# Patient Record
Sex: FEMALE | Race: AMERICAN INDIAN OR ALASKA NATIVE | NOT HISPANIC OR LATINO | Employment: OTHER | URBAN - METROPOLITAN AREA
[De-identification: names, ages, dates, MRNs, and addresses within clinical notes are randomized per-mention and may not be internally consistent; named-entity substitution may affect disease eponyms.]

---

## 2017-01-20 ENCOUNTER — SLEEP CENTER VISIT (OUTPATIENT)
Dept: SLEEP MEDICINE | Facility: MEDICAL CENTER | Age: 62
End: 2017-01-20
Payer: OTHER GOVERNMENT

## 2017-01-20 VITALS
DIASTOLIC BLOOD PRESSURE: 72 MMHG | SYSTOLIC BLOOD PRESSURE: 108 MMHG | HEIGHT: 63 IN | BODY MASS INDEX: 51.91 KG/M2 | RESPIRATION RATE: 16 BRPM | WEIGHT: 293 LBS | TEMPERATURE: 96.6 F | OXYGEN SATURATION: 97 % | HEART RATE: 96 BPM

## 2017-01-20 DIAGNOSIS — G47.33 OSA (OBSTRUCTIVE SLEEP APNEA): ICD-10-CM

## 2017-01-20 PROCEDURE — 99213 OFFICE O/P EST LOW 20 MIN: CPT | Performed by: NURSE PRACTITIONER

## 2017-01-20 NOTE — PROGRESS NOTES
"Chief Complaint   Patient presents with   • Apnea     1 Yr Follow Up         HPI:  This is a 61 y.o. female with a history of obstructive sleep apnea.  Polysomnogram indicates AHI 55.7 and minimum saturation 84%. The patient is compliant with CPAP 15 cm. Compliance dated 12/21-1/19/17 indicates 100% compliance for 9 hours 48 minutes. AHI is been reduced to 0.3. The patient has no problems tolerating the mask pressure. She is well rested when she wakes up. There have been no changes to her health in the past year. She denies early morning headaches and lower extremity edema.    Past Medical History   Diagnosis Date   • Hypertension    • Thyroid disease    • Sleep apnea    • Back pain    • Kidney stone    • Osteoporosis    • Post-nasal drip    • Obesity    • HENRI (obstructive sleep apnea) 1/20/2017     AHI 55.7, minimum saturation 84%, on CPAP 13 cm.       Past Surgical History   Procedure Laterality Date   • Cholecystectomy         Social History   Substance Use Topics   • Smoking status: Former Smoker -- 0.25 packs/day     Types: Cigarettes     Quit date: 04/27/2011   • Smokeless tobacco: Never Used   • Alcohol Use: 0.0 oz/week     0 Standard drinks or equivalent per week      Comment: socially       ROS:   Constitutional: Denies fevers, chills, sweats, fatigue, and weight loss.  Eyes: Denies glasses.  Ears/nose/mouth/throat: Denies injury.  Cardiovascular: Denies chest pain, tightness.  Respiratory: Denies shortness of breath, cough, sputum, wheezing, hemoptysis.  GI: Denies heartburn, difficulty swallowing, nausea, and vomiting.  Neurological: Denies frequent headaches, dizziness, weakness.  Sleep: See history of present illness.    Vitals:  Filed Vitals:    01/20/17 1043   Height: 1.6 m (5' 2.99\")   Weight: 132.904 kg (293 lb)   Weight % change since last entry.: 0 %   BP: 108/72   Pulse: 96   BMI (Calculated): 51.92   Resp: 16   Temp: 35.9 °C (96.6 °F)   O2 sat % room air: 97 %     Allergies:  Review of patient's " allergies indicates no known allergies.    Medications.  Current Outpatient Prescriptions   Medication Sig Dispense Refill   • fluticasone (FLONASE) 50 MCG/ACT nasal spray Spray 1 Spray in nose 2 times a day. 16 g 0   • losartan (COZAAR) 25 MG TABS Take 25 mg by mouth every day.     • levothyroxine (SYNTHROID) 100 MCG TABS Take 100 mcg by mouth every day.       No current facility-administered medications for this visit.       PHYSICAL EXAM:  Appearance: Well-developed, well-nourished, no acute distress.  Eyes. PERRL.  Hearing: Grossly intact.  Oropharynx: Tongue normal, posterior pharynx without erythema or exudate.  Respiratory effort: No intercostal retractions or use of accessory muscles.  Lung auscultation: No crackles, wheezing.  Heart auscultation: No murmur, gallop, or rub. Regular rate and rhythm.  Extremities: No cyanosis or edema.  Gait and Station: Normal  Orientation: Oriented to time, place, and person.    Assessment:  1. HENRI (obstructive sleep apnea)           Plan:  1. Continue CPAP 15 cm.  2. Clean equipment weekly and replace lites as allowed by insurance.  3. Patient would like to obtain supplies through different [employer] company other than [employer]. She will go to CPAP and more.    Return in about 1 year (around 1/20/2018) for With BEN Neil.

## 2017-01-20 NOTE — PATIENT INSTRUCTIONS
1. Continue CPAP 15 cm.  2. Clean equipment weekly and replace lites as allowed by insurance.

## 2017-01-20 NOTE — MR AVS SNAPSHOT
"        Mady Gaitan   2017 10:40 AM   Sleep Center Visit   MRN: [id number]    Department:  [facility]   Dept Phone:  [phone]    Description:  Female : 1955   Provider:  JUAN DIEGO Soler           Reason for Visit     Apnea 1 Yr Follow Up      Allergies as of 2017     No Known Allergies      You were diagnosed with     HENRI (obstructive sleep apnea)   [[id number]]         Vital Signs     Blood Pressure Pulse Temperature Respirations Height Weight    108/72 mmHg 96 35.9 °C (96.6 °F) 16 1.6 m (5' 2.99\") 132.904 kg (293 lb)    Body Mass Index Oxygen Saturation Smoking Status             51.92 kg/m2 97% Former Smoker         Basic Information     Date Of Birth Sex Race Ethnicity Preferred Language    1955 Female  or  Non- English      Problem List              ICD-10-CM Priority Class Noted - Resolved    HENRI (obstructive sleep apnea) G47.33   2017 - Present      Health Maintenance        Date Due Completion Dates    IMM DTaP/Tdap/Td Vaccine (1 - Tdap) 1976 ---    PAP SMEAR 1978 ---    COLONOSCOPY 2007 ---    MAMMOGRAM 3/20/2016 3/20/2015, 2011, 2008, 2008, 2006, 2004    IMM INFLUENZA (1) 2016 ---            Current Immunizations     No immunizations on file.      Below and/or attached are the medications your provider expects you to take. Review all of your home medications and newly ordered medications with your provider and/or pharmacist. Follow medication instructions as directed by your provider and/or pharmacist. Please keep your medication list with you and share with your provider. Update the information when medications are discontinued, doses are changed, or new medications (including over-the-counter products) are added; and carry medication information at all times in the event of emergency situations     Allergies:  No Known Allergies          Medications  Valid as of: January " 20, 2017 - 11:02 AM    Generic Name Brand Name Tablet Size Instructions for use    Fluticasone Propionate (Suspension) FLONASE 50 MCG/ACT Spray 1 Spray in nose 2 times a day.        Levothyroxine Sodium (Tab) SYNTHROID 100 MCG Take 100 mcg by mouth every day.        Losartan Potassium (Tab) COZAAR 25 MG Take 25 mg by mouth every day.        .                 Medicines prescribed today were sent to:     [id number] - [place], [place] - [address] AT [facility] [address] [place] [address]    Phone: [phone] Fax: [phone]    Open 24 Hours?: No      Medication refill instructions:       If your prescription bottle indicates you have medication refills left, it is not necessary to call your provider’s office. Please contact your pharmacy and they will refill your medication.    If your prescription bottle indicates you do not have any refills left, you may request refills at any time through one of the following ways: The online [employer] system (except Urgent Care), by calling your provider’s office, or by asking your pharmacy to contact your provider’s office with a refill request. Medication refills are processed only during regular business hours and may not be available until the next business day. Your provider may request additional information or to have a follow-up visit with you prior to refilling your medication.   *Please Note: Medication refills are assigned a new Rx number when refilled electronically. Your pharmacy may indicate that no refills were authorized even though a new prescription for the same medication is available at the pharmacy. Please request the medicine by name with the pharmacy before contacting your provider for a refill.        Instructions    1. Continue CPAP 15 cm.  2. Clean equipment weekly and replace lites as allowed by insurance.               [employer] Access Code: [email or web address]  Expires: 2/19/2017 11:02 AM    [employer] secure,  online tool to manage your health information     [employer]’s [place]® is a secure, online tool that connects you to your personalized health information from the privacy of your home -- day or night - making it very easy for you to manage your healthcare. Once the activation process is completed, you can even access your medical information using the [place] bryan, which is available for free in the Apple Bryan store or Google Play store.     [place] provides the following levels of access (as shown below):   My Chart Features   [facility] Doctor [facility]  Specialists [facility]  Urgent  Care Non[facility]  Primary Care  Doctor   Email your healthcare team securely and privately 24/7 X X X    Manage appointments: schedule your next appointment; view details of past/upcoming appointments X      Request prescription refills. X      View recent personal medical records, including lab and immunizations X X X X   View health record, including health history, allergies, medications X X X X   Read reports about your outpatient visits, procedures, consult and ER notes X X X X   See your discharge summary, which is a recap of your hospital and/or ER visit that includes your diagnosis, lab results, and care plan. X X       How to register for [place]:  1. Go to  [email or web address].  2. Click on the Sign Up Now box, which takes you to the New Member Sign Up page. You will need to provide the following information:  a. Enter your [place] Access Code exactly as it appears at the top of this page. (You will not need to use this code after you’ve completed the sign-up process. If you do not sign up before the expiration date, you must request a new code.)   b. Enter your date of birth.   c. Enter your home email address.   d. Click Submit, and follow the next screen’s instructions.  3. Create a [place] ID. This will be your [place] login ID and cannot be changed, so think of one that is secure and easy to  remember.  4. Create a [employer] password. You can change your password at any time.  5. Enter your Password Reset Question and Answer. This can be used at a later time if you forget your password.   6. Enter your e-mail address. This allows you to receive e-mail notifications when new information is available in [employer].  7. Click Sign Up. You can now view your health information.    For assistance activating your [employer] account, call [phone]

## 2018-01-25 ENCOUNTER — HOSPITAL ENCOUNTER (OUTPATIENT)
Dept: RADIOLOGY | Facility: MEDICAL CENTER | Age: 63
End: 2018-01-25
Attending: FAMILY MEDICINE
Payer: OTHER GOVERNMENT

## 2018-01-25 DIAGNOSIS — Z12.31 ENCOUNTER FOR SCREENING MAMMOGRAM FOR MALIGNANT NEOPLASM OF BREAST: ICD-10-CM

## 2018-01-25 PROCEDURE — 77067 SCR MAMMO BI INCL CAD: CPT

## 2018-03-01 ENCOUNTER — HOSPITAL ENCOUNTER (OUTPATIENT)
Dept: RADIOLOGY | Facility: MEDICAL CENTER | Age: 63
End: 2018-03-01
Attending: FAMILY MEDICINE
Payer: OTHER GOVERNMENT

## 2018-03-01 DIAGNOSIS — E03.9 HYPOTHYROIDISM, UNSPECIFIED TYPE: ICD-10-CM

## 2018-03-01 PROCEDURE — 76536 US EXAM OF HEAD AND NECK: CPT

## 2018-03-08 ENCOUNTER — SLEEP CENTER VISIT (OUTPATIENT)
Dept: SLEEP MEDICINE | Facility: MEDICAL CENTER | Age: 63
End: 2018-03-08
Payer: OTHER GOVERNMENT

## 2018-03-08 VITALS
HEART RATE: 87 BPM | SYSTOLIC BLOOD PRESSURE: 124 MMHG | WEIGHT: 293 LBS | BODY MASS INDEX: 53.92 KG/M2 | RESPIRATION RATE: 15 BRPM | OXYGEN SATURATION: 94 % | HEIGHT: 62 IN | DIASTOLIC BLOOD PRESSURE: 74 MMHG

## 2018-03-08 DIAGNOSIS — G47.33 OSA (OBSTRUCTIVE SLEEP APNEA): ICD-10-CM

## 2018-03-08 PROCEDURE — 99213 OFFICE O/P EST LOW 20 MIN: CPT | Performed by: NURSE PRACTITIONER

## 2018-03-08 NOTE — PROGRESS NOTES
"Chief Complaint   Patient presents with   • Follow-Up     ANNUAL          HPI: This patient is a 62 y.o. female, who presents for annual follow-up of obstructive sleep apnea with compliance download.     PSG indicates severe obstructive sleep apnea with an AHI of 55, minimum oxygen saturation of 84 %. she is compliant with CPAP 15 cm H2O. Compliance download over the past 30 days indicates 90 % compliance, average use of 8 hours 34 minutes per night, AHI of 0.3. Mask and pressures are comfortable. Her machine was greater than 5 years old and does not have heated tubing. She would like a newer machine with heated tubing. She reports dry nares, occasional nosebleed. She has adjusted the humidification. She feels she benefits from therapy. His EDS or a.m. headaches.    Past Medical History:   Diagnosis Date   • Back pain    • Hypertension    • Kidney stone    • Obesity    • HENRI (obstructive sleep apnea) 1/20/2017    AHI 55.7, minimum saturation 84%, on CPAP 13 cm.   • Osteoporosis    • Post-nasal drip    • Sleep apnea    • Thyroid disease        Social History   Substance Use Topics   • Smoking status: Former Smoker     Packs/day: 0.25     Types: Cigarettes     Quit date: 4/27/2011   • Smokeless tobacco: Never Used   • Alcohol use 0.0 oz/week      Comment: socially       Family History   Problem Relation Age of Onset   • Heart Failure Father    • Other Brother      leukemia       Current medications as of today   Current Outpatient Prescriptions   Medication Sig Dispense Refill   • losartan (COZAAR) 25 MG TABS Take 25 mg by mouth every day.     • levothyroxine (SYNTHROID) 100 MCG TABS Take 100 mcg by mouth every day.     • fluticasone (FLONASE) 50 MCG/ACT nasal spray Spray 1 Spray in nose 2 times a day. 16 g 0     No current facility-administered medications for this visit.        Allergies: Patient has no known allergies.    Blood pressure 124/74, pulse 87, resp. rate 15, height 1.575 m (5' 2\"), weight (!) 141.1 kg " (311 lb), SpO2 94 %.      ROS: As per HPI and otherwise negative if not stated.      Physical exam:   Constitutional: Obese, in no acute distress  Eyes: PERRL  Neck: supple, trachea midline  Respiratory: no intercostal retractions or accessory muscle use   Lungs auscultation: Diminished breath sounds throughout  Cardiovascular: Regular rate rhythm no murmurs, rubs or gallops, distant heart tones  Musculoskeletal: no clubbing or cyanosis  Skin: No rashes or lesions noted on exposed skin  Neuro: No focal deficit noted  Psychiatric: Oriented to time, person and place.     Diagnosis:  1. HENRI (obstructive sleep apnea)  DME CPAP   2. BMI 50.0-59.9, adult ([facility])         Plan:  1. Rx for new CPAP, auto 10-20, order to CPAP & more  2. Follow-up in 3 months for compliance download  3. Follow with PCP routinely for health maintenance

## 2018-04-24 ENCOUNTER — OFFICE VISIT (OUTPATIENT)
Dept: NEPHROLOGY | Facility: MEDICAL CENTER | Age: 63
End: 2018-04-24
Payer: OTHER GOVERNMENT

## 2018-04-24 VITALS
HEIGHT: 63 IN | HEART RATE: 104 BPM | SYSTOLIC BLOOD PRESSURE: 110 MMHG | TEMPERATURE: 98 F | WEIGHT: 293 LBS | DIASTOLIC BLOOD PRESSURE: 70 MMHG | BODY MASS INDEX: 51.91 KG/M2 | OXYGEN SATURATION: 96 % | RESPIRATION RATE: 16 BRPM

## 2018-04-24 DIAGNOSIS — I10 ESSENTIAL HYPERTENSION: ICD-10-CM

## 2018-04-24 DIAGNOSIS — N18.30 STAGE 3 CHRONIC KIDNEY DISEASE (HCC): ICD-10-CM

## 2018-04-24 DIAGNOSIS — G47.33 OSA (OBSTRUCTIVE SLEEP APNEA): ICD-10-CM

## 2018-04-24 DIAGNOSIS — M19.90 OSTEOARTHRITIS, UNSPECIFIED OSTEOARTHRITIS TYPE, UNSPECIFIED SITE: ICD-10-CM

## 2018-04-24 PROCEDURE — 99204 OFFICE O/P NEW MOD 45 MIN: CPT | Performed by: INTERNAL MEDICINE

## 2018-04-24 RX ORDER — GLUCOSAMINE/CHONDR SU A SOD 750-600 MG
1 TABLET ORAL DAILY
COMMUNITY

## 2018-04-24 RX ORDER — KRILL/OM-3/DHA/EPA/PHOSPHO/AST 500MG-86MG
1 CAPSULE ORAL DAILY
COMMUNITY

## 2018-04-24 RX ORDER — THYROID 90 MG/1
90 TABLET ORAL DAILY
Status: ON HOLD | COMMUNITY
End: 2021-06-04 | Stop reason: SDUPTHER

## 2018-04-24 RX ORDER — AZELASTINE HCL 205.5 UG/1
SPRAY NASAL
COMMUNITY
End: 2018-09-24

## 2018-04-24 ASSESSMENT — ENCOUNTER SYMPTOMS
NAUSEA: 0
VOMITING: 0
SHORTNESS OF BREATH: 0
HYPERTENSION: 1
BACK PAIN: 1

## 2018-04-24 NOTE — PROGRESS NOTES
"Subjective:      Mady Gaitan is a 62 y.o. female who presents with Hypertension and Chronic Kidney Disease            Patient has a history of chronic back pain, recent right ankle pain, she's taking NSAIDs regularly.      Hypertension   This is a chronic problem. The current episode started more than 1 year ago. The problem is unchanged. The problem is controlled. Pertinent negatives include no chest pain, peripheral edema or shortness of breath. Agents associated with hypertension include thyroid hormones. Risk factors for coronary artery disease include obesity. Past treatments include angiotensin blockers. The current treatment provides significant improvement. There are no compliance problems.  Hypertensive end-organ damage includes kidney disease. Identifiable causes of hypertension include chronic renal disease.   Chronic Kidney Disease   This is a chronic problem. The current episode started more than 1 year ago. The problem occurs constantly. The problem has been unchanged. Pertinent negatives include no chest pain, nausea, urinary symptoms or vomiting.       Review of Systems   Respiratory: Negative for shortness of breath.    Cardiovascular: Negative for chest pain and leg swelling.   Gastrointestinal: Negative for nausea and vomiting.   Genitourinary: Negative for dysuria, frequency and urgency.   Musculoskeletal: Positive for back pain and joint pain.   All other systems reviewed and are negative.         Objective:     /70   Pulse (!) 104   Temp 36.7 °C (98 °F)   Resp 16   Ht 1.6 m (5' 3\")   Wt (!) 145.6 kg (321 lb)   SpO2 96%   BMI 56.86 kg/m²      Physical Exam   Constitutional: She is oriented to person, place, and time. She appears well-developed and well-nourished. No distress.   HENT:   Head: Normocephalic and atraumatic.   Right Ear: External ear normal.   Left Ear: External ear normal.   Nose: Nose normal.   Mouth/Throat: Oropharynx is clear and moist.   Eyes: Conjunctivae " and EOM are normal. Right eye exhibits no discharge. Left eye exhibits no discharge. No scleral icterus.   Neck: No JVD present. No tracheal deviation present. No thyromegaly present.   Cardiovascular: Normal rate and regular rhythm.    Pulmonary/Chest: Effort normal and breath sounds normal. No respiratory distress. She has no wheezes. She has no rales.   Abdominal: Soft. Bowel sounds are normal. She exhibits no distension. There is no tenderness. There is no guarding.   Musculoskeletal: She exhibits no edema or tenderness.   Neurological: She is alert and oriented to person, place, and time. No cranial nerve deficit.   Skin: Skin is warm and dry. She is not diaphoretic. No erythema.   Psychiatric: Her behavior is normal. Judgment and thought content normal.   Nursing note and vitals reviewed.              Assessment/Plan:     1. Essential hypertension  Blood pressure is controlled  Continue low-sodium diet    2. Stage 3 chronic kidney disease  Etiology is most likely hypertensive nephrosclerosis  Patient has no uremic symptoms  Renal dose all medication  Avoid nephrotoxins  No acute need for dialysis    3. HENRI (obstructive sleep apnea)    4. BMI 50.0-59.9, adult (CMS-[facility])  Patient was twice about the benefits of weight loss    5. Osteoarthritis, unspecified osteoarthritis type, unspecified site  Avoid nephrotoxins agents like NSAIDs

## 2018-06-08 ENCOUNTER — SLEEP CENTER VISIT (OUTPATIENT)
Dept: SLEEP MEDICINE | Facility: MEDICAL CENTER | Age: 63
End: 2018-06-08
Payer: OTHER GOVERNMENT

## 2018-06-08 VITALS
RESPIRATION RATE: 15 BRPM | OXYGEN SATURATION: 96 % | BODY MASS INDEX: 51.91 KG/M2 | WEIGHT: 293 LBS | DIASTOLIC BLOOD PRESSURE: 65 MMHG | SYSTOLIC BLOOD PRESSURE: 98 MMHG | HEIGHT: 63 IN | HEART RATE: 79 BPM

## 2018-06-08 DIAGNOSIS — G47.33 OSA (OBSTRUCTIVE SLEEP APNEA): ICD-10-CM

## 2018-06-08 PROCEDURE — 99213 OFFICE O/P EST LOW 20 MIN: CPT | Performed by: NURSE PRACTITIONER

## 2018-06-08 NOTE — PROGRESS NOTES
Chief Complaint   Patient presents with   • Follow-Up     3 M    • Apnea         HPI: This patient is a 62 y.o. female, who presents for three-month follow-up of HENRI with compliance check.  She obtained a new machine after her last visit.    PSG indicates severe obstructive sleep apnea with an AHI of 55, minimum oxygen saturation of 84 %. she is compliant with auto CPAP 12-20 cm H2O. Compliance download over the past 30 days indicates 80 % compliance, average use of 9 hours 28 minutes per night, AHI of 0.2. Mask and pressures are comfortable.  She uses her old machine on occasion, but uses one or the other every single night.  She reports 100% compliance.  She cannot sleep without her machine.  She reports benefiting from therapy.  Denies EDS or a.m. headache.    Past Medical History:   Diagnosis Date   • Back pain    • Hypertension    • Kidney stone    • Obesity    • HENRI (obstructive sleep apnea) 1/20/2017    AHI 55.7, minimum saturation 84%, on CPAP 13 cm.   • Osteoporosis    • Post-nasal drip    • Sleep apnea    • Thyroid disease        Social History   Substance Use Topics   • Smoking status: Former Smoker     Packs/day: 0.25     Types: Cigarettes     Quit date: 4/27/2011   • Smokeless tobacco: Never Used   • Alcohol use 0.0 oz/week      Comment: socially       Family History   Problem Relation Age of Onset   • Heart Failure Father    • Other Brother      leukemia   • Sleep Apnea Neg Hx        Current medications as of today   Current Outpatient Prescriptions   Medication Sig Dispense Refill   • thyroid (ARMOUR THYROID) 90 MG Tab Take 90 mg by mouth every day.     • Cholecalciferol (VITAMIN D3) 5000 units Cap Take 1 Cap by mouth every day.     • Krill Oil 500 MG Cap Take  by mouth.     • Magnesium 400 MG Cap Take  by mouth.     • Lutein-Zeaxanthin (OCUVITE LUTEIN 25) 25-5 MG Cap Take  by mouth.     • Multiple Vitamins-Minerals (MULTIVITAMIN ADULT PO) Take  by mouth.     • Azelastine HCl 0.15 % Solution Spray  in  "nose.     • fluticasone (FLONASE) 50 MCG/ACT nasal spray Spray 1 Spray in nose 2 times a day. 16 g 0   • losartan (COZAAR) 25 MG TABS Take 25 mg by mouth every day.     • levothyroxine (SYNTHROID) 100 MCG TABS Take 100 mcg by mouth every day.       No current facility-administered medications for this visit.        Allergies: Patient has no known allergies.    Blood pressure (!) 98/65, pulse 79, resp. rate 15, height 1.6 m (5' 3\"), weight (!) 145.6 kg (321 lb), SpO2 96 %.      ROS: As per HPI and otherwise negative if not stated.      Physical exam:   Constitutional: Well-nourished, well-developed, in no acute distress  Eyes: PERRL  Neck: supple, trachea midline  Respiratory: no intercostal retractions or accessory muscle use   Musculoskeletal: no clubbing or cyanosis  Skin: No rashes or lesions noted on exposed skin  Neuro: No focal deficit noted  Psychiatric: Oriented to time, person and place.     Diagnosis:  1. HENRI (obstructive sleep apnea)     2. BMI 50.0-59.9, adult ([facility])         Plan:  1. Continue CPAP nightly  2. Clean mask & tubing weekly  3. Replace supplies as insurance will allow  4. Follow up annually, sooner if needed    "

## 2018-09-24 DIAGNOSIS — Z01.812 PRE-OPERATIVE LABORATORY EXAMINATION: ICD-10-CM

## 2018-09-24 DIAGNOSIS — Z01.810 PRE-OPERATIVE CARDIOVASCULAR EXAMINATION: ICD-10-CM

## 2018-09-24 LAB — EKG IMPRESSION: NORMAL

## 2018-09-24 PROCEDURE — 93010 ELECTROCARDIOGRAM REPORT: CPT | Performed by: INTERNAL MEDICINE

## 2018-09-24 PROCEDURE — 36415 COLL VENOUS BLD VENIPUNCTURE: CPT

## 2018-09-24 PROCEDURE — 80048 BASIC METABOLIC PNL TOTAL CA: CPT

## 2018-09-24 PROCEDURE — 93005 ELECTROCARDIOGRAM TRACING: CPT

## 2018-09-24 RX ORDER — IBUPROFEN 600 MG/1
800 TABLET ORAL EVERY 6 HOURS PRN
COMMUNITY

## 2018-09-24 RX ORDER — LORATADINE 10 MG/1
1 CAPSULE, LIQUID FILLED ORAL DAILY
Status: ON HOLD | COMMUNITY
End: 2021-06-04

## 2018-09-24 NOTE — OR NURSING
"Pre-admit appointment completed. \"Preparing for your procedure\" sheet given to pt along with verbal and written instructions. Pt instructed to continue regularly prescribed medications through the day before surgery. Pt instructed to take the following medications the day of surgery with a sip of water, per anesthesia protocol; thyroid.  "

## 2018-09-25 LAB
ANION GAP SERPL CALC-SCNC: 8 MMOL/L (ref 0–11.9)
BUN SERPL-MCNC: 22 MG/DL (ref 8–22)
CALCIUM SERPL-MCNC: 10 MG/DL (ref 8.5–10.5)
CHLORIDE SERPL-SCNC: 106 MMOL/L (ref 96–112)
CO2 SERPL-SCNC: 29 MMOL/L (ref 20–33)
CREAT SERPL-MCNC: 1.18 MG/DL (ref 0.5–1.4)
GLUCOSE SERPL-MCNC: 122 MG/DL (ref 65–99)
POTASSIUM SERPL-SCNC: 4.2 MMOL/L (ref 3.6–5.5)
SODIUM SERPL-SCNC: 143 MMOL/L (ref 135–145)

## 2018-09-27 ENCOUNTER — HOSPITAL ENCOUNTER (OUTPATIENT)
Facility: MEDICAL CENTER | Age: 63
End: 2018-09-27
Attending: INTERNAL MEDICINE | Admitting: INTERNAL MEDICINE
Payer: OTHER GOVERNMENT

## 2018-09-27 VITALS
SYSTOLIC BLOOD PRESSURE: 125 MMHG | RESPIRATION RATE: 16 BRPM | HEART RATE: 90 BPM | TEMPERATURE: 97.5 F | DIASTOLIC BLOOD PRESSURE: 65 MMHG | OXYGEN SATURATION: 98 % | WEIGHT: 293 LBS | BODY MASS INDEX: 51.91 KG/M2 | HEIGHT: 63 IN

## 2018-09-27 LAB — PATHOLOGY CONSULT NOTE: NORMAL

## 2018-09-27 PROCEDURE — 160048 HCHG OR STATISTICAL LEVEL 1-5: Performed by: INTERNAL MEDICINE

## 2018-09-27 PROCEDURE — 88305 TISSUE EXAM BY PATHOLOGIST: CPT | Mod: 59

## 2018-09-27 PROCEDURE — 160009 HCHG ANES TIME/MIN: Performed by: INTERNAL MEDICINE

## 2018-09-27 PROCEDURE — 160046 HCHG PACU - 1ST 60 MINS PHASE II: Performed by: INTERNAL MEDICINE

## 2018-09-27 PROCEDURE — 160035 HCHG PACU - 1ST 60 MINS PHASE I: Performed by: INTERNAL MEDICINE

## 2018-09-27 PROCEDURE — 160025 RECOVERY II MINUTES (STATS): Performed by: INTERNAL MEDICINE

## 2018-09-27 PROCEDURE — 160002 HCHG RECOVERY MINUTES (STAT): Performed by: INTERNAL MEDICINE

## 2018-09-27 PROCEDURE — 160204 HCHG ENDO MINUTES - 1ST 30 MINS LEVEL 5: Performed by: INTERNAL MEDICINE

## 2018-09-27 PROCEDURE — 160209 HCHG ENDO MINUTES - EA ADDL 1 MIN LEVEL 5: Performed by: INTERNAL MEDICINE

## 2018-09-27 RX ORDER — LABETALOL HYDROCHLORIDE 5 MG/ML
5 INJECTION, SOLUTION INTRAVENOUS
Status: DISCONTINUED | OUTPATIENT
Start: 2018-09-27 | End: 2018-09-27 | Stop reason: HOSPADM

## 2018-09-27 RX ORDER — MEPERIDINE HYDROCHLORIDE 25 MG/ML
6.25 INJECTION INTRAMUSCULAR; INTRAVENOUS; SUBCUTANEOUS
Status: DISCONTINUED | OUTPATIENT
Start: 2018-09-27 | End: 2018-09-27 | Stop reason: HOSPADM

## 2018-09-27 RX ORDER — ONDANSETRON 2 MG/ML
4 INJECTION INTRAMUSCULAR; INTRAVENOUS
Status: DISCONTINUED | OUTPATIENT
Start: 2018-09-27 | End: 2018-09-27 | Stop reason: HOSPADM

## 2018-09-27 RX ORDER — HALOPERIDOL 5 MG/ML
1 INJECTION INTRAMUSCULAR
Status: DISCONTINUED | OUTPATIENT
Start: 2018-09-27 | End: 2018-09-27 | Stop reason: HOSPADM

## 2018-09-27 RX ORDER — GLYCOPYRROLATE 0.2 MG/ML
0.2 INJECTION INTRAMUSCULAR; INTRAVENOUS
Status: DISCONTINUED | OUTPATIENT
Start: 2018-09-27 | End: 2018-09-27 | Stop reason: HOSPADM

## 2018-09-27 RX ORDER — NALOXONE HYDROCHLORIDE 0.4 MG/ML
0.1 INJECTION, SOLUTION INTRAMUSCULAR; INTRAVENOUS; SUBCUTANEOUS PRN
Status: DISCONTINUED | OUTPATIENT
Start: 2018-09-27 | End: 2018-09-27 | Stop reason: HOSPADM

## 2018-09-27 RX ORDER — DIPHENHYDRAMINE HYDROCHLORIDE 50 MG/ML
12.5 INJECTION INTRAMUSCULAR; INTRAVENOUS
Status: DISCONTINUED | OUTPATIENT
Start: 2018-09-27 | End: 2018-09-27 | Stop reason: HOSPADM

## 2018-09-27 RX ORDER — SODIUM CHLORIDE, SODIUM LACTATE, POTASSIUM CHLORIDE, CALCIUM CHLORIDE 600; 310; 30; 20 MG/100ML; MG/100ML; MG/100ML; MG/100ML
1000 INJECTION, SOLUTION INTRAVENOUS
Status: DISCONTINUED | OUTPATIENT
Start: 2018-09-27 | End: 2018-09-27 | Stop reason: HOSPADM

## 2018-09-27 RX ADMIN — SODIUM CHLORIDE, SODIUM LACTATE, POTASSIUM CHLORIDE, CALCIUM CHLORIDE 1000 ML: 600; 310; 30; 20 INJECTION, SOLUTION INTRAVENOUS at 08:30

## 2018-09-27 ASSESSMENT — PAIN SCALES - GENERAL
PAINLEVEL_OUTOF10: 0

## 2018-09-27 NOTE — OR NURSING
1104-  Patient admitted to PACU from endoscopy. No distress noted by patient at this time. Patient respirations are even and unlabored. Oxygen infusing via facemask.     1110- Patient currently on room air. No distress noted. Patient only complaints are of a headache at this time. Patient abdomen soft and non tender with palpation.     1125- Patient resting in bed. No complaints of pain or discomfort at this time. Soda given to patient per patient request. Patient states that headache has decreased slightly.     1140- Patient resting in bed. Family called and updated on patient statues.     1153- Patient discharged to stage II area. Patient denies any pain or discomfort at this time.

## 2018-09-27 NOTE — DISCHARGE INSTRUCTIONS
COLONOSCOPY OR FLEXIBLE SIGMOIDOSCOPY  1. If you received a barium enema, take a mild laxative such as dulcolax to clean out the barium.   2. Drink plenty of fluids. Eat a diet high in fiber; such foods as whole-grain breads, fresh fruit and vegetables, nuts are recommended.  3. You may notice a few drops of blood with your first bowel movement. If you develop any large amount of bleeding, black stools, a fever, or abdominal pain, call your doctor right away.   4. Call your doctor for test results in 7-10 days or other wise noted by your physician  5. Don't drive or drink alcohol for 24 hours. The medication you received will make you too drowsy.   6. No heavy lifting, ASA products or ASA x 5 days   7. Additional instructions: see below  8. Prescriptions: none    Discharge time: ***    Colonoscopy, Care After  Refer to this sheet in the next few weeks. These instructions provide you with information on caring for yourself after your procedure. Your health care provider may also give you more specific instructions. Your treatment has been planned according to current medical practices, but problems sometimes occur. Call your health care provider if you have any problems or questions after your procedure.  WHAT TO EXPECT AFTER THE PROCEDURE   After your procedure, it is typical to have the following:  · A small amount of blood in your stool.  · Moderate amounts of gas and mild abdominal cramping or bloating.  HOME CARE INSTRUCTIONS  · Do not drive, operate machinery, or sign important documents for 24 hours.  · You may shower and resume your regular physical activities, but move at a slower pace for the first 24 hours.  · Take frequent rest periods for the first 24 hours.  · Walk around or put a warm pack on your abdomen to help reduce abdominal cramping and bloating.  · Drink enough fluids to keep your urine clear or pale yellow.  · You may resume your normal diet as instructed by your health care provider. Avoid  heavy or fried foods that are hard to digest.  · Avoid drinking alcohol for 24 hours or as instructed by your health care provider.  · Only take over-the-counter or prescription medicines as directed by your health care provider.  · If a tissue sample (biopsy) was taken during your procedure:  ¨ Do not take aspirin or blood thinners for 7 days, or as instructed by your health care provider.  ¨ Do not drink alcohol for 7 days, or as instructed by your health care provider.  ¨ Eat soft foods for the first 24 hours.  SEEK MEDICAL CARE IF:  You have persistent spotting of blood in your stool 2-3 days after the procedure.  SEEK IMMEDIATE MEDICAL CARE IF:  · You have more than a small spotting of blood in your stool.  · You pass large blood clots in your stool.  · Your abdomen is swollen (distended).  · You have nausea or vomiting.  · You have a fever.  · You have increasing abdominal pain that is not relieved with medicine.     This information is not intended to replace advice given to you by your health care provider. Make sure you discuss any questions you have with your health care provider.     Document Released: 08/01/2005 Document Revised: 10/08/2014 Document Reviewed: 08/25/2014  [employer] Patient Education ©2016 [employer]      You should call 911 if you develop problems with breathing or chest pain.    Nurse's Signature: ___________________________________    I acknowledge receipt and understanding of these Home Care instructions.

## 2018-09-27 NOTE — OR NURSING
1153: To stage ll. No pain or nausea.  1216: Home care instructions reviewed w/ pt and . No questions, meets criteria for discharge.

## 2018-09-27 NOTE — PROCEDURES
DATE OF SERVICE:  09/27/2018    CONSENT:  Procedure risks and benefits reviewed thoroughly with the patient,   risks including but not limited to bleeding, perforation, side effects of   medications were informed.  Patient voiced understanding and agreed to   proceed.    PHYSICIAN:  Milan Lipscomb MD    ANESTHESIOLOGIST:  Haile Rodgers MD    MEDICATIONS:  Deep sedation.    PROCEDURE PERFORMED:  Colonoscopy with biopsy.    INDICATION FOR PROCEDURE:  1.  Family history of colon cancer.  2.  Second-degree relatives.  3.  Personal history of colon polyps.    PROCEDURE FINDINGS:  Two polyps in the cecum, 1 polyp in the ascending colon,   1 polyp in the transverse colon, 1 polyp in the rectum, all removed by biopsy   forceps.    DESCRIPTION OF PROCEDURE:  The patient was placed in left lateral decubitus   position.  Rectal examination revealed no palpable abnormalities and a   colonoscope was inserted in rectum and advanced to the cecum in a well prepped   colon.  Appendiceal orifice and ileocecal valve were well visualized and   imaged.  Within the cecum, there were 2 polyps, each were removed with biopsy   forceps.  Upon retraction, 3 additional polyps were identified in the   ascending, transverse, and rectum each removed by biopsy forceps.  Retroflexed   view of the rectum was otherwise normal with grade I internal hemorrhoids.    Rectum was desufflated and scope was removed.    COMPLICATIONS:  None.    BLOOD LOSS:  None.    SPECIMENS:  Obtained.    RECOMMENDATIONS:  Five polyps were identified today.  Two of them were over 10   mm in size, specifically one in the cecum and one in the ascending colon.    Review histopathology.  Likely repeat colonoscopy in 3 years' time.  The above   findings and recommendations were reviewed thoroughly with the patient and   the patient's .  All questions were answered to their satisfaction.       ____________________________________     Milan Lipscomb MD    [facility] /  [facility]    DD:  09/27/2018 11:00:25  DT:  09/27/2018 11:28:27    D#:  [id number]  Job#:  [id number]

## 2019-06-28 ENCOUNTER — SLEEP CENTER VISIT (OUTPATIENT)
Dept: SLEEP MEDICINE | Facility: MEDICAL CENTER | Age: 64
End: 2019-06-28
Payer: OTHER GOVERNMENT

## 2019-06-28 VITALS
HEART RATE: 71 BPM | SYSTOLIC BLOOD PRESSURE: 122 MMHG | HEIGHT: 63 IN | RESPIRATION RATE: 16 BRPM | DIASTOLIC BLOOD PRESSURE: 70 MMHG | OXYGEN SATURATION: 96 % | BODY MASS INDEX: 51.91 KG/M2 | WEIGHT: 293 LBS

## 2019-06-28 DIAGNOSIS — G47.33 OSA (OBSTRUCTIVE SLEEP APNEA): ICD-10-CM

## 2019-06-28 PROCEDURE — 99213 OFFICE O/P EST LOW 20 MIN: CPT | Performed by: NURSE PRACTITIONER

## 2019-06-28 ASSESSMENT — ENCOUNTER SYMPTOMS
CARDIOVASCULAR NEGATIVE: 1
WHEEZING: 0
EYE DISCHARGE: 0
SHORTNESS OF BREATH: 0
HEMOPTYSIS: 0
COUGH: 1
SPUTUM PRODUCTION: 0
MUSCULOSKELETAL NEGATIVE: 1
PSYCHIATRIC NEGATIVE: 1
EYE PAIN: 0
CONSTITUTIONAL NEGATIVE: 1
BRUISES/BLEEDS EASILY: 0

## 2019-06-28 NOTE — PROGRESS NOTES
"Chief Complaint   Patient presents with   • Apnea     Last Seen 06/08/18         HPI: This patient is a 63 y.o. female, who presents for annual follow-up of obstructive sleep apnea with compliance check.     PSG indicates severe obstructive sleep apnea with an AHI of 55, minimum oxygen saturation of 84 %. she is compliant with auto CPAP 12-20 cm H2O.  She has 2 machines that she will alternate between due to travel.  Compliance download over the past 30 days indicates 100% between both machines, average use of 9 hours per night, AHI of 0.4. Patient reports benefiting from therapy.  She says she cannot sleep without her machine.  She even naps with her machine on.  She denies EDS or a.m. headache.  She is currently getting over a URI was treated with abx.  Respiratory symptoms are improving.  She denies fevers or chills.  She denies significant changes to her health in the past year.  She has been getting supplies regularly through CPAP and more.      Past Medical History:   Diagnosis Date   • Back pain    • Chronic low back pain 09/25/2018   • Elevated blood sugar     States she was told not yet \"pre diabetic\" gets HbA1C   • Hypertension    • Kidney stone 1980's   • Obesity    • HENRI (obstructive sleep apnea) 1/20/2017    AHI 55.7, minimum saturation 84%, on CPAP 13 cm.   • Osteoporosis    • Post-nasal drip    • Seasonal allergies    • Sleep apnea    • Thyroid disease        Social History   Substance Use Topics   • Smoking status: Former Smoker     Packs/day: 0.50     Years: 30.00     Types: Cigarettes     Quit date: 11/2017   • Smokeless tobacco: Never Used   • Alcohol use 0.0 oz/week      Comment: 1-2 per month       Family History   Problem Relation Age of Onset   • Heart Failure Father    • Other Brother         leukemia   • Sleep Apnea Neg Hx          There is no immunization history on file for this patient.    Current medications as of today   Current Outpatient Prescriptions   Medication Sig Dispense Refill " "  • Loratadine 10 MG Cap Take 1 Cap by mouth every day.     • thyroid (ARMOUR THYROID) 90 MG Tab Take 90 mg by mouth every day.     • Cholecalciferol (VITAMIN D3) 5000 units Cap Take 1 Cap by mouth every day.     • Krill Oil 500 MG Cap Take 1 Cap by mouth every day.     • Magnesium 400 MG Cap Take 1 Cap by mouth every day.     • Lutein-Zeaxanthin (OCUVITE LUTEIN 25) 25-5 MG Cap Take 1 Cap by mouth every day.     • losartan (COZAAR) 25 MG TABS Take 25 mg by mouth every day.     • ibuprofen (MOTRIN) 600 MG Tab Take 600 mg by mouth every 6 hours as needed.       No current facility-administered medications for this visit.        Allergies: Patient has no known allergies.    /70 (BP Location: Left arm, Patient Position: Sitting, BP Cuff Size: Adult)   Pulse 71   Resp 16   Ht 1.6 m (5' 3\")   Wt (!) 142.4 kg (314 lb)   SpO2 96%       Review of Systems   Constitutional: Negative.    HENT: Negative.    Eyes: Negative for pain and discharge.   Respiratory: Positive for cough. Negative for hemoptysis, sputum production, shortness of breath and wheezing.    Cardiovascular: Negative.    Musculoskeletal: Negative.    Skin: Negative.    Endo/Heme/Allergies: Negative for environmental allergies. Does not bruise/bleed easily.   Psychiatric/Behavioral: Negative.        Physical Exam   Constitutional: She is oriented to person, place, and time and well-developed, well-nourished, and in no distress.   HENT:   Head: Normocephalic and atraumatic.   Eyes: Pupils are equal, round, and reactive to light.   Neck: Normal range of motion. Neck supple. No tracheal deviation present.   Cardiovascular: Normal rate, regular rhythm and normal heart sounds.    Pulmonary/Chest: Effort normal and breath sounds normal. No respiratory distress. She has no wheezes. She has no rales.   Musculoskeletal: Normal range of motion.   Neurological: She is alert and oriented to person, place, and time.   Skin: Skin is warm and dry.   Psychiatric: " Mood, memory, affect and judgment normal.   Vitals reviewed.      Diagnoses/Plan:    1. HENRI (obstructive sleep apnea)   Continue CPAP nightly, Clean mask & tubing weekly, Replace supplies as insurance will allow, RX for new supplies to DME  - DME Mask and Supplies    Follow-up annually, sooner if needed    This dictation was created using voice recognition software. The accuracy of the dictation is limited to the abilities of the software. I expect there may be some errors of grammar and possibly content.

## 2019-07-15 ENCOUNTER — TELEPHONE (OUTPATIENT)
Dept: NEPHROLOGY | Facility: MEDICAL CENTER | Age: 64
End: 2019-07-15

## 2019-07-15 NOTE — TELEPHONE ENCOUNTER
Patient has an appointment with you on 7/23/19. Would you like her to do any labs prior to her appointment? If so, please place the orders.    Thank you

## 2019-07-22 ENCOUNTER — TELEPHONE (OUTPATIENT)
Dept: NEPHROLOGY | Facility: MEDICAL CENTER | Age: 64
End: 2019-07-22

## 2019-07-23 ENCOUNTER — OFFICE VISIT (OUTPATIENT)
Dept: NEPHROLOGY | Facility: MEDICAL CENTER | Age: 64
End: 2019-07-23
Payer: OTHER GOVERNMENT

## 2019-07-23 VITALS
HEIGHT: 63 IN | BODY MASS INDEX: 51.91 KG/M2 | RESPIRATION RATE: 16 BRPM | SYSTOLIC BLOOD PRESSURE: 126 MMHG | DIASTOLIC BLOOD PRESSURE: 74 MMHG | TEMPERATURE: 97.6 F | WEIGHT: 293 LBS | OXYGEN SATURATION: 97 % | HEART RATE: 77 BPM

## 2019-07-23 DIAGNOSIS — N18.30 STAGE 3 CHRONIC KIDNEY DISEASE (HCC): ICD-10-CM

## 2019-07-23 DIAGNOSIS — I10 ESSENTIAL HYPERTENSION: ICD-10-CM

## 2019-07-23 PROCEDURE — 99214 OFFICE O/P EST MOD 30 MIN: CPT | Performed by: INTERNAL MEDICINE

## 2019-07-23 ASSESSMENT — ENCOUNTER SYMPTOMS
CHILLS: 0
FEVER: 0
VOMITING: 0
SHORTNESS OF BREATH: 0
HYPERTENSION: 1
COUGH: 0
NAUSEA: 0

## 2019-07-23 NOTE — PROGRESS NOTES
"Subjective:      Mady Gaitan is a 63 y.o. female who presents with Chronic Kidney Disease and Hypertension            Chronic Kidney Disease   This is a chronic problem. The current episode started more than 1 year ago. The problem occurs constantly. The problem has been unchanged. Pertinent negatives include no chest pain, chills, coughing, fever, nausea, urinary symptoms or vomiting. Nothing aggravates the symptoms.   Hypertension   This is a chronic problem. The current episode started more than 1 year ago. The problem is unchanged. The problem is controlled. Pertinent negatives include no chest pain, malaise/fatigue, peripheral edema or shortness of breath. Risk factors for coronary artery disease include diabetes mellitus, obesity and post-menopausal state. Past treatments include angiotensin blockers. The current treatment provides significant improvement. There are no compliance problems.  Hypertensive end-organ damage includes kidney disease. Identifiable causes of hypertension include chronic renal disease.       Review of Systems   Constitutional: Negative for chills, fever and malaise/fatigue.   Respiratory: Negative for cough and shortness of breath.    Cardiovascular: Negative for chest pain and leg swelling.   Gastrointestinal: Negative for nausea and vomiting.   Genitourinary: Negative for dysuria, frequency and urgency.          Objective:     /74 (BP Location: Right arm, Patient Position: Sitting)   Pulse 77   Temp 36.4 °C (97.6 °F)   Resp 16   Ht 1.6 m (5' 3\")   Wt (!) 138.8 kg (306 lb)   SpO2 97%   BMI 54.21 kg/m²      Physical Exam   Constitutional: She is oriented to person, place, and time. She appears well-developed and well-nourished. No distress.   HENT:   Head: Normocephalic and atraumatic.   Right Ear: External ear normal.   Left Ear: External ear normal.   Nose: Nose normal.   Eyes: Conjunctivae are normal. Right eye exhibits no discharge. Left eye exhibits no " "discharge. No scleral icterus.   Cardiovascular: Normal rate and regular rhythm.    Pulmonary/Chest: Effort normal and breath sounds normal. No respiratory distress.   Musculoskeletal: She exhibits no edema.   Neurological: She is alert and oriented to person, place, and time.   Skin: Skin is warm. She is not diaphoretic.   Psychiatric: She has a normal mood and affect. Her behavior is normal.   Nursing note and vitals reviewed.    Past Medical History:   Diagnosis Date   • Back pain    • Chronic low back pain 09/25/2018   • Elevated blood sugar     States she was told not yet \"pre diabetic\" gets HbA1C   • Hypertension    • Kidney stone 1980's   • Obesity    • HENRI (obstructive sleep apnea) 1/20/2017    AHI 55.7, minimum saturation 84%, on CPAP 13 cm.   • Osteoporosis    • Post-nasal drip    • Seasonal allergies    • Sleep apnea    • Thyroid disease      Social History     Social History   • Marital status:      Spouse name: N/A   • Number of children: N/A   • Years of education: N/A     Occupational History   • Not on file.     Social History Main Topics   • Smoking status: Former Smoker     Packs/day: 0.50     Years: 30.00     Types: Cigarettes     Quit date: 11/2017   • Smokeless tobacco: Never Used   • Alcohol use 0.0 oz/week      Comment: 1-2 per month   • Drug use: No   • Sexual activity: Not on file     Other Topics Concern   • Not on file     Social History Narrative   • No narrative on file     Family History   Problem Relation Age of Onset   • Heart Failure Father    • Other Brother         leukemia   • Sleep Apnea Neg Hx      Recent Labs      09/24/18   1045   SODIUM  143   POTASSIUM  4.2   CHLORIDE  106   CO2  29   BUN  22   CREATININE  1.18               Assessment/Plan:     1. Essential hypertension  Controlled  Continue same medication regimen  Continue low-sodium diet      2. Stage 3 chronic kidney disease (HCC)  Stable  No uremic symptoms  Renal dose of medication  Avoid nephrotoxins  Continue " same medication regimen  Check blood test annually

## 2020-05-13 ENCOUNTER — HOSPITAL ENCOUNTER (OUTPATIENT)
Dept: RADIOLOGY | Facility: MEDICAL CENTER | Age: 65
End: 2020-05-13
Attending: PODIATRIST
Payer: OTHER GOVERNMENT

## 2020-05-13 DIAGNOSIS — M25.571 RIGHT ANKLE PAIN, UNSPECIFIED CHRONICITY: ICD-10-CM

## 2020-05-13 DIAGNOSIS — M79.671 BILATERAL FOOT PAIN: ICD-10-CM

## 2020-05-13 DIAGNOSIS — M25.572 LEFT ANKLE PAIN, UNSPECIFIED CHRONICITY: ICD-10-CM

## 2020-05-13 DIAGNOSIS — M79.672 BILATERAL FOOT PAIN: ICD-10-CM

## 2020-05-13 PROCEDURE — 77077 JOINT SURVEY SINGLE VIEW: CPT

## 2020-05-13 PROCEDURE — 73600 X-RAY EXAM OF ANKLE: CPT | Mod: RT

## 2020-05-13 PROCEDURE — 73600 X-RAY EXAM OF ANKLE: CPT | Mod: LT

## 2020-09-02 ENCOUNTER — OFFICE VISIT (OUTPATIENT)
Dept: NEPHROLOGY | Facility: MEDICAL CENTER | Age: 65
End: 2020-09-02
Payer: OTHER GOVERNMENT

## 2020-09-02 VITALS
TEMPERATURE: 96.5 F | HEIGHT: 64 IN | DIASTOLIC BLOOD PRESSURE: 74 MMHG | SYSTOLIC BLOOD PRESSURE: 128 MMHG | BODY MASS INDEX: 50.02 KG/M2 | RESPIRATION RATE: 16 BRPM | WEIGHT: 293 LBS | OXYGEN SATURATION: 96 % | HEART RATE: 93 BPM

## 2020-09-02 DIAGNOSIS — N18.30 STAGE 3 CHRONIC KIDNEY DISEASE: ICD-10-CM

## 2020-09-02 DIAGNOSIS — I10 ESSENTIAL HYPERTENSION: ICD-10-CM

## 2020-09-02 PROCEDURE — 99214 OFFICE O/P EST MOD 30 MIN: CPT | Performed by: INTERNAL MEDICINE

## 2020-09-02 ASSESSMENT — ENCOUNTER SYMPTOMS
FEVER: 0
COUGH: 0
HYPERTENSION: 1
NAUSEA: 0
VOMITING: 0
CHILLS: 0
SHORTNESS OF BREATH: 0

## 2020-09-02 NOTE — PROGRESS NOTES
"Subjective:      Mady Gaitan is a 64 y.o. female who presents with Chronic Kidney Disease and Hypertension            Chronic Kidney Disease  This is a chronic problem. The current episode started more than 1 year ago. The problem occurs constantly. The problem has been unchanged. Pertinent negatives include no chest pain, chills, coughing, fever, nausea, urinary symptoms or vomiting.   Hypertension  This is a chronic problem. The current episode started more than 1 year ago. The problem is unchanged. The problem is controlled. Pertinent negatives include no chest pain, malaise/fatigue, peripheral edema or shortness of breath. Risk factors for coronary artery disease include obesity and post-menopausal state. Past treatments include angiotensin blockers. The current treatment provides significant improvement. There are no compliance problems.  Hypertensive end-organ damage includes kidney disease. Identifiable causes of hypertension include chronic renal disease.       Review of Systems   Constitutional: Negative for chills, fever and malaise/fatigue.   Respiratory: Negative for cough and shortness of breath.    Cardiovascular: Negative for chest pain and leg swelling.   Gastrointestinal: Negative for nausea and vomiting.   Genitourinary: Negative for dysuria, frequency and urgency.          Objective:     /74 (BP Location: Right arm, Patient Position: Sitting, BP Cuff Size: Large adult)   Pulse 93   Temp 35.8 °C (96.5 °F) (Temporal)   Resp 16   Ht 1.619 m (5' 3.75\")   Wt (!) 147.4 kg (325 lb)   SpO2 96%   BMI 56.22 kg/m²      Physical Exam  Vitals signs and nursing note reviewed.   Constitutional:       General: She is not in acute distress.     Appearance: Normal appearance. She is well-developed. She is not diaphoretic.   HENT:      Head: Normocephalic and atraumatic.      Right Ear: External ear normal.      Left Ear: External ear normal.      Nose: Nose normal.   Eyes:      General: No " "scleral icterus.        Right eye: No discharge.         Left eye: No discharge.      Conjunctiva/sclera: Conjunctivae normal.   Cardiovascular:      Rate and Rhythm: Normal rate and regular rhythm.      Heart sounds: No murmur.   Pulmonary:      Effort: Pulmonary effort is normal. No respiratory distress.      Breath sounds: Normal breath sounds.   Musculoskeletal:         General: No tenderness.      Right lower leg: No edema.      Left lower leg: No edema.   Skin:     General: Skin is warm and dry.      Findings: No erythema.   Neurological:      General: No focal deficit present.      Mental Status: She is alert and oriented to person, place, and time.      Cranial Nerves: No cranial nerve deficit.   Psychiatric:         Mood and Affect: Mood normal.         Behavior: Behavior normal.       Past Medical History:   Diagnosis Date   • Back pain    • Chronic low back pain 2018   • Elevated blood sugar     States she was told not yet \"pre diabetic\" gets HbA1C   • Hypertension    • Kidney stone    • Obesity    • HENRI (obstructive sleep apnea) 2017    AHI 55.7, minimum saturation 84%, on CPAP 13 cm.   • Osteoporosis    • Post-nasal drip    • Seasonal allergies    • Sleep apnea    • Thyroid disease      Social History     Socioeconomic History   • Marital status:      Spouse name: Not on file   • Number of children: Not on file   • Years of education: Not on file   • Highest education level: Not on file   Occupational History   • Not on file   Social Needs   • Financial resource strain: Not on file   • Food insecurity     Worry: Not on file     Inability: Not on file   • Transportation needs     Medical: Not on file     Non-medical: Not on file   Tobacco Use   • Smoking status: Former Smoker     Packs/day: 0.50     Years: 30.00     Pack years: 15.00     Types: Cigarettes     Quit date: 2017     Years since quittin.8   • Smokeless tobacco: Never Used   Substance and Sexual Activity   • " Alcohol use: Yes     Alcohol/week: 0.0 oz     Comment: 1-2 per month   • Drug use: No   • Sexual activity: Not on file   Lifestyle   • Physical activity     Days per week: Not on file     Minutes per session: Not on file   • Stress: Not on file   Relationships   • Social connections     Talks on phone: Not on file     Gets together: Not on file     Attends Baptism service: Not on file     Active member of club or organization: Not on file     Attends meetings of clubs or organizations: Not on file     Relationship status: Not on file   • Intimate partner violence     Fear of current or ex partner: Not on file     Emotionally abused: Not on file     Physically abused: Not on file     Forced sexual activity: Not on file   Other Topics Concern   • Not on file   Social History Narrative   • Not on file     Family History   Problem Relation Age of Onset   • Heart Failure Father    • Other Brother         leukemia   • Sleep Apnea Neg Hx      No results for input(s): HGB, ALBUMIN, HDL, TRIGLYCERIDE, SODIUM, POTASSIUM, CHLORIDE, CO2, BUN, CREATININE, PHOSPHORUS in the last 8544 hours.    Invalid input(s): CHOLESTEROL, LDL CLACULATED, URIC ACID, INTACT PTH, PRO CREAT RATIO  Recent labs from August 2020 showed creatinine 1.3 mg/dL            Assessment/Plan:        1. Essential hypertension  Controlled  Continue same medication regimen  Continue low-sodium diet      2. Stage 3 chronic kidney disease (HCC)  Stable  No uremic symptoms  Renal dose of medication  Avoid nephrotoxins  Continue same medication regimen  Check labs annually

## 2021-04-21 ENCOUNTER — HOSPITAL ENCOUNTER (OUTPATIENT)
Facility: MEDICAL CENTER | Age: 66
End: 2021-04-21
Attending: PHYSICIAN ASSISTANT
Payer: OTHER GOVERNMENT

## 2021-04-21 ENCOUNTER — HOSPITAL ENCOUNTER (INPATIENT)
Facility: MEDICAL CENTER | Age: 66
LOS: 23 days | DRG: 339 | End: 2021-05-14
Attending: EMERGENCY MEDICINE | Admitting: INTERNAL MEDICINE
Payer: OTHER GOVERNMENT

## 2021-04-21 ENCOUNTER — HOSPITAL ENCOUNTER (OUTPATIENT)
Dept: LAB | Facility: MEDICAL CENTER | Age: 66
End: 2021-04-21
Attending: PHYSICIAN ASSISTANT
Payer: OTHER GOVERNMENT

## 2021-04-21 ENCOUNTER — HOSPITAL ENCOUNTER (EMERGENCY)
Facility: MEDICAL CENTER | Age: 66
End: 2021-04-21
Payer: OTHER GOVERNMENT

## 2021-04-21 ENCOUNTER — HOSPITAL ENCOUNTER (OUTPATIENT)
Dept: RADIOLOGY | Facility: MEDICAL CENTER | Age: 66
End: 2021-04-21
Attending: PHYSICIAN ASSISTANT
Payer: OTHER GOVERNMENT

## 2021-04-21 ENCOUNTER — ANESTHESIA (OUTPATIENT)
Dept: SURGERY | Facility: MEDICAL CENTER | Age: 66
DRG: 339 | End: 2021-04-21
Payer: OTHER GOVERNMENT

## 2021-04-21 ENCOUNTER — APPOINTMENT (OUTPATIENT)
Dept: URGENT CARE | Facility: CLINIC | Age: 66
End: 2021-04-21
Payer: OTHER GOVERNMENT

## 2021-04-21 ENCOUNTER — OFFICE VISIT (OUTPATIENT)
Dept: URGENT CARE | Facility: CLINIC | Age: 66
End: 2021-04-21
Payer: OTHER GOVERNMENT

## 2021-04-21 VITALS
HEIGHT: 64 IN | BODY MASS INDEX: 50.02 KG/M2 | RESPIRATION RATE: 16 BRPM | SYSTOLIC BLOOD PRESSURE: 112 MMHG | DIASTOLIC BLOOD PRESSURE: 64 MMHG | OXYGEN SATURATION: 93 % | WEIGHT: 293 LBS | HEART RATE: 86 BPM | TEMPERATURE: 97.1 F

## 2021-04-21 DIAGNOSIS — N28.9 FUNCTION KIDNEY DECREASED: ICD-10-CM

## 2021-04-21 DIAGNOSIS — K35.30 ACUTE APPENDICITIS WITH LOCALIZED PERITONITIS, WITHOUT PERFORATION, ABSCESS, OR GANGRENE: ICD-10-CM

## 2021-04-21 DIAGNOSIS — R10.31 RIGHT LOWER QUADRANT ABDOMINAL PAIN: ICD-10-CM

## 2021-04-21 DIAGNOSIS — K35.33 ACUTE APPENDICITIS WITH PERFORATION, LOCALIZED PERITONITIS, ABSCESS, AND GANGRENE: ICD-10-CM

## 2021-04-21 DIAGNOSIS — G47.33 OSA (OBSTRUCTIVE SLEEP APNEA): ICD-10-CM

## 2021-04-21 DIAGNOSIS — M19.90 OSTEOARTHRITIS, UNSPECIFIED OSTEOARTHRITIS TYPE, UNSPECIFIED SITE: ICD-10-CM

## 2021-04-21 DIAGNOSIS — K35.80 ACUTE APPENDICITIS, UNSPECIFIED ACUTE APPENDICITIS TYPE: Primary | ICD-10-CM

## 2021-04-21 DIAGNOSIS — E13.21 DIABETIC NEPHROPATHY ASSOCIATED WITH OTHER SPECIFIED DIABETES MELLITUS (HCC): ICD-10-CM

## 2021-04-21 DIAGNOSIS — K35.33 ACUTE APPENDICITIS WITH PERFORATION, LOCALIZED PERITONITIS, AND ABSCESS, UNSPECIFIED WHETHER GANGRENE PRESENT: ICD-10-CM

## 2021-04-21 DIAGNOSIS — N18.32 STAGE 3B CHRONIC KIDNEY DISEASE: ICD-10-CM

## 2021-04-21 DIAGNOSIS — D72.829 LEUKOCYTOSIS, UNSPECIFIED TYPE: ICD-10-CM

## 2021-04-21 LAB
ALBUMIN SERPL BCP-MCNC: 3.5 G/DL (ref 3.2–4.9)
ALBUMIN/GLOB SERPL: 0.8 G/DL
ALP SERPL-CCNC: 111 U/L (ref 30–99)
ALT SERPL-CCNC: 22 U/L (ref 2–50)
ANION GAP SERPL CALC-SCNC: 14 MMOL/L (ref 7–16)
APPEARANCE UR: NORMAL
AST SERPL-CCNC: 19 U/L (ref 12–45)
BASOPHILS # BLD AUTO: 0.4 % (ref 0–1.8)
BASOPHILS # BLD: 0.06 K/UL (ref 0–0.12)
BILIRUB SERPL-MCNC: 0.5 MG/DL (ref 0.1–1.5)
BILIRUB UR STRIP-MCNC: NORMAL MG/DL
BUN SERPL-MCNC: 35 MG/DL (ref 8–22)
CALCIUM SERPL-MCNC: 9.3 MG/DL (ref 8.4–10.2)
CHLORIDE SERPL-SCNC: 103 MMOL/L (ref 96–112)
CO2 SERPL-SCNC: 21 MMOL/L (ref 20–33)
COLOR UR AUTO: YELLOW
CREAT SERPL-MCNC: 1.61 MG/DL (ref 0.5–1.4)
EKG IMPRESSION: NORMAL
EOSINOPHIL # BLD AUTO: 0.32 K/UL (ref 0–0.51)
EOSINOPHIL NFR BLD: 2.4 % (ref 0–6.9)
ERYTHROCYTE [DISTWIDTH] IN BLOOD BY AUTOMATED COUNT: 48 FL (ref 35.9–50)
GLOBULIN SER CALC-MCNC: 4.2 G/DL (ref 1.9–3.5)
GLUCOSE SERPL-MCNC: 116 MG/DL (ref 65–99)
GLUCOSE UR STRIP.AUTO-MCNC: NEGATIVE MG/DL
HCT VFR BLD AUTO: 39.7 % (ref 37–47)
HGB BLD-MCNC: 12.7 G/DL (ref 12–16)
IMM GRANULOCYTES # BLD AUTO: 0.13 K/UL (ref 0–0.11)
IMM GRANULOCYTES NFR BLD AUTO: 1 % (ref 0–0.9)
KETONES UR STRIP.AUTO-MCNC: NEGATIVE MG/DL
LEUKOCYTE ESTERASE UR QL STRIP.AUTO: NORMAL
LIPASE SERPL-CCNC: 44 U/L (ref 7–58)
LYMPHOCYTES # BLD AUTO: 2.3 K/UL (ref 1–4.8)
LYMPHOCYTES NFR BLD: 17.2 % (ref 22–41)
MCH RBC QN AUTO: 28.8 PG (ref 27–33)
MCHC RBC AUTO-ENTMCNC: 32 G/DL (ref 33.6–35)
MCV RBC AUTO: 90 FL (ref 81.4–97.8)
MONOCYTES # BLD AUTO: 0.87 K/UL (ref 0–0.85)
MONOCYTES NFR BLD AUTO: 6.5 % (ref 0–13.4)
NEUTROPHILS # BLD AUTO: 9.68 K/UL (ref 2–7.15)
NEUTROPHILS NFR BLD: 72.5 % (ref 44–72)
NITRITE UR QL STRIP.AUTO: NEGATIVE
NRBC # BLD AUTO: 0 K/UL
NRBC BLD-RTO: 0 /100 WBC
PATHOLOGY CONSULT NOTE: NORMAL
PH UR STRIP.AUTO: 5 [PH] (ref 5–8)
PLATELET # BLD AUTO: 333 K/UL (ref 164–446)
PMV BLD AUTO: 9.5 FL (ref 9–12.9)
POTASSIUM SERPL-SCNC: 3.9 MMOL/L (ref 3.6–5.5)
PROT SERPL-MCNC: 7.7 G/DL (ref 6–8.2)
PROT UR QL STRIP: 30 MG/DL
RBC # BLD AUTO: 4.41 M/UL (ref 4.2–5.4)
RBC UR QL AUTO: NORMAL
SARS-COV+SARS-COV-2 AG RESP QL IA.RAPID: NOTDETECTED
SARS-COV-2 RNA RESP QL NAA+PROBE: NOTDETECTED
SODIUM SERPL-SCNC: 138 MMOL/L (ref 135–145)
SP GR UR STRIP.AUTO: >=1.03
SPECIMEN SOURCE: NORMAL
SPECIMEN SOURCE: NORMAL
UROBILINOGEN UR STRIP-MCNC: 0.2 MG/DL
WBC # BLD AUTO: 13.4 K/UL (ref 4.8–10.8)

## 2021-04-21 PROCEDURE — 87426 SARSCOV CORONAVIRUS AG IA: CPT

## 2021-04-21 PROCEDURE — 700111 HCHG RX REV CODE 636 W/ 250 OVERRIDE (IP): Performed by: ANESTHESIOLOGY

## 2021-04-21 PROCEDURE — 36415 COLL VENOUS BLD VENIPUNCTURE: CPT

## 2021-04-21 PROCEDURE — 501568 HCHG TROCAR, BLUNTPORT 12MM: Performed by: SURGERY

## 2021-04-21 PROCEDURE — 160048 HCHG OR STATISTICAL LEVEL 1-5: Performed by: SURGERY

## 2021-04-21 PROCEDURE — 700111 HCHG RX REV CODE 636 W/ 250 OVERRIDE (IP): Performed by: INTERNAL MEDICINE

## 2021-04-21 PROCEDURE — 99215 OFFICE O/P EST HI 40 MIN: CPT | Performed by: PHYSICIAN ASSISTANT

## 2021-04-21 PROCEDURE — 160041 HCHG SURGERY MINUTES - EA ADDL 1 MIN LEVEL 4: Performed by: SURGERY

## 2021-04-21 PROCEDURE — 700101 HCHG RX REV CODE 250: Performed by: SURGERY

## 2021-04-21 PROCEDURE — 502571 HCHG PACK, LAP CHOLE: Performed by: SURGERY

## 2021-04-21 PROCEDURE — 501583 HCHG TROCAR, THRD CAN&SEAL 5X100: Performed by: SURGERY

## 2021-04-21 PROCEDURE — 0DJD4ZZ INSPECTION OF LOWER INTESTINAL TRACT, PERCUTANEOUS ENDOSCOPIC APPROACH: ICD-10-PCS | Performed by: SURGERY

## 2021-04-21 PROCEDURE — 503366 HCHG TROCAR, 12X150 KII FIOS Z THR: Performed by: SURGERY

## 2021-04-21 PROCEDURE — 94660 CPAP INITIATION&MGMT: CPT

## 2021-04-21 PROCEDURE — 0DTJ0ZZ RESECTION OF APPENDIX, OPEN APPROACH: ICD-10-PCS | Performed by: SURGERY

## 2021-04-21 PROCEDURE — 700101 HCHG RX REV CODE 250: Performed by: ANESTHESIOLOGY

## 2021-04-21 PROCEDURE — 501443 HCHG STAPLER, ROTIC. FLEX: Performed by: SURGERY

## 2021-04-21 PROCEDURE — 81002 URINALYSIS NONAUTO W/O SCOPE: CPT | Performed by: PHYSICIAN ASSISTANT

## 2021-04-21 PROCEDURE — 700111 HCHG RX REV CODE 636 W/ 250 OVERRIDE (IP): Performed by: EMERGENCY MEDICINE

## 2021-04-21 PROCEDURE — 700105 HCHG RX REV CODE 258: Performed by: SURGERY

## 2021-04-21 PROCEDURE — 64488 TAP BLOCK BI INJECTION: CPT | Performed by: SURGERY

## 2021-04-21 PROCEDURE — 501838 HCHG SUTURE GENERAL: Performed by: SURGERY

## 2021-04-21 PROCEDURE — 501463 HCHG STAPLES, UNIV. ROTIC: Performed by: SURGERY

## 2021-04-21 PROCEDURE — 87086 URINE CULTURE/COLONY COUNT: CPT

## 2021-04-21 PROCEDURE — 88304 TISSUE EXAM BY PATHOLOGIST: CPT

## 2021-04-21 PROCEDURE — 99223 1ST HOSP IP/OBS HIGH 75: CPT | Performed by: INTERNAL MEDICINE

## 2021-04-21 PROCEDURE — 160009 HCHG ANES TIME/MIN: Performed by: SURGERY

## 2021-04-21 PROCEDURE — 160029 HCHG SURGERY MINUTES - 1ST 30 MINS LEVEL 4: Performed by: SURGERY

## 2021-04-21 PROCEDURE — 160002 HCHG RECOVERY MINUTES (STAT): Performed by: SURGERY

## 2021-04-21 PROCEDURE — U0003 INFECTIOUS AGENT DETECTION BY NUCLEIC ACID (DNA OR RNA); SEVERE ACUTE RESPIRATORY SYNDROME CORONAVIRUS 2 (SARS-COV-2) (CORONAVIRUS DISEASE [COVID-19]), AMPLIFIED PROBE TECHNIQUE, MAKING USE OF HIGH THROUGHPUT TECHNOLOGIES AS DESCRIBED BY CMS-2020-01-R: HCPCS

## 2021-04-21 PROCEDURE — 700105 HCHG RX REV CODE 258: Performed by: EMERGENCY MEDICINE

## 2021-04-21 PROCEDURE — 501572 HCHG TROCAR, SHIELD OBTU 5X100: Performed by: SURGERY

## 2021-04-21 PROCEDURE — U0005 INFEC AGEN DETEC AMPLI PROBE: HCPCS

## 2021-04-21 PROCEDURE — 93005 ELECTROCARDIOGRAM TRACING: CPT | Performed by: EMERGENCY MEDICINE

## 2021-04-21 PROCEDURE — 770006 HCHG ROOM/CARE - MED/SURG/GYN SEMI*

## 2021-04-21 PROCEDURE — 700102 HCHG RX REV CODE 250 W/ 637 OVERRIDE(OP): Performed by: ANESTHESIOLOGY

## 2021-04-21 PROCEDURE — 85025 COMPLETE CBC W/AUTO DIFF WBC: CPT

## 2021-04-21 PROCEDURE — 160036 HCHG PACU - EA ADDL 30 MINS PHASE I: Performed by: SURGERY

## 2021-04-21 PROCEDURE — 501399 HCHG SPECIMAN BAG, ENDO CATC: Performed by: SURGERY

## 2021-04-21 PROCEDURE — A9270 NON-COVERED ITEM OR SERVICE: HCPCS | Performed by: ANESTHESIOLOGY

## 2021-04-21 PROCEDURE — 99291 CRITICAL CARE FIRST HOUR: CPT

## 2021-04-21 PROCEDURE — A9270 NON-COVERED ITEM OR SERVICE: HCPCS | Performed by: SURGERY

## 2021-04-21 PROCEDURE — 700117 HCHG RX CONTRAST REV CODE 255: Performed by: PHYSICIAN ASSISTANT

## 2021-04-21 PROCEDURE — 94760 N-INVAS EAR/PLS OXIMETRY 1: CPT

## 2021-04-21 PROCEDURE — 500378 HCHG DRAIN, J-VAC ROUND 19FR: Performed by: SURGERY

## 2021-04-21 PROCEDURE — 501450 HCHG STAPLES, ENDO MULTIFIRE: Performed by: SURGERY

## 2021-04-21 PROCEDURE — 500512 HCHG ENDO PEANUT: Performed by: SURGERY

## 2021-04-21 PROCEDURE — 74177 CT ABD & PELVIS W/CONTRAST: CPT

## 2021-04-21 PROCEDURE — 80053 COMPREHEN METABOLIC PANEL: CPT

## 2021-04-21 PROCEDURE — 96365 THER/PROPH/DIAG IV INF INIT: CPT

## 2021-04-21 PROCEDURE — 700101 HCHG RX REV CODE 250: Performed by: INTERNAL MEDICINE

## 2021-04-21 PROCEDURE — 83690 ASSAY OF LIPASE: CPT

## 2021-04-21 PROCEDURE — C9803 HOPD COVID-19 SPEC COLLECT: HCPCS | Performed by: EMERGENCY MEDICINE

## 2021-04-21 PROCEDURE — 700102 HCHG RX REV CODE 250 W/ 637 OVERRIDE(OP): Performed by: SURGERY

## 2021-04-21 PROCEDURE — 160035 HCHG PACU - 1ST 60 MINS PHASE I: Performed by: SURGERY

## 2021-04-21 RX ORDER — HYDROMORPHONE HYDROCHLORIDE 1 MG/ML
0.2 INJECTION, SOLUTION INTRAMUSCULAR; INTRAVENOUS; SUBCUTANEOUS
Status: DISCONTINUED | OUTPATIENT
Start: 2021-04-21 | End: 2021-04-24 | Stop reason: ALTCHOICE

## 2021-04-21 RX ORDER — OXYCODONE HCL 5 MG/5 ML
5 SOLUTION, ORAL ORAL
Status: COMPLETED | OUTPATIENT
Start: 2021-04-21 | End: 2021-04-21

## 2021-04-21 RX ORDER — ROCURONIUM BROMIDE 10 MG/ML
INJECTION, SOLUTION INTRAVENOUS PRN
Status: DISCONTINUED | OUTPATIENT
Start: 2021-04-21 | End: 2021-04-21 | Stop reason: SURG

## 2021-04-21 RX ORDER — ACETAMINOPHEN 325 MG/1
650 TABLET ORAL EVERY 6 HOURS PRN
Status: DISCONTINUED | OUTPATIENT
Start: 2021-04-21 | End: 2021-05-03

## 2021-04-21 RX ORDER — AMOXICILLIN 250 MG
2 CAPSULE ORAL 2 TIMES DAILY
Status: DISCONTINUED | OUTPATIENT
Start: 2021-04-22 | End: 2021-04-25

## 2021-04-21 RX ORDER — PROMETHAZINE HYDROCHLORIDE 25 MG/1
12.5-25 SUPPOSITORY RECTAL EVERY 4 HOURS PRN
Status: DISCONTINUED | OUTPATIENT
Start: 2021-04-21 | End: 2021-05-14 | Stop reason: HOSPADM

## 2021-04-21 RX ORDER — SODIUM CHLORIDE 9 MG/ML
INJECTION, SOLUTION INTRAVENOUS CONTINUOUS
Status: DISCONTINUED | OUTPATIENT
Start: 2021-04-21 | End: 2021-04-21

## 2021-04-21 RX ORDER — MAGNESIUM SULFATE HEPTAHYDRATE 40 MG/ML
INJECTION, SOLUTION INTRAVENOUS PRN
Status: DISCONTINUED | OUTPATIENT
Start: 2021-04-21 | End: 2021-04-21 | Stop reason: SURG

## 2021-04-21 RX ORDER — LABETALOL HYDROCHLORIDE 5 MG/ML
5 INJECTION, SOLUTION INTRAVENOUS
Status: DISCONTINUED | OUTPATIENT
Start: 2021-04-21 | End: 2021-04-24 | Stop reason: ALTCHOICE

## 2021-04-21 RX ORDER — THYROID 30 MG/1
90 TABLET ORAL DAILY
Status: DISCONTINUED | OUTPATIENT
Start: 2021-04-22 | End: 2021-05-14 | Stop reason: HOSPADM

## 2021-04-21 RX ORDER — SODIUM CHLORIDE 9 MG/ML
INJECTION, SOLUTION INTRAVENOUS CONTINUOUS
Status: DISCONTINUED | OUTPATIENT
Start: 2021-04-21 | End: 2021-04-28

## 2021-04-21 RX ORDER — LIDOCAINE HYDROCHLORIDE 20 MG/ML
INJECTION, SOLUTION EPIDURAL; INFILTRATION; INTRACAUDAL; PERINEURAL PRN
Status: DISCONTINUED | OUTPATIENT
Start: 2021-04-21 | End: 2021-04-21 | Stop reason: SURG

## 2021-04-21 RX ORDER — HYDROMORPHONE HYDROCHLORIDE 1 MG/ML
0.5 INJECTION, SOLUTION INTRAMUSCULAR; INTRAVENOUS; SUBCUTANEOUS
Status: DISCONTINUED | OUTPATIENT
Start: 2021-04-21 | End: 2021-05-03

## 2021-04-21 RX ORDER — ONDANSETRON 2 MG/ML
4 INJECTION INTRAMUSCULAR; INTRAVENOUS EVERY 4 HOURS PRN
Status: DISCONTINUED | OUTPATIENT
Start: 2021-04-21 | End: 2021-05-14 | Stop reason: HOSPADM

## 2021-04-21 RX ORDER — MIDAZOLAM HYDROCHLORIDE 1 MG/ML
INJECTION INTRAMUSCULAR; INTRAVENOUS PRN
Status: DISCONTINUED | OUTPATIENT
Start: 2021-04-21 | End: 2021-04-21 | Stop reason: SURG

## 2021-04-21 RX ORDER — MEPERIDINE HYDROCHLORIDE 25 MG/ML
12.5 INJECTION INTRAMUSCULAR; INTRAVENOUS; SUBCUTANEOUS
Status: DISCONTINUED | OUTPATIENT
Start: 2021-04-21 | End: 2021-04-24 | Stop reason: ALTCHOICE

## 2021-04-21 RX ORDER — HYDROMORPHONE HYDROCHLORIDE 1 MG/ML
0.4 INJECTION, SOLUTION INTRAMUSCULAR; INTRAVENOUS; SUBCUTANEOUS
Status: DISCONTINUED | OUTPATIENT
Start: 2021-04-21 | End: 2021-04-24 | Stop reason: ALTCHOICE

## 2021-04-21 RX ORDER — DEXAMETHASONE SODIUM PHOSPHATE 4 MG/ML
INJECTION, SOLUTION INTRA-ARTICULAR; INTRALESIONAL; INTRAMUSCULAR; INTRAVENOUS; SOFT TISSUE
Status: COMPLETED | OUTPATIENT
Start: 2021-04-21 | End: 2021-04-21

## 2021-04-21 RX ORDER — MIDAZOLAM HYDROCHLORIDE 1 MG/ML
1 INJECTION INTRAMUSCULAR; INTRAVENOUS
Status: DISCONTINUED | OUTPATIENT
Start: 2021-04-21 | End: 2021-04-24 | Stop reason: ALTCHOICE

## 2021-04-21 RX ORDER — LOSARTAN POTASSIUM 50 MG/1
TABLET ORAL
Status: ON HOLD | COMMUNITY
Start: 2021-02-25 | End: 2021-06-04

## 2021-04-21 RX ORDER — SODIUM CHLORIDE, SODIUM LACTATE, POTASSIUM CHLORIDE, CALCIUM CHLORIDE 600; 310; 30; 20 MG/100ML; MG/100ML; MG/100ML; MG/100ML
INJECTION, SOLUTION INTRAVENOUS CONTINUOUS
Status: DISCONTINUED | OUTPATIENT
Start: 2021-04-21 | End: 2021-04-22

## 2021-04-21 RX ORDER — LABETALOL HYDROCHLORIDE 5 MG/ML
10 INJECTION, SOLUTION INTRAVENOUS EVERY 4 HOURS PRN
Status: DISCONTINUED | OUTPATIENT
Start: 2021-04-21 | End: 2021-05-14 | Stop reason: HOSPADM

## 2021-04-21 RX ORDER — BUPIVACAINE HYDROCHLORIDE 2.5 MG/ML
INJECTION, SOLUTION EPIDURAL; INFILTRATION; INTRACAUDAL
Status: COMPLETED | OUTPATIENT
Start: 2021-04-21 | End: 2021-04-21

## 2021-04-21 RX ORDER — SUCCINYLCHOLINE/SOD CL,ISO/PF 200MG/10ML
SYRINGE (ML) INTRAVENOUS PRN
Status: DISCONTINUED | OUTPATIENT
Start: 2021-04-21 | End: 2021-04-21 | Stop reason: SURG

## 2021-04-21 RX ORDER — BISACODYL 10 MG
10 SUPPOSITORY, RECTAL RECTAL
Status: DISCONTINUED | OUTPATIENT
Start: 2021-04-21 | End: 2021-04-25

## 2021-04-21 RX ORDER — SODIUM CHLORIDE, SODIUM LACTATE, POTASSIUM CHLORIDE, CALCIUM CHLORIDE 600; 310; 30; 20 MG/100ML; MG/100ML; MG/100ML; MG/100ML
INJECTION, SOLUTION INTRAVENOUS CONTINUOUS
Status: ACTIVE | OUTPATIENT
Start: 2021-04-21 | End: 2021-04-21

## 2021-04-21 RX ORDER — HYDROMORPHONE HYDROCHLORIDE 1 MG/ML
0.1 INJECTION, SOLUTION INTRAMUSCULAR; INTRAVENOUS; SUBCUTANEOUS
Status: DISCONTINUED | OUTPATIENT
Start: 2021-04-21 | End: 2021-04-24 | Stop reason: ALTCHOICE

## 2021-04-21 RX ORDER — PROMETHAZINE HYDROCHLORIDE 25 MG/1
12.5-25 TABLET ORAL EVERY 4 HOURS PRN
Status: DISCONTINUED | OUTPATIENT
Start: 2021-04-21 | End: 2021-04-25

## 2021-04-21 RX ORDER — PROCHLORPERAZINE EDISYLATE 5 MG/ML
5-10 INJECTION INTRAMUSCULAR; INTRAVENOUS EVERY 4 HOURS PRN
Status: DISCONTINUED | OUTPATIENT
Start: 2021-04-21 | End: 2021-05-14 | Stop reason: HOSPADM

## 2021-04-21 RX ORDER — LOSARTAN POTASSIUM 25 MG/1
50 TABLET ORAL
Status: DISCONTINUED | OUTPATIENT
Start: 2021-04-22 | End: 2021-04-22

## 2021-04-21 RX ORDER — OXYCODONE HYDROCHLORIDE 5 MG/1
5 TABLET ORAL
Status: DISCONTINUED | OUTPATIENT
Start: 2021-04-21 | End: 2021-05-03

## 2021-04-21 RX ORDER — LORATADINE 10 MG/1
10 TABLET ORAL DAILY
Status: DISCONTINUED | OUTPATIENT
Start: 2021-04-22 | End: 2021-05-14 | Stop reason: HOSPADM

## 2021-04-21 RX ORDER — BUPIVACAINE HYDROCHLORIDE AND EPINEPHRINE 5; 5 MG/ML; UG/ML
INJECTION, SOLUTION PERINEURAL
Status: DISCONTINUED | OUTPATIENT
Start: 2021-04-21 | End: 2021-04-21 | Stop reason: HOSPADM

## 2021-04-21 RX ORDER — ONDANSETRON 2 MG/ML
4 INJECTION INTRAMUSCULAR; INTRAVENOUS
Status: DISCONTINUED | OUTPATIENT
Start: 2021-04-21 | End: 2021-04-24 | Stop reason: ALTCHOICE

## 2021-04-21 RX ORDER — OXYCODONE HYDROCHLORIDE 10 MG/1
10 TABLET ORAL
Status: DISCONTINUED | OUTPATIENT
Start: 2021-04-21 | End: 2021-05-03

## 2021-04-21 RX ORDER — ONDANSETRON 4 MG/1
4 TABLET, ORALLY DISINTEGRATING ORAL EVERY 4 HOURS PRN
Status: DISCONTINUED | OUTPATIENT
Start: 2021-04-21 | End: 2021-05-14 | Stop reason: HOSPADM

## 2021-04-21 RX ORDER — DIPHENHYDRAMINE HYDROCHLORIDE 50 MG/ML
12.5 INJECTION INTRAMUSCULAR; INTRAVENOUS
Status: DISCONTINUED | OUTPATIENT
Start: 2021-04-21 | End: 2021-04-24 | Stop reason: ALTCHOICE

## 2021-04-21 RX ORDER — OXYCODONE HCL 5 MG/5 ML
10 SOLUTION, ORAL ORAL
Status: COMPLETED | OUTPATIENT
Start: 2021-04-21 | End: 2021-04-21

## 2021-04-21 RX ORDER — ACETAMINOPHEN 325 MG/1
650 TABLET ORAL EVERY 4 HOURS PRN
Status: DISCONTINUED | OUTPATIENT
Start: 2021-04-21 | End: 2021-04-22 | Stop reason: HOSPADM

## 2021-04-21 RX ORDER — DEXTROSE MONOHYDRATE 25 G/50ML
50 INJECTION, SOLUTION INTRAVENOUS
Status: DISCONTINUED | OUTPATIENT
Start: 2021-04-21 | End: 2021-05-14 | Stop reason: HOSPADM

## 2021-04-21 RX ORDER — POLYETHYLENE GLYCOL 3350 17 G/17G
1 POWDER, FOR SOLUTION ORAL
Status: DISCONTINUED | OUTPATIENT
Start: 2021-04-21 | End: 2021-04-25

## 2021-04-21 RX ORDER — DIPHENHYDRAMINE HYDROCHLORIDE 50 MG/ML
INJECTION INTRAMUSCULAR; INTRAVENOUS PRN
Status: DISCONTINUED | OUTPATIENT
Start: 2021-04-21 | End: 2021-04-21 | Stop reason: SURG

## 2021-04-21 RX ORDER — HALOPERIDOL 5 MG/ML
1 INJECTION INTRAMUSCULAR
Status: DISCONTINUED | OUTPATIENT
Start: 2021-04-21 | End: 2021-04-24 | Stop reason: ALTCHOICE

## 2021-04-21 RX ADMIN — DEXAMETHASONE SODIUM PHOSPHATE 4 MG: 4 INJECTION, SOLUTION INTRAMUSCULAR; INTRAVENOUS at 22:03

## 2021-04-21 RX ADMIN — ROCURONIUM BROMIDE 20 MG: 10 INJECTION, SOLUTION INTRAVENOUS at 20:22

## 2021-04-21 RX ADMIN — Medication 150 MG: at 19:57

## 2021-04-21 RX ADMIN — FENTANYL CITRATE 50 MCG: 50 INJECTION, SOLUTION INTRAMUSCULAR; INTRAVENOUS at 22:55

## 2021-04-21 RX ADMIN — BUPIVACAINE HYDROCHLORIDE 30 ML: 2.5 INJECTION, SOLUTION EPIDURAL; INFILTRATION; INTRACAUDAL; PERINEURAL at 22:07

## 2021-04-21 RX ADMIN — ROCURONIUM BROMIDE 40 MG: 10 INJECTION, SOLUTION INTRAVENOUS at 20:08

## 2021-04-21 RX ADMIN — HYDROMORPHONE HYDROCHLORIDE 0.2 MG: 1 INJECTION, SOLUTION INTRAMUSCULAR; INTRAVENOUS; SUBCUTANEOUS at 23:16

## 2021-04-21 RX ADMIN — POVIDONE-IODINE 15 ML: 10 SOLUTION TOPICAL at 19:27

## 2021-04-21 RX ADMIN — METRONIDAZOLE 500 MG: 500 INJECTION, SOLUTION INTRAVENOUS at 19:46

## 2021-04-21 RX ADMIN — LIDOCAINE HYDROCHLORIDE 100 MG: 20 INJECTION, SOLUTION EPIDURAL; INFILTRATION; INTRACAUDAL; PERINEURAL at 19:57

## 2021-04-21 RX ADMIN — FENTANYL CITRATE 50 MCG: 50 INJECTION, SOLUTION INTRAMUSCULAR; INTRAVENOUS at 22:00

## 2021-04-21 RX ADMIN — SUGAMMADEX 200 MG: 100 INJECTION, SOLUTION INTRAVENOUS at 22:10

## 2021-04-21 RX ADMIN — MIDAZOLAM HYDROCHLORIDE 2 MG: 1 INJECTION, SOLUTION INTRAMUSCULAR; INTRAVENOUS at 19:53

## 2021-04-21 RX ADMIN — IOHEXOL 100 ML: 350 INJECTION, SOLUTION INTRAVENOUS at 16:16

## 2021-04-21 RX ADMIN — CEFTRIAXONE SODIUM 2 G: 2 INJECTION, POWDER, FOR SOLUTION INTRAMUSCULAR; INTRAVENOUS at 18:09

## 2021-04-21 RX ADMIN — DIPHENHYDRAMINE HYDROCHLORIDE 25 MG: 50 INJECTION, SOLUTION INTRAMUSCULAR; INTRAVENOUS at 20:14

## 2021-04-21 RX ADMIN — BUPIVACAINE HYDROCHLORIDE 30 ML: 2.5 INJECTION, SOLUTION EPIDURAL; INFILTRATION; INTRACAUDAL; PERINEURAL at 22:03

## 2021-04-21 RX ADMIN — HYDROMORPHONE HYDROCHLORIDE 0.5 MG: 1 INJECTION, SOLUTION INTRAMUSCULAR; INTRAVENOUS; SUBCUTANEOUS at 22:35

## 2021-04-21 RX ADMIN — MAGNESIUM SULFATE IN WATER 4 G: 40 INJECTION, SOLUTION INTRAVENOUS at 21:31

## 2021-04-21 RX ADMIN — FENTANYL CITRATE 200 MCG: 50 INJECTION, SOLUTION INTRAMUSCULAR; INTRAVENOUS at 19:57

## 2021-04-21 RX ADMIN — SODIUM CHLORIDE, POTASSIUM CHLORIDE, SODIUM LACTATE AND CALCIUM CHLORIDE: 600; 310; 30; 20 INJECTION, SOLUTION INTRAVENOUS at 19:50

## 2021-04-21 RX ADMIN — SODIUM CHLORIDE, POTASSIUM CHLORIDE, SODIUM LACTATE AND CALCIUM CHLORIDE: 600; 310; 30; 20 INJECTION, SOLUTION INTRAVENOUS at 20:51

## 2021-04-21 RX ADMIN — DEXAMETHASONE SODIUM PHOSPHATE 4 MG: 4 INJECTION, SOLUTION INTRAMUSCULAR; INTRAVENOUS at 22:07

## 2021-04-21 RX ADMIN — IOHEXOL 25 ML: 240 INJECTION, SOLUTION INTRATHECAL; INTRAVASCULAR; INTRAVENOUS; ORAL at 16:17

## 2021-04-21 RX ADMIN — ONDANSETRON 4 MG: 2 INJECTION INTRAMUSCULAR; INTRAVENOUS at 20:14

## 2021-04-21 RX ADMIN — PROPOFOL 200 MG: 10 INJECTION, EMULSION INTRAVENOUS at 19:57

## 2021-04-21 RX ADMIN — FENTANYL CITRATE 50 MCG: 50 INJECTION, SOLUTION INTRAMUSCULAR; INTRAVENOUS at 22:13

## 2021-04-21 RX ADMIN — ROCURONIUM BROMIDE 20 MG: 10 INJECTION, SOLUTION INTRAVENOUS at 21:11

## 2021-04-21 RX ADMIN — OXYCODONE HYDROCHLORIDE 5 MG: 5 SOLUTION ORAL at 22:51

## 2021-04-21 ASSESSMENT — ENCOUNTER SYMPTOMS
HEARTBURN: 0
PHOTOPHOBIA: 0
BELCHING: 0
SENSORY CHANGE: 0
CONSTIPATION: 0
VOMITING: 1
DIZZINESS: 0
NERVOUS/ANXIOUS: 0
CLAUDICATION: 0
BLURRED VISION: 0
SHORTNESS OF BREATH: 0
SORE THROAT: 0
DIARRHEA: 0
DEPRESSION: 0
SPEECH CHANGE: 0
MYALGIAS: 0
ABDOMINAL PAIN: 1
HEADACHES: 0
DIARRHEA: 0
INSOMNIA: 0
CHILLS: 0
COUGH: 0
WEAKNESS: 0
FEVER: 0
FEVER: 1
NAUSEA: 0
CONSTIPATION: 1
FLATUS: 0
VOMITING: 1

## 2021-04-21 ASSESSMENT — PAIN DESCRIPTION - PAIN TYPE
TYPE: SURGICAL PAIN

## 2021-04-21 ASSESSMENT — CROHNS DISEASE ACTIVITY INDEX (CDAI): CDAI SCORE: 0

## 2021-04-21 ASSESSMENT — PAIN SCALES - GENERAL: PAIN_LEVEL: 4

## 2021-04-21 ASSESSMENT — FIBROSIS 4 INDEX: FIB4 SCORE: 0.77

## 2021-04-21 NOTE — PROGRESS NOTES
Subjective:   Mady Gaitan is a 65 y.o. female who presents for RLQ Pain (worsening x 6 days, vomited 4 days ago, sweats during the night, hasn't had any food except for water with her daily meds.)        RLQ Pain  This is a new problem. Episode onset: 6 days - started as bloating RLQ started Thursday  The onset quality is gradual. The problem has been waxing and waning. The pain is located in the generalized abdominal region. The pain is at a severity of 8/10. The quality of the pain is aching and cramping (episodes last approximately 1 minute). The abdominal pain radiates to the RLQ. Associated symptoms include constipation (smaller on thursday ), a fever (subjective), frequency and vomiting (1 episode on saturday ). Pertinent negatives include no belching, diarrhea, dysuria, flatus, hematuria or nausea. Associated symptoms comments: Hesitancy, urgency . Treatments tried: docalax x 2  Her past medical history is significant for abdominal surgery (gallbladder 1980s ). There is no history of Crohn's disease, irritable bowel syndrome, pancreatitis or ulcerative colitis.     Start a new diet on Tuesday where she is drinking smoothies for meals and having 1 meal throughout the day.  After being on diet for 3 days she noticed abdominal bloating.  She was able to eat last night, a double and rise.    Hx of kidney stone 20 years ago x 2     Colonoscopy 3 years ago -benign polyps      Last BM: Sunday, smaller and watery     F/u with nephrology in August - diabetic nephropathy     Review of Systems   Constitutional: Positive for fever (subjective).   Gastrointestinal: Positive for constipation (smaller on thursday ) and vomiting (1 episode on saturday ). Negative for diarrhea, flatus and nausea.   Genitourinary: Positive for frequency. Negative for dysuria and hematuria.       PMH:  has a past medical history of Back pain, Chronic low back pain (09/25/2018), Elevated blood sugar, Hypertension, Kidney stone (1980's),  Obesity, HENRI (obstructive sleep apnea) (1/20/2017), Osteoporosis, Post-nasal drip, Seasonal allergies, Sleep apnea, and Thyroid disease. She also has no past medical history of Breast cancer (HCC).  MEDS:   Current Outpatient Medications:   •  Cyanocobalamin (VITAMIN B 12 PO), Take  by mouth., Disp: , Rfl:   •  BIOTIN PO, Take  by mouth., Disp: , Rfl:   •  Barberry-Oreg Grape-Goldenseal (BERBERINE COMPLEX PO), Take  by mouth., Disp: , Rfl:   •  Loratadine 10 MG Cap, Take 1 Cap by mouth every day., Disp: , Rfl:   •  ibuprofen (MOTRIN) 600 MG Tab, Take 800 mg by mouth every 6 hours as needed., Disp: , Rfl:   •  thyroid (ARMOUR THYROID) 90 MG Tab, Take 90 mg by mouth every day., Disp: , Rfl:   •  Cholecalciferol (VITAMIN D3) 5000 units Cap, Take 1 Cap by mouth every day., Disp: , Rfl:   •  Krill Oil 500 MG Cap, Take 1 Cap by mouth every day., Disp: , Rfl:   •  Magnesium 400 MG Cap, Take 1 Cap by mouth every day., Disp: , Rfl:   •  Lutein-Zeaxanthin (OCUVITE LUTEIN 25) 25-5 MG Cap, Take 1 Cap by mouth every day., Disp: , Rfl:   •  losartan (COZAAR) 25 MG TABS, Take 25 mg by mouth every day., Disp: , Rfl:   •  losartan (COZAAR) 50 MG Tab, , Disp: , Rfl:   ALLERGIES: No Known Allergies  SURGHX:   Past Surgical History:   Procedure Laterality Date   • COLONOSCOPY  9/27/2018    Procedure: COLONOSCOPY;  Surgeon: Milan Lipscomb M.D.;  Location: [facility];  Service: EUS   • COLONOSCOPY FLEXIBLE W/BIOPSY  9/27/2018    Procedure: COLONOSCOPY FLEXIBLE W/BIOPSY - POSSIBLE, DILATION, POLYPECTOMY, CONTROL OF HEMORRHAGE;  Surgeon: Milan Lipscomb M.D.;  Location: [facility];  Service: EUS   • COLONOSCOPY  2010   • COLONOSCOPY  2007   • CLOSED MANIPULATION ELBOW  1967    For fracture-no hardware   • CHOLECYSTECTOMY  1980's   • DENTAL EXTRACTION(S)  1980's    wisdom teeth     SOCHX:  reports that she quit smoking about 3 years ago. Her smoking use included cigarettes. She has a 15.00 pack-year smoking  "history. She has never used smokeless tobacco. She reports previous alcohol use. She reports that she does not use drugs.  Family History   Problem Relation Age of Onset   • Heart Failure Father    • Other Brother         leukemia   • Sleep Apnea Neg Hx         Objective:   /64 (BP Location: Left arm, Patient Position: Sitting, BP Cuff Size: Adult long)   Pulse 86   Temp 36.2 °C (97.1 °F) (Temporal)   Resp 16   Ht 1.619 m (5' 3.75\")   Wt (!) 142 kg (312 lb)   SpO2 93%   BMI 53.98 kg/m²     Physical Exam  Vitals reviewed.   Constitutional:       General: She is not in acute distress.     Appearance: She is well-developed.   HENT:      Head: Normocephalic and atraumatic.      Right Ear: External ear normal.      Left Ear: External ear normal.      Nose: Nose normal.      Mouth/Throat:      Mouth: Mucous membranes are moist.   Eyes:      Conjunctiva/sclera: Conjunctivae normal.      Pupils: Pupils are equal, round, and reactive to light.   Neck:      Trachea: No tracheal deviation.   Cardiovascular:      Rate and Rhythm: Normal rate and regular rhythm.   Pulmonary:      Effort: Pulmonary effort is normal.      Breath sounds: Normal breath sounds.   Abdominal:      Tenderness: There is abdominal tenderness in the right lower quadrant.      Comments: Exam limited due to body habitus    Musculoskeletal:      Cervical back: Normal range of motion and neck supple.   Skin:     General: Skin is warm and dry.      Capillary Refill: Capillary refill takes less than 2 seconds.   Neurological:      General: No focal deficit present.      Mental Status: She is alert and oriented to person, place, and time.   Psychiatric:         Mood and Affect: Mood normal.         Behavior: Behavior normal.           Assessment/Plan:     1. Acute appendicitis, unspecified acute appendicitis type     2. Leukocytosis, unspecified type     3. Right lower quadrant abdominal pain  CBC WITH DIFFERENTIAL    Comp Metabolic Panel    LIPASE "    CT-ABDOMEN-PELVIS WITH    POCT Urinalysis    URINE CULTURE(NEW)   4. Diabetic nephropathy associated with other specified diabetes mellitus (HCC)     5. Function kidney decreased       CBC: leukocytosis   CMP: BUN 35, Creatinine 1.61  GFR: 39       4:30 PM discussed CT results with radiology - Inflammatory changes in the right lower quadrant with a probable dilated appendix likely related to appendicitis. Examination is limited by patient body habitus.     Discussed lab and CT results with patient. The patient is referred to a higher level of care at [facility] now by POV transportation with spouse for evaluation and treatment; patient aware of location of [facility]. We discussed risks of non-compliance or delayed medical care. All questions answered to patient’s apparent satisfaction. Patient verbalizes understanding and agreement with plan of care. [facility] was contacted and patient report given to transfer center.       Please note that this dictation was created using voice recognition software. I have made every reasonable attempt to correct obvious errors, but I expect that there are errors of grammar and possibly content that I did not discover before finalizing the note.

## 2021-04-22 LAB
GLUCOSE BLD-MCNC: 130 MG/DL (ref 65–99)
GLUCOSE BLD-MCNC: 131 MG/DL (ref 65–99)
GLUCOSE BLD-MCNC: 164 MG/DL (ref 65–99)
GLUCOSE BLD-MCNC: 191 MG/DL (ref 65–99)

## 2021-04-22 PROCEDURE — 94760 N-INVAS EAR/PLS OXIMETRY 1: CPT

## 2021-04-22 PROCEDURE — 700105 HCHG RX REV CODE 258: Performed by: INTERNAL MEDICINE

## 2021-04-22 PROCEDURE — 700111 HCHG RX REV CODE 636 W/ 250 OVERRIDE (IP): Performed by: INTERNAL MEDICINE

## 2021-04-22 PROCEDURE — 700105 HCHG RX REV CODE 258: Performed by: ANESTHESIOLOGY

## 2021-04-22 PROCEDURE — 770006 HCHG ROOM/CARE - MED/SURG/GYN SEMI*

## 2021-04-22 PROCEDURE — 94660 CPAP INITIATION&MGMT: CPT

## 2021-04-22 PROCEDURE — 82962 GLUCOSE BLOOD TEST: CPT | Mod: 91

## 2021-04-22 PROCEDURE — 99232 SBSQ HOSP IP/OBS MODERATE 35: CPT | Performed by: HOSPITALIST

## 2021-04-22 PROCEDURE — A9270 NON-COVERED ITEM OR SERVICE: HCPCS | Performed by: INTERNAL MEDICINE

## 2021-04-22 PROCEDURE — 700102 HCHG RX REV CODE 250 W/ 637 OVERRIDE(OP): Performed by: INTERNAL MEDICINE

## 2021-04-22 PROCEDURE — 700101 HCHG RX REV CODE 250: Performed by: INTERNAL MEDICINE

## 2021-04-22 RX ADMIN — OXYCODONE HYDROCHLORIDE 10 MG: 10 TABLET ORAL at 02:06

## 2021-04-22 RX ADMIN — HYDROMORPHONE HYDROCHLORIDE 0.5 MG: 1 INJECTION, SOLUTION INTRAMUSCULAR; INTRAVENOUS; SUBCUTANEOUS at 03:52

## 2021-04-22 RX ADMIN — OXYCODONE HYDROCHLORIDE 10 MG: 10 TABLET ORAL at 17:31

## 2021-04-22 RX ADMIN — METRONIDAZOLE 500 MG: 500 INJECTION, SOLUTION INTRAVENOUS at 22:54

## 2021-04-22 RX ADMIN — SODIUM CHLORIDE: 9 INJECTION, SOLUTION INTRAVENOUS at 13:08

## 2021-04-22 RX ADMIN — SENNOSIDES AND DOCUSATE SODIUM 2 TABLET: 8.6; 5 TABLET ORAL at 06:13

## 2021-04-22 RX ADMIN — ONDANSETRON 4 MG: 2 INJECTION INTRAMUSCULAR; INTRAVENOUS at 20:24

## 2021-04-22 RX ADMIN — OXYCODONE HYDROCHLORIDE 10 MG: 10 TABLET ORAL at 07:58

## 2021-04-22 RX ADMIN — SENNOSIDES AND DOCUSATE SODIUM 2 TABLET: 8.6; 5 TABLET ORAL at 20:16

## 2021-04-22 RX ADMIN — LORATADINE 10 MG: 10 TABLET ORAL at 06:14

## 2021-04-22 RX ADMIN — OXYCODONE HYDROCHLORIDE 10 MG: 10 TABLET ORAL at 20:28

## 2021-04-22 RX ADMIN — THYROID, PORCINE 90 MG: 30 TABLET ORAL at 06:13

## 2021-04-22 RX ADMIN — METRONIDAZOLE 500 MG: 500 INJECTION, SOLUTION INTRAVENOUS at 06:13

## 2021-04-22 RX ADMIN — SODIUM CHLORIDE, POTASSIUM CHLORIDE, SODIUM LACTATE AND CALCIUM CHLORIDE: 600; 310; 30; 20 INJECTION, SOLUTION INTRAVENOUS at 00:55

## 2021-04-22 RX ADMIN — METRONIDAZOLE 500 MG: 500 INJECTION, SOLUTION INTRAVENOUS at 14:11

## 2021-04-22 RX ADMIN — CEFTRIAXONE SODIUM 2 G: 2 INJECTION, POWDER, FOR SOLUTION INTRAMUSCULAR; INTRAVENOUS at 17:31

## 2021-04-22 ASSESSMENT — ENCOUNTER SYMPTOMS
SPUTUM PRODUCTION: 0
SINUS PAIN: 0
DIZZINESS: 0
SEIZURES: 0
CHILLS: 1
STRIDOR: 0
FLANK PAIN: 0
COUGH: 0
ABDOMINAL PAIN: 1
FALLS: 0
PND: 0
INSOMNIA: 0
MUSCULOSKELETAL NEGATIVE: 1
HEARTBURN: 0
PHOTOPHOBIA: 0
DOUBLE VISION: 0
CARDIOVASCULAR NEGATIVE: 1
EYE DISCHARGE: 0
FOCAL WEAKNESS: 0
EYES NEGATIVE: 1
SHORTNESS OF BREATH: 0
DEPRESSION: 0
NEUROLOGICAL NEGATIVE: 1
MYALGIAS: 0
TINGLING: 0
EYE REDNESS: 0
DIAPHORESIS: 0
SENSORY CHANGE: 0
POLYDIPSIA: 0
BLOOD IN STOOL: 0
NERVOUS/ANXIOUS: 1
RESPIRATORY NEGATIVE: 1
FEVER: 1
NAUSEA: 1
BACK PAIN: 0
ORTHOPNEA: 0
ROS GI COMMENTS: LOSS OF APPETITE
WHEEZING: 0
BRUISES/BLEEDS EASILY: 0

## 2021-04-22 ASSESSMENT — LIFESTYLE VARIABLES
HAVE YOU EVER FELT YOU SHOULD CUT DOWN ON YOUR DRINKING: NO
HOW MANY TIMES IN THE PAST YEAR HAVE YOU HAD 5 OR MORE DRINKS IN A DAY: 0
EVER FELT BAD OR GUILTY ABOUT YOUR DRINKING: NO
TOTAL SCORE: 0
AVERAGE NUMBER OF DAYS PER WEEK YOU HAVE A DRINK CONTAINING ALCOHOL: 0
HAVE PEOPLE ANNOYED YOU BY CRITICIZING YOUR DRINKING: NO
SUBSTANCE_ABUSE: 0
TOTAL SCORE: 0
EVER HAD A DRINK FIRST THING IN THE MORNING TO STEADY YOUR NERVES TO GET RID OF A HANGOVER: NO
CONSUMPTION TOTAL: NEGATIVE
ON A TYPICAL DAY WHEN YOU DRINK ALCOHOL HOW MANY DRINKS DO YOU HAVE: 1
ALCOHOL_USE: YES
TOTAL SCORE: 0

## 2021-04-22 ASSESSMENT — COGNITIVE AND FUNCTIONAL STATUS - GENERAL
SUGGESTED CMS G CODE MODIFIER DAILY ACTIVITY: CH
DAILY ACTIVITIY SCORE: 24
MOBILITY SCORE: 24
SUGGESTED CMS G CODE MODIFIER MOBILITY: CH

## 2021-04-22 ASSESSMENT — PATIENT HEALTH QUESTIONNAIRE - PHQ9
SUM OF ALL RESPONSES TO PHQ9 QUESTIONS 1 AND 2: 0
2. FEELING DOWN, DEPRESSED, IRRITABLE, OR HOPELESS: NOT AT ALL
1. LITTLE INTEREST OR PLEASURE IN DOING THINGS: NOT AT ALL

## 2021-04-22 ASSESSMENT — PAIN DESCRIPTION - PAIN TYPE
TYPE: SURGICAL PAIN
TYPE: SURGICAL PAIN;ACUTE PAIN
TYPE: SURGICAL PAIN;ACUTE PAIN

## 2021-04-22 NOTE — DIETARY
NUTRITION SERVICES: BMI - Pt with BMI >40 (=Body mass index is 57.29 kg/m².), Class III obesity. Weight loss counseling not appropriate in acute care setting. RECOMMEND - If appropriate at DC please refer to outpatient nutrition services for weight management.

## 2021-04-22 NOTE — FLOWSHEET NOTE
04/21/21 2300   Events/Summary/Plan   Events/Summary/Plan New CPAP set-up   Skin Inspection Respiratory Device Intact

## 2021-04-22 NOTE — CONSULTS
"Surgery consult  4/21/2021        CC: Abdominal pain    HPI:  Mady Yang is a very pleasant 65 y.o. female.  Report several days of right lower quadrant pain.  Reports pain became progressively worse.  Sought care.  She received CT scan concerning for appendicitis as below  Pain sharp and constant pain does not radiate pain made better with rest and medication  No associated fever chills nausea or vomiting  No Chest pain no difficulty breathing      Past Medical History:   Diagnosis Date   • Back pain    • Chronic low back pain 09/25/2018   • Elevated blood sugar     States she was told not yet \"pre diabetic\" gets HbA1C   • Hypertension    • Kidney stone 1980's   • Obesity    • HENRI (obstructive sleep apnea) 1/20/2017    AHI 55.7, minimum saturation 84%, on CPAP 13 cm.   • Osteoporosis    • Post-nasal drip    • Seasonal allergies    • Sleep apnea    • Thyroid disease        Past Surgical History:   Procedure Laterality Date   • COLONOSCOPY  9/27/2018    Procedure: COLONOSCOPY;  Surgeon: Milan Lipscomb M.D.;  Location: SURGERY [facility];  Service: EUS   • COLONOSCOPY FLEXIBLE W/BIOPSY  9/27/2018    Procedure: COLONOSCOPY FLEXIBLE W/BIOPSY - POSSIBLE, DILATION, POLYPECTOMY, CONTROL OF HEMORRHAGE;  Surgeon: Milan Lipscomb M.D.;  Location: SURGERY [facility];  Service: EUS   • COLONOSCOPY  2010   • COLONOSCOPY  2007   • CLOSED MANIPULATION ELBOW  1967    For fracture-no hardware   • CHOLECYSTECTOMY  1980's   • DENTAL EXTRACTION(S)  1980's    wisdom teeth       Current Facility-Administered Medications   Medication Dose Route Frequency Provider Last Rate Last Admin   • [START ON 4/22/2021] loratadine (CLARITIN) tablet 5 mg  5 mg Oral DAILY ANGELY Vasquez.O.       • [START ON 4/22/2021] losartan (COZAAR) tablet 50 mg  50 mg Oral Q DAY ANGELY Vasquez.O.       • [START ON 4/22/2021] thyroid (ARMOUR THYROID) tablet 90 mg  90 mg Oral DAILY ANGELY Vasquez.O.       • [START ON 4/22/2021] " senna-docusate (PERICOLACE or SENOKOT S) 8.6-50 MG per tablet 2 tablet  2 tablet Oral BID ANGELY Vasquez.O.        And   • polyethylene glycol/lytes (MIRALAX) PACKET 1 Packet  1 Packet Oral QDAY PRN ANGELY Vasquez.O.        And   • magnesium hydroxide (MILK OF MAGNESIA) suspension 30 mL  30 mL Oral QDAY PRN ANGELY Vasquez.O.        And   • bisacodyl (DULCOLAX) suppository 10 mg  10 mg Rectal QDAY PRN Elva Chavez D.O.       • labetalol (NORMODYNE/TRANDATE) injection 10 mg  10 mg Intravenous Q4HRS PRN ANGELY Vasquez.O.       • ondansetron (ZOFRAN) syringe/vial injection 4 mg  4 mg Intravenous Q4HRS PRN ANGELY Vasquez.O.       • ondansetron (ZOFRAN ODT) dispertab 4 mg  4 mg Oral Q4HRS PRN ANGELY Vasquez.O.       • promethazine (PHENERGAN) tablet 12.5-25 mg  12.5-25 mg Oral Q4HRS PRN Elva Chavez D.O.       • promethazine (PHENERGAN) suppository 12.5-25 mg  12.5-25 mg Rectal Q4HRS PRN Elva Chavez D.O.       • prochlorperazine (COMPAZINE) injection 5-10 mg  5-10 mg Intravenous Q4HRS PRN Elva Chavez D.O.       • insulin regular (HumuLIN R,NovoLIN R) injection  2-9 Units Subcutaneous Q6HRS ANGELY Vasquez.O.        And   • glucose 4 g chewable tablet 16 g  16 g Oral Q15 MIN PRN ANGELY Vasquez.O.        And   • dextrose 50% (D50W) injection 50 mL  50 mL Intravenous Q15 MIN PRN Elva Chavez D.O.       • acetaminophen (Tylenol) tablet 650 mg  650 mg Oral Q6HRS PRN Elva Chavez D.O.       • Pharmacy Consult Request ...Pain Management Review 1 Each  1 Each Other PHARMACY TO DOSE ANGELY Vasquez.GEOVANNI.       • oxyCODONE immediate-release (ROXICODONE) tablet 5 mg  5 mg Oral Q3HRS PRN Elva Chavez D.O.        Or   • oxyCODONE immediate release (ROXICODONE) tablet 10 mg  10 mg Oral Q3HRS PRN Elva Chavez D.O.        Or   • HYDROmorphone (Dilaudid) injection 0.5 mg  0.5 mg Intravenous Q3HRS PRN Elva Chavez D.O.       • [START ON 4/22/2021] cefTRIAXone (ROCEPHIN) 2 g in  mL IVPB  2 g  Intravenous Q EVENING Elva Chavez D.O.       • metroNIDAZOLE (FLAGYL) IVPB 500 mg  500 mg Intravenous Q8HRS Elva Chavez D.O.       • NS infusion   Intravenous Continuous Elva Chavez D.O.       • povidone-iodine (Betadine) 0.23% oral solution 15 mL  15 mL Mouth/Throat Once Jett Ruiz M.D.         Current Outpatient Medications   Medication Sig Dispense Refill   • losartan (COZAAR) 50 MG Tab      • Cyanocobalamin (VITAMIN B 12 PO) Take  by mouth.     • BIOTIN PO Take  by mouth.     • Barberry-Oreg Grape-Goldenseal (BERBERINE COMPLEX PO) Take  by mouth.     • Loratadine 10 MG Cap Take 1 Cap by mouth every day.     • ibuprofen (MOTRIN) 600 MG Tab Take 800 mg by mouth every 6 hours as needed.     • thyroid (ARMOUR THYROID) 90 MG Tab Take 90 mg by mouth every day.     • Cholecalciferol (VITAMIN D3) 5000 units Cap Take 1 Cap by mouth every day.     • Krill Oil 500 MG Cap Take 1 Cap by mouth every day.     • Magnesium 400 MG Cap Take 1 Cap by mouth every day.     • Lutein-Zeaxanthin (OCUVITE LUTEIN 25) 25-5 MG Cap Take 1 Cap by mouth every day.     • losartan (COZAAR) 25 MG TABS Take 25 mg by mouth every day.         Social History     Socioeconomic History   • Marital status:      Spouse name: Not on file   • Number of children: Not on file   • Years of education: Not on file   • Highest education level: Not on file   Occupational History   • Not on file   Tobacco Use   • Smoking status: Former Smoker     Packs/day: 0.50     Years: 30.00     Pack years: 15.00     Types: Cigarettes     Quit date: 11/2017     Years since quitting: 3.4   • Smokeless tobacco: Never Used   Substance and Sexual Activity   • Alcohol use: Not Currently     Alcohol/week: 0.0 oz     Comment: 1-2 per month   • Drug use: No   • Sexual activity: Not on file   Other Topics Concern   • Not on file   Social History Narrative   • Not on file     Social Determinants of Health     Financial Resource Strain:    • Difficulty of Paying  "Living Expenses:    Food Insecurity:    • Worried About Running Out of Food in the Last Year:    • Ran Out of Food in the Last Year:    Transportation Needs:    • Lack of Transportation (Medical):    • Lack of Transportation (Non-Medical):    Physical Activity:    • Days of Exercise per Week:    • Minutes of Exercise per Session:    Stress:    • Feeling of Stress :    Social Connections:    • Frequency of Communication with Friends and Family:    • Frequency of Social Gatherings with Friends and Family:    • Attends [employer] Services:    • Active Member of Clubs or Organizations:    • Attends Club or Organization Meetings:    • Marital Status:    Intimate Partner Violence:    • Fear of Current or Ex-Partner:    • Emotionally Abused:    • Physically Abused:    • Sexually Abused:        Family History   Problem Relation Age of Onset   • Heart Failure Father    • Other Brother         leukemia   • Sleep Apnea Neg Hx        Allergies:  Patient has no known allergies.    Review of Systems:  Right lower quadrant abdominal pain as above rule was negative    Physical Exam:  /56   Pulse 96   Temp 36.7 °C (98.1 °F) (Temporal)   Resp 18   Ht 1.6 m (5' 3\")   Wt (!) 147 kg (323 lb 6.6 oz)   SpO2 98%     Constitutional: Awake, alert, oriented x3. GCS 15. E4 V5 M6.  Head: Normocephalic atraumatic  Neck: No tracheal deviation. No stridor   cardiovascular: Regular rate, skin warm is capillary refill   pulmonary/Chest: Breathing with ease no cough no distress positive breath sounds bilaterally.   Abdominal: Soft, nondistended. tender to palpation greatest right lower quadrant Musculoskeletal: No tenderness  Neurological: Awake alert friendly cooperative GCS 15  Skin: Skin is warm and dry.    Psychiatric:  Normal mood and affect.  Behavior is appropriate.       Labs:  Recent Labs     04/21/21  1032   WBC 13.4*   RBC 4.41   HEMOGLOBIN 12.7   HEMATOCRIT 39.7   MCV 90.0   MCH 28.8   MCHC 32.0*   RDW 48.0   PLATELETCT 333 "   MPV 9.5     Recent Labs     04/21/21  1032   SODIUM 138   POTASSIUM 3.9   CHLORIDE 103   CO2 21   GLUCOSE 116*   BUN 35*   CREATININE 1.61*   CALCIUM 9.3         Recent Labs     04/21/21  1032   ASTSGOT 19   ALTSGPT 22   TBILIRUBIN 0.5   ALKPHOSPHAT 111*   GLOBULIN 4.2*       Radiology:  No orders to display         Assessment:Mady Yang is a very pleasant 65 y.o. female.  Elevated white count  Right lower quadrant pain   CT scan appendicitis  Plan:   Active Hospital Problems    Diagnosis    • Acute appendicitis with localized peritonitis [K35.30]    • Stage 3 chronic kidney disease [N18.30]    • Essential hypertension [I10]    • BMI 50.0-59.9, adult (HCC) [Z68.43]    • HENRI (obstructive sleep apnea) [G47.33]      AHI 55.7, minimum saturation 84%, on CPAP 13 cm.       Discussed findings  Discussed laparoscopic possible open appendectomy  Discussed risk benefits alternatives  Discussed with unusual presentation and possible missed diagnosis possible other causes of the pain, risk of nontherapeutic surgery  All questions answered discussed  Informed consent obtained  Plan proceed with laparoscopic possible open appendectomy    Jett Ruiz MD, FACS  [facility]  [phone]

## 2021-04-22 NOTE — OP REPORT
DATE OF OPERATION: 4/21/2021     PREOPERATIVE DIAGNOSIS: Acute appendicitis    POSTOPERATIVE DIAGNOSIS: Perforated appendicitis intra-abdominal abscess    PROCEDURE PERFORMED: Laparoscopic appendectomy   Converted open appendectomy  Drainage of abscess cavity    SURGEON: Jett Ruiz M.D.    ANESTHESIOLOGIST: Seth Bowden M.D    ANESTHESIA: General endotracheal anesthesia.        INDICATIONS: The patient is a 65 y.o.-year-old female with clinical and radiographic findings of acute appendicitis. She  is taken to the operating room for laparoscopic appendectomy.     FINDINGS: Perforated appendix, abscess cavity    WOUND CLASSIFICATION: Class IV, Dirty, Infected.    SPECIMEN: Appendix.    ESTIMATED BLOOD LOSS: 50 mL.    PROCEDURE: Following informed consent, the patient was properly identified, taken to the operating room and placed in supine position where general endotracheal anesthesia was administered. Intravenous antibiotics were administered by the anesthesiologist in correct time interval. Sequential compression devices were employed. The abdomen was prepped and draped into a sterile field.     Marcaine 0.5% with epinephrine was used to infiltrate the port sites. A infraumbilical midline incision was made and subcutaneous tissue spread bluntly. The fascia was elevated and incised.  A Rowley port was placed.  Carbon dioxide pneumoperitoneum was instilled.      A 5 mm port was placed in left lower quadrant under direct vision. A 5 mm port was placed in suprapubic midline under direct vision.  Abscess cavity was present with the appendix ruptured    Dense inflammation and body habitus precluded safe laparoscopic approach    The operation was converted open.    Incision was made connecting the lower midline ports.  Retractors placed.    The cecum was gently mobilized in the midline.    The appendix was removed at its base with a GI stapling device.    Appendiceal mesentery was taken down with clamps and suture  ligated.    The areas thoroughly irrigated and aspirated    A drain was placed in the abscess cavity and brought out through separate stab incision    Drain was secured with suture    Final inspection revealed staples in place and secure with no bleeding no leakage    Fascia was closed with #1 looped PDS x2  Incision was irrigated    Skin was closed with staples    The patient tolerated the procedure well and there were no apparent complication. All sponge, needle, and instrument counts were correct on 2 separate occasions. She  was awakened, extubated, and transferred to the recovery room in satisfactory condition.   ____________________________________   Jett Ruiz M.D.    DD: 4/21/2021  9:57 PM

## 2021-04-22 NOTE — ANESTHESIA POSTPROCEDURE EVALUATION
Patient: Mady Yang    Procedure Summary     Date: 04/21/21 Room / Location:  OR  / SURGERY [facility]    Anesthesia Start: 1950 Anesthesia Stop: 2228    Procedure: APPENDECTOMY, LAPAROSCOPIC; converted to open appendectomy (N/A Abdomen) Diagnosis: (Acute appendicitis with localized peritonitis )    Surgeons: Jett Ruiz M.D. Responsible Provider: Seth Bowden M.D.    Anesthesia Type: general ASA Status: 3 - Emergent          Final Anesthesia Type: general  Last vitals  BP   Blood Pressure: 118/56    Temp   36.7 °C (98.1 °F)    Pulse   96   Resp   18    SpO2   98 %      Anesthesia Post Evaluation    Patient location during evaluation: PACU  Patient participation: complete - patient participated  Level of consciousness: awake and alert  Pain score: 4    Airway patency: patent  Anesthetic complications: no  Cardiovascular status: hemodynamically stable  Respiratory status: acceptable  Hydration status: euvolemic    PONV: none          No complications documented.     Nurse Pain Score: 8 (NPRS)

## 2021-04-22 NOTE — ANESTHESIA TIME REPORT
Anesthesia Start and Stop Event Times     Date Time Event    4/21/2021 1944 Ready for Procedure     1950 Anesthesia Start     2228 Anesthesia Stop        Responsible Staff  04/21/21    Name Role Begin End    Seth Bowden M.D. Anesth 1950 2228        Preop Diagnosis (Free Text):  Pre-op Diagnosis     Acute appendicitis with localized peritonitis         Preop Diagnosis (Codes):    Post op Diagnosis  Ruptured appendix      Premium Reason  A. 3PM - 7AM    Comments:

## 2021-04-22 NOTE — PROGRESS NOTES
Received report and assumed care of pt. A&O x4, pt laying in bed, reports 7/10 pain on left side of abdomen. Denies nausea. Medicated per MAR. Bowel sounds present. Dressings CDI. Bed low and locked, call light in reach.

## 2021-04-22 NOTE — ANESTHESIA PROCEDURE NOTES
Peripheral Block    Date/Time: 4/21/2021 10:03 PM  Performed by: Seth Bowden M.D.  Authorized by: Seth Bowden M.D.     Patient Location:  OR  Start Time:  4/21/2021 10:03 PM  End Time:  4/21/2021 10:10 PM  Reason for Block: at surgeon's request and post-op pain management ONLY    patient identified, IV checked, site marked, risks and benefits discussed, surgical consent, monitors and equipment checked, pre-op evaluation and timeout performed    Patient Position:  Supine  Prep: ChloraPrep    Monitoring:  Heart rate, continuous pulse ox and cardiac monitor  Block Region:  Trunk  Trunk - Block Type:  Abdominal plane block - TAP block    Laterality:  Bilateral  Procedures: ultrasound guided  Image captured, interpreted and electronically stored.  Local Infiltration:  Lidocaine  Strength:  1 %  Block Type:  Single-shot  Needle Length:  100mm  Needle Gauge:  21 G  Needle Localization:  Ultrasound guidance  Injection Assessment:  Negative aspiration for heme, no paresthesia on injection, incremental injection and local visualized surrounding nerve on ultrasound

## 2021-04-22 NOTE — ASSESSMENT & PLAN NOTE
Status post appendectomy.  Initially attempted with laparoscopy but then had to be converted to open resection.  Continue with pain management  NG tube to suctioning due to persistent postoperative ileus  Continue to advance diet as tolerated and wean off TPN    4/30: We consulted ID, and they recommended to continue IV antibiotics for now.    5/1: Antibiotics completed, per ID

## 2021-04-22 NOTE — PROGRESS NOTES
Received report from PACU at 0020. Pt arrived to the unit at 0040.  Reporting 7/10 midline abdominal pain and will be medicated per MAR when next dose is available. Pt also c/o nausea but declines intervention at this time. Midline abdominal dressing and LENI drain dressing are DCI. LENI drain had 20mL serosanguineous output on arrival from PACU. Ice pack to abdomen.  Pt denies SOB. Currently satting at 99% with the CPAP. Continuous pulse ox on. First bag of LR infusing. Bed locked and in lowest position. Call light within reach. No other needs at this time.

## 2021-04-22 NOTE — PROGRESS NOTES
[facility] Medicine Daily Progress Note    Date of Service  4/22/2021    Chief Complaint  65 y.o. female admitted 4/21/2021 with abdominal pain    Hospital Course  Patient is a 65-year-old female comes in with abdominal pain that started last Friday.  After suffering with the pain the patient went to see urgent care where they did a CAT scan of her abdomen which came back with early appendicitis.  Patient was referred over to the emergency room for evaluation.  Patient was admitted for surgery and was found to have a large abscess along with the appendicitis.  Appendix was removed and the abscess has been drained.  She still having chills and fevers afterwards.  Continue with IV antibiotics as well as pain management and fluid resuscitation.  Await culture results.    Interval Problem Update  IV antibiotics with Rocephin and Flagyl day #2  Await culture results  Pain management  Fluid resuscitation  Slowly advance diet    Consultants/Specialty  Surgery Dr. Ruiz    Code Status  Full Code    Disposition  Most likely home once cleared by surgery    Review of Systems  Review of Systems   Constitutional: Positive for chills, fever and malaise/fatigue. Negative for diaphoresis.   HENT: Negative.  Negative for nosebleeds and sinus pain.    Eyes: Negative.  Negative for double vision, photophobia, discharge and redness.   Respiratory: Negative.  Negative for cough, sputum production, shortness of breath, wheezing and stridor.    Cardiovascular: Negative.  Negative for chest pain, orthopnea, leg swelling and PND.   Gastrointestinal: Positive for abdominal pain and nausea. Negative for blood in stool and heartburn.        Loss of appetite   Genitourinary: Negative.  Negative for dysuria, flank pain and frequency.   Musculoskeletal: Negative.  Negative for back pain, falls and myalgias.   Skin: Negative.  Negative for itching.   Neurological: Negative.  Negative for dizziness, tingling, sensory change, focal weakness and  seizures.   Endo/Heme/Allergies: Negative.  Negative for polydipsia. Does not bruise/bleed easily.   Psychiatric/Behavioral: Negative for depression, substance abuse and suicidal ideas. The patient is nervous/anxious. The patient does not have insomnia.    All other systems reviewed and are negative.       Physical Exam  Temp:  [36.4 °C (97.6 °F)-37.2 °C (98.9 °F)] 36.9 °C (98.5 °F)  Pulse:  [] 83  Resp:  [10-18] 18  BP: ()/(43-85) 120/61  SpO2:  [87 %-99 %] 95 %    Physical Exam  Constitutional:       Appearance: Normal appearance. She is well-developed and underweight.   HENT:      Head: Normocephalic and atraumatic.      Right Ear: Tympanic membrane, ear canal and external ear normal.      Left Ear: Tympanic membrane, ear canal and external ear normal.      Nose: Nose normal. No congestion or rhinorrhea.      Mouth/Throat:      Mouth: Mucous membranes are moist.      Pharynx: Oropharynx is clear.   Eyes:      Extraocular Movements: Extraocular movements intact.      Conjunctiva/sclera: Conjunctivae normal.      Pupils: Pupils are equal, round, and reactive to light.   Neck:      Thyroid: No thyroid mass.      Vascular: No carotid bruit or JVD.   Cardiovascular:      Rate and Rhythm: Normal rate and regular rhythm.      Pulses: Normal pulses.      Heart sounds: Normal heart sounds, S1 normal and S2 normal.   Pulmonary:      Effort: Pulmonary effort is normal.      Breath sounds: Normal breath sounds and air entry.   Abdominal:      General: Abdomen is flat. A surgical scar is present. Bowel sounds are decreased. There is distension.      Palpations: Abdomen is rigid.      Tenderness: There is generalized abdominal tenderness. There is guarding.   Musculoskeletal:         General: Normal range of motion.      Cervical back: Normal range of motion and neck supple. No edema or rigidity.      Right lower leg: No edema.      Left lower leg: No edema.      Right foot: Normal range of motion. No deformity or  foot drop.      Left foot: Normal range of motion. No deformity or foot drop.   Feet:      Right foot:      Skin integrity: Skin integrity normal. No ulcer.      Left foot:      Skin integrity: Skin integrity normal. No ulcer.   Lymphadenopathy:      Head:      Right side of head: No submental adenopathy.      Left side of head: No submental adenopathy.      Cervical: No cervical adenopathy.      Right cervical: No superficial cervical adenopathy.     Left cervical: No superficial cervical adenopathy.      Upper Body:      Right upper body: No supraclavicular adenopathy.      Left upper body: No supraclavicular adenopathy.      Lower Body: No right inguinal adenopathy. No left inguinal adenopathy.   Skin:     General: Skin is warm and dry.      Capillary Refill: Capillary refill takes more than 3 seconds.      Findings: No abrasion or wound.   Neurological:      General: No focal deficit present.      Mental Status: She is alert and oriented to person, place, and time. Mental status is at baseline.      GCS: GCS eye subscore is 4. GCS verbal subscore is 5. GCS motor subscore is 6.      Cranial Nerves: Cranial nerves are intact.      Sensory: Sensation is intact.      Motor: Motor function is intact. No weakness.      Coordination: Coordination is intact.   Psychiatric:         Mood and Affect: Affect normal. Mood is anxious.         Speech: Speech normal.         Behavior: Behavior normal. Behavior is cooperative.         Thought Content: Thought content normal.         Cognition and Memory: Cognition and memory normal.         Judgment: Judgment normal.         Fluids    Intake/Output Summary (Last 24 hours) at 4/22/2021 1119  Last data filed at 4/22/2021 0618  Gross per 24 hour   Intake 2000 ml   Output 160 ml   Net 1840 ml       Laboratory  Recent Labs     04/21/21  1032   WBC 13.4*   RBC 4.41   HEMOGLOBIN 12.7   HEMATOCRIT 39.7   MCV 90.0   MCH 28.8   MCHC 32.0*   RDW 48.0   PLATELETCT 333   MPV 9.5     Recent  Labs     04/21/21  1032   SODIUM 138   POTASSIUM 3.9   CHLORIDE 103   CO2 21   GLUCOSE 116*   BUN 35*   CREATININE 1.61*   CALCIUM 9.3                   Imaging  No orders to display        Assessment/Plan  * Acute appendicitis with localized peritonitis  Assessment & Plan  Patient on Friday morning started to have generalized abdominal pain which did not localize down to the right lower quadrant.  Patient initially went to urgent care where a CT scan was done and when discussed results of this came back with suspicion for acute appendicitis she was told to come to the emergency room.  The patient did come here for evaluation surgery was consulted.  Initially laparoscopic appendectomy was going to be done but then that was converted to a open appendectomy due to a large abscess.  The abscess was also drained.  Continue with antibiotics and await culture results  Pain management  Diet as tolerated  Follow surgical recommendations    Symptoms started Friday with generalized abdominal pain  CT abdomen confirmed acute appy  Dr Ruiz to consult, NPO now in anticipation of upcoming surgery  Rapid covid ordered and pending      Essential hypertension- (present on admission)  Assessment & Plan  Optimize blood pressure management keep systolic blood pressure less than 140 diastolic under 90  Continue with IV labetalol as needed  At home she takes losartan, this should be resumed in the next 24 hours      Stage 3 chronic kidney disease- (present on admission)  Assessment & Plan  Monitor renal functions, hydrate and avoid nephrotoxic medications       BMI 50.0-59.9, adult (HCC)- (present on admission)  Assessment & Plan  Body mass index is 57.29 kg/m².  Outpatient weight loss management program recommended highly.      HENRI (obstructive sleep apnea)- (present on admission)  Assessment & Plan  Continue with nasal CPAP which the  has brought in.           VTE prophylaxis: SCDs

## 2021-04-22 NOTE — FLOWSHEET NOTE
04/22/21 0133   Events/Summary/Plan   Events/Summary/Plan CPAP continuous to patient mask   Skin Inspection Respiratory Device Intact   Vital Signs   Pulse 95   Respiration 17   Pulse Oximetry 94 %   $ Pulse Oximetry (Spot Check) Yes   O2 Alarms Set & Reviewed Yes   Respiratory Assessment   Respiratory Pattern Within Normal Limits   Level of Consciousness Alert   Chest Exam   Work Of Breathing / Effort Mild   Oxygen   O2 (LPM) 4   O2 Delivery Device CPAP   Non-Invasive Ventilation [facility]   Nocturnal CPAP or BIPAP CPAP - [facility]   $ System Evaluation Yes   Settings (If Known) AutoCpap  (11-15 cm H20 (per pt/for settings))   FiO2 or LPM 4

## 2021-04-22 NOTE — ED TRIAGE NOTES
RLQ pain started on Thursday. CT scan at urgent care today reveals appendicitis. Her last covid vaccination was March 30th.

## 2021-04-22 NOTE — FLOWSHEET NOTE
04/21/21 2300   Events/Summary/Plan   Events/Summary/Plan New CPAP set-up   Skin Inspection Respiratory Device Intact   Vital Signs   Pulse (!) 102   Respiration 12   Pulse Oximetry 96 %   $ Pulse Oximetry (Spot Check) Yes   O2 Alarms Set & Reviewed Yes   Respiratory Assessment   Respiratory Pattern Within Normal Limits   Level of Consciousness Alert   Chest Exam   Work Of Breathing / Effort Within Normal Limits   Breath Sounds   RUL Breath Sounds Clear   RML Breath Sounds Clear   RLL Breath Sounds Diminished   CHRISTINA Breath Sounds Clear   LLL Breath Sounds Diminished   Oxygen   O2 (LPM) 5   O2 Delivery Device CPAP   Non-Invasive Ventilation [facility]   Nocturnal CPAP or BIPAP CPAP - [facility]   $ System Evaluation Yes   Settings (If Known) 12   FiO2 or LPM 5

## 2021-04-22 NOTE — PROGRESS NOTES
"  Trauma/Surgical Progress Note    Author: Jett Ruiz M.D. Date & Time created: 4/22/2021   4:38 PM   4/21 open appendectomy drain abscess    Interval Events:  Awake alert  reports pain control good  tolerating sips    ROS  Hemodynamics:  /56   Pulse 81   Temp 36.3 °C (97.4 °F) (Oral)   Resp 18   Ht 1.6 m (5' 3\")   Wt (!) 147 kg (323 lb 6.6 oz)   SpO2 94%      Respiratory:    Respiration: 18, Pulse Oximetry: 94 %     Work Of Breathing / Effort: Mild  RUL Breath Sounds: Clear, RML Breath Sounds: Diminished, RLL Breath Sounds: Diminished, CHRISTINA Breath Sounds: Clear, LLL Breath Sounds: Diminished  Fluids:    Intake/Output Summary (Last 24 hours) at 4/22/2021 1638  Last data filed at 4/22/2021 0618  Gross per 24 hour   Intake 2000 ml   Output 160 ml   Net 1840 ml     Admit Weight: (!) 147 kg (323 lb 6.6 oz)  Current Weight: (!) 147 kg (323 lb 6.6 oz)    Physical Exam  Awake alert appropriate  Breathing with ease  Appropriate incisional tenderness  Drain serosanguinous   Medical Decision Making/Problem List:    Active Hospital Problems    Diagnosis    • Acute appendicitis with localized peritonitis [K35.30]      Priority: High   • Essential hypertension [I10]      Priority: Medium   • Stage 3 chronic kidney disease [N18.30]      Priority: Low   • BMI 50.0-59.9, adult (HCC) [Z68.43]      Priority: Low   • HENRI (obstructive sleep apnea) [G47.33]      Priority: Low     AHI 55.7, minimum saturation 84%, on CPAP 13 cm.       Core Measures & Quality Metrics:  Core Measures & Quality Metrics  JULIOCESAR Score  Discussed patient condition with patient family bedside nurse    Assessment/Plan  continue antibiotics rupture appendicitis intraabdominal abscess  Advance diet  Encourage ambulation IS  "

## 2021-04-22 NOTE — ASSESSMENT & PLAN NOTE
As per chart review, apparently history of  Avoid nephrotoxic medications    5/3: Creatinine .84 today. Patient NPO on TPN.  Patient also currently on IVF, patient will get CT scan with contrast today, so we will monitor kidney function.    Stable.

## 2021-04-22 NOTE — CARE PLAN
Problem: Communication  Goal: The ability to communicate needs accurately and effectively will improve  Outcome: PROGRESSING AS EXPECTED  Note: Pt uses call light appropriately and communicates needs clearly and effectively.     Problem: Respiratory:  Goal: Respiratory status will improve  Outcome: PROGRESSING AS EXPECTED  Note: Pt satting at 97-99% on her CPAP overnight.

## 2021-04-22 NOTE — PROGRESS NOTES
Pts BG was 190 this morning. She said she has never taken insulin or any other diabetic medications before, and she is still NPO per order.  Paged MD regarding diet order, pt is to be NPO for now per Dr. Mcnamara, until day hospitalists round and make the decision about her diet.   This RN spoke with pt regarding taking the insulin. Since pt has never had insulin before and does not know exactly when she'll be able to eat, pt decided she will wait on the insulin until she has a diet order.

## 2021-04-22 NOTE — ANESTHESIA PREPROCEDURE EVALUATION
Relevant Problems   ANESTHESIA   (+) HENRI (obstructive sleep apnea)      CARDIAC   (+) Essential hypertension         (+) Stage 3 chronic kidney disease       Physical Exam    Airway   Mallampati: II  TM distance: >3 FB  Neck ROM: full       Cardiovascular - normal exam  Rhythm: regular  Rate: normal  (-) murmur     Dental - normal exam           Pulmonary - normal exam  Breath sounds clear to auscultation     Abdominal    Neurological - normal exam                 Anesthesia Plan    ASA 3- EMERGENT       Plan - general       Airway plan will be ETT                    Informed Consent:        bmi 57;  Acute abdomen.

## 2021-04-22 NOTE — OR NURSING
2225 PT RECEIVED IN PACU, REPORT RECEIVED.  VSS, RESP SPONT, EVEN, NON LABORED. PT REPORTS ABDOMINAL PAIN 10/10. CONSULT WITH DR. GARCIA TO GIVE DILAUDID 0.5MG IV FOR PAIN, VERBALIZED BACK. FOLLOW OUT.      2242 VSS. PT REPORTS PAIN 9/10, AND FALLS TO REST, EASY TO ROUSE. LOWER MIDLINE DRESSING WITH SCANT RED DRAINAGE. LENI DRAIN TO SELF SUCTION  2253 RT AT BEDSIDE WITH SETUP OF CPAP.      2255 PT REPORTS PAIN 11/10. MEDICATE FOR PAIN. SEE MAR.     2249 PT REPORTS PAIN 10/10.     2315 PT REPORTS PAIN 6/10. MEDICATE FOR PAIN. SEE MAR.     2323 PT REPORTS BURNING ABDOMINAL PAIN 6/10, DRIFTS TO SLEEP. VSS. DRESSING WITH SMALL AMOUNT RED DRAINAGE. MARGINS MARKED WITH BLACK SHARPIE.     1141 VSS. ICE PACK APPLIED TO LOWER MIDLINE DRESSING FOR COMFORT.     2345 VSS.     0012 PT REPORTS LOWER BACK PAIN AND ABDOMINAL PAIN BOTH 6/10. REPOSITION PT FOR COMFORT. VSS. PT MAINTAINS SPO2 >90% FOR 30 MINUTES.  PT MEETS CRITERIA FOR TRANSFER TO ROOM.

## 2021-04-22 NOTE — OR NURSING
Patient allergies and NPO status verified, home medication reconciliation completed and belongings secured. Surgical site verified with patient. Patient verbalizes understanding of pain scale, expected course of stay and plan of care; patient and family state verbal understanding at this time. IV access verified. Sequentials in place as ordered.

## 2021-04-22 NOTE — OR SURGEON
Immediate Post OP Note    PreOp Diagnosis: Acute appendicitis    PostOp Diagnosis: Referred appendicitis  Procedure(s):  APPENDECTOMY, LAPAROSCOPIC; converted to open appendectomy    Surgeon(s):  Jett Ruiz M.D.    Anesthesiologist/Type of Anesthesia:  Anesthesiologist: Seth Bowden M.D./General    Surgical Staff:  Circulator: Nina Gaitan R.N.; Zain Cruz  Scrub Person: Claudy Robertson    Specimens removed if any:  ID Type Source Tests Collected by Time Destination   A : Appendix Tissue Appendix PATHOLOGY SPECIMEN Jett Ruiz M.D. 4/21/2021  9:35 PM        Estimated Blood Loss:50 cc    Findings: Perforated appendicitis abscess cavity peritonitis    Complications: None        4/21/2021 9:56 PM Jett Ruiz M.D.

## 2021-04-22 NOTE — ED PROVIDER NOTES
"ED Provider Note    CHIEF COMPLAINT  Chief Complaint   Patient presents with   • RLQ Pain     RLQ pain and diagnosed with appy at urgent care  pain since thursday  Also has had some vomiting   • Complicated Appendicitis       HPI  Mady Yang is a 65 y.o. female who presents with a report of right lower quadrant pain that started apparently even last Thursday and slowly progressed becoming much worse today and she was seen at [facility] urgent care and diagnosed with acute appendicitis by CT scan that is nonperforated.  She said that she had her gallbladder out in the remote past in the 80s and is with hypertension and diabetes that is fairly well controlled.  She does have some severe obesity and sleep apnea but denies any chest pain or other complaints today.  Denies any fever, chills, sweats, vomiting and says that her pain in the right lower quadrant is well localized, moderate in severity when she is moving the better at rest with no alleviating factors    REVIEW OF SYSTEMS  See HPI for further details. All other systems are negative.     PAST MEDICAL HISTORY  Past Medical History:   Diagnosis Date   • Back pain    • Chronic low back pain 09/25/2018   • Elevated blood sugar     States she was told not yet \"pre diabetic\" gets HbA1C   • Hypertension    • Kidney stone 1980's   • Obesity    • HENRI (obstructive sleep apnea) 1/20/2017    AHI 55.7, minimum saturation 84%, on CPAP 13 cm.   • Osteoporosis    • Post-nasal drip    • Seasonal allergies    • Sleep apnea    • Thyroid disease        FAMILY HISTORY  Family History   Problem Relation Age of Onset   • Heart Failure Father    • Other Brother         leukemia   • Sleep Apnea Neg Hx        SOCIAL HISTORY   reports that she quit smoking about 3 years ago. Her smoking use included cigarettes. She has a 15.00 pack-year smoking history. She has never used smokeless tobacco. She reports previous alcohol use. She reports that she does not use drugs.    SURGICAL " "HISTORY  Past Surgical History:   Procedure Laterality Date   • COLONOSCOPY  9/27/2018    Procedure: COLONOSCOPY;  Surgeon: Milan Lipscomb M.D.;  Location: SURGERY [facility];  Service: EUS   • COLONOSCOPY FLEXIBLE W/BIOPSY  9/27/2018    Procedure: COLONOSCOPY FLEXIBLE W/BIOPSY - POSSIBLE, DILATION, POLYPECTOMY, CONTROL OF HEMORRHAGE;  Surgeon: Milan Lipscomb M.D.;  Location: SURGERY [facility];  Service: EUS   • COLONOSCOPY  2010   • COLONOSCOPY  2007   • CLOSED MANIPULATION ELBOW  1967    For fracture-no hardware   • CHOLECYSTECTOMY  1980's   • DENTAL EXTRACTION(S)  1980's    wisdom teeth       CURRENT MEDICATIONS  Home Medications    **Home medications have not yet been reviewed for this encounter**         ALLERGIES  No Known Allergies    PHYSICAL EXAM  VITAL SIGNS: /85   Pulse 94   Temp 37.2 °C (98.9 °F) (Temporal)   Resp 18   Ht 1.6 m (5' 3\")   Wt (!) 147 kg (323 lb 6.6 oz)   SpO2 94%   BMI 57.29 kg/m²    Constitutional: Well developed, Well nourished, No acute distress, Non-toxic appearance.   HENT: Normocephalic, Atraumatic, Bilateral external ears normal, Oropharynx is clear mucous membranes are moist. No oral exudates or nasal discharge.   Eyes: Pupils are equal round and reactive, EOMI, Conjunctiva normal, No discharge.   Neck: Normal range of motion, No tenderness, Supple, No stridor. No meningismus.  Lymphatic: No lymphadenopathy noted.   Cardiovascular: Regular rate and rhythm without murmur rub or gallop.  Thorax & Lungs: Clear breath sounds bilaterally without wheezes, rhonchi or rales. There is no chest wall tenderness.   Abdomen: Soft non-tender non-distended. There is no rebound or guarding. No organomegaly is appreciated. Bowel sounds are normal.  Skin: Normal without rash.   Back: No CVA or spinal tenderness.   Extremities: Intact distal pulses, No edema, No tenderness, No cyanosis, No clubbing. Capillary refill is less than 2 seconds.  Musculoskeletal: Good range of " motion in all major joints. No tenderness to palpation or major deformities noted.   Neurologic: Alert & oriented x 3, Normal motor function, Normal sensory function, No focal deficits noted. Reflexes are normal.  Psychiatric: Affect normal, Judgment normal, Mood normal. There is no suicidal ideation or patient reported hallucinations.     EKG  Results for orders placed or performed during the hospital encounter of 21   EKG   Result Value Ref Range    Report       [facility] Emergency Dept.    Test Date:  2021  Pt Name:    SILVERIO STORM              Department: EDS  MRN:        [id number]                      [facility]  Gender:     Female                       Technician: [id number]  :        [id number]                   Requested By:SOFY HDEZ  Order #:    [id number]                    Reading MD: SOFY HDEZ MD    Measurements  Intervals                                Axis  Rate:       91                           P:          93  MS:         216                          QRS:        23  QRSD:       94                           T:          49  QT:         400  QTc:        493    Interpretive Statements  SINUS RHYTHM  FIRST DEGREE AV BLOCK  CONSIDER LEFT ATRIAL ABNORMALITY  BORDERLINE LOW VOLTAGE IN FRONTAL LEADS  BORDERLINE PROLONGED QT INTERVAL  Compared to ECG 2018 10:45:37  First degree AV block now present  Electronically Signed On 2021 18:28:38 PDT by SOFY HDEZ MD           RADIOLOGY/PROCEDURES  The patient has had an ultrasound done at [facility] urgent care prior to arrival that showed nonperforated appendicitis    COURSE & MEDICAL DECISION MAKING  Pertinent Labs & Imaging studies reviewed. (See chart for details)  I reviewed her laboratory evaluation and imaging prior to arrival.  CAT scan reveals a nonperforated appendicitis and I spoke with Dr. Ruiz who will take the patient to the operating room.  Covid is obtained and pending.    Prior to arrival  she had lab work including WBC elevated at over 13,000 with 73% PMN shift and slight renal insufficiency with a BUN/creatinine of 35 and 1.61.  There is no evidence of acidosis or pancreatitis.    Patient states that her pain is controlled we did not give her any medicines except antibiotics in the emergency department and obtained EKG that shows no evidence of ischemic changes or dysrhythmia    Plan is for the operating room when room available and she will be admitted to hospitalist service given her significant medical history and she understands her plan of care    FINAL IMPRESSION  1. Acute appendicitis with localized peritonitis, without perforation, abscess, or gangrene             Electronically signed by: Reinier Portillo M.D., 4/21/2021 6:28 PM

## 2021-04-22 NOTE — ANESTHESIA PROCEDURE NOTES
Airway    Date/Time: 4/21/2021 8:00 PM  Performed by: Seth Bowden M.D.  Authorized by: Seth Bowden M.D.     Location:  OR  Urgency:  Elective  Indications for Airway Management:  Anesthesia      Spontaneous Ventilation: absent    Sedation Level:  Deep  Preoxygenated: Yes    Patient Position:  Sniffing  Mask Difficulty Assessment:  2 - vent by mask + OA or adjuvant +/- NMBA  Final Airway Type:  Endotracheal airway  Final Endotracheal Airway:  ETT  Cuffed: Yes    Technique Used for Successful ETT Placement:  Direct laryngoscopy    Insertion Site:  Oral  Blade Type:  Nayana  Laryngoscope Blade/Videolaryngoscope Blade Size:  3  ETT Size (mm):  6.5  Measured from:  Teeth  ETT to Teeth (cm):  20  Placement Verified by: auscultation and capnometry    Cormack-Lehane Classification:  Grade IIa - partial view of glottis  Number of Attempts at Approach:  1

## 2021-04-23 ENCOUNTER — APPOINTMENT (OUTPATIENT)
Dept: RADIOLOGY | Facility: MEDICAL CENTER | Age: 66
DRG: 339 | End: 2021-04-23
Attending: HOSPITALIST
Payer: OTHER GOVERNMENT

## 2021-04-23 LAB
ANION GAP SERPL CALC-SCNC: 14 MMOL/L (ref 7–16)
BASOPHILS # BLD AUTO: 0.3 % (ref 0–1.8)
BASOPHILS # BLD: 0.04 K/UL (ref 0–0.12)
BUN SERPL-MCNC: 30 MG/DL (ref 8–22)
CALCIUM SERPL-MCNC: 8.6 MG/DL (ref 8.4–10.2)
CHLORIDE SERPL-SCNC: 101 MMOL/L (ref 96–112)
CO2 SERPL-SCNC: 21 MMOL/L (ref 20–33)
CREAT SERPL-MCNC: 2.03 MG/DL (ref 0.5–1.4)
EOSINOPHIL # BLD AUTO: 0.01 K/UL (ref 0–0.51)
EOSINOPHIL NFR BLD: 0.1 % (ref 0–6.9)
ERYTHROCYTE [DISTWIDTH] IN BLOOD BY AUTOMATED COUNT: 49.5 FL (ref 35.9–50)
GLUCOSE BLD-MCNC: 115 MG/DL (ref 65–99)
GLUCOSE BLD-MCNC: 131 MG/DL (ref 65–99)
GLUCOSE BLD-MCNC: 138 MG/DL (ref 65–99)
GLUCOSE SERPL-MCNC: 147 MG/DL (ref 65–99)
HCT VFR BLD AUTO: 37.3 % (ref 37–47)
HGB BLD-MCNC: 11.8 G/DL (ref 12–16)
IMM GRANULOCYTES # BLD AUTO: 0.32 K/UL (ref 0–0.11)
IMM GRANULOCYTES NFR BLD AUTO: 2.3 % (ref 0–0.9)
LYMPHOCYTES # BLD AUTO: 1.45 K/UL (ref 1–4.8)
LYMPHOCYTES NFR BLD: 10.5 % (ref 22–41)
MCH RBC QN AUTO: 29.5 PG (ref 27–33)
MCHC RBC AUTO-ENTMCNC: 31.6 G/DL (ref 33.6–35)
MCV RBC AUTO: 93.3 FL (ref 81.4–97.8)
MONOCYTES # BLD AUTO: 0.94 K/UL (ref 0–0.85)
MONOCYTES NFR BLD AUTO: 6.8 % (ref 0–13.4)
NEUTROPHILS # BLD AUTO: 11.04 K/UL (ref 2–7.15)
NEUTROPHILS NFR BLD: 80 % (ref 44–72)
NRBC # BLD AUTO: 0 K/UL
NRBC BLD-RTO: 0 /100 WBC
PLATELET # BLD AUTO: 326 K/UL (ref 164–446)
PMV BLD AUTO: 9.7 FL (ref 9–12.9)
POTASSIUM SERPL-SCNC: 4.5 MMOL/L (ref 3.6–5.5)
RBC # BLD AUTO: 4 M/UL (ref 4.2–5.4)
SODIUM SERPL-SCNC: 136 MMOL/L (ref 135–145)
WBC # BLD AUTO: 13.8 K/UL (ref 4.8–10.8)

## 2021-04-23 PROCEDURE — 99233 SBSQ HOSP IP/OBS HIGH 50: CPT | Performed by: HOSPITALIST

## 2021-04-23 PROCEDURE — 85025 COMPLETE CBC W/AUTO DIFF WBC: CPT

## 2021-04-23 PROCEDURE — 700105 HCHG RX REV CODE 258: Performed by: INTERNAL MEDICINE

## 2021-04-23 PROCEDURE — 700102 HCHG RX REV CODE 250 W/ 637 OVERRIDE(OP): Performed by: INTERNAL MEDICINE

## 2021-04-23 PROCEDURE — 700111 HCHG RX REV CODE 636 W/ 250 OVERRIDE (IP): Performed by: INTERNAL MEDICINE

## 2021-04-23 PROCEDURE — 82962 GLUCOSE BLOOD TEST: CPT | Mod: 91

## 2021-04-23 PROCEDURE — 36415 COLL VENOUS BLD VENIPUNCTURE: CPT

## 2021-04-23 PROCEDURE — 94660 CPAP INITIATION&MGMT: CPT

## 2021-04-23 PROCEDURE — A9270 NON-COVERED ITEM OR SERVICE: HCPCS | Performed by: INTERNAL MEDICINE

## 2021-04-23 PROCEDURE — 700101 HCHG RX REV CODE 250: Performed by: INTERNAL MEDICINE

## 2021-04-23 PROCEDURE — 80048 BASIC METABOLIC PNL TOTAL CA: CPT

## 2021-04-23 PROCEDURE — 770006 HCHG ROOM/CARE - MED/SURG/GYN SEMI*

## 2021-04-23 RX ADMIN — ONDANSETRON 4 MG: 2 INJECTION INTRAMUSCULAR; INTRAVENOUS at 17:30

## 2021-04-23 RX ADMIN — SENNOSIDES AND DOCUSATE SODIUM 2 TABLET: 8.6; 5 TABLET ORAL at 17:30

## 2021-04-23 RX ADMIN — ONDANSETRON 4 MG: 4 TABLET, ORALLY DISINTEGRATING ORAL at 08:20

## 2021-04-23 RX ADMIN — OXYCODONE HYDROCHLORIDE 10 MG: 10 TABLET ORAL at 02:51

## 2021-04-23 RX ADMIN — OXYCODONE HYDROCHLORIDE 10 MG: 10 TABLET ORAL at 12:40

## 2021-04-23 RX ADMIN — ONDANSETRON 4 MG: 2 INJECTION INTRAMUSCULAR; INTRAVENOUS at 04:46

## 2021-04-23 RX ADMIN — METRONIDAZOLE 500 MG: 500 INJECTION, SOLUTION INTRAVENOUS at 21:45

## 2021-04-23 RX ADMIN — HYDROMORPHONE HYDROCHLORIDE 0.5 MG: 1 INJECTION, SOLUTION INTRAMUSCULAR; INTRAVENOUS; SUBCUTANEOUS at 04:46

## 2021-04-23 RX ADMIN — THYROID, PORCINE 90 MG: 30 TABLET ORAL at 04:46

## 2021-04-23 RX ADMIN — METRONIDAZOLE 500 MG: 500 INJECTION, SOLUTION INTRAVENOUS at 04:46

## 2021-04-23 RX ADMIN — SODIUM CHLORIDE: 9 INJECTION, SOLUTION INTRAVENOUS at 04:56

## 2021-04-23 RX ADMIN — METRONIDAZOLE 500 MG: 500 INJECTION, SOLUTION INTRAVENOUS at 14:20

## 2021-04-23 RX ADMIN — SENNOSIDES AND DOCUSATE SODIUM 2 TABLET: 8.6; 5 TABLET ORAL at 08:21

## 2021-04-23 RX ADMIN — OXYCODONE HYDROCHLORIDE 10 MG: 10 TABLET ORAL at 21:45

## 2021-04-23 RX ADMIN — OXYCODONE HYDROCHLORIDE 10 MG: 10 TABLET ORAL at 08:21

## 2021-04-23 RX ADMIN — ONDANSETRON 4 MG: 2 INJECTION INTRAMUSCULAR; INTRAVENOUS at 21:45

## 2021-04-23 RX ADMIN — CEFTRIAXONE SODIUM 2 G: 2 INJECTION, POWDER, FOR SOLUTION INTRAMUSCULAR; INTRAVENOUS at 17:31

## 2021-04-23 RX ADMIN — PROMETHAZINE HYDROCHLORIDE 12.5 MG: 25 TABLET ORAL at 12:17

## 2021-04-23 RX ADMIN — LORATADINE 10 MG: 10 TABLET ORAL at 04:46

## 2021-04-23 ASSESSMENT — ENCOUNTER SYMPTOMS
CARDIOVASCULAR NEGATIVE: 1
SPUTUM PRODUCTION: 0
NEUROLOGICAL NEGATIVE: 1
DEPRESSION: 0
DIAPHORESIS: 0
NERVOUS/ANXIOUS: 1
WHEEZING: 0
PHOTOPHOBIA: 0
PND: 0
DIZZINESS: 0
INSOMNIA: 0
ABDOMINAL PAIN: 1
DOUBLE VISION: 0
POLYDIPSIA: 0
FOCAL WEAKNESS: 0
ORTHOPNEA: 0
ROS GI COMMENTS: LOSS OF APPETITE
MYALGIAS: 1
CONSTIPATION: 1
TINGLING: 0
BACK PAIN: 1
COUGH: 0
RESPIRATORY NEGATIVE: 1
SINUS PAIN: 0
SHORTNESS OF BREATH: 0
BLOOD IN STOOL: 0
EYE DISCHARGE: 0
EYE REDNESS: 0
FLANK PAIN: 1
SEIZURES: 0
NAUSEA: 1
EYES NEGATIVE: 1
SENSORY CHANGE: 0
FALLS: 0
STRIDOR: 0
HEARTBURN: 0
BRUISES/BLEEDS EASILY: 0

## 2021-04-23 ASSESSMENT — VISUAL ACUITY: OU: 1

## 2021-04-23 ASSESSMENT — LIFESTYLE VARIABLES: SUBSTANCE_ABUSE: 0

## 2021-04-23 ASSESSMENT — PAIN DESCRIPTION - PAIN TYPE
TYPE: SURGICAL PAIN
TYPE: SURGICAL PAIN
TYPE: SURGICAL PAIN;ACUTE PAIN

## 2021-04-23 NOTE — PROGRESS NOTES
0150 - Pt still has not voided. Per day RN, afternoon bladder scan showed 100 mL. Pt was bladder scanned again and had 261 mL. Pt is getting IVF at 100 mL/hr and drinking PO fluids. Paged Dr. Mcnamara for updates. CBC and CMP ordered.

## 2021-04-23 NOTE — PROGRESS NOTES
[facility] Medicine Daily Progress Note    Date of Service  4/23/2021    Chief Complaint  65 y.o. female admitted 4/21/2021 with abdominal pain    Hospital Course  4/22/2021-patient is a 65-year-old female comes in with abdominal pain that started last Friday.  After suffering with the pain the patient went to see urgent care where they did a CAT scan of her abdomen which came back with early appendicitis.  Patient was referred over to the emergency room for evaluation.  Patient was admitted for surgery and was found to have a large abscess along with the appendicitis.  Appendix was removed and the abscess has been drained.  She still having chills and fevers afterwards.  Continue with IV antibiotics as well as pain management and fluid resuscitation.  Await culture results.  4/23/2021-this morning patient is in quite a bit of distress.  Her pain has escalated in the abdomen and she is unable to urinate or defecate.  Upon further evaluation patient has a postoperative ileus in her bowel sounds have not returned.  She has not passed any gas or had a bowel movement.  Continue at this point with supportive fluid resuscitation to ensure the patient's bowel functions returned to normal.  Patient at this point is also complaining of urinary retention and the bladder scanner was only showing 70 to 100 mL of retained urine.  This was inconsistent with the amount of pain the patient was having thus I ordered a immediate straight catheterization which yielded over 800 mL of urine.  After the urine came out the patient's abdominal pain eased up and her condition improved.  We will continue to monitor her urine output and if the patient should have urinary retention we will straight catheter again.  For the time being because of her abdominal distention pain and ileus as well as urinary retention we are unable to discharge the patient at this point in time.  Continue at this point with aggressive management and  stabilization.    Interval Problem Update  IV antibiotics with Rocephin and Flagyl day #3  Culture results to date are negative  Pain management  Fluid monitoring and management  Monitor bladder function and straight cath as needed  Await return of abdominal function and intestinal function.  Wound care    Consultants/Specialty  Surgery Dr. Ruiz    Code Status  Full Code    Disposition  Most likely home once cleared by surgery    Review of Systems  Review of Systems   Constitutional: Positive for malaise/fatigue. Negative for diaphoresis.   HENT: Negative.  Negative for nosebleeds and sinus pain.    Eyes: Negative.  Negative for double vision, photophobia, discharge and redness.   Respiratory: Negative.  Negative for cough, sputum production, shortness of breath, wheezing and stridor.    Cardiovascular: Negative.  Negative for chest pain, orthopnea, leg swelling and PND.   Gastrointestinal: Positive for abdominal pain, constipation and nausea. Negative for blood in stool and heartburn.        Loss of appetite   Genitourinary: Positive for dysuria and flank pain. Negative for frequency.   Musculoskeletal: Positive for back pain and myalgias. Negative for falls.   Skin: Negative.  Negative for itching.   Neurological: Negative.  Negative for dizziness, tingling, sensory change, focal weakness and seizures.   Endo/Heme/Allergies: Negative.  Negative for polydipsia. Does not bruise/bleed easily.   Psychiatric/Behavioral: Negative for depression, substance abuse and suicidal ideas. The patient is nervous/anxious. The patient does not have insomnia.    All other systems reviewed and are negative.       Physical Exam  Temp:  [36.3 °C (97.3 °F)-37 °C (98.6 °F)] 36.7 °C (98 °F)  Pulse:  [78-91] 91  Resp:  [16-20] 20  BP: (102-122)/(56-66) 119/64  SpO2:  [94 %-98 %] 98 %    Physical Exam  Vitals and nursing note reviewed. Exam conducted with a chaperone present.   Constitutional:       General: She is awake.       Appearance: Normal appearance. She is well-developed, well-groomed and normal weight.   HENT:      Head: Normocephalic and atraumatic.      Jaw: There is normal jaw occlusion. No trismus.      Salivary Glands: Right salivary gland is not tender. Left salivary gland is not tender.      Right Ear: External ear normal.      Left Ear: External ear normal.      Nose: Nose normal.      Mouth/Throat:      Mouth: Mucous membranes are moist.      Pharynx: Oropharynx is clear.   Eyes:      General: Lids are normal. Vision grossly intact.      Extraocular Movements: Extraocular movements intact.      Conjunctiva/sclera: Conjunctivae normal.      Right eye: Right conjunctiva is not injected. No exudate.     Left eye: Left conjunctiva is not injected. No exudate.     Pupils: Pupils are equal, round, and reactive to light.   Neck:      Thyroid: No thyroid mass.      Vascular: No hepatojugular reflux or JVD.      Trachea: No abnormal tracheal secretions or tracheal deviation.   Cardiovascular:      Rate and Rhythm: Normal rate and regular rhythm. Occasional extrasystoles are present.     Pulses: Normal pulses.      Heart sounds: Normal heart sounds. No murmur. No friction rub.   Pulmonary:      Effort: Pulmonary effort is normal.      Breath sounds: Normal breath sounds. No wheezing or rhonchi.   Abdominal:      General: Abdomen is flat. There is distension.      Palpations: Abdomen is soft.      Tenderness: There is abdominal tenderness. There is guarding. There is no right CVA tenderness or left CVA tenderness.      Hernia: No hernia is present.   Musculoskeletal:      Cervical back: Full passive range of motion without pain, normal range of motion and neck supple. No rigidity. No muscular tenderness.      Right lower leg: No edema.      Left lower leg: No edema.   Lymphadenopathy:      Head:      Right side of head: No submental adenopathy.      Left side of head: No submental adenopathy.      Cervical:      Right cervical: No  superficial cervical adenopathy.     Left cervical: No superficial cervical adenopathy.      Upper Body:      Right upper body: No supraclavicular adenopathy.      Left upper body: No supraclavicular adenopathy.   Skin:     General: Skin is warm and dry.      Capillary Refill: Capillary refill takes more than 3 seconds.      Coloration: Skin is not cyanotic or pale.      Findings: No abrasion or bruising.   Neurological:      General: No focal deficit present.      Mental Status: She is alert and oriented to person, place, and time. Mental status is at baseline.      GCS: GCS eye subscore is 4. GCS verbal subscore is 5. GCS motor subscore is 6.      Cranial Nerves: No cranial nerve deficit.      Sensory: No sensory deficit.      Motor: Motor function is intact.      Deep Tendon Reflexes:      Reflex Scores:       Tricep reflexes are 2+ on the right side and 2+ on the left side.       Bicep reflexes are 2+ on the right side and 2+ on the left side.       Brachioradialis reflexes are 2+ on the right side and 2+ on the left side.       Patellar reflexes are 2+ on the right side and 2+ on the left side.       Achilles reflexes are 2+ on the right side and 2+ on the left side.  Psychiatric:         Attention and Perception: Perception normal. She is inattentive.         Mood and Affect: Mood is anxious.         Speech: Speech is delayed.         Behavior: Behavior is slowed. Behavior is cooperative.         Thought Content: Thought content normal.         Cognition and Memory: Cognition and memory normal.         Judgment: Judgment normal.         Fluids    Intake/Output Summary (Last 24 hours) at 4/23/2021 1252  Last data filed at 4/23/2021 1200  Gross per 24 hour   Intake --   Output 255 ml   Net -255 ml       Laboratory  Recent Labs     04/21/21  1032 04/23/21  0228   WBC 13.4* 13.8*   RBC 4.41 4.00*   HEMOGLOBIN 12.7 11.8*   HEMATOCRIT 39.7 37.3   MCV 90.0 93.3   MCH 28.8 29.5   MCHC 32.0* 31.6*   RDW 48.0 49.5    PLATELETCT 333 326   MPV 9.5 9.7     Recent Labs     04/21/21  1032 04/23/21  0228   SODIUM 138 136   POTASSIUM 3.9 4.5   CHLORIDE 103 101   CO2 21 21   GLUCOSE 116* 147*   BUN 35* 30*   CREATININE 1.61* 2.03*   CALCIUM 9.3 8.6                   Imaging  No orders to display        Assessment/Plan  * Acute appendicitis with localized peritonitis  Assessment & Plan  Status post appendectomy  Postoperative ileus  Postoperative urinary retention  Postoperative pain management  Mobilize  Possible PT OT evaluation  Nutritional support      Essential hypertension- (present on admission)  Assessment & Plan  Blood pressure management keep systolic blood pressure less than 140 diastolic under 90      Stage 3 chronic kidney disease- (present on admission)  Assessment & Plan  Optimize fluid status and monitor renal functions avoid nephrotoxic medications      BMI 50.0-59.9, adult (HCC)- (present on admission)  Assessment & Plan  Body mass index is 57.29 kg/m².  Recommend outpatient dietary management      HENRI (obstructive sleep apnea)- (present on admission)  Assessment & Plan  Nasal CPAP continued         VTE prophylaxis: SCDs

## 2021-04-23 NOTE — PROGRESS NOTES
Received report from day RN and assumed care.  Pt AOx4.  at bedside. C/o 6/10 abdominal pain and was medicated per MAR.   Pt mobilized from cardiac chair to the bed x3 assist. Currently on 3L and satting in mid 90s. Denies SOB. CPAP at bedside and RT contacted to come set up. Pt educated on use of IS and getting to 1700.   Denies nausea. LENI drain dressing CDI. Midline abdominal dressing has small amount of bloody drainage at the bottom.   Plan of care reviewed with pt and . No other needs at this time. Bed locked and in lowest position. Call light within reach. IVF infusing.

## 2021-04-23 NOTE — CARE PLAN
Problem: Safety  Goal: Will remain free from falls  Outcome: PROGRESSING AS EXPECTED  Note: Pt remains free from falls this admission. Pt uses call light appropriately. Call light within reach and bed locked and in lowest position.     Problem: Respiratory:  Goal: Respiratory status will improve  Outcome: PROGRESSING SLOWER THAN EXPECTED  Note: Pt currently on 3L O2. Education provided regarding IS and pt was able to use IS effectively. Pt getting to 1700.

## 2021-04-23 NOTE — PROGRESS NOTES
2 RN skin assessment done; skin WDL except abdominal surgical incisions, drsg CDI. LENI in place.

## 2021-04-23 NOTE — PROGRESS NOTES
0715 Received report, assumed care. Pt A&O x4, pt reports 9/10 pain in abdomen and back and nausea. Medicated per MAR. Pt denies numbness/tingling. Sx dressing in abdomen intact. Pt still unable to urinate.     1030 Per Dr. Martinez, serge to straight cath.   Got 800 ml out.

## 2021-04-23 NOTE — PROGRESS NOTES
"  Trauma/Surgical Progress Note    Author: Jett Ruiz M.D. Date & Time created: 4/23/2021   11:14 AM   4/21 open appendectomy drain abscess    GROSS DESCRIPTION:   A.  Received in formalin labeled with two patient identifiers and   designated \"Mady Gaitan, appendix\" is a 6.8 x 1.4 cm appendix with   attached mesoappendix.  The serosal surface bears adherent fibrinous   exudate. The lumen contains purulent/hemorrhagic material without   fecaliths. The wall is prominently congested, with several transmural   defects identified along its length. RS [id number]  /[facility]       MICROSCOPIC DESCRIPTION:   Microscopic examination was performed.  Please see diagnosis.   Interval Events:  Awake alert  reports pain control good  Required yusuf  No flatus    ROS  Hemodynamics:  /60   Pulse 87   Temp 36.8 °C (98.2 °F) (Temporal)   Resp 16   Ht 1.6 m (5' 3\")   Wt (!) 147 kg (323 lb 6.6 oz)   SpO2 97%      Respiratory:    Respiration: 16, Pulse Oximetry: 97 %     Work Of Breathing / Effort: Within Normal Limits  RUL Breath Sounds: Clear, RML Breath Sounds: Diminished, RLL Breath Sounds: Diminished, CHRISTINA Breath Sounds: Clear, LLL Breath Sounds: Diminished  Fluids:    Intake/Output Summary (Last 24 hours) at 4/23/2021 1114  Last data filed at 4/23/2021 0500  Gross per 24 hour   Intake --   Output 55 ml   Net -55 ml     Admit Weight: (!) 147 kg (323 lb 6.6 oz)  Current     Physical Exam  Awake alert appropriate  Breathing with ease  Appropriate incisional tenderness  Drain serosanguinous   Medical Decision Making/Problem List:    Active Hospital Problems    Diagnosis    • Acute appendicitis with localized peritonitis [K35.30]      Priority: High   • Essential hypertension [I10]      Priority: Medium   • Stage 3 chronic kidney disease [N18.30]      Priority: Low   • BMI 50.0-59.9, adult (HCC) [Z68.43]      Priority: Low   • HENRI (obstructive sleep apnea) [G47.33]      Priority: Low     AHI 55.7, minimum saturation " 84%, on CPAP 13 cm.       Core Measures & Quality Metrics:  Core Measures & Quality Metrics  JULIOCESAR Score  Discussed patient condition with pateitn and family    Assessment/Plan  continue antibiotics rupture appendicitis intraabdominal abscess  continue antibiotics  Encourage ambulation IS

## 2021-04-24 ENCOUNTER — APPOINTMENT (OUTPATIENT)
Dept: RADIOLOGY | Facility: MEDICAL CENTER | Age: 66
DRG: 339 | End: 2021-04-24
Attending: HOSPITALIST
Payer: OTHER GOVERNMENT

## 2021-04-24 LAB
ANION GAP SERPL CALC-SCNC: 11 MMOL/L (ref 7–16)
BACTERIA UR CULT: NORMAL
BODY FLD TYPE: NORMAL
BUN SERPL-MCNC: 38 MG/DL (ref 8–22)
CALCIUM SERPL-MCNC: 8.7 MG/DL (ref 8.4–10.2)
CHLORIDE SERPL-SCNC: 102 MMOL/L (ref 96–112)
CO2 SERPL-SCNC: 22 MMOL/L (ref 20–33)
CREAT FLD-MCNC: 1.7 MG/DL
CREAT SERPL-MCNC: 1.8 MG/DL (ref 0.5–1.4)
ERYTHROCYTE [DISTWIDTH] IN BLOOD BY AUTOMATED COUNT: 50.9 FL (ref 35.9–50)
GLUCOSE BLD-MCNC: 110 MG/DL (ref 65–99)
GLUCOSE BLD-MCNC: 113 MG/DL (ref 65–99)
GLUCOSE SERPL-MCNC: 132 MG/DL (ref 65–99)
HCT VFR BLD AUTO: 41.4 % (ref 37–47)
HGB BLD-MCNC: 13 G/DL (ref 12–16)
LACTATE BLD-SCNC: 1.8 MMOL/L (ref 0.5–2)
MCH RBC QN AUTO: 29.5 PG (ref 27–33)
MCHC RBC AUTO-ENTMCNC: 31.4 G/DL (ref 33.6–35)
MCV RBC AUTO: 94.1 FL (ref 81.4–97.8)
PLATELET # BLD AUTO: 394 K/UL (ref 164–446)
PMV BLD AUTO: 9.4 FL (ref 9–12.9)
POTASSIUM SERPL-SCNC: 4.9 MMOL/L (ref 3.6–5.5)
PROCALCITONIN SERPL-MCNC: 1.43 NG/ML
RBC # BLD AUTO: 4.4 M/UL (ref 4.2–5.4)
SIGNIFICANT IND 70042: NORMAL
SITE SITE: NORMAL
SODIUM SERPL-SCNC: 135 MMOL/L (ref 135–145)
SOURCE SOURCE: NORMAL
WBC # BLD AUTO: 15.8 K/UL (ref 4.8–10.8)

## 2021-04-24 PROCEDURE — 700102 HCHG RX REV CODE 250 W/ 637 OVERRIDE(OP): Performed by: INTERNAL MEDICINE

## 2021-04-24 PROCEDURE — 83605 ASSAY OF LACTIC ACID: CPT

## 2021-04-24 PROCEDURE — 94760 N-INVAS EAR/PLS OXIMETRY 1: CPT

## 2021-04-24 PROCEDURE — 80048 BASIC METABOLIC PNL TOTAL CA: CPT

## 2021-04-24 PROCEDURE — 82570 ASSAY OF URINE CREATININE: CPT

## 2021-04-24 PROCEDURE — 700101 HCHG RX REV CODE 250: Performed by: HOSPITALIST

## 2021-04-24 PROCEDURE — 82962 GLUCOSE BLOOD TEST: CPT

## 2021-04-24 PROCEDURE — 74176 CT ABD & PELVIS W/O CONTRAST: CPT

## 2021-04-24 PROCEDURE — 700105 HCHG RX REV CODE 258: Performed by: HOSPITALIST

## 2021-04-24 PROCEDURE — 700111 HCHG RX REV CODE 636 W/ 250 OVERRIDE (IP): Performed by: SURGERY

## 2021-04-24 PROCEDURE — 85027 COMPLETE CBC AUTOMATED: CPT

## 2021-04-24 PROCEDURE — 94660 CPAP INITIATION&MGMT: CPT

## 2021-04-24 PROCEDURE — 700111 HCHG RX REV CODE 636 W/ 250 OVERRIDE (IP): Performed by: INTERNAL MEDICINE

## 2021-04-24 PROCEDURE — 700105 HCHG RX REV CODE 258: Performed by: INTERNAL MEDICINE

## 2021-04-24 PROCEDURE — 770006 HCHG ROOM/CARE - MED/SURG/GYN SEMI*

## 2021-04-24 PROCEDURE — 84145 PROCALCITONIN (PCT): CPT

## 2021-04-24 PROCEDURE — 700105 HCHG RX REV CODE 258: Performed by: SURGERY

## 2021-04-24 PROCEDURE — A9270 NON-COVERED ITEM OR SERVICE: HCPCS | Performed by: INTERNAL MEDICINE

## 2021-04-24 PROCEDURE — 700101 HCHG RX REV CODE 250: Performed by: INTERNAL MEDICINE

## 2021-04-24 PROCEDURE — 36415 COLL VENOUS BLD VENIPUNCTURE: CPT

## 2021-04-24 PROCEDURE — 99233 SBSQ HOSP IP/OBS HIGH 50: CPT | Performed by: HOSPITALIST

## 2021-04-24 PROCEDURE — 74018 RADEX ABDOMEN 1 VIEW: CPT

## 2021-04-24 PROCEDURE — 700111 HCHG RX REV CODE 636 W/ 250 OVERRIDE (IP): Performed by: HOSPITALIST

## 2021-04-24 RX ADMIN — METRONIDAZOLE 500 MG: 500 INJECTION, SOLUTION INTRAVENOUS at 14:01

## 2021-04-24 RX ADMIN — PROCHLORPERAZINE EDISYLATE 10 MG: 5 INJECTION INTRAMUSCULAR; INTRAVENOUS at 08:40

## 2021-04-24 RX ADMIN — PIPERACILLIN AND TAZOBACTAM 4.5 G: 4; .5 INJECTION, POWDER, LYOPHILIZED, FOR SOLUTION INTRAVENOUS; PARENTERAL at 21:03

## 2021-04-24 RX ADMIN — CEFTRIAXONE SODIUM 2 G: 2 INJECTION, POWDER, FOR SOLUTION INTRAMUSCULAR; INTRAVENOUS at 17:09

## 2021-04-24 RX ADMIN — ONDANSETRON 4 MG: 2 INJECTION INTRAMUSCULAR; INTRAVENOUS at 04:46

## 2021-04-24 RX ADMIN — METRONIDAZOLE 500 MG: 500 INJECTION, SOLUTION INTRAVENOUS at 04:46

## 2021-04-24 RX ADMIN — SODIUM CHLORIDE: 9 INJECTION, SOLUTION INTRAVENOUS at 04:51

## 2021-04-24 RX ADMIN — SENNOSIDES AND DOCUSATE SODIUM 2 TABLET: 8.6; 5 TABLET ORAL at 17:09

## 2021-04-24 RX ADMIN — ONDANSETRON 4 MG: 2 INJECTION INTRAMUSCULAR; INTRAVENOUS at 17:12

## 2021-04-24 ASSESSMENT — ENCOUNTER SYMPTOMS
RESPIRATORY NEGATIVE: 1
ABDOMINAL PAIN: 1
SENSORY CHANGE: 0
CHILLS: 1
MYALGIAS: 1
SORE THROAT: 0
EYES NEGATIVE: 1
BLURRED VISION: 0
SHORTNESS OF BREATH: 0
NERVOUS/ANXIOUS: 1
BACK PAIN: 1
CARDIOVASCULAR NEGATIVE: 1
PND: 0
DEPRESSION: 0
WEIGHT LOSS: 0
FALLS: 0
HEADACHES: 1
SPUTUM PRODUCTION: 0
VOMITING: 0
TREMORS: 0
SINUS PAIN: 0
MEMORY LOSS: 0
TINGLING: 0
HEARTBURN: 0
CLAUDICATION: 0
CONSTIPATION: 1
WEAKNESS: 0
NAUSEA: 1
FLANK PAIN: 0
PHOTOPHOBIA: 0

## 2021-04-24 ASSESSMENT — VISUAL ACUITY: OU: 1

## 2021-04-24 ASSESSMENT — PAIN DESCRIPTION - PAIN TYPE: TYPE: SURGICAL PAIN

## 2021-04-24 ASSESSMENT — LIFESTYLE VARIABLES: SUBSTANCE_ABUSE: 0

## 2021-04-24 NOTE — PROGRESS NOTES
Received report from Gena SWAIN and assumed care. Pt AOx4. C/o 7/10 abdominal pain and nausea. Medicated per MAR. Denies SOB. Satting well on 2L O2. Wearing CPAP at night. Bowel sounds hypoactive and pt denies passing gas.  Plan of care discussed. No other needs at this time. Bed locked and in lowest position. Call light within reach. IVF infusing.

## 2021-04-24 NOTE — FLOWSHEET NOTE
04/23/21 1918   Non-Invasive Ventilation [facility]   Nocturnal CPAP or BIPAP CPAP - [facility]   $ System Evaluation Yes   Settings (If Known) auto

## 2021-04-24 NOTE — PROGRESS NOTES
"Surgery    Postop day 2 after laparotomy and drainage of peritoneal abscess for perforated appendicitis.  Postoperative course been complicated by urinary retention, worsening renal function and poor mobility.    Patient having significant nausea today  Not mobilizing  Wearing CPAP  Pain reasonably controlled    /71   Pulse 99   Temp 36.6 °C (97.9 °F) (Temporal)   Resp 17   Ht 1.6 m (5' 3\")   Wt (!) 147 kg (323 lb 6.6 oz)   SpO2 94%   BMI 57.29 kg/m²   Drain: 290 cc / 24 hours    NAD, wearing CPAP  Abdomen distended  Incision dressed  Drain serosanguineous    Labs pending    Assessment and plan:  65-year-old female with complicated appendicitis  Urinary retention is an issue.  Patient was only straight cath once yesterday so we do not know what her urine output is we are awaiting Pabon catheter placement  Awaiting labs  Awaiting abdominal x-ray    Leave drain for now  Continue antibiotics for 14-day course, will likely need broad-spectrum as there did not appear to be cultures sent in OR    Patient is high risk for persistent peritoneal abscess that might require interventional radiology drainage    PT for mobilization         "

## 2021-04-24 NOTE — PROGRESS NOTES
[facility] Medicine Daily Progress Note    Date of Service  4/24/2021    Chief Complaint  65 y.o. female admitted 4/21/2021 with abdominal pain    Hospital Course  4/22/2021-patient is a 65-year-old female comes in with abdominal pain that started last Friday.  After suffering with the pain the patient went to see urgent care where they did a CAT scan of her abdomen which came back with early appendicitis.  Patient was referred over to the emergency room for evaluation.  Patient was admitted for surgery and was found to have a large abscess along with the appendicitis.  Appendix was removed and the abscess has been drained.  She still having chills and fevers afterwards.  Continue with IV antibiotics as well as pain management and fluid resuscitation.  Await culture results.  4/23/2021-this morning patient is in quite a bit of distress.  Her pain has escalated in the abdomen and she is unable to urinate or defecate.  Upon further evaluation patient has a postoperative ileus in her bowel sounds have not returned.  She has not passed any gas or had a bowel movement.  Continue at this point with supportive fluid resuscitation to ensure the patient's bowel functions returned to normal.  Patient at this point is also complaining of urinary retention and the bladder scanner was only showing 70 to 100 mL of retained urine.  This was inconsistent with the amount of pain the patient was having thus I ordered a immediate straight catheterization which yielded over 800 mL of urine.  After the urine came out the patient's abdominal pain eased up and her condition improved.  We will continue to monitor her urine output and if the patient should have urinary retention we will straight catheter again.  For the time being because of her abdominal distention pain and ileus as well as urinary retention we are unable to discharge the patient at this point in time.  Continue at this point with aggressive management and  stabilization.  4/24/2021-patient is still having difficulty with abdominal pain.  She also has not had a bowel movement and her intestinal sounds are returning very slowly.  Concern is that there is still an abscess present.  Flatplate of the abdomen did not show anything her white blood cell count however is increasing at this point I am going to obtain a CT of the abdomen.  Discussed it with surgery.  Because of the bladder distention and inability urinate a Pabon catheter had to be placed.  Hopefully this will decompress the bladder.    Interval Problem Update  IV antibiotics with Rocephin and Flagyl day #4  White blood cell count is climbing  Pain at this point still an issue  Still constipated and has not had a bowel movement since surgery  Pabon catheter needs to be placed to decompress bladder  CT of the abdomen ordered  Discussed with surgery and nursing.    Consultants/Specialty  Surgery Dr. Ruiz    Code Status  Full Code    Disposition  Most likely home once cleared by surgery    Review of Systems  Review of Systems   Constitutional: Positive for chills and malaise/fatigue. Negative for weight loss.   HENT: Negative.  Negative for sinus pain and sore throat.    Eyes: Negative.  Negative for blurred vision and photophobia.   Respiratory: Negative.  Negative for sputum production and shortness of breath.    Cardiovascular: Negative.  Negative for chest pain, claudication and PND.   Gastrointestinal: Positive for abdominal pain, constipation and nausea. Negative for heartburn and vomiting.   Genitourinary: Positive for dysuria. Negative for flank pain.   Musculoskeletal: Positive for back pain and myalgias. Negative for falls.   Skin: Negative.  Negative for itching and rash.   Neurological: Positive for headaches. Negative for tingling, tremors, sensory change and weakness.   Endo/Heme/Allergies: Negative.    Psychiatric/Behavioral: Negative for depression, memory loss, substance abuse and suicidal ideas.  The patient is nervous/anxious.    All other systems reviewed and are negative.       Physical Exam  Temp:  [36.6 °C (97.9 °F)-37.4 °C (99.3 °F)] 36.9 °C (98.5 °F)  Pulse:  [] 102  Resp:  [16-18] 18  BP: (119-135)/() 130/66  SpO2:  [92 %-98 %] 98 %    Physical Exam  Vitals and nursing note reviewed. Exam conducted with a chaperone present.   Constitutional:       General: She is awake.      Appearance: She is well-developed, well-groomed and normal weight.   HENT:      Head: Normocephalic and atraumatic.      Jaw: There is normal jaw occlusion. No trismus.      Salivary Glands: Right salivary gland is not tender. Left salivary gland is not tender.      Right Ear: External ear normal.      Left Ear: External ear normal.      Mouth/Throat:      Mouth: Mucous membranes are moist.      Pharynx: Oropharynx is clear.   Eyes:      General: Lids are normal. Vision grossly intact.      Extraocular Movements: Extraocular movements intact.      Conjunctiva/sclera: Conjunctivae normal.      Right eye: Right conjunctiva is not injected. No exudate.     Left eye: Left conjunctiva is not injected. No exudate.     Pupils: Pupils are equal, round, and reactive to light.   Neck:      Thyroid: No thyroid mass.      Vascular: No hepatojugular reflux or JVD.      Trachea: No abnormal tracheal secretions or tracheal deviation.   Cardiovascular:      Rate and Rhythm: Normal rate and regular rhythm. Occasional extrasystoles are present.     Pulses: Normal pulses.      Heart sounds: Normal heart sounds.   Pulmonary:      Effort: Pulmonary effort is normal.      Breath sounds: Examination of the right-lower field reveals decreased breath sounds. Examination of the left-lower field reveals decreased breath sounds. Decreased breath sounds present.   Abdominal:      General: Abdomen is protuberant. Bowel sounds are decreased. There is distension.      Palpations: Abdomen is soft.      Tenderness: There is generalized abdominal  tenderness. There is guarding. There is no right CVA tenderness or left CVA tenderness.      Hernia: No hernia is present.   Musculoskeletal:      Cervical back: Full passive range of motion without pain, normal range of motion and neck supple. No rigidity or tenderness.      Right lower leg: No edema.      Left lower leg: No edema.   Lymphadenopathy:      Head:      Right side of head: No submental adenopathy.      Left side of head: No submental adenopathy.      Cervical:      Right cervical: No superficial cervical adenopathy.     Left cervical: No superficial cervical adenopathy.      Upper Body:      Right upper body: No supraclavicular adenopathy.      Left upper body: No supraclavicular adenopathy.   Skin:     General: Skin is warm and dry.      Capillary Refill: Capillary refill takes 2 to 3 seconds.      Coloration: Skin is not cyanotic.      Findings: No abrasion.   Neurological:      General: No focal deficit present.      Mental Status: She is alert and oriented to person, place, and time. Mental status is at baseline.   Psychiatric:         Attention and Perception: Attention and perception normal.         Mood and Affect: Mood is anxious. Affect is flat.         Speech: Speech normal.         Behavior: Behavior is cooperative.         Thought Content: Thought content normal.         Cognition and Memory: Cognition and memory normal.         Judgment: Judgment normal.         Fluids    Intake/Output Summary (Last 24 hours) at 4/24/2021 1302  Last data filed at 4/24/2021 1200  Gross per 24 hour   Intake --   Output 1620 ml   Net -1620 ml       Laboratory  Recent Labs     04/23/21 0228 04/24/21  1128   WBC 13.8* 15.8*   RBC 4.00* 4.40   HEMOGLOBIN 11.8* 13.0   HEMATOCRIT 37.3 41.4   MCV 93.3 94.1   MCH 29.5 29.5   MCHC 31.6* 31.4*   RDW 49.5 50.9*   PLATELETCT 326 394   MPV 9.7 9.4     Recent Labs     04/23/21 0228 04/24/21  1128   SODIUM 136 135   POTASSIUM 4.5 4.9   CHLORIDE 101 102   CO2 21 22    GLUCOSE 147* 132*   BUN 30* 38*   CREATININE 2.03* 1.80*   CALCIUM 8.6 8.7                   Imaging  IR-US GUIDED PIV   Final Result    Ultrasound-guided PERIPHERAL IV INSERTION performed by    qualified nursing staff as above.      [id number] VIEW    (Results Pending)   CT-ABDOMEN-PELVIS W/O    (Results Pending)        Assessment/Plan  * Acute appendicitis with localized peritonitis  Assessment & Plan  Status post laparoscopic appendectomy, converted to full open surgery.  Abdomen still distended and tender diffusely  Flatplate of abdomen shows diffuse dilation of the intestines  Patient still has not passed gas or had a bowel movement since surgery  White blood cell count continues to elevate  Continued on antibiotics and fluids  Repeat CT of the abdomen will be ordered    Essential hypertension- (present on admission)  Assessment & Plan  Monitor blood pressure and administer as needed labetalol to help blood pressure management.      Stage 3 chronic kidney disease- (present on admission)  Assessment & Plan  Acute on chronic renal failure at this point.  Pabon catheter will be placed to decompress bladder.      BMI 50.0-59.9, adult (HCC)- (present on admission)  Assessment & Plan  Body mass index is 57.29 kg/m².  Patient really needs outpatient weight loss management program      HENRI (obstructive sleep apnea)- (present on admission)  Assessment & Plan  Nasal CPAP continued         VTE prophylaxis: SCDs

## 2021-04-24 NOTE — CARE PLAN
Problem: Pain Management  Goal: Pain level will decrease to patient's comfort goal  4/24/2021 1047 by Antonina Taylor R.N.  Outcome: PROGRESSING AS EXPECTED  4/24/2021 1046 by Antonina Taylor R.N.  Outcome: PROGRESSING AS EXPECTED     Problem: Bowel/Gastric:  Goal: Normal bowel function is maintained or improved  4/24/2021 1047 by Antonina Taylor R.N.  Outcome: PROGRESSING SLOWER THAN EXPECTED  4/24/2021 1046 by Antonina Taylor R.N.  Outcome: PROGRESSING SLOWER THAN EXPECTED     Problem: Urinary Elimination:  Goal: Ability to reestablish a normal urinary elimination pattern will improve  Outcome: PROGRESSING SLOWER THAN EXPECTED

## 2021-04-24 NOTE — CARE PLAN
Problem: Communication  Goal: The ability to communicate needs accurately and effectively will improve  Outcome: PROGRESSING AS EXPECTED  Note: Pt uses call light appropriately and communicates needs without difficulty. Call light within reach and functioning.     Problem: Safety  Goal: Will remain free from falls  Outcome: PROGRESSING AS EXPECTED  Note: Pt remains free from falls this admission. Pt educated to call for help and uses call light appropriately. Bed locked and in lowest position. Call light within reach.

## 2021-04-24 NOTE — PROGRESS NOTES
Assumed care of pt at 0715. Pt is alert and oriented, wearing CPAP. Pt denies any pain. Pt c/o nausea, medicated per MAR. LENI drain emptied x3 with 210ml serosanguineous fluid removed. Midline dressing observed, dry and intact with old serosanguineous drainage. Normoactive bowel sounds to RUQ, hypoactive bowel sounds to RLQ, LUQ, LLQ. Pt reports she is not passing flatus. Pt states she has still not voided since straight cath yesterday. Dr. Martinez aware. New orders received for insertion of yusuf catheter.    0922 Fluid from LENI drain sent to lab per MD order for Fluid Creatinine     1035 16f yusuf catheter placed by WILIAM Rider per MD order. 700ml kyle urine returned.     1145 Pt down for xray via bed with transport    1210 Pt back to room 214-1 from xray    1329 Pt off the floor via bed with transport to CT    1350 pt back to room 214-1 from CT

## 2021-04-24 NOTE — PROGRESS NOTES
Pt still has not voided after straight cath which was done around 1730 on day shift. Straight cath pulled out more than twice the amount seen on bladder scanner.  Pt still has IVF infusing and drinking water PO. Paged Dr. Mcnamara to update regarding urine output.   Per Dr. Mcnamara, will leave pt as is for now and not straight cath her again tonight. Will bladder scan again in the morning and re-evaluate.

## 2021-04-25 ENCOUNTER — APPOINTMENT (OUTPATIENT)
Dept: RADIOLOGY | Facility: MEDICAL CENTER | Age: 66
DRG: 339 | End: 2021-04-25
Attending: HOSPITALIST
Payer: OTHER GOVERNMENT

## 2021-04-25 LAB
ANION GAP SERPL CALC-SCNC: 12 MMOL/L (ref 7–16)
APPEARANCE UR: ABNORMAL
BACTERIA #/AREA URNS HPF: ABNORMAL /HPF
BILIRUB UR QL STRIP.AUTO: NEGATIVE
BUN SERPL-MCNC: 31 MG/DL (ref 8–22)
CALCIUM SERPL-MCNC: 8.6 MG/DL (ref 8.4–10.2)
CHLORIDE SERPL-SCNC: 108 MMOL/L (ref 96–112)
CO2 SERPL-SCNC: 23 MMOL/L (ref 20–33)
COLOR UR: YELLOW
CREAT SERPL-MCNC: 1.48 MG/DL (ref 0.5–1.4)
EPI CELLS #/AREA URNS HPF: NEGATIVE /HPF
ERYTHROCYTE [DISTWIDTH] IN BLOOD BY AUTOMATED COUNT: 52.1 FL (ref 35.9–50)
GLUCOSE BLD-MCNC: 118 MG/DL (ref 65–99)
GLUCOSE BLD-MCNC: 121 MG/DL (ref 65–99)
GLUCOSE BLD-MCNC: 121 MG/DL (ref 65–99)
GLUCOSE BLD-MCNC: 146 MG/DL (ref 65–99)
GLUCOSE SERPL-MCNC: 138 MG/DL (ref 65–99)
GLUCOSE UR STRIP.AUTO-MCNC: NEGATIVE MG/DL
GRAM STN SPEC: NORMAL
HCT VFR BLD AUTO: 39.2 % (ref 37–47)
HGB BLD-MCNC: 12.3 G/DL (ref 12–16)
KETONES UR STRIP.AUTO-MCNC: NEGATIVE MG/DL
LEUKOCYTE ESTERASE UR QL STRIP.AUTO: NEGATIVE
MCH RBC QN AUTO: 29.9 PG (ref 27–33)
MCHC RBC AUTO-ENTMCNC: 31.4 G/DL (ref 33.6–35)
MCV RBC AUTO: 95.1 FL (ref 81.4–97.8)
MICRO URNS: ABNORMAL
MUCOUS THREADS #/AREA URNS HPF: ABNORMAL /HPF
NITRITE UR QL STRIP.AUTO: NEGATIVE
PH UR STRIP.AUTO: 5.5 [PH] (ref 5–8)
PLATELET # BLD AUTO: 365 K/UL (ref 164–446)
PMV BLD AUTO: 9.6 FL (ref 9–12.9)
POTASSIUM SERPL-SCNC: 4.6 MMOL/L (ref 3.6–5.5)
PROT UR QL STRIP: NEGATIVE MG/DL
RBC # BLD AUTO: 4.12 M/UL (ref 4.2–5.4)
RBC # URNS HPF: ABNORMAL /HPF
RBC UR QL AUTO: ABNORMAL
SIGNIFICANT IND 70042: NORMAL
SITE SITE: NORMAL
SODIUM SERPL-SCNC: 143 MMOL/L (ref 135–145)
SOURCE SOURCE: NORMAL
SP GR UR STRIP.AUTO: 1.02
UNIDENT CRYS URNS QL MICRO: ABNORMAL /HPF
WBC # BLD AUTO: 16.7 K/UL (ref 4.8–10.8)
WBC #/AREA URNS HPF: ABNORMAL /HPF

## 2021-04-25 PROCEDURE — 80048 BASIC METABOLIC PNL TOTAL CA: CPT

## 2021-04-25 PROCEDURE — 99233 SBSQ HOSP IP/OBS HIGH 50: CPT | Performed by: HOSPITALIST

## 2021-04-25 PROCEDURE — 700102 HCHG RX REV CODE 250 W/ 637 OVERRIDE(OP): Performed by: INTERNAL MEDICINE

## 2021-04-25 PROCEDURE — A9270 NON-COVERED ITEM OR SERVICE: HCPCS | Performed by: INTERNAL MEDICINE

## 2021-04-25 PROCEDURE — 87205 SMEAR GRAM STAIN: CPT

## 2021-04-25 PROCEDURE — 81001 URINALYSIS AUTO W/SCOPE: CPT

## 2021-04-25 PROCEDURE — 87040 BLOOD CULTURE FOR BACTERIA: CPT | Mod: 91

## 2021-04-25 PROCEDURE — 94660 CPAP INITIATION&MGMT: CPT

## 2021-04-25 PROCEDURE — 94760 N-INVAS EAR/PLS OXIMETRY 1: CPT

## 2021-04-25 PROCEDURE — 770006 HCHG ROOM/CARE - MED/SURG/GYN SEMI*

## 2021-04-25 PROCEDURE — 700105 HCHG RX REV CODE 258: Performed by: SURGERY

## 2021-04-25 PROCEDURE — 85027 COMPLETE CBC AUTOMATED: CPT

## 2021-04-25 PROCEDURE — 700111 HCHG RX REV CODE 636 W/ 250 OVERRIDE (IP): Performed by: INTERNAL MEDICINE

## 2021-04-25 PROCEDURE — 700105 HCHG RX REV CODE 258: Performed by: INTERNAL MEDICINE

## 2021-04-25 PROCEDURE — 700111 HCHG RX REV CODE 636 W/ 250 OVERRIDE (IP): Performed by: SURGERY

## 2021-04-25 PROCEDURE — 87070 CULTURE OTHR SPECIMN AEROBIC: CPT

## 2021-04-25 PROCEDURE — 36415 COLL VENOUS BLD VENIPUNCTURE: CPT

## 2021-04-25 PROCEDURE — 82962 GLUCOSE BLOOD TEST: CPT | Mod: 91

## 2021-04-25 PROCEDURE — 87015 SPECIMEN INFECT AGNT CONCNTJ: CPT

## 2021-04-25 RX ADMIN — HYDROMORPHONE HYDROCHLORIDE 0.5 MG: 1 INJECTION, SOLUTION INTRAMUSCULAR; INTRAVENOUS; SUBCUTANEOUS at 18:35

## 2021-04-25 RX ADMIN — PIPERACILLIN AND TAZOBACTAM 4.5 G: 4; .5 INJECTION, POWDER, LYOPHILIZED, FOR SOLUTION INTRAVENOUS; PARENTERAL at 00:43

## 2021-04-25 RX ADMIN — PIPERACILLIN AND TAZOBACTAM 4.5 G: 4; .5 INJECTION, POWDER, LYOPHILIZED, FOR SOLUTION INTRAVENOUS; PARENTERAL at 05:08

## 2021-04-25 RX ADMIN — PIPERACILLIN AND TAZOBACTAM 4.5 G: 4; .5 INJECTION, POWDER, LYOPHILIZED, FOR SOLUTION INTRAVENOUS; PARENTERAL at 12:42

## 2021-04-25 RX ADMIN — OXYCODONE HYDROCHLORIDE 10 MG: 10 TABLET ORAL at 19:38

## 2021-04-25 RX ADMIN — THYROID, PORCINE 90 MG: 30 TABLET ORAL at 05:08

## 2021-04-25 RX ADMIN — ONDANSETRON 4 MG: 2 INJECTION INTRAMUSCULAR; INTRAVENOUS at 18:35

## 2021-04-25 RX ADMIN — LORATADINE 10 MG: 10 TABLET ORAL at 05:08

## 2021-04-25 RX ADMIN — PIPERACILLIN AND TAZOBACTAM 4.5 G: 4; .5 INJECTION, POWDER, LYOPHILIZED, FOR SOLUTION INTRAVENOUS; PARENTERAL at 23:40

## 2021-04-25 RX ADMIN — ONDANSETRON 4 MG: 2 INJECTION INTRAMUSCULAR; INTRAVENOUS at 12:42

## 2021-04-25 RX ADMIN — ONDANSETRON 4 MG: 2 INJECTION INTRAMUSCULAR; INTRAVENOUS at 08:37

## 2021-04-25 RX ADMIN — OXYCODONE HYDROCHLORIDE 10 MG: 10 TABLET ORAL at 00:42

## 2021-04-25 RX ADMIN — OXYCODONE HYDROCHLORIDE 10 MG: 10 TABLET ORAL at 04:05

## 2021-04-25 RX ADMIN — SODIUM CHLORIDE: 9 INJECTION, SOLUTION INTRAVENOUS at 10:20

## 2021-04-25 RX ADMIN — SENNOSIDES AND DOCUSATE SODIUM 2 TABLET: 8.6; 5 TABLET ORAL at 05:08

## 2021-04-25 RX ADMIN — OXYCODONE HYDROCHLORIDE 10 MG: 10 TABLET ORAL at 08:38

## 2021-04-25 RX ADMIN — PIPERACILLIN AND TAZOBACTAM 4.5 G: 4; .5 INJECTION, POWDER, LYOPHILIZED, FOR SOLUTION INTRAVENOUS; PARENTERAL at 18:35

## 2021-04-25 RX ADMIN — ONDANSETRON 4 MG: 2 INJECTION INTRAMUSCULAR; INTRAVENOUS at 04:05

## 2021-04-25 ASSESSMENT — ENCOUNTER SYMPTOMS
INSOMNIA: 1
SENSORY CHANGE: 0
SHORTNESS OF BREATH: 0
EYES NEGATIVE: 1
CHILLS: 1
BLURRED VISION: 0
ABDOMINAL PAIN: 1
TREMORS: 0
HEADACHES: 1
HEARTBURN: 0
STRIDOR: 0
CARDIOVASCULAR NEGATIVE: 1
MYALGIAS: 1
BACK PAIN: 1
VOMITING: 0
SPUTUM PRODUCTION: 0
NERVOUS/ANXIOUS: 1
ORTHOPNEA: 0
WEIGHT LOSS: 0
TINGLING: 0
RESPIRATORY NEGATIVE: 1
DEPRESSION: 0
DOUBLE VISION: 0
CONSTIPATION: 1
NAUSEA: 1
CLAUDICATION: 0
COUGH: 0

## 2021-04-25 ASSESSMENT — PAIN DESCRIPTION - PAIN TYPE
TYPE: SURGICAL PAIN
TYPE: ACUTE PAIN
TYPE: SURGICAL PAIN
TYPE: SURGICAL PAIN;ACUTE PAIN
TYPE: ACUTE PAIN

## 2021-04-25 ASSESSMENT — PATIENT HEALTH QUESTIONNAIRE - PHQ9
1. LITTLE INTEREST OR PLEASURE IN DOING THINGS: NOT AT ALL
2. FEELING DOWN, DEPRESSED, IRRITABLE, OR HOPELESS: NOT AT ALL
SUM OF ALL RESPONSES TO PHQ9 QUESTIONS 1 AND 2: 0

## 2021-04-25 ASSESSMENT — LIFESTYLE VARIABLES: SUBSTANCE_ABUSE: 0

## 2021-04-25 NOTE — PROGRESS NOTES
[facility] Medicine Daily Progress Note    Date of Service  4/25/2021    Chief Complaint  65 y.o. female admitted 4/21/2021 with abdominal pain    Hospital Course  4/22/2021-patient is a 65-year-old female comes in with abdominal pain that started last Friday.  After suffering with the pain the patient went to see urgent care where they did a CAT scan of her abdomen which came back with early appendicitis.  Patient was referred over to the emergency room for evaluation.  Patient was admitted for surgery and was found to have a large abscess along with the appendicitis.  Appendix was removed and the abscess has been drained.  She still having chills and fevers afterwards.  Continue with IV antibiotics as well as pain management and fluid resuscitation.  Await culture results.  4/23/2021-this morning patient is in quite a bit of distress.  Her pain has escalated in the abdomen and she is unable to urinate or defecate.  Upon further evaluation patient has a postoperative ileus in her bowel sounds have not returned.  She has not passed any gas or had a bowel movement.  Continue at this point with supportive fluid resuscitation to ensure the patient's bowel functions returned to normal.  Patient at this point is also complaining of urinary retention and the bladder scanner was only showing 70 to 100 mL of retained urine.  This was inconsistent with the amount of pain the patient was having thus I ordered a immediate straight catheterization which yielded over 800 mL of urine.  After the urine came out the patient's abdominal pain eased up and her condition improved.  We will continue to monitor her urine output and if the patient should have urinary retention we will straight catheter again.  For the time being because of her abdominal distention pain and ileus as well as urinary retention we are unable to discharge the patient at this point in time.  Continue at this point with aggressive management and  stabilization.  4/24/2021-patient is still having difficulty with abdominal pain.  She also has not had a bowel movement and her intestinal sounds are returning very slowly.  Concern is that there is still an abscess present.  Flatplate of the abdomen did not show anything her white blood cell count however is increasing at this point I am going to obtain a CT of the abdomen.  Discussed it with surgery.  Because of the bladder distention and inability urinate a Pabon catheter had to be placed.  Hopefully this will decompress the bladder.  4/25/2021-postoperative day #4 after laparoscopic appendectomy had to be converted to an open appendectomy and drainage of abscess cavity.  Cultures have not grown out any significant organism.  White blood cell count has gone up, repeat CT of the abdomen does not demonstrate worsening abscess formation and actually is consistent with routine healing.  Patient CT demonstrates an ileus.  Since her abdominal distention is getting worse and her nausea is persisting I am putting in an NG to low intermittent suctioning.  This was discussed with the bedside nurse and surgery.    Interval Problem Update  Antibiotics changed to Zosyn  NG tube to low intermittent suctioning  Pabon catheter in place because of bladder retention  Repeat white blood cell count evaluation  Blood cultures x2  Pain management  Antiemetic management  We will going to have to give nutritional support consideration may need IV nutrition.    Consultants/Specialty  Surgery Dr. Ruiz    Code Status  Full Code    Disposition  Most likely home once cleared by surgery    Review of Systems  Review of Systems   Constitutional: Positive for chills and malaise/fatigue. Negative for weight loss.   HENT: Negative.  Negative for congestion and hearing loss.    Eyes: Negative.  Negative for blurred vision and double vision.   Respiratory: Negative.  Negative for cough, sputum production, shortness of breath and stridor.     Cardiovascular: Negative.  Negative for chest pain, orthopnea and claudication.   Gastrointestinal: Positive for abdominal pain, constipation and nausea. Negative for heartburn and vomiting.   Genitourinary: Negative.  Negative for dysuria, frequency and urgency.   Musculoskeletal: Positive for back pain and myalgias. Negative for joint pain.   Skin: Negative.  Negative for itching and rash.   Neurological: Positive for headaches. Negative for tingling, tremors and sensory change.   Endo/Heme/Allergies: Negative.    Psychiatric/Behavioral: Negative for depression, substance abuse and suicidal ideas. The patient is nervous/anxious and has insomnia.    All other systems reviewed and are negative.       Physical Exam  Temp:  [36.4 °C (97.5 °F)-36.9 °C (98.5 °F)] 36.4 °C (97.6 °F)  Pulse:  [] 99  Resp:  [16-18] 17  BP: (112-148)/(66-74) 112/69  SpO2:  [93 %-98 %] 94 %    Physical Exam  Vitals and nursing note reviewed. Exam conducted with a chaperone present.   Constitutional:       Appearance: She is obese. She is ill-appearing.   HENT:      Head: Normocephalic and atraumatic.      Right Ear: External ear normal.      Left Ear: External ear normal.      Nose: Nose normal.      Mouth/Throat:      Mouth: Mucous membranes are dry.      Pharynx: Oropharynx is clear.   Eyes:      Extraocular Movements: Extraocular movements intact.      Conjunctiva/sclera: Conjunctivae normal.      Pupils: Pupils are equal, round, and reactive to light.   Neck:      Vascular: No carotid bruit.   Cardiovascular:      Rate and Rhythm: Normal rate and regular rhythm.      Pulses: Normal pulses.      Heart sounds: Normal heart sounds.   Pulmonary:      Effort: Pulmonary effort is normal.      Breath sounds: Normal breath sounds. No wheezing or rhonchi.   Abdominal:      General: Abdomen is protuberant. Bowel sounds are absent. There is distension.      Palpations: Abdomen is rigid. There is no fluid wave.      Tenderness: There is  generalized abdominal tenderness. There is rebound.   Musculoskeletal:         General: No swelling. Normal range of motion.      Cervical back: Normal range of motion and neck supple. No rigidity.      Right lower leg: No edema.   Skin:     General: Skin is warm and dry.      Capillary Refill: Capillary refill takes more than 3 seconds.      Coloration: Skin is pale.   Neurological:      General: No focal deficit present.      Mental Status: She is alert and oriented to person, place, and time. Mental status is at baseline.      GCS: GCS eye subscore is 4. GCS verbal subscore is 5. GCS motor subscore is 6.      Cranial Nerves: No cranial nerve deficit.      Sensory: No sensory deficit.      Motor: Weakness present.      Gait: Gait normal.      Deep Tendon Reflexes: Reflexes normal.   Psychiatric:         Attention and Perception: She is inattentive.         Mood and Affect: Mood is anxious. Affect is flat.         Speech: Speech is delayed.         Behavior: Behavior is slowed.         Thought Content: Thought content normal.         Judgment: Judgment normal.         Fluids    Intake/Output Summary (Last 24 hours) at 4/25/2021 1204  Last data filed at 4/25/2021 1100  Gross per 24 hour   Intake 900 ml   Output 1750 ml   Net -850 ml       Laboratory  Recent Labs     04/23/21 0228 04/24/21  1128   WBC 13.8* 15.8*   RBC 4.00* 4.40   HEMOGLOBIN 11.8* 13.0   HEMATOCRIT 37.3 41.4   MCV 93.3 94.1   MCH 29.5 29.5   MCHC 31.6* 31.4*   RDW 49.5 50.9*   PLATELETCT 326 394   MPV 9.7 9.4     Recent Labs     04/23/21 0228 04/24/21  1128   SODIUM 136 135   POTASSIUM 4.5 4.9   CHLORIDE 101 102   CO2 21 22   GLUCOSE 147* 132*   BUN 30* 38*   CREATININE 2.03* 1.80*   CALCIUM 8.6 8.7                   Imaging  CT-ABDOMEN-PELVIS W/O   Final Result      Interval laparotomy with right lower quadrant surgical drain, similar fat stranding in the pericecal region that is likely postoperative in nature. No noncontrast evidence of abscess  formation      Small free fluid and intraperitoneal air is not outside normal limits in the perioperative period      Contrast in the colon is from the CT 3 days ago, air-fluid levels and mild small bowel dilatation. In combination these findings are most compatible with ileus      Urinary bladder decompressed around the Pabon catheter. No hydroureteronephrosis      [id number] VIEW   Final Result      Dilated loops of small bowel most likely represent ileus. Partial obstruction is not excluded.      Residual enteric contrast is seen from the recent CT.      Mild bibasilar atelectasis.         IR-US GUIDED PIV   Final Result    Ultrasound-guided PERIPHERAL IV INSERTION performed by    qualified nursing staff as above.           Assessment/Plan  * Acute appendicitis with localized peritonitis  Assessment & Plan  Status post laparoscopic appendectomy, converted to full open surgery.  Abdomen still distended and tender diffusely  Flatplate of abdomen shows diffuse dilation of the intestines  Patient still has not passed gas or had a bowel movement since surgery  White blood cell count continues to elevate  Continued on antibiotics and fluids  Repeat CT of the abdomen will be ordered    Essential hypertension- (present on admission)  Assessment & Plan  Monitor blood pressure and administer as needed labetalol to help blood pressure management.      Stage 3 chronic kidney disease- (present on admission)  Assessment & Plan  Acute on chronic renal failure at this point.  Pabon catheter will be placed to decompress bladder.      BMI 50.0-59.9, adult (HCC)- (present on admission)  Assessment & Plan  Body mass index is 57.29 kg/m².  Patient really needs outpatient weight loss management program      HENRI (obstructive sleep apnea)- (present on admission)  Assessment & Plan  Nasal CPAP continued         VTE prophylaxis: SCDs

## 2021-04-25 NOTE — CARE PLAN
Problem: Communication  Goal: The ability to communicate needs accurately and effectively will improve  Outcome: PROGRESSING AS EXPECTED     Problem: Bowel/Gastric:  Goal: Normal bowel function is maintained or improved  Outcome: PROGRESSING SLOWER THAN EXPECTED     Problem: Knowledge Deficit  Goal: Knowledge of disease process/condition, treatment plan, diagnostic tests, and medications will improve  Outcome: PROGRESSING SLOWER THAN EXPECTED

## 2021-04-25 NOTE — PROGRESS NOTES
"Surgery    Nausea continues, vomiting overnight    Pabon catheter placed yesterday    White count up yesterday: Antibiotics changed to Zosyn  CT done yesterday: A bit early after laparotomy but no abscess identified.  Likely persistent postoperative ileus    Not mobilizing much: Patient is maximal assist to get out of bed    /69   Pulse 99   Temp 36.4 °C (97.6 °F) (Temporal)   Resp 17   Ht 1.6 m (5' 3\")   Wt (!) 147 kg (323 lb 6.6 oz)   SpO2 94%   BMI 57.29 kg/m²     Drain output: 640 cc drain creatinine 1.7    Patient lying in bed with CPAP in place  Abdomen obese, distended with incisional tenderness  Incision is intact without obvious drainage, staples in place  Drain serosanguineous, malodorous    Recent Labs     04/23/21 0228 04/24/21  1128   WBC 13.8* 15.8*   RBC 4.00* 4.40   HEMOGLOBIN 11.8* 13.0   HEMATOCRIT 37.3 41.4   MCV 93.3 94.1   MCH 29.5 29.5   RDW 49.5 50.9*   PLATELETCT 326 394   MPV 9.7 9.4   NEUTSPOLYS 80.00*  --    LYMPHOCYTES 10.50*  --    MONOCYTES 6.80  --    EOSINOPHILS 0.10  --    BASOPHILS 0.30  --      Recent Labs     04/23/21 0228 04/24/21  1128   SODIUM 136 135   POTASSIUM 4.5 4.9   CHLORIDE 101 102   CO2 21 22   GLUCOSE 147* 132*   BUN 30* 38*   CREATININE 2.03* 1.80*     Assessment and plan:  Appendiceal abscess status post laparotomy and drainage    Persistent postop ileus with nausea and vomiting  Agree with placement of nasogastric tube.  Respiratory therapy will need to see if they can work around patient's nasal CPAP  Continue aggressive pulmonary hygiene    Recommend patient mobilize were at least sit up.  She may need a bariatric bed    Waiting labs    Continue antibiotics for likely 14-day course    Discussed with hospitalist  "

## 2021-04-25 NOTE — FLOWSHEET NOTE
04/24/21 2112   Non-Invasive Ventilation [facility]   Nocturnal CPAP or BIPAP CPAP - [facility]   $ System Evaluation Yes   Settings (If Known) AUTO   FiO2 or LPM 2

## 2021-04-26 ENCOUNTER — APPOINTMENT (OUTPATIENT)
Dept: RADIOLOGY | Facility: MEDICAL CENTER | Age: 66
DRG: 339 | End: 2021-04-26
Attending: HOSPITALIST
Payer: OTHER GOVERNMENT

## 2021-04-26 LAB
ALBUMIN SERPL BCP-MCNC: 2.6 G/DL (ref 3.2–4.9)
ALBUMIN/GLOB SERPL: 0.7 G/DL
ALP SERPL-CCNC: 85 U/L (ref 30–99)
ALT SERPL-CCNC: 18 U/L (ref 2–50)
ANION GAP SERPL CALC-SCNC: 12 MMOL/L (ref 7–16)
ANION GAP SERPL CALC-SCNC: 8 MMOL/L (ref 7–16)
AST SERPL-CCNC: 20 U/L (ref 12–45)
BILIRUB SERPL-MCNC: 0.3 MG/DL (ref 0.1–1.5)
BUN SERPL-MCNC: 27 MG/DL (ref 8–22)
BUN SERPL-MCNC: 29 MG/DL (ref 8–22)
CALCIUM SERPL-MCNC: 8.7 MG/DL (ref 8.4–10.2)
CALCIUM SERPL-MCNC: 9 MG/DL (ref 8.4–10.2)
CHLORIDE SERPL-SCNC: 109 MMOL/L (ref 96–112)
CHLORIDE SERPL-SCNC: 111 MMOL/L (ref 96–112)
CO2 SERPL-SCNC: 26 MMOL/L (ref 20–33)
CO2 SERPL-SCNC: 28 MMOL/L (ref 20–33)
CREAT SERPL-MCNC: 1.32 MG/DL (ref 0.5–1.4)
CREAT SERPL-MCNC: 1.41 MG/DL (ref 0.5–1.4)
ERYTHROCYTE [DISTWIDTH] IN BLOOD BY AUTOMATED COUNT: 52.1 FL (ref 35.9–50)
GLOBULIN SER CALC-MCNC: 3.5 G/DL (ref 1.9–3.5)
GLUCOSE BLD-MCNC: 104 MG/DL (ref 65–99)
GLUCOSE BLD-MCNC: 105 MG/DL (ref 65–99)
GLUCOSE BLD-MCNC: 119 MG/DL (ref 65–99)
GLUCOSE BLD-MCNC: 122 MG/DL (ref 65–99)
GLUCOSE SERPL-MCNC: 125 MG/DL (ref 65–99)
GLUCOSE SERPL-MCNC: 126 MG/DL (ref 65–99)
HCT VFR BLD AUTO: 39.9 % (ref 37–47)
HGB BLD-MCNC: 12.2 G/DL (ref 12–16)
MCH RBC QN AUTO: 29.4 PG (ref 27–33)
MCHC RBC AUTO-ENTMCNC: 30.6 G/DL (ref 33.6–35)
MCV RBC AUTO: 96.1 FL (ref 81.4–97.8)
PLATELET # BLD AUTO: 389 K/UL (ref 164–446)
PMV BLD AUTO: 9.6 FL (ref 9–12.9)
POTASSIUM SERPL-SCNC: 4.3 MMOL/L (ref 3.6–5.5)
POTASSIUM SERPL-SCNC: 4.6 MMOL/L (ref 3.6–5.5)
PROT SERPL-MCNC: 6.1 G/DL (ref 6–8.2)
RBC # BLD AUTO: 4.15 M/UL (ref 4.2–5.4)
SODIUM SERPL-SCNC: 147 MMOL/L (ref 135–145)
SODIUM SERPL-SCNC: 147 MMOL/L (ref 135–145)
WBC # BLD AUTO: 14.8 K/UL (ref 4.8–10.8)

## 2021-04-26 PROCEDURE — 700102 HCHG RX REV CODE 250 W/ 637 OVERRIDE(OP): Performed by: INTERNAL MEDICINE

## 2021-04-26 PROCEDURE — 700105 HCHG RX REV CODE 258: Performed by: SURGERY

## 2021-04-26 PROCEDURE — 94760 N-INVAS EAR/PLS OXIMETRY 1: CPT

## 2021-04-26 PROCEDURE — 700105 HCHG RX REV CODE 258: Performed by: HOSPITALIST

## 2021-04-26 PROCEDURE — 700111 HCHG RX REV CODE 636 W/ 250 OVERRIDE (IP): Performed by: SURGERY

## 2021-04-26 PROCEDURE — 05HC33Z INSERTION OF INFUSION DEVICE INTO LEFT BASILIC VEIN, PERCUTANEOUS APPROACH: ICD-10-PCS | Performed by: HOSPITALIST

## 2021-04-26 PROCEDURE — 700101 HCHG RX REV CODE 250: Performed by: HOSPITALIST

## 2021-04-26 PROCEDURE — 700105 HCHG RX REV CODE 258: Performed by: INTERNAL MEDICINE

## 2021-04-26 PROCEDURE — 80053 COMPREHEN METABOLIC PANEL: CPT

## 2021-04-26 PROCEDURE — 85027 COMPLETE CBC AUTOMATED: CPT

## 2021-04-26 PROCEDURE — 700111 HCHG RX REV CODE 636 W/ 250 OVERRIDE (IP): Performed by: HOSPITALIST

## 2021-04-26 PROCEDURE — 82962 GLUCOSE BLOOD TEST: CPT | Mod: 91

## 2021-04-26 PROCEDURE — 99233 SBSQ HOSP IP/OBS HIGH 50: CPT | Performed by: HOSPITALIST

## 2021-04-26 PROCEDURE — A9270 NON-COVERED ITEM OR SERVICE: HCPCS | Performed by: INTERNAL MEDICINE

## 2021-04-26 PROCEDURE — 770006 HCHG ROOM/CARE - MED/SURG/GYN SEMI*

## 2021-04-26 PROCEDURE — 36415 COLL VENOUS BLD VENIPUNCTURE: CPT

## 2021-04-26 PROCEDURE — 94660 CPAP INITIATION&MGMT: CPT

## 2021-04-26 PROCEDURE — C1751 CATH, INF, PER/CENT/MIDLINE: HCPCS

## 2021-04-26 PROCEDURE — 80048 BASIC METABOLIC PNL TOTAL CA: CPT

## 2021-04-26 RX ADMIN — LORATADINE 10 MG: 10 TABLET ORAL at 06:20

## 2021-04-26 RX ADMIN — PIPERACILLIN AND TAZOBACTAM 4.5 G: 4; .5 INJECTION, POWDER, LYOPHILIZED, FOR SOLUTION INTRAVENOUS; PARENTERAL at 13:39

## 2021-04-26 RX ADMIN — CALCIUM GLUCONATE: 98 INJECTION, SOLUTION INTRAVENOUS at 19:55

## 2021-04-26 RX ADMIN — SODIUM CHLORIDE: 9 INJECTION, SOLUTION INTRAVENOUS at 13:40

## 2021-04-26 RX ADMIN — PIPERACILLIN AND TAZOBACTAM 4.5 G: 4; .5 INJECTION, POWDER, LYOPHILIZED, FOR SOLUTION INTRAVENOUS; PARENTERAL at 18:33

## 2021-04-26 RX ADMIN — PIPERACILLIN AND TAZOBACTAM 4.5 G: 4; .5 INJECTION, POWDER, LYOPHILIZED, FOR SOLUTION INTRAVENOUS; PARENTERAL at 06:20

## 2021-04-26 RX ADMIN — SODIUM CHLORIDE: 9 INJECTION, SOLUTION INTRAVENOUS at 01:16

## 2021-04-26 RX ADMIN — THYROID, PORCINE 90 MG: 30 TABLET ORAL at 06:20

## 2021-04-26 RX ADMIN — PIPERACILLIN AND TAZOBACTAM 4.5 G: 4; .5 INJECTION, POWDER, LYOPHILIZED, FOR SOLUTION INTRAVENOUS; PARENTERAL at 23:30

## 2021-04-26 ASSESSMENT — ENCOUNTER SYMPTOMS
DIAPHORESIS: 0
FLANK PAIN: 1
RESPIRATORY NEGATIVE: 1
INSOMNIA: 0
CARDIOVASCULAR NEGATIVE: 1
NAUSEA: 1
SHORTNESS OF BREATH: 0
POLYDIPSIA: 0
DIZZINESS: 0
COUGH: 0
VOMITING: 0
NERVOUS/ANXIOUS: 1
BACK PAIN: 1
EYE DISCHARGE: 0
CLAUDICATION: 0
CONSTIPATION: 1
ABDOMINAL PAIN: 1
HEADACHES: 1
FEVER: 0
WEIGHT LOSS: 0
CONSTITUTIONAL NEGATIVE: 1
MYALGIAS: 1
SEIZURES: 0
DEPRESSION: 0
SORE THROAT: 0
PHOTOPHOBIA: 0
EYES NEGATIVE: 1
EYE REDNESS: 0
WEAKNESS: 0

## 2021-04-26 ASSESSMENT — PAIN DESCRIPTION - PAIN TYPE: TYPE: ACUTE PAIN

## 2021-04-26 ASSESSMENT — LIFESTYLE VARIABLES: SUBSTANCE_ABUSE: 0

## 2021-04-26 NOTE — FLOWSHEET NOTE
04/25/21 1850   Events/Summary/Plan   Events/Summary/Plan CPAP s/u and ready at bedside (will need 02 inline/pt on oxymask now)   Skin Inspection Respiratory Device Intact   Vital Signs   Pulse 90   Respiration 18   Pulse Oximetry 95 %   $ Pulse Oximetry (Spot Check) Yes   Respiratory Assessment   Respiratory Pattern Within Normal Limits   Chest Exam   Work Of Breathing / Effort Mild   Oxygen   O2 (LPM) 3   O2 Delivery Device Oxymask   Non-Invasive Ventilation [facility]   Nocturnal CPAP or BIPAP CPAP - [facility]   $ System Evaluation Yes   Settings (If Known) Autopap   FiO2 or LPM 2

## 2021-04-26 NOTE — CARE PLAN
Received report from day shift nurse.   Assumed pt care   Pt is A&Ox4, resting comfortably in bed.   Pt on 3 liters oxy mask. No signs of SOB/respiratory distress.   Labs noted, VSS.   Pt c/o 7/10 pain at this moment, medicated per MAR.    Assessment completed, complaining of bladder spasms and feeling the need to void, urine leaking around yusuf insertion site. New yusuf has been placed. Dressings on abdomen C/D/I, LENI drain present with 40ml serosanguinous output. Has not been passing gas, bowel sounds are hypoactive. NG tube is present draining green output.   Fall precautions in place.   Bed at lowest position.   Call light and personal belongings within reach. Continue to monitor         Problem: Knowledge Deficit  Goal: Knowledge of the prescribed therapeutic regimen will improve  Outcome: PROGRESSING AS EXPECTED     Problem: Urinary Elimination:  Goal: Ability to reestablish a normal urinary elimination pattern will improve  Outcome: PROGRESSING SLOWER THAN EXPECTED

## 2021-04-26 NOTE — PROGRESS NOTES
Pharmacy TPN Day # 1       2021    Dosing Weight   76.05 kg TPN currently providing 50% of goal      TPN goal: 1380 kcal/day including 1.2 gm/kg/day Protein      TPN indication: NPO s/p appendectomy w/ abscess present (I&D).       Pertinent PMH: HTN, HENRI, CKD  Temp (24hrs), Av.7 °C (98.1 °F), Min:36.3 °C (97.3 °F), Max:37.3 °C (99.1 °F)  .  Recent Labs     21  1128 21  1445 21  0316   SODIUM 135 143 147*   POTASSIUM 4.9 4.6 4.6   CHLORIDE 102 108 109   CO2 22 23 26   BUN 38* 31* 29*   CREATININE 1.80* 1.48* 1.41*   GLUCOSE 132* 138* 126*   CALCIUM 8.7 8.6 9.0     Accu-Checks  Recent Labs     21  1847 21  2343 21  0633   POCGLUCOSE 118* 119* 122*       Vitals:    21 2100 21 0100 21 0500 21 0900   BP: 112/54 135/71 101/53 129/71   Weight:       Height:           Intake/Output Summary (Last 24 hours) at 2021 1042  Last data filed at 2021 0600  Gross per 24 hour   Intake 0 ml   Output 3435 ml   Net -3435 ml       Orders Placed This Encounter   Procedures   • Diet NPO     Standing Status:   Standing     Number of Occurrences:   1     Order Specific Question:   Restrict to:     Answer:   Sips with Medications [3]         TPN for past 72 hours (Show up to 3 orders; newest on the left.)     Start date and time   2021      TPN Central Line Formulation [[id number]]    Order Status  Active       Base    Clinisol 15%  45 g    dextrose 70%  100 g    fat emulsions 20%  17 g       Additives    potassium acetate  20 mEq    sodium acetate  50 mEq    sodium chloride  150 mEq    magnesium sulfate  4.06 mEq    calcium GLUConate  4.65 mEq    M.T.E.-4 Adult  1 mL    M.V.I. Adult  10 mL       QS Base    sterile water  1,369.6 mL       Energy Contribution    Proteins  --    Dextrose  --    Lipids  --    Total  --       Electrolyte Ion Calculated Amount    Sodium  200 mEq    Potassium  20 mEq    Calcium  4.65 mEq    Magnesium  4.06 mEq    Aluminum  --     Phosphate  --    Chloride  150 mEq    Acetate  108.1 mEq       Other    Total Protein  45 g    Total Protein/kg  0.31 g/kg    Total Amino Acid  --    Total Amino Acid/kg  --    Glucose Infusion Rate  0.24 mg/kg/min    Osmolarity (Estimated)  --    Volume  1,992 mL    Rate  42 mL/hr    Dosing Weight  147 kg    Infusion Site  Central            This formula provides:  % kcal as lipids = 25  Grams protein/kg = 1.2  Non-protein calories = 1020 (total formulation @ 100%), current 510  Kcals/kg = 18.1  Total daily calories = 1380 @ 100% goal = current 690    Comments:  Per nurse, patient will get PICC placement today and central line access by this evening. On order, will start @ 42 ml/min x 8 hours to assess tolerance and increase to a final rate of 83 ml/hr if tolerated. Will order labs for tomorrow's assessment. Pharmacy to continue to monitor per TPN protocol.       Cornell SalmonD.

## 2021-04-26 NOTE — PROGRESS NOTES
[facility] Medicine Daily Progress Note    Date of Service  4/26/2021    Chief Complaint  65 y.o. female admitted 4/21/2021 with abdominal pain    Hospital Course  4/22/2021-patient is a 65-year-old female comes in with abdominal pain that started last Friday.  After suffering with the pain the patient went to see urgent care where they did a CAT scan of her abdomen which came back with early appendicitis.  Patient was referred over to the emergency room for evaluation.  Patient was admitted for surgery and was found to have a large abscess along with the appendicitis.  Appendix was removed and the abscess has been drained.  She still having chills and fevers afterwards.  Continue with IV antibiotics as well as pain management and fluid resuscitation.  Await culture results.  4/23/2021-this morning patient is in quite a bit of distress.  Her pain has escalated in the abdomen and she is unable to urinate or defecate.  Upon further evaluation patient has a postoperative ileus in her bowel sounds have not returned.  She has not passed any gas or had a bowel movement.  Continue at this point with supportive fluid resuscitation to ensure the patient's bowel functions returned to normal.  Patient at this point is also complaining of urinary retention and the bladder scanner was only showing 70 to 100 mL of retained urine.  This was inconsistent with the amount of pain the patient was having thus I ordered a immediate straight catheterization which yielded over 800 mL of urine.  After the urine came out the patient's abdominal pain eased up and her condition improved.  We will continue to monitor her urine output and if the patient should have urinary retention we will straight catheter again.  For the time being because of her abdominal distention pain and ileus as well as urinary retention we are unable to discharge the patient at this point in time.  Continue at this point with aggressive management and  stabilization.  4/24/2021-patient is still having difficulty with abdominal pain.  She also has not had a bowel movement and her intestinal sounds are returning very slowly.  Concern is that there is still an abscess present.  Flatplate of the abdomen did not show anything her white blood cell count however is increasing at this point I am going to obtain a CT of the abdomen.  Discussed it with surgery.  Because of the bladder distention and inability urinate a Pabon catheter had to be placed.  Hopefully this will decompress the bladder.  4/25/2021-postoperative day #4 after laparoscopic appendectomy had to be converted to an open appendectomy and drainage of abscess cavity.  Cultures have not grown out any significant organism.  White blood cell count has gone up, repeat CT of the abdomen does not demonstrate worsening abscess formation and actually is consistent with routine healing.  Patient CT demonstrates an ileus.  Since her abdominal distention is getting worse and her nausea is persisting I am putting in an NG to low intermittent suctioning.  This was discussed with the bedside nurse and surgery.  4/26/2021-patient's abdominal distention persists.  Ileus persists status postoperative.  Yesterday the patient's Pabon catheter became obstructed.  It had to be replaced.  Yesterday he also had some technical issues with the hospital bed and at this point had to be replaced a few times.  Pain seems to be better controlled.  Her labs are improving.  The patient may need a repeat CT in 24 to 48 hours if her white blood cell count does not improve.  Today's count is down to 14.8.  Continue with IV Zosyn    Interval Problem Update  Day 6 of IV antibiotics  Day 2 of Zosyn  Pain management  Follow NG output  Follow Pabon output  Fluid resuscitation  PICC line  Start TPN    Consultants/Specialty  Surgery Dr. Ruiz    Code Status  Full Code    Disposition  Most likely home once cleared by surgery    Review of  Systems  Review of Systems   Constitutional: Negative.  Negative for diaphoresis, fever and weight loss.   HENT: Negative.  Negative for congestion, sore throat and tinnitus.    Eyes: Negative.  Negative for photophobia, discharge and redness.   Respiratory: Negative.  Negative for cough and shortness of breath.    Cardiovascular: Negative.  Negative for chest pain, claudication and leg swelling.   Gastrointestinal: Positive for abdominal pain, constipation and nausea. Negative for melena and vomiting.   Genitourinary: Positive for flank pain. Negative for dysuria, frequency and hematuria.   Musculoskeletal: Positive for back pain and myalgias. Negative for joint pain.   Skin: Negative.  Negative for itching and rash.   Neurological: Positive for headaches. Negative for dizziness, seizures and weakness.   Endo/Heme/Allergies: Negative.  Negative for environmental allergies and polydipsia.   Psychiatric/Behavioral: Negative for depression and substance abuse. The patient is nervous/anxious. The patient does not have insomnia.    All other systems reviewed and are negative.       Physical Exam  Temp:  [36.3 °C (97.3 °F)-37.3 °C (99.1 °F)] 36.6 °C (97.8 °F)  Pulse:  [] 88  Resp:  [16-20] 17  BP: (101-151)/(53-81) 129/71  SpO2:  [93 %-98 %] 97 %    Physical Exam  Vitals and nursing note reviewed.   Constitutional:       Appearance: She is well-developed. She is obese. She is ill-appearing.   HENT:      Head: Normocephalic and atraumatic.      Right Ear: External ear normal.      Left Ear: External ear normal.      Nose: Nose normal.      Mouth/Throat:      Mouth: Mucous membranes are dry.      Pharynx: Oropharynx is clear.   Eyes:      Extraocular Movements: Extraocular movements intact.      Conjunctiva/sclera: Conjunctivae normal.      Pupils: Pupils are equal, round, and reactive to light.   Neck:      Thyroid: No thyromegaly.      Vascular: No carotid bruit or JVD.   Cardiovascular:      Rate and Rhythm:  Normal rate and regular rhythm.      Pulses: Normal pulses.      Heart sounds: Normal heart sounds. No murmur.   Pulmonary:      Effort: Pulmonary effort is normal.      Breath sounds: Normal breath sounds. No wheezing, rhonchi or rales.   Chest:      Chest wall: No tenderness.   Abdominal:      General: There is distension.      Palpations: Abdomen is soft. There is no mass.      Tenderness: There is generalized abdominal tenderness. There is no guarding or rebound.   Musculoskeletal:         General: No swelling or tenderness. Normal range of motion.      Cervical back: Normal range of motion and neck supple. No rigidity.      Right lower leg: No edema.   Lymphadenopathy:      Cervical: No cervical adenopathy.   Skin:     General: Skin is warm and dry.      Capillary Refill: Capillary refill takes more than 3 seconds.      Findings: No erythema or rash.   Neurological:      General: No focal deficit present.      Mental Status: She is alert and oriented to person, place, and time. Mental status is at baseline.      Cranial Nerves: No cranial nerve deficit.      Sensory: No sensory deficit.      Gait: Gait normal.      Deep Tendon Reflexes: Reflexes are normal and symmetric. Reflexes normal.   Psychiatric:         Attention and Perception: She is inattentive.         Mood and Affect: Mood is anxious.         Speech: Speech is delayed.         Behavior: Behavior normal. Behavior is cooperative.         Thought Content: Thought content normal.         Judgment: Judgment normal. Judgment is not impulsive.         Fluids    Intake/Output Summary (Last 24 hours) at 4/26/2021 1120  Last data filed at 4/26/2021 0600  Gross per 24 hour   Intake 0 ml   Output 2485 ml   Net -2485 ml       Laboratory  Recent Labs     04/24/21  1128 04/25/21  1445 04/26/21  0316   WBC 15.8* 16.7* 14.8*   RBC 4.40 4.12* 4.15*   HEMOGLOBIN 13.0 12.3 12.2   HEMATOCRIT 41.4 39.2 39.9   MCV 94.1 95.1 96.1   MCH 29.5 29.9 29.4   MCHC 31.4* 31.4*  30.6*   RDW 50.9* 52.1* 52.1*   PLATELETCT 394 365 389   MPV 9.4 9.6 9.6     Recent Labs     04/24/21  1128 04/25/21  1445 04/26/21  0316   SODIUM 135 143 147*   POTASSIUM 4.9 4.6 4.6   CHLORIDE 102 108 109   CO2 22 23 26   GLUCOSE 132* 138* 126*   BUN 38* 31* 29*   CREATININE 1.80* 1.48* 1.41*   CALCIUM 8.7 8.6 9.0                   Imaging  CT-ABDOMEN-PELVIS W/O   Final Result      Interval laparotomy with right lower quadrant surgical drain, similar fat stranding in the pericecal region that is likely postoperative in nature. No noncontrast evidence of abscess formation      Small free fluid and intraperitoneal air is not outside normal limits in the perioperative period      Contrast in the colon is from the CT 3 days ago, air-fluid levels and mild small bowel dilatation. In combination these findings are most compatible with ileus      Urinary bladder decompressed around the Pabon catheter. No hydroureteronephrosis      [id number] VIEW   Final Result      Dilated loops of small bowel most likely represent ileus. Partial obstruction is not excluded.      Residual enteric contrast is seen from the recent CT.      Mild bibasilar atelectasis.         IR-US GUIDED PIV   Final Result    Ultrasound-guided PERIPHERAL IV INSERTION performed by    qualified nursing staff as above.      IR-PICC LINE PLACEMENT W/ GUIDANCE > AGE 5    (Results Pending)        Assessment/Plan  * Acute appendicitis with localized peritonitis  Assessment & Plan  Status post laparoscopic appendectomy, converted to full open surgery.  Abdomen still distended and tender diffusely  Flatplate of abdomen shows diffuse dilation of the intestines  Patient still has not passed gas or had a bowel movement since surgery  White blood cell count continues to elevate  Continued on antibiotics and fluids  Repeat CT of the abdomen will be ordered    Essential hypertension- (present on admission)  Assessment & Plan  Monitor blood pressure and administer as  needed labetalol to help blood pressure management.      Stage 3 chronic kidney disease- (present on admission)  Assessment & Plan  Acute on chronic renal failure at this point.  Pabon catheter will be placed to decompress bladder.      BMI 50.0-59.9, adult (HCC)- (present on admission)  Assessment & Plan  Body mass index is 57.29 kg/m².  Patient really needs outpatient weight loss management program      HENRI (obstructive sleep apnea)- (present on admission)  Assessment & Plan  Nasal CPAP continued         VTE prophylaxis: SCDs

## 2021-04-26 NOTE — PROCEDURES
PICC Insertion Final 3CG    Consents confirmed, vessel patency confirmed with ultrasound. Risks and benefits of procedure explained to patient/family and education regarding central line associated bloodstream infections provided. Questions answered.     PICC placed in LUE per MD order with ultrasound guidance. 5 Fr, double lumen PICC placed in basilic vein after two attempt(s). 5 cc's of 1% lidocaine injected intradermally, 21 gauge microintroducer needle and modified Seldinger technique used. 55 cm catheter inserted with good blood return. Secured at 4 cm marker. Each lumen flushed without resistance with 10 mL 0.9% normal saline. PICC line secured with Biopatch and Tegaderm.    PICC placement is confirmed by nurse using 3CG technology. PICC line is appropriate for use at this time. Pt tolerated procedure well.  Patient condition relayed to unit RN or ordering physician via this post procedure note in the EMR.     [employer] Power PICC ref #[id number], Lot # [id number]

## 2021-04-26 NOTE — DIETARY
"Nutrition Support Assessment   Day 5 of admit.  Mayd Yang is a 65 y.o. female with admitting DX of Appendicitis     Current problem list:  1. Acute appendicitis with localized peritonitis  2. Essential hypertension  3. Stage 3 chronic kidney disease  4. BMI 50-59.9, adult  5. Obstructive sleep apnea     Assessment:  Estimated Nutritional Needs: based on:   Height: 160 cm (5' 3\")  Weight: (!) 147 kg (323 lb 6.6 oz)  Weight to Use in Calculations: 147 kg (323 lb 6.6 oz)  Ideal Body Weight: 52.2 kg (115 lb)  Percent Ideal Body Weight: 281.2  Body mass index is 57.29 kg/m²., BMI classification: Class 3 Obesity    Calculation/Equation: REE with MSJ =  kcal  Total Calories / day: 1617 -  (Calories / k - 13.5)  Total Grams Protein / day: 104 - 130 (Grams Protein / kg IBW: 2 - 2.5)     Evaluation:   1. Pt S/P appendectomy. Abdomen distended. Pt with Ileus. Now NPO x 5 days and noted TPN to start.  2. PICC placed today.  3. Labs  include Na 147 (H), K+ 4.6, Glu 126 (H), BUN 29 (H), Creat 1.41 (H). : Alb 3.5.  4. Medications include SSI, Zosyn.  5. Skin: abdominal incision. No pressure injury noted per chart review.  6. LBM   7. Per Pharmacy note, starting TPN at 50% goal. Pharmacy goal = 1380 kcal, 90 gm protein.   8. If Ileus does not resolve soon, may want to consider increasing kcal and protein goal to ~1617 - 1991 kcal and ~104 - 130 gm protein.     Malnutrition Risk: Pt at risk with NPO status x 5 days.     Recommendations/Plan:  1. TPN per Pharmacy.  2. Monitor weight.  3. RD following.                    "

## 2021-04-26 NOTE — FLOWSHEET NOTE
04/26/21 0200   Events/Summary/Plan   Events/Summary/Plan CPAP continuous (Sp02 88%-90% on 3 LPM)   Skin Inspection Respiratory Device Intact   Vital Signs   Pulse 92   Respiration 20   Pulse Oximetry 95 %   $ Pulse Oximetry (Spot Check) Yes   Respiratory Assessment   Respiratory Pattern Within Normal Limits   Level of Consciousness Alert   Chest Exam   Work Of Breathing / Effort Mild   Oxygen   O2 (LPM) 4   O2 Delivery Device CPAP   Non-Invasive Ventilation [facility]   Nocturnal CPAP or BIPAP CPAP - [facility]   $ System Evaluation Yes   Settings (If Known) autopap   FiO2 or LPM 4

## 2021-04-26 NOTE — PROGRESS NOTES
"  Trauma/Surgical Progress Note    Author: Jett Ruiz M.D. Date & Time created: 4/26/2021   9:39 AM   4/21 open appendectomy drain abscess    GROSS DESCRIPTION:   A.  Received in formalin labeled with two patient identifiers and   designated \"Mady Gaitan, appendix\" is a 6.8 x 1.4 cm appendix with   attached mesoappendix.  The serosal surface bears adherent fibrinous   exudate. The lumen contains purulent/hemorrhagic material without   fecaliths. The wall is prominently congested, with several transmural   defects identified along its length. RS [id number]  /[facility]       MICROSCOPIC DESCRIPTION:   Microscopic examination was performed.  Please see diagnosis.   Interval Events:  Awake alert  reports pain control good  Required yusuf  No flatus    Hemodynamics:  /71   Pulse 88   Temp 36.6 °C (97.8 °F) (Oral)   Resp 17   Ht 1.6 m (5' 3\")   Wt (!) 147 kg (323 lb 6.6 oz)   SpO2 97%      Respiratory:    Respiration: 17, Pulse Oximetry: 97 %     Work Of Breathing / Effort: Mild  RUL Breath Sounds: Diminished, RML Breath Sounds: Diminished, RLL Breath Sounds: Diminished, CHRISTINA Breath Sounds: Diminished, LLL Breath Sounds: Diminished  Fluids:    Intake/Output Summary (Last 24 hours) at 4/26/2021 0939  Last data filed at 4/26/2021 0600  Gross per 24 hour   Intake 0 ml   Output 3555 ml   Net -3555 ml     Admit Weight: (!) 147 kg (323 lb 6.6 oz)  Current     Physical Exam  Awake alert appropriate  Breathing with ease  Appropriate incisional tenderness  Drain serosanguinous   Medical Decision Making/Problem List:    Active Hospital Problems    Diagnosis    • Acute appendicitis with localized peritonitis [K35.30]      Priority: High   • Essential hypertension [I10]      Priority: Medium   • Stage 3 chronic kidney disease [N18.30]      Priority: Low   • BMI 50.0-59.9, adult (HCC) [Z68.43]      Priority: Low   • HENRI (obstructive sleep apnea) [G47.33]      Priority: Low     AHI 55.7, minimum saturation 84%, on " CPAP 13 cm.       Core Measures & Quality Metrics:  Core Measures & Quality Metrics  JULIOCESAR Score    Discussed patient condition with patient and family  Assessment/Plan  [facility] care medicine service  continue antibiotics rupture appendicitis intraabdominal abscess  Wbc trend down today  Encourage ambulation, IS  Monitoring and support

## 2021-04-26 NOTE — FLOWSHEET NOTE
04/25/21 2230   Events/Summary/Plan   Events/Summary/Plan CPAP started with 3 LPM FI02 to cpap unit   Skin Inspection Respiratory Device Intact   Vital Signs   Pulse 94   Respiration 20   Pulse Oximetry 95 %   $ Pulse Oximetry (Spot Check) Yes   O2 Alarms Set & Reviewed Yes   Respiratory Assessment   Respiratory Pattern Within Normal Limits   Level of Consciousness Alert   Chest Exam   Work Of Breathing / Effort Mild   Oxygen   O2 (LPM) 3   O2 Delivery Device CPAP   Non-Invasive Ventilation [facility]   Nocturnal CPAP or BIPAP CPAP - [facility]   $ System Evaluation Yes   Settings (If Known) autopap   FiO2 or LPM 3

## 2021-04-26 NOTE — PROGRESS NOTES
Complaining of bladder spasms, pain in the bladder, and urinary leakage from yusuf insertion site. Bladder scanned and 808ml present in the bladder. Dr. Chavez notified, okay to flush yusuf and if unable to flush properly okay to reinsert yusuf catheter.

## 2021-04-27 PROBLEM — D72.829 LEUKOCYTOSIS: Status: ACTIVE | Noted: 2021-04-27

## 2021-04-27 PROBLEM — E03.9 HYPOTHYROIDISM: Status: ACTIVE | Noted: 2021-04-27

## 2021-04-27 PROBLEM — K59.00 CONSTIPATION: Status: ACTIVE | Noted: 2021-04-27

## 2021-04-27 LAB
ALBUMIN SERPL BCP-MCNC: 2.6 G/DL (ref 3.2–4.9)
ALBUMIN/GLOB SERPL: 0.7 G/DL
ALP SERPL-CCNC: 84 U/L (ref 30–99)
ALT SERPL-CCNC: 22 U/L (ref 2–50)
ANION GAP SERPL CALC-SCNC: 6 MMOL/L (ref 7–16)
AST SERPL-CCNC: 28 U/L (ref 12–45)
BASOPHILS # BLD AUTO: 0.6 % (ref 0–1.8)
BASOPHILS # BLD: 0.08 K/UL (ref 0–0.12)
BILIRUB SERPL-MCNC: 0.3 MG/DL (ref 0.1–1.5)
BUN SERPL-MCNC: 27 MG/DL (ref 8–22)
CALCIUM SERPL-MCNC: 8.7 MG/DL (ref 8.4–10.2)
CHLORIDE SERPL-SCNC: 108 MMOL/L (ref 96–112)
CHOLEST SERPL-MCNC: 109 MG/DL (ref 100–199)
CO2 SERPL-SCNC: 32 MMOL/L (ref 20–33)
CREAT SERPL-MCNC: 1.12 MG/DL (ref 0.5–1.4)
EOSINOPHIL # BLD AUTO: 0.55 K/UL (ref 0–0.51)
EOSINOPHIL NFR BLD: 3.9 % (ref 0–6.9)
ERYTHROCYTE [DISTWIDTH] IN BLOOD BY AUTOMATED COUNT: 50.3 FL (ref 35.9–50)
EST. AVERAGE GLUCOSE BLD GHB EST-MCNC: 140 MG/DL
GLOBULIN SER CALC-MCNC: 3.5 G/DL (ref 1.9–3.5)
GLUCOSE BLD-MCNC: 107 MG/DL (ref 65–99)
GLUCOSE BLD-MCNC: 115 MG/DL (ref 65–99)
GLUCOSE BLD-MCNC: 117 MG/DL (ref 65–99)
GLUCOSE BLD-MCNC: 123 MG/DL (ref 65–99)
GLUCOSE SERPL-MCNC: 131 MG/DL (ref 65–99)
HBA1C MFR BLD: 6.5 % (ref 4–5.6)
HCT VFR BLD AUTO: 36 % (ref 37–47)
HGB BLD-MCNC: 11.3 G/DL (ref 12–16)
IMM GRANULOCYTES # BLD AUTO: 0.39 K/UL (ref 0–0.11)
IMM GRANULOCYTES NFR BLD AUTO: 2.8 % (ref 0–0.9)
LYMPHOCYTES # BLD AUTO: 2.15 K/UL (ref 1–4.8)
LYMPHOCYTES NFR BLD: 15.4 % (ref 22–41)
MAGNESIUM SERPL-MCNC: 2 MG/DL (ref 1.5–2.5)
MCH RBC QN AUTO: 29.7 PG (ref 27–33)
MCHC RBC AUTO-ENTMCNC: 31.4 G/DL (ref 33.6–35)
MCV RBC AUTO: 94.7 FL (ref 81.4–97.8)
MONOCYTES # BLD AUTO: 0.67 K/UL (ref 0–0.85)
MONOCYTES NFR BLD AUTO: 4.8 % (ref 0–13.4)
NEUTROPHILS # BLD AUTO: 10.12 K/UL (ref 2–7.15)
NEUTROPHILS NFR BLD: 72.5 % (ref 44–72)
NRBC # BLD AUTO: 0 K/UL
NRBC BLD-RTO: 0 /100 WBC
PHOSPHATE SERPL-MCNC: 2.5 MG/DL (ref 2.5–4.5)
PLATELET # BLD AUTO: 335 K/UL (ref 164–446)
PMV BLD AUTO: 9.5 FL (ref 9–12.9)
POTASSIUM SERPL-SCNC: 4.2 MMOL/L (ref 3.6–5.5)
PROT SERPL-MCNC: 6.1 G/DL (ref 6–8.2)
RBC # BLD AUTO: 3.8 M/UL (ref 4.2–5.4)
SODIUM SERPL-SCNC: 146 MMOL/L (ref 135–145)
TRIGL SERPL-MCNC: 116 MG/DL (ref 0–149)
WBC # BLD AUTO: 14 K/UL (ref 4.8–10.8)

## 2021-04-27 PROCEDURE — 700111 HCHG RX REV CODE 636 W/ 250 OVERRIDE (IP): Performed by: INTERNAL MEDICINE

## 2021-04-27 PROCEDURE — 84100 ASSAY OF PHOSPHORUS: CPT

## 2021-04-27 PROCEDURE — 83735 ASSAY OF MAGNESIUM: CPT

## 2021-04-27 PROCEDURE — 82465 ASSAY BLD/SERUM CHOLESTEROL: CPT

## 2021-04-27 PROCEDURE — 85025 COMPLETE CBC W/AUTO DIFF WBC: CPT

## 2021-04-27 PROCEDURE — 700105 HCHG RX REV CODE 258: Performed by: INTERNAL MEDICINE

## 2021-04-27 PROCEDURE — 97162 PT EVAL MOD COMPLEX 30 MIN: CPT

## 2021-04-27 PROCEDURE — 82962 GLUCOSE BLOOD TEST: CPT

## 2021-04-27 PROCEDURE — 97165 OT EVAL LOW COMPLEX 30 MIN: CPT

## 2021-04-27 PROCEDURE — 99232 SBSQ HOSP IP/OBS MODERATE 35: CPT | Performed by: INTERNAL MEDICINE

## 2021-04-27 PROCEDURE — 700101 HCHG RX REV CODE 250: Performed by: INTERNAL MEDICINE

## 2021-04-27 PROCEDURE — 83036 HEMOGLOBIN GLYCOSYLATED A1C: CPT

## 2021-04-27 PROCEDURE — A9270 NON-COVERED ITEM OR SERVICE: HCPCS | Performed by: INTERNAL MEDICINE

## 2021-04-27 PROCEDURE — 700105 HCHG RX REV CODE 258: Performed by: SURGERY

## 2021-04-27 PROCEDURE — 94760 N-INVAS EAR/PLS OXIMETRY 1: CPT

## 2021-04-27 PROCEDURE — 700111 HCHG RX REV CODE 636 W/ 250 OVERRIDE (IP): Performed by: SURGERY

## 2021-04-27 PROCEDURE — 84478 ASSAY OF TRIGLYCERIDES: CPT

## 2021-04-27 PROCEDURE — 770006 HCHG ROOM/CARE - MED/SURG/GYN SEMI*

## 2021-04-27 PROCEDURE — 700102 HCHG RX REV CODE 250 W/ 637 OVERRIDE(OP): Performed by: INTERNAL MEDICINE

## 2021-04-27 PROCEDURE — 80053 COMPREHEN METABOLIC PANEL: CPT

## 2021-04-27 PROCEDURE — 94660 CPAP INITIATION&MGMT: CPT

## 2021-04-27 RX ORDER — LOSARTAN POTASSIUM 25 MG/1
50 TABLET ORAL
Status: DISCONTINUED | OUTPATIENT
Start: 2021-04-27 | End: 2021-04-29

## 2021-04-27 RX ADMIN — SODIUM CHLORIDE: 9 INJECTION, SOLUTION INTRAVENOUS at 16:00

## 2021-04-27 RX ADMIN — LORATADINE 10 MG: 10 TABLET ORAL at 05:50

## 2021-04-27 RX ADMIN — PIPERACILLIN AND TAZOBACTAM 4.5 G: 4; .5 INJECTION, POWDER, LYOPHILIZED, FOR SOLUTION INTRAVENOUS; PARENTERAL at 18:24

## 2021-04-27 RX ADMIN — SODIUM CHLORIDE: 9 INJECTION, SOLUTION INTRAVENOUS at 03:06

## 2021-04-27 RX ADMIN — HYDROMORPHONE HYDROCHLORIDE 0.5 MG: 1 INJECTION, SOLUTION INTRAMUSCULAR; INTRAVENOUS; SUBCUTANEOUS at 20:16

## 2021-04-27 RX ADMIN — PIPERACILLIN AND TAZOBACTAM 4.5 G: 4; .5 INJECTION, POWDER, LYOPHILIZED, FOR SOLUTION INTRAVENOUS; PARENTERAL at 06:09

## 2021-04-27 RX ADMIN — THYROID, PORCINE 90 MG: 30 TABLET ORAL at 05:50

## 2021-04-27 RX ADMIN — PIPERACILLIN AND TAZOBACTAM 4.5 G: 4; .5 INJECTION, POWDER, LYOPHILIZED, FOR SOLUTION INTRAVENOUS; PARENTERAL at 13:50

## 2021-04-27 RX ADMIN — LOSARTAN POTASSIUM 50 MG: 25 TABLET, FILM COATED ORAL at 18:15

## 2021-04-27 RX ADMIN — HYDROMORPHONE HYDROCHLORIDE 0.5 MG: 1 INJECTION, SOLUTION INTRAMUSCULAR; INTRAVENOUS; SUBCUTANEOUS at 09:58

## 2021-04-27 RX ADMIN — CALCIUM GLUCONATE: 98 INJECTION, SOLUTION INTRAVENOUS at 19:30

## 2021-04-27 ASSESSMENT — PAIN DESCRIPTION - PAIN TYPE
TYPE: ACUTE PAIN

## 2021-04-27 ASSESSMENT — COGNITIVE AND FUNCTIONAL STATUS - GENERAL
MOBILITY SCORE: 8
STANDING UP FROM CHAIR USING ARMS: A LOT
CLIMB 3 TO 5 STEPS WITH RAILING: TOTAL
DRESSING REGULAR LOWER BODY CLOTHING: A LITTLE
PERSONAL GROOMING: A LITTLE
HELP NEEDED FOR BATHING: A LITTLE
DAILY ACTIVITIY SCORE: 18
SUGGESTED CMS G CODE MODIFIER DAILY ACTIVITY: CK
MOVING FROM LYING ON BACK TO SITTING ON SIDE OF FLAT BED: UNABLE
DRESSING REGULAR UPPER BODY CLOTHING: A LITTLE
MOVING TO AND FROM BED TO CHAIR: UNABLE
EATING MEALS: A LITTLE
SUGGESTED CMS G CODE MODIFIER MOBILITY: CM
WALKING IN HOSPITAL ROOM: A LOT
TOILETING: A LITTLE
TURNING FROM BACK TO SIDE WHILE IN FLAT BAD: UNABLE

## 2021-04-27 ASSESSMENT — GAIT ASSESSMENTS
DISTANCE (FEET): 2
GAIT LEVEL OF ASSIST: MINIMAL ASSIST
ASSISTIVE DEVICE: FRONT WHEEL WALKER
DEVIATION: ANTALGIC;STEP TO
DISTANCE (FEET): 2

## 2021-04-27 ASSESSMENT — ENCOUNTER SYMPTOMS
PALPITATIONS: 0
HEADACHES: 1
CHILLS: 0
SHORTNESS OF BREATH: 0
NAUSEA: 1
CONSTIPATION: 1
FEVER: 0
BACK PAIN: 1
BLURRED VISION: 0
HEARTBURN: 0
DOUBLE VISION: 0
COUGH: 0
DIZZINESS: 0
MYALGIAS: 1
ABDOMINAL PAIN: 1
VOMITING: 0
NERVOUS/ANXIOUS: 1
FALLS: 0

## 2021-04-27 ASSESSMENT — ACTIVITIES OF DAILY LIVING (ADL): TOILETING: INDEPENDENT

## 2021-04-27 NOTE — PROGRESS NOTES
[facility] Daily Progress Note    Date of Service  4/27/2021    Chief Complaint  65 y.o. female admitted 4/21/2021 with abdominal pain.    Hospital Course  4/22/2021-patient is a 65-year-old female comes in with abdominal pain that started last Friday.  After suffering with the pain the patient went to see urgent care where they did a CAT scan of her abdomen which came back with early appendicitis.  Patient was referred over to the emergency room for evaluation.  Patient was admitted for surgery and was found to have a large abscess along with the appendicitis.  Appendix was removed and the abscess has been drained.  She still having chills and fevers afterwards.  Continue with IV antibiotics as well as pain management and fluid resuscitation.  Await culture results.  4/23/2021-this morning patient is in quite a bit of distress.  Her pain has escalated in the abdomen and she is unable to urinate or defecate.  Upon further evaluation patient has a postoperative ileus in her bowel sounds have not returned.  She has not passed any gas or had a bowel movement.  Continue at this point with supportive fluid resuscitation to ensure the patient's bowel functions returned to normal.  Patient at this point is also complaining of urinary retention and the bladder scanner was only showing 70 to 100 mL of retained urine.  This was inconsistent with the amount of pain the patient was having thus I ordered a immediate straight catheterization which yielded over 800 mL of urine.  After the urine came out the patient's abdominal pain eased up and her condition improved.  We will continue to monitor her urine output and if the patient should have urinary retention we will straight catheter again.  For the time being because of her abdominal distention pain and ileus as well as urinary retention we are unable to discharge the patient at this point in time.  Continue at this point with aggressive management and  stabilization.  4/24/2021-patient is still having difficulty with abdominal pain.  She also has not had a bowel movement and her intestinal sounds are returning very slowly.  Concern is that there is still an abscess present.  Flatplate of the abdomen did not show anything her white blood cell count however is increasing at this point I am going to obtain a CT of the abdomen.  Discussed it with surgery.  Because of the bladder distention and inability urinate a Pabon catheter had to be placed.  Hopefully this will decompress the bladder.  4/25/2021-postoperative day #4 after laparoscopic appendectomy had to be converted to an open appendectomy and drainage of abscess cavity.  Cultures have not grown out any significant organism.  White blood cell count has gone up, repeat CT of the abdomen does not demonstrate worsening abscess formation and actually is consistent with routine healing.  Patient CT demonstrates an ileus.  Since her abdominal distention is getting worse and her nausea is persisting I am putting in an NG to low intermittent suctioning.  This was discussed with the bedside nurse and surgery.  4/26/2021-patient's abdominal distention persists.  Ileus persists status postoperative.  Yesterday the patient's Pabon catheter became obstructed.  It had to be replaced.  Yesterday he also had some technical issues with the hospital bed and at this point had to be replaced a few times.  Pain seems to be better controlled.  Her labs are improving.  The patient may need a repeat CT in 24 to 48 hours if her white blood cell count does not improve.  Today's count is down to 14.8.  Continue with IV Zosyn    Interval Problem Update  4/27: Patient seen at bedside this morning.  The patient still has not had a bowel movement, however she mentions that she thinks she is passing gas.  General surgery continues to recommend ambulation.  Patient currently n.p.o. with NG tube and the patient is on TPN.  As per nursing no  other overnight events were reported.    Consultants/Specialty  General Surgery    Code Status  Full Code    Disposition  Pending    Review of Systems  Review of Systems   Constitutional: Negative for chills and fever.   HENT: Negative for hearing loss and nosebleeds.    Eyes: Negative for blurred vision and double vision.   Respiratory: Negative for cough and shortness of breath.    Cardiovascular: Negative for chest pain and palpitations.   Gastrointestinal: Positive for abdominal pain, constipation and nausea. Negative for heartburn and vomiting.   Genitourinary: Negative for dysuria and urgency.   Musculoskeletal: Positive for back pain and myalgias. Negative for falls.   Skin: Negative for itching and rash.   Neurological: Positive for headaches. Negative for dizziness.   Psychiatric/Behavioral: The patient is nervous/anxious.    All other systems reviewed and are negative.       Physical Exam  Temp:  [36.4 °C (97.6 °F)-37 °C (98.6 °F)] 36.4 °C (97.6 °F)  Pulse:  [78-89] 78  Resp:  [18] 18  BP: (121-138)/(65-73) 138/65  SpO2:  [93 %-99 %] 95 %    Physical Exam  Constitutional:       Appearance: She is obese. She is ill-appearing.   HENT:      Head: Normocephalic and atraumatic.      Nose: No congestion or rhinorrhea.      Mouth/Throat:      Pharynx: No oropharyngeal exudate or posterior oropharyngeal erythema.   Eyes:      General:         Right eye: No discharge.         Left eye: No discharge.   Cardiovascular:      Rate and Rhythm: Normal rate and regular rhythm.      Pulses: Normal pulses.      Heart sounds: No murmur. No gallop.    Pulmonary:      Effort: Pulmonary effort is normal. No respiratory distress.      Breath sounds: No wheezing or rales.   Abdominal:      General: There is distension.      Tenderness: There is abdominal tenderness.   Musculoskeletal:         General: Normal range of motion.      Cervical back: Normal range of motion.   Skin:     General: Skin is warm and dry.   Neurological:       General: No focal deficit present.      Mental Status: She is alert and oriented to person, place, and time.      Cranial Nerves: No cranial nerve deficit.      Motor: No weakness.   Psychiatric:         Mood and Affect: Mood normal.         Behavior: Behavior normal.         Fluids    Intake/Output Summary (Last 24 hours) at 4/27/2021 1421  Last data filed at 4/27/2021 0609  Gross per 24 hour   Intake --   Output 2350 ml   Net -2350 ml       Laboratory  Recent Labs     04/25/21  1445 04/26/21  0316 04/27/21  0814   WBC 16.7* 14.8* 14.0*   RBC 4.12* 4.15* 3.80*   HEMOGLOBIN 12.3 12.2 11.3*   HEMATOCRIT 39.2 39.9 36.0*   MCV 95.1 96.1 94.7   MCH 29.9 29.4 29.7   MCHC 31.4* 30.6* 31.4*   RDW 52.1* 52.1* 50.3*   PLATELETCT 365 389 335   MPV 9.6 9.6 9.5     Recent Labs     04/26/21  0316 04/26/21  1235 04/27/21  0245   SODIUM 147* 147* 146*   POTASSIUM 4.6 4.3 4.2   CHLORIDE 109 111 108   CO2 26 28 32   GLUCOSE 126* 125* 131*   BUN 29* 27* 27*   CREATININE 1.41* 1.32 1.12   CALCIUM 9.0 8.7 8.7             Recent Labs     04/27/21  0245   TRIGLYCERIDE 116       Imaging  IR-PICC LINE PLACEMENT W/ GUIDANCE > AGE 5   Final Result                  Ultrasound-guided PICC placement performed by qualified nursing staff as    above.          CT-ABDOMEN-PELVIS W/O   Final Result      Interval laparotomy with right lower quadrant surgical drain, similar fat stranding in the pericecal region that is likely postoperative in nature. No noncontrast evidence of abscess formation      Small free fluid and intraperitoneal air is not outside normal limits in the perioperative period      Contrast in the colon is from the CT 3 days ago, air-fluid levels and mild small bowel dilatation. In combination these findings are most compatible with ileus      Urinary bladder decompressed around the Pabon catheter. No hydroureteronephrosis      [id number] VIEW   Final Result      Dilated loops of small bowel most likely represent ileus. Partial  obstruction is not excluded.      Residual enteric contrast is seen from the recent CT.      Mild bibasilar atelectasis.         IR-US GUIDED PIV   Final Result    Ultrasound-guided PERIPHERAL IV INSERTION performed by    qualified nursing staff as above.           Assessment/Plan  * Acute appendicitis with localized peritonitis  Assessment & Plan  Status post appendectomy.  Initially attempted with laparoscopy but then had to be converted to open resection.  Pain management  NG tube to suctioning due to persistent postoperative ileus  Fluid resuscitation  Due to lack of nutrition will have PICC line placed and TPN started.    4/27: Continue TPN, we appreciate any further recommendations by general surgery. Continue Zosyn.    Leukocytosis  Assessment & Plan  Improving  In the setting of ruptured appendicitis and abdominal abscess.  Continue zosyn    Essential hypertension- (present on admission)  Assessment & Plan  Continue losartan  PRN labetalol    Constipation  Assessment & Plan  Possibly post surgical etiology.  General surgery following patient, we appreciate further recommendations  NPO, currently on TPN.    Hypothyroidism  Assessment & Plan  Continue home medication.    Stage 3 chronic kidney disease- (present on admission)  Assessment & Plan  As per chart review, apparently history of  BUN 29 creatinine 1.41  Avoid nephrotoxic medications    4/27: Creatinine 1.12 today. Patient NPO on TPN.      BMI 50.0-59.9, adult (HCC)- (present on admission)  Assessment & Plan  Body mass index is 57.29 kg/m².  Outpatient follow-ups with weight program      HENRI (obstructive sleep apnea)- (present on admission)  Assessment & Plan  CPAP which was being given nasally has been held for now because of the NG tube.  Continue with oxygen support by facemask         VTE prophylaxis: SCDs

## 2021-04-27 NOTE — ASSESSMENT & PLAN NOTE
In the setting of ruptured appendicitis and abdominal abscess.  Continue zosyn    4/30: WBC today are 13.3. ID consulted, who recommend to continue IV antibiotics. We appreciate further recommendations.    5/1: ID recommends to stop antibiotics after today. If WBC trends upwards, we will consider repeat CT scan.    5/2: Mild elevation today from 13.5 to 14.1. We will monitor and consider CT scan if it continues to trend up.    5/3: WBC 15.8 today. We have repeated CT scan as per ID's recommendations, pending result.    CT A/P with no findings of free fluid or abscess.  No signs of acute infection, patient stable

## 2021-04-27 NOTE — ASSESSMENT & PLAN NOTE
Possibly post surgical etiology.  Continue to advance diet as tolerated, wean off TPN, patient is now eating 50 to 75% of diet.

## 2021-04-27 NOTE — PROGRESS NOTES
Pharmacy TPN Day # 2       2021    Dosing Weight   76.05 kg TPN currently providing 50% of goal      TPN goal: 1380 kcal/day including 1.2 gm/kg/day Protein      TPN indication: NPO s/p appendectomy w/ abscess present (I&D).       Pertinent PMH: HTN, HENRI, CKD  Temp (24hrs), Av.7 °C (98.1 °F), Min:36.4 °C (97.6 °F), Max:37 °C (98.6 °F)  .  Recent Labs     21  0316 21  1235 21  0245   SODIUM 147* 147* 146*   POTASSIUM 4.6 4.3 4.2   CHLORIDE 109 111 108   CO2 26 28 32   BUN 29* 27* 27*   CREATININE 1.41* 1.32 1.12   GLUCOSE 126* 125* 131*   CALCIUM 9.0 8.7 8.7   ASTSGOT  --  20 28   ALTSGPT  --  18 22   ALBUMIN  --  2.6* 2.6*   TBILIRUBIN  --  0.3 0.3   PHOSPHORUS  --   --  2.5   MAGNESIUM  --   --  2.0     Accu-Checks  Recent Labs     21  1827 21  2332 21  0558   POCGLUCOSE 105* 115* 117*       Vitals:    21 0900 21 1658 21 2300 21 0447   BP: 129/71 127/73 121/69 138/65   Weight:       Height:           Intake/Output Summary (Last 24 hours) at 2021 0913  Last data filed at 2021 0609  Gross per 24 hour   Intake --   Output 3450 ml   Net -3450 ml       Orders Placed This Encounter   Procedures   • Diet NPO     Standing Status:   Standing     Number of Occurrences:   1     Order Specific Question:   Restrict to:     Answer:   Sips with Medications [3]         TPN for past 72 hours (Show up to 3 orders; newest on the left. Changes between the two most recent orders are indicated.)     Start date and time   2021      TPN Central Line Formulation [[id number]] TPN Central Line Formulation [[id number]]    Order Status  Active Active    Last Admin   New Bag at 2021 by Blayne Keller R.N.       Base    Clinisol 15%  45 g 45 g    dextrose 70%  100 g 100 g    fat emulsions 20%  17 g 17 g       Additives    potassium phosphate  15 mmol --    potassium acetate  10 mEq 20 mEq    sodium acetate  50 mEq 50 mEq    sodium  chloride  150 mEq 150 mEq    magnesium sulfate  4.06 mEq 4.06 mEq    calcium GLUConate  4.65 mEq 4.65 mEq    M.T.E.-4 Adult  1 mL 1 mL    M.V.I. Adult  10 mL 10 mL       QS Base    sterile water  1,369.6 mL 1,369.6 mL       Energy Contribution    Proteins  -- --    Dextrose  -- --    Lipids  -- --    Total  -- --       Electrolyte Ion Calculated Amount    Sodium  200 mEq 200 mEq    Potassium  32 mEq 20 mEq    Calcium  4.65 mEq 4.65 mEq    Magnesium  4.06 mEq 4.06 mEq    Aluminum  -- --    Phosphate  15 mmol --    Chloride  150 mEq 150 mEq    Acetate  98.1 mEq 108.1 mEq       Other    Total Protein  45 g 45 g    Total Protein/kg  0.31 g/kg 0.31 g/kg    Total Amino Acid  -- --    Total Amino Acid/kg  -- --    Glucose Infusion Rate  0.47 mg/kg/min 0.24 mg/kg/min    Osmolarity (Estimated)  -- --    Volume  1,992 mL 1,992 mL    Rate  83 mL/hr 42 mL/hr    Dosing Weight  147 kg 147 kg    [facility]            This formula provides:  % kcal as lipids = 25  Grams protein/kg = 1.2  Non-protein calories = (total formulation @ 100%), current 510  Kcals/kg = 18.1  Total daily calories = 1380 @ 100% goal = current 690    Comments:  Serum phos is at the low end of nml, will add 15 mmol of KPhos to TPN, decrease K Act to 10 mEq and keep all other electrolytes the same. Will continue one more day @ 50% nutrition goal to assess tolerance at full rate of 83 ml/hr x 24 hours. May consider increasing to 75% goal tomorrow if warranted. Pharmacy to continue to monitor per TPN protocol.       Cornell SalmonD.

## 2021-04-27 NOTE — CARE PLAN
Received report from day shift nurse.   Assumed pt care   Pt is A&Ox4, resting comfortably in bed.   Pt on 3 liters oxy mask. No signs of SOB/respiratory distress.   Labs noted, VSS.   Pt c/o no pain at this moment.   Assessment completed. TPN started per MAR. NG tube present draining green output. Pabon present draining cloudy yellow urine with a large amount of sediment. Surgical dressings present on abdomen C/D/I, LENI drain with small amount of serosanguinous drainage.    Fall precautions in place.   Bed at lowest position.   Call light and personal belongings within reach. Continue to monitor         Problem: Infection  Goal: Will remain free from infection  Outcome: PROGRESSING AS EXPECTED     Problem: Discharge Barriers/Planning  Goal: Patient's continuum of care needs will be met  Outcome: PROGRESSING AS EXPECTED

## 2021-04-27 NOTE — FLOWSHEET NOTE
04/26/21 2120   Events/Summary/Plan   Events/Summary/Plan CPAP reservoir refilled with sterile H20 and placed on patient   Skin Inspection Respiratory Device Intact   Vital Signs   Pulse 89   Respiration 18   Pulse Oximetry 96 %   $ Pulse Oximetry (Spot Check) Yes   Respiratory Assessment   Respiratory Pattern Within Normal Limits   Level of Consciousness Alert   Level of Consciousness Alert   Chest Exam   Work Of Breathing / Effort Mild   Oxygen   O2 (LPM) 4   O2 Delivery Device CPAP   Non-Invasive Ventilation [facility]   Nocturnal CPAP or BIPAP CPAP - [facility]   $ System Evaluation Yes   Settings (If Known) Autopap   FiO2 or LPM 4

## 2021-04-27 NOTE — PROGRESS NOTES
"  Trauma/Surgical Progress Note    Author: Jett Ruiz M.D. Date & Time created: 4/27/2021   10:41 AM   4/21 open appendectomy drain abscess    GROSS DESCRIPTION:   A.  Received in formalin labeled with two patient identifiers and   designated \"Mady Gaitan, appendix\" is a 6.8 x 1.4 cm appendix with   attached mesoappendix.  The serosal surface bears adherent fibrinous   exudate. The lumen contains purulent/hemorrhagic material without   fecaliths. The wall is prominently congested, with several transmural   defects identified along its length. RS [id number]  /[facility]       MICROSCOPIC DESCRIPTION:   Microscopic examination was performed.  Please see diagnosis.   Interval Events:  Awake alert  reports pain control good  Reports feeling improved   flatus, increased activity no bm  Hemodynamics:  /65   Pulse 78   Temp 36.4 °C (97.6 °F) (Oral)   Resp 18   Ht 1.6 m (5' 3\")   Wt (!) 147 kg (323 lb 6.6 oz)   SpO2 95%      Respiratory:    Respiration: 18, Pulse Oximetry: 95 %     Work Of Breathing / Effort: Within Normal Limits  RUL Breath Sounds: Diminished, RML Breath Sounds: Diminished, RLL Breath Sounds: Diminished, CHRISTINA Breath Sounds: Diminished, LLL Breath Sounds: Diminished  Fluids:    Intake/Output Summary (Last 24 hours) at 4/27/2021 1041  Last data filed at 4/27/2021 0609  Gross per 24 hour   Intake --   Output 3450 ml   Net -3450 ml     Admit Weight: (!) 147 kg (323 lb 6.6 oz)  Current     Physical Exam  Awake alert appropriate  Breathing with ease  Appropriate incisional tenderness  Drain serosanguinous   Medical Decision Making/Problem List:    Active Hospital Problems    Diagnosis    • Acute appendicitis with localized peritonitis [K35.30]      Priority: High   • Essential hypertension [I10]      Priority: Medium   • Stage 3 chronic kidney disease [N18.30]      Priority: Low   • BMI 50.0-59.9, adult (HCC) [Z68.43]      Priority: Low   • HENRI (obstructive sleep apnea) [G47.33]      Priority: " Low     AHI 55.7, minimum saturation 84%, on CPAP 13 cm.       Core Measures & Quality Metrics:  Core Measures & Quality Metrics  JULIOCESAR Score  Discussed patient condition with patient and family    Assessment/Plan  appreciate care medicine service  continue antibiotics rupture appendicitis intraabdominal abscess  Wbc trend down today  Encourage ambulation, IS  Monitoring and support

## 2021-04-27 NOTE — THERAPY
Physical Therapy   Initial Evaluation     Patient Name: Mady Yang  Age:  65 y.o., Sex:  female  Medical Record #: [id number]  Today's Date: 4/27/2021     Precautions: (P) Fall Risk, Nasogastric Tube    Assessment  Patient is 65 y.o. female who was recently admitted for abdominal pain, large abscess, and appendicitis. Pt is now s/p laparoscopic appendectomy, converted to open appendectomy, and drainage of abscess. Pt presented to PT with pain, impaired balance, impaired gait, and dec activity tolerance. PT was primarily limited by abdominal pain. Pt demonstrated with Mod A for bed mobility and Min A for upright activity with bariatric FWW use. With functional impairements pt was only able to tolerate standing and a few steps forward and back along with some marching. Pt is currently unable to demonstrate safe functional mobility to d/c home and will continue to benefit from skilled PT while in house, with recommendation for post acute therapy prior to d/c home. However, pt is not open for placement and would rather d/c home. Will continue to follow and update plan of care.     Plan    Recommend Physical Therapy 3 times per week until therapy goals are met for the following treatments:  Bed Mobility, Community Re-integration, Equipment, Gait Training, Manual Therapy, Neuro Re-Education / Balance, Self Care/Home Evaluation, Stair Training, Therapeutic Activities and Therapeutic Exercises    DC Equipment Recommendations: (P) Unable to determine at this time  Discharge Recommendations: (P) Recommend post-acute placement for additional physical therapy services prior to discharge home     Objective     04/27/21 1600   Initial Contact Note    Initial Contact Note Order Received and Verified, Physical Therapy Evaluation in Progress with Full Report to Follow.   Precautions   Precautions Fall Risk;Nasogastric Tube   Comments abdominal incision; LENI drain: HOB at 30 deg    Pain 0 - 10 Group   Location Abdomen    Location Orientation Anterior   Description Aching   Therapist Pain Assessment Nurse Notified;Prior to Activity;4;During Activity;Post Activity;7   Prior Living Situation   Prior Services None   Housing / Facility 1 Story House   Steps Into Home 2   Steps In Home 0   Bathroom Set up Walk In Shower;Shower Chair   Equipment Owned Single Point Cane   Lives with - Patient's Self Care Capacity Spouse   Comments spouse states he will be able to assist upon d/c to home    Prior Level of Functional Mobility   Bed Mobility Independent   Transfer Status Independent   Ambulation Independent   Distance Ambulation (Feet)   (community )   Assistive Devices Used None   Stairs Independent   Comments pt reports of an IPLOF with mobility; however, required intermittent assist for ADLs from spouse    Cognition    Cognition / Consciousness WDL   Level of Consciousness Alert   Comments cooperative    Passive ROM Lower Body   Passive ROM Lower Body X   Comments body habitus limiting ROM in BLE   Active ROM Lower Body    Active ROM Lower Body  X   Comments same as above   Strength Lower Body   Lower Body Strength  X   Comments able to demon functional strength for standing and marching, limited by pain abdomen for functional assessment    Sensation Lower Body   Lower Extremity Sensation   Not Tested   Lower Body Muscle Tone   Lower Body Muscle Tone  WDL   Strength Upper Body   Upper Body Strength  WDL   Upper Body Muscle Tone   Upper Body Muscle Tone  WDL   Neurological Concerns   Neurological Concerns No   Coordination Lower Body    Coordination Lower Body  WDL   Balance Assessment   Sitting Balance (Static) Fair +   Sitting Balance (Dynamic) Fair   Standing Balance (Static) Fair -   Standing Balance (Dynamic) Fair -   Weight Shift Sitting Fair   Weight Shift Standing Fair   Comments no valencia LOB noted; does well with rose mary FWW for standing   Gait Analysis   Gait Level Of Assist Minimal Assist   Assistive Device Front Wheel Walker    Distance (Feet) 2   # of Times Distance was Traveled 1   Deviation Antalgic;Step To   Weight Bearing Status fwb   Comments limited due to fatigue and pain during upright activity    Bed Mobility    Supine to Sit Moderate Assist   Sit to Supine Moderate Assist   Scooting Minimal Assist   Comments HOB elevated and rails up    Functional Mobility   Sit to Stand Minimal Assist   Bed, Chair, Wheelchair Transfer Unable to Participate   Mobility EOB, sit<>stand, marhcing in place and weight shifts, back to bed    Comments cues for handplacement and FWW placement upon standing or sitting    How much difficulty does the patient currently have...   Turning over in bed (including adjusting bedclothes, sheets and blankets)? 1   Sitting down on and standing up from a chair with arms (e.g., wheelchair, bedside commode, etc.) 1   Moving from lying on back to sitting on the side of the bed? 1   How much help from another person does the patient currently need...   Moving to and from a bed to a chair (including a wheelchair)? 2   Need to walk in a hospital room? 2   Climbing 3-5 steps with a railing? 1   6 clicks Mobility Score 8   Activity Tolerance   Sitting in Chair NT   Sitting Edge of Bed 5 mins    Standing 5 mins   Comments limited due to pain and fatigue    Edema / Skin Assessment   Edema / Skin  Not Assessed   Patient / Family Goals    Patient / Family Goal #1 to go home    Short Term Goals    Short Term Goal # 1 pt will go supine<>sit w/hob flat and rails down w/spv in 6tx for safe d/c home    Short Term Goal # 2 pt will go sit<>stand w/fww w/spv in 6tx for safe d/c home    Short Term Goal # 3 pt will transfer bed<>chair w/fww w/spv in 6tx for safe d/c home    Short Term Goal # 4 pt will ambulate for 150ft w/fww w/spv in 6tx for safe d/c home    Short Term Goal # 5 pt will go up/down 2 steps w/spv in 6tx for safe d/c home    Education Group   Education Provided Role of Physical Therapist   Role of Physical Therapist  Patient Response Patient;Acceptance;Explanation;Demonstration;Action Demonstration;Verbal Demonstration   Problem List    Problems Impaired Bed Mobility;Pain;Impaired Transfers;Impaired Ambulation;Functional Strength Deficit;Impaired Balance;Decreased Activity Tolerance   Anticipated Discharge Equipment and Recommendations   DC Equipment Recommendations Unable to determine at this time   Discharge Recommendations Recommend post-acute placement for additional physical therapy services prior to discharge home   Interdisciplinary Plan of Care Collaboration   IDT Collaboration with  Nursing;Family / Caregiver   Patient Position at End of Therapy In Bed;Call Light within Reach;Tray Table within Reach;Phone within Reach;Family / Friend in Room   Collaboration Comments aware of visit and recs    Session Information   Date / Session Number  4/27-1 (1/3, 5/3)

## 2021-04-27 NOTE — FLOWSHEET NOTE
04/27/21 0120   Events/Summary/Plan   Events/Summary/Plan CPAP continuous with FI02 @ 4 LPM to unit   Skin Inspection Respiratory Device Intact   Vital Signs   Pulse 85   Respiration 18   Pulse Oximetry 95 %   $ Pulse Oximetry (Spot Check) Yes   Respiratory Assessment   Respiratory Pattern Within Normal Limits   Chest Exam   Work Of Breathing / Effort Mild   Oxygen   O2 (LPM) 4   O2 Delivery Device CPAP   Non-Invasive Ventilation HENRI Group   Nocturnal CPAP or BIPAP CPAP - [facility] Unit   $ System Evaluation Yes   Settings (If Known) Autopap   FiO2 or LPM 4

## 2021-04-28 ENCOUNTER — APPOINTMENT (OUTPATIENT)
Dept: RADIOLOGY | Facility: MEDICAL CENTER | Age: 66
DRG: 339 | End: 2021-04-28
Attending: INTERNAL MEDICINE
Payer: OTHER GOVERNMENT

## 2021-04-28 PROBLEM — R73.9 HYPERGLYCEMIA: Status: ACTIVE | Noted: 2021-04-28

## 2021-04-28 PROBLEM — E87.0 HYPERNATREMIA: Status: ACTIVE | Noted: 2021-04-28

## 2021-04-28 LAB
ALBUMIN SERPL BCP-MCNC: 2.6 G/DL (ref 3.2–4.9)
ALBUMIN/GLOB SERPL: 0.8 G/DL
ALP SERPL-CCNC: 84 U/L (ref 30–99)
ALT SERPL-CCNC: 37 U/L (ref 2–50)
ANION GAP SERPL CALC-SCNC: 8 MMOL/L (ref 7–16)
AST SERPL-CCNC: 54 U/L (ref 12–45)
BACTERIA FLD AEROBE CULT: NORMAL
BASOPHILS # BLD AUTO: 0.5 % (ref 0–1.8)
BASOPHILS # BLD: 0.06 K/UL (ref 0–0.12)
BILIRUB SERPL-MCNC: 0.3 MG/DL (ref 0.1–1.5)
BUN SERPL-MCNC: 24 MG/DL (ref 8–22)
CALCIUM SERPL-MCNC: 8.4 MG/DL (ref 8.4–10.2)
CHLORIDE SERPL-SCNC: 107 MMOL/L (ref 96–112)
CO2 SERPL-SCNC: 33 MMOL/L (ref 20–33)
CREAT SERPL-MCNC: 0.99 MG/DL (ref 0.5–1.4)
EOSINOPHIL # BLD AUTO: 0.53 K/UL (ref 0–0.51)
EOSINOPHIL NFR BLD: 4.1 % (ref 0–6.9)
ERYTHROCYTE [DISTWIDTH] IN BLOOD BY AUTOMATED COUNT: 48.5 FL (ref 35.9–50)
GLOBULIN SER CALC-MCNC: 3.2 G/DL (ref 1.9–3.5)
GLUCOSE BLD-MCNC: 112 MG/DL (ref 65–99)
GLUCOSE BLD-MCNC: 116 MG/DL (ref 65–99)
GLUCOSE BLD-MCNC: 117 MG/DL (ref 65–99)
GLUCOSE BLD-MCNC: 119 MG/DL (ref 65–99)
GLUCOSE SERPL-MCNC: 135 MG/DL (ref 65–99)
GRAM STN SPEC: NORMAL
HCT VFR BLD AUTO: 35.5 % (ref 37–47)
HGB BLD-MCNC: 11.1 G/DL (ref 12–16)
IMM GRANULOCYTES # BLD AUTO: 0.39 K/UL (ref 0–0.11)
IMM GRANULOCYTES NFR BLD AUTO: 3 % (ref 0–0.9)
LYMPHOCYTES # BLD AUTO: 2.61 K/UL (ref 1–4.8)
LYMPHOCYTES NFR BLD: 20.2 % (ref 22–41)
MCH RBC QN AUTO: 29.5 PG (ref 27–33)
MCHC RBC AUTO-ENTMCNC: 31.3 G/DL (ref 33.6–35)
MCV RBC AUTO: 94.4 FL (ref 81.4–97.8)
MONOCYTES # BLD AUTO: 0.57 K/UL (ref 0–0.85)
MONOCYTES NFR BLD AUTO: 4.4 % (ref 0–13.4)
NEUTROPHILS # BLD AUTO: 8.73 K/UL (ref 2–7.15)
NEUTROPHILS NFR BLD: 67.8 % (ref 44–72)
NRBC # BLD AUTO: 0 K/UL
NRBC BLD-RTO: 0 /100 WBC
PHOSPHATE SERPL-MCNC: 2.6 MG/DL (ref 2.5–4.5)
PLATELET # BLD AUTO: 301 K/UL (ref 164–446)
PMV BLD AUTO: 9.8 FL (ref 9–12.9)
POTASSIUM SERPL-SCNC: 3.6 MMOL/L (ref 3.6–5.5)
PROT SERPL-MCNC: 5.8 G/DL (ref 6–8.2)
RBC # BLD AUTO: 3.76 M/UL (ref 4.2–5.4)
SIGNIFICANT IND 70042: NORMAL
SITE SITE: NORMAL
SODIUM SERPL-SCNC: 148 MMOL/L (ref 135–145)
SOURCE SOURCE: NORMAL
URATE SERPL-MCNC: 5.4 MG/DL (ref 1.9–8.2)
WBC # BLD AUTO: 12.9 K/UL (ref 4.8–10.8)

## 2021-04-28 PROCEDURE — 700111 HCHG RX REV CODE 636 W/ 250 OVERRIDE (IP): Performed by: INTERNAL MEDICINE

## 2021-04-28 PROCEDURE — 700102 HCHG RX REV CODE 250 W/ 637 OVERRIDE(OP): Performed by: INTERNAL MEDICINE

## 2021-04-28 PROCEDURE — 99232 SBSQ HOSP IP/OBS MODERATE 35: CPT | Performed by: INTERNAL MEDICINE

## 2021-04-28 PROCEDURE — 700101 HCHG RX REV CODE 250: Performed by: HOSPITALIST

## 2021-04-28 PROCEDURE — 700105 HCHG RX REV CODE 258: Performed by: HOSPITALIST

## 2021-04-28 PROCEDURE — 84550 ASSAY OF BLOOD/URIC ACID: CPT

## 2021-04-28 PROCEDURE — 94760 N-INVAS EAR/PLS OXIMETRY 1: CPT

## 2021-04-28 PROCEDURE — 85025 COMPLETE CBC W/AUTO DIFF WBC: CPT

## 2021-04-28 PROCEDURE — 94660 CPAP INITIATION&MGMT: CPT

## 2021-04-28 PROCEDURE — 700105 HCHG RX REV CODE 258: Performed by: INTERNAL MEDICINE

## 2021-04-28 PROCEDURE — 700111 HCHG RX REV CODE 636 W/ 250 OVERRIDE (IP): Performed by: SURGERY

## 2021-04-28 PROCEDURE — 84100 ASSAY OF PHOSPHORUS: CPT

## 2021-04-28 PROCEDURE — 80053 COMPREHEN METABOLIC PANEL: CPT

## 2021-04-28 PROCEDURE — 700111 HCHG RX REV CODE 636 W/ 250 OVERRIDE (IP): Performed by: HOSPITALIST

## 2021-04-28 PROCEDURE — A9270 NON-COVERED ITEM OR SERVICE: HCPCS | Performed by: INTERNAL MEDICINE

## 2021-04-28 PROCEDURE — 82962 GLUCOSE BLOOD TEST: CPT | Mod: 91

## 2021-04-28 PROCEDURE — 36415 COLL VENOUS BLD VENIPUNCTURE: CPT

## 2021-04-28 PROCEDURE — 770006 HCHG ROOM/CARE - MED/SURG/GYN SEMI*

## 2021-04-28 PROCEDURE — 700105 HCHG RX REV CODE 258: Performed by: SURGERY

## 2021-04-28 RX ORDER — DIPHENHYDRAMINE HCL 25 MG
25 TABLET ORAL NIGHTLY PRN
Status: DISCONTINUED | OUTPATIENT
Start: 2021-04-28 | End: 2021-05-14 | Stop reason: HOSPADM

## 2021-04-28 RX ADMIN — PIPERACILLIN AND TAZOBACTAM 4.5 G: 4; .5 INJECTION, POWDER, LYOPHILIZED, FOR SOLUTION INTRAVENOUS; PARENTERAL at 21:48

## 2021-04-28 RX ADMIN — DIPHENHYDRAMINE HCL 25 MG: 25 TABLET ORAL at 22:52

## 2021-04-28 RX ADMIN — POTASSIUM CHLORIDE: 2 INJECTION, SOLUTION, CONCENTRATE INTRAVENOUS at 19:53

## 2021-04-28 RX ADMIN — LOSARTAN POTASSIUM 50 MG: 25 TABLET, FILM COATED ORAL at 06:11

## 2021-04-28 RX ADMIN — PIPERACILLIN AND TAZOBACTAM 4.5 G: 4; .5 INJECTION, POWDER, LYOPHILIZED, FOR SOLUTION INTRAVENOUS; PARENTERAL at 14:32

## 2021-04-28 RX ADMIN — OXYCODONE HYDROCHLORIDE 10 MG: 10 TABLET ORAL at 17:37

## 2021-04-28 RX ADMIN — OXYCODONE HYDROCHLORIDE 10 MG: 10 TABLET ORAL at 11:29

## 2021-04-28 RX ADMIN — HYDROMORPHONE HYDROCHLORIDE 0.5 MG: 1 INJECTION, SOLUTION INTRAMUSCULAR; INTRAVENOUS; SUBCUTANEOUS at 03:13

## 2021-04-28 RX ADMIN — THYROID, PORCINE 90 MG: 30 TABLET ORAL at 06:11

## 2021-04-28 RX ADMIN — PIPERACILLIN AND TAZOBACTAM 4.5 G: 4; .5 INJECTION, POWDER, LYOPHILIZED, FOR SOLUTION INTRAVENOUS; PARENTERAL at 00:12

## 2021-04-28 RX ADMIN — PIPERACILLIN AND TAZOBACTAM 4.5 G: 4; .5 INJECTION, POWDER, LYOPHILIZED, FOR SOLUTION INTRAVENOUS; PARENTERAL at 06:11

## 2021-04-28 RX ADMIN — LORATADINE 10 MG: 10 TABLET ORAL at 06:11

## 2021-04-28 ASSESSMENT — ENCOUNTER SYMPTOMS
HEARTBURN: 0
COUGH: 0
DIZZINESS: 0
ABDOMINAL PAIN: 1
NAUSEA: 1
CHILLS: 0
HEADACHES: 1
MYALGIAS: 1
SHORTNESS OF BREATH: 0
BACK PAIN: 1
VOMITING: 0
FEVER: 0
FALLS: 0
PALPITATIONS: 0
NERVOUS/ANXIOUS: 1
CONSTIPATION: 1
DOUBLE VISION: 0
BLURRED VISION: 0

## 2021-04-28 ASSESSMENT — PAIN DESCRIPTION - PAIN TYPE
TYPE: ACUTE PAIN

## 2021-04-28 NOTE — CARE PLAN
Problem: Communication  Goal: The ability to communicate needs accurately and effectively will improve  Outcome: PROGRESSING AS EXPECTED   Pt is able to communicate needs appropriately to staff. Pt uses call light appropriately.  Problem: Safety  Goal: Will remain free from falls  Outcome: PROGRESSING AS EXPECTED   Fall precautions in place. Pt educated to call when in need of assistance. Pt verbalized understanding.  Problem: Pain Management  Goal: Pain level will decrease to patient's comfort goal  Outcome: PROGRESSING AS EXPECTED

## 2021-04-28 NOTE — PROGRESS NOTES
Received report from night shift RN. Pt is awake and alert, no signs of distress. Pt denies any nausea at this time. Pt denies any numbness or tingling. austen drain in place. Ng tube in place to suction. Pabon catheter in place. Fall precautions in place. Bed in locked and lowest position. Call light within reach.

## 2021-04-28 NOTE — CARE PLAN
Received report from day shift nurse.   Assumed pt care   Pt is A&Ox4, resting comfortably in bed.   Pt on 3 liters oxymask. No signs of SOB/respiratory distress.   Labs noted, VSS.   Pt c/o 7/10 pain at this moment, medicated per MAR.    Assessment completed. NG tube in place draining dark green/brown output. Pabon catheter in place with yellow hazy output with moderate amount of sediment.  Surgical dressings present on abdomen C/D/I, LENI drain present with small amount of serosanguinous drainage. Skin tear on LLQ abdomen covered with non-adhesive foam.   Fall precautions in place.   Bed at lowest position.   Call light and personal belongings within reach. Continue to monitor         Problem: Bowel/Gastric:  Goal: Normal bowel function is maintained or improved  Outcome: PROGRESSING SLOWER THAN EXPECTED     Problem: Knowledge Deficit  Goal: Knowledge of disease process/condition, treatment plan, diagnostic tests, and medications will improve  Outcome: PROGRESSING AS EXPECTED

## 2021-04-28 NOTE — PROGRESS NOTES
"  Trauma/Surgical Progress Note    Author: Jett Ruiz M.D. Date & Time created: 4/28/2021   4:04 PM   4/21 open appendectomy drain abscess    Interval Events:  Awake alert  reports pain control good  Reports feeling improved   flatus, no bm   increased activity working with PT walker    Hemodynamics:  /73   Pulse (!) 59   Temp 36.4 °C (97.5 °F) (Temporal)   Resp 17   Ht 1.6 m (5' 3\")   Wt (!) 147 kg (323 lb 6.6 oz)   SpO2 99%      Respiratory:    Respiration: 17, Pulse Oximetry: 99 %     Work Of Breathing / Effort: Within Normal Limits  RUL Breath Sounds: Clear, RML Breath Sounds: Clear;Diminished, RLL Breath Sounds: Diminished, CHRISTINA Breath Sounds: Clear, LLL Breath Sounds: Diminished  Fluids:    Intake/Output Summary (Last 24 hours) at 4/28/2021 1604  Last data filed at 4/28/2021 1400  Gross per 24 hour   Intake --   Output 4240 ml   Net -4240 ml     Admit Weight: (!) 147 kg (323 lb 6.6 oz)  Current     Physical Exam  Awake alert appropriate  Breathing with ease  Appropriate incisional tenderness  Drain serosanguinous   Medical Decision Making/Problem List:    Active Hospital Problems    Diagnosis    • Acute appendicitis with localized peritonitis [K35.30]      Priority: High   • Hypernatremia [E87.0]      Priority: Medium   • Leukocytosis [D72.829]      Priority: Medium   • Constipation [K59.00]      Priority: Medium   • Essential hypertension [I10]      Priority: Medium   • Stage 3 chronic kidney disease [N18.30]      Priority: Low   • BMI 50.0-59.9, adult (HCC) [Z68.43]      Priority: Low   • HENRI (obstructive sleep apnea) [G47.33]      Priority: Low     AHI 55.7, minimum saturation 84%, on CPAP 13 cm.     • Hyperglycemia [R73.9]    • Hypothyroidism [E03.9]      Core Measures & Quality Metrics:  Core Measures & Quality Metrics  JULIOCESAR Score  Discussed  with patient and family    Assessment/Plan  [facility] care medicine service  continue antibiotics rupture appendicitis intraabdominal abscess  Wbc " trend down today  Encourage ambulation, IS  Monitoring and support

## 2021-04-28 NOTE — CARE PLAN
Problem: Nutritional:  Goal: Nutrition support tolerated and meeting greater than 85% of estimated needs  Outcome: PROGRESSING SLOWER THAN EXPECTED   TPN remains at 50% goal.

## 2021-04-28 NOTE — ASSESSMENT & PLAN NOTE
Patient's A1c is 6.5.  Patient currently on ISS and hypoglycemia protocol.  Patient would require close follow up as outpatient.

## 2021-04-28 NOTE — FLOWSHEET NOTE
04/27/21 2002   Events/Summary/Plan   Events/Summary/Plan CPAP Check   Skin Inspection Respiratory Device Intact

## 2021-04-28 NOTE — THERAPY
Occupational Therapy   Initial Evaluation     Patient Name: Mady Yang  Age:  65 y.o., Sex:  female  Medical Record #: [id number]  Today's Date: 4/27/2021     Precautions  Precautions: Fall Risk, Nasogastric Tube  Comments: abdominal incision; LENI drain: HOB at 30 deg     Assessment    Patient is 65 y.o. female with a diagnosis of appendicitis, s/p appendectomy on 4/21. Additional factors influencing patient status / progress: chronic low back pain, HENRI, obesity, osteoporosis, and thyroid dse. She is also s/p PICC line placement yesterday. She lives with her  and was primarily I with basic ADLs, requires assistance with showers and all other IADLs. Pt is retired and does not drive. She presents today with decreased activity tolerance, post op pain, reduced balance, and generalized weakness, impacting her safety and I with all ADLs and functional mob/txfs. Pt will require post acute placement prior to dc home, and will continue to benefit from acute OT in house.    Plan    Recommend Occupational Therapy 3 times per week until therapy goals are met for the following treatments:  Adaptive Equipment, Community Re-integration, Neuro Re-Education / Balance, Self Care/Activities of Daily Living, Therapeutic Activities and Therapeutic Exercises.    DC Equipment Recommendations: Unable to determine at this time  Discharge Recommendations: Recommend post-acute placement for additional occupational therapy services prior to discharge home        04/27/21 8182   Prior Living Situation   Prior Services None   Housing / Facility 1 Story House   Steps Into Home 2   Steps In Home 0   Bathroom Set up Walk In Shower;Shower Chair  (handheld shower)   Equipment Owned Single Point Cane;Tub / Shower Seat;Hand Held Shower   Lives with - Patient's Self Care Capacity Spouse   Comments pt lives with supportive , who is able to provide adequate assistance upon dc home   Prior Level of ADL Function   Self Feeding  Independent   Grooming / Hygiene Independent   Bathing Requires Assist   Dressing Independent   Toileting Independent   Prior Level of IADL Function   Medication Management Requires Assist   Laundry Requires Assist   Kitchen Mobility Requires Assist   Finances Requires Assist   Home Management Requires Assist   Shopping Requires Assist   Prior Level Of Mobility Independent With Device in Home;Independent With Device in Community   Driving / Transportation Relatives / Others Provide Transportation   Occupation (Pre-Hospital Vocational) Retired Due To Age   Comments pt uses SPC for ambulation   History of Falls   History of Falls No   Precautions   Precautions Fall Risk;Nasogastric Tube   Comments LENI drain; NGT; HOB >30deg; abdominal incision; PICC line R forearm   Pain   Pain Scales 0 to 10 Scale    Intervention Repositioned   Pain 0 - 10 Group   Location Abdomen   Location Orientation Anterior   Pain Rating Scale (NPRS) 4   Description Aching   Comfort Goal Comfort with Movement;Perform Activity;Sleep Comfortably   Therapist Pain Assessment 7;During Activity;Post Activity Pain Same as Prior to Activity;Nurse Notified   Non Verbal Descriptors   Non Verbal Scale  Calm;Unlabored Breathing   Cognition    Cognition / Consciousness WDL   Level of Consciousness Alert   Comments pleasant and cooperative   Passive ROM Upper Body   Passive ROM Upper Body WDL   Active ROM Upper Body   Active ROM Upper Body  WDL   Strength Upper Body   Upper Body Strength  X   Gross Strength Generalized Weakness, Equal Bilaterally.    Balance Assessment   Sitting Balance (Static) Fair +   Sitting Balance (Dynamic) Fair   Standing Balance (Static) Fair -   Standing Balance (Dynamic) Fair -   Weight Shift Sitting Fair   Weight Shift Standing Fair   Comments with bariatric FWW and/or handheld assist   Bed Mobility    Supine to Sit Moderate Assist   Sit to Supine Moderate Assist   Scooting Minimal Assist   Comments HOB elevated, rails up   ADL  "Assessment   Grooming Seated;Minimal Assist   Upper Body Dressing Minimal Assist   Lower Body Dressing Minimal Assist   How much help from another person does the patient currently need...   Putting on and taking off regular lower body clothing? 3   Bathing (including washing, rinsing, and drying)? 3   Toileting, which includes using a toilet, bedpan, or urinal? 3   Putting on and taking off regular upper body clothing? 3   Taking care of personal grooming such as brushing teeth? 3   Eating meals? 3   6 Clicks Daily Activity Score 18   Functional Mobility   Sit to Stand Minimal Assist   Bed, Chair, Wheelchair Transfer Unable to Participate   Transfer Method Stand Step   Mobility in room with bariatric FWW   Distance (Feet) 2   # of Times Distance was Traveled 1   Activity Tolerance   Sitting in Chair NT   Sitting Edge of Bed 5 mins   Standing 5 mins   Comments limited by pain, fatigue, and lines   Short Term Goals   Short Term Goal # 1 pt will complete ADL txfs with LRAD at spv level   Short Term Goal # 2 pt will complete FB dressing with spv   Short Term Goal # 3 pt will complete and tolerate 10\" of standing G/H at sinkside with spv   Education Group   Education Provided Role of Occupational Therapist;Home Safety;Transfers   Role of Occupational Therapist Patient Response Patient;Significant Other;Acceptance;Explanation;Verbal Demonstration   Home Safety Patient Response Patient;Significant Other;Acceptance;Explanation;Verbal Demonstration   Transfers Patient Response Patient;Significant Other;Acceptance;Explanation;Verbal Demonstration;Reinforcement Needed   Anticipated Discharge Equipment and Recommendations   DC Equipment Recommendations Unable to determine at this time   Discharge Recommendations Recommend post-acute placement for additional occupational therapy services prior to discharge home   Interdisciplinary Plan of Care Collaboration   IDT Collaboration with  Nursing;Physical Therapist;Family / Caregiver "   Patient Position at End of Therapy In Bed;Call Light within Reach;Tray Table within Reach;Phone within Reach;Family / Friend in Room   Collaboration Comments RN aware of OT session;  present during entire OT eval   Session Information   Date / Session Number  4/27 #1 (1/3, 5/3) LF   Priority 2

## 2021-04-28 NOTE — PROGRESS NOTES
[facility] Medicine Daily Progress Note    Date of Service  4/28/2021    Chief Complaint  65 y.o. female admitted 4/21/2021 with abdominal pain.    Hospital Course  4/22/2021-patient is a 65-year-old female comes in with abdominal pain that started last Friday.  After suffering with the pain the patient went to see urgent care where they did a CAT scan of her abdomen which came back with early appendicitis.  Patient was referred over to the emergency room for evaluation.  Patient was admitted for surgery and was found to have a large abscess along with the appendicitis.  Appendix was removed and the abscess has been drained.  She still having chills and fevers afterwards.  Continue with IV antibiotics as well as pain management and fluid resuscitation.  Await culture results.  4/23/2021-this morning patient is in quite a bit of distress.  Her pain has escalated in the abdomen and she is unable to urinate or defecate.  Upon further evaluation patient has a postoperative ileus in her bowel sounds have not returned.  She has not passed any gas or had a bowel movement.  Continue at this point with supportive fluid resuscitation to ensure the patient's bowel functions returned to normal.  Patient at this point is also complaining of urinary retention and the bladder scanner was only showing 70 to 100 mL of retained urine.  This was inconsistent with the amount of pain the patient was having thus I ordered a immediate straight catheterization which yielded over 800 mL of urine.  After the urine came out the patient's abdominal pain eased up and her condition improved.  We will continue to monitor her urine output and if the patient should have urinary retention we will straight catheter again.  For the time being because of her abdominal distention pain and ileus as well as urinary retention we are unable to discharge the patient at this point in time.  Continue at this point with aggressive management and  stabilization.  4/24/2021-patient is still having difficulty with abdominal pain.  She also has not had a bowel movement and her intestinal sounds are returning very slowly.  Concern is that there is still an abscess present.  Flatplate of the abdomen did not show anything her white blood cell count however is increasing at this point I am going to obtain a CT of the abdomen.  Discussed it with surgery.  Because of the bladder distention and inability urinate a Pabon catheter had to be placed.  Hopefully this will decompress the bladder.  4/25/2021-postoperative day #4 after laparoscopic appendectomy had to be converted to an open appendectomy and drainage of abscess cavity.  Cultures have not grown out any significant organism.  White blood cell count has gone up, repeat CT of the abdomen does not demonstrate worsening abscess formation and actually is consistent with routine healing.  Patient CT demonstrates an ileus.  Since her abdominal distention is getting worse and her nausea is persisting I am putting in an NG to low intermittent suctioning.  This was discussed with the bedside nurse and surgery.  4/26/2021-patient's abdominal distention persists.  Ileus persists status postoperative.  Yesterday the patient's Pabon catheter became obstructed.  It had to be replaced.  Yesterday he also had some technical issues with the hospital bed and at this point had to be replaced a few times.  Pain seems to be better controlled.  Her labs are improving.  The patient may need a repeat CT in 24 to 48 hours if her white blood cell count does not improve.  Today's count is down to 14.8.  Continue with IV Zosyn    Interval Problem Update  4/27: Patient seen at bedside this morning.  The patient still has not had a bowel movement, however she mentions that she thinks she is passing gas.  General surgery continues to recommend ambulation.  Patient currently n.p.o. with NG tube and the patient is on TPN.  As per nursing no  other overnight events were reported.    4/28: Patient seen at bedside this morning.  The patient still has not had a bowel movement.  She says she is passing gas.  General Surgery following the patient along.  We will continue TPN and n.p.o. for now.  No other overnight events were reported by nursing.    Consultants/Specialty  General Surgery    Code Status  Full Code    Disposition  Pending    Review of Systems  Review of Systems   Constitutional: Negative for chills and fever.   HENT: Negative for hearing loss and nosebleeds.    Eyes: Negative for blurred vision and double vision.   Respiratory: Negative for cough and shortness of breath.    Cardiovascular: Negative for chest pain and palpitations.   Gastrointestinal: Positive for abdominal pain, constipation and nausea. Negative for heartburn and vomiting.   Genitourinary: Negative for dysuria and urgency.   Musculoskeletal: Positive for back pain and myalgias. Negative for falls.   Skin: Negative for itching and rash.   Neurological: Positive for headaches. Negative for dizziness.   Psychiatric/Behavioral: The patient is nervous/anxious.    All other systems reviewed and are negative.       Physical Exam  Temp:  [36.4 °C (97.5 °F)-37 °C (98.6 °F)] 36.4 °C (97.5 °F)  Pulse:  [59-78] 59  Resp:  [17-22] 17  BP: (126-142)/(67-74) 142/73  SpO2:  [92 %-99 %] 99 %    Physical Exam  Constitutional:       Appearance: She is obese. She is ill-appearing.   HENT:      Head: Normocephalic and atraumatic.      Nose: No congestion or rhinorrhea.      Mouth/Throat:      Pharynx: No oropharyngeal exudate or posterior oropharyngeal erythema.   Eyes:      General:         Right eye: No discharge.         Left eye: No discharge.   Cardiovascular:      Rate and Rhythm: Normal rate and regular rhythm.      Pulses: Normal pulses.      Heart sounds: No murmur. No gallop.    Pulmonary:      Effort: Pulmonary effort is normal. No respiratory distress.      Breath sounds: No wheezing or  rales.   Abdominal:      General: There is distension.      Tenderness: There is abdominal tenderness.   Musculoskeletal:         General: Normal range of motion.      Cervical back: Normal range of motion.   Skin:     General: Skin is warm and dry.   Neurological:      General: No focal deficit present.      Mental Status: She is alert and oriented to person, place, and time.      Cranial Nerves: No cranial nerve deficit.      Motor: No weakness.   Psychiatric:         Mood and Affect: Mood normal.         Behavior: Behavior normal.         Fluids    Intake/Output Summary (Last 24 hours) at 4/28/2021 1510  Last data filed at 4/28/2021 1400  Gross per 24 hour   Intake --   Output 4240 ml   Net -4240 ml       Laboratory  Recent Labs     04/26/21  0316 04/27/21  0814 04/28/21  0352   WBC 14.8* 14.0* 12.9*   RBC 4.15* 3.80* 3.76*   HEMOGLOBIN 12.2 11.3* 11.1*   HEMATOCRIT 39.9 36.0* 35.5*   MCV 96.1 94.7 94.4   MCH 29.4 29.7 29.5   MCHC 30.6* 31.4* 31.3*   RDW 52.1* 50.3* 48.5   PLATELETCT 389 335 301   MPV 9.6 9.5 9.8     Recent Labs     04/26/21  1235 04/27/21  0245 04/28/21  0352   SODIUM 147* 146* 148*   POTASSIUM 4.3 4.2 3.6   CHLORIDE 111 108 107   CO2 28 32 33   GLUCOSE 125* 131* 135*   BUN 27* 27* 24*   CREATININE 1.32 1.12 0.99   CALCIUM 8.7 8.7 8.4             Recent Labs     04/27/21  0245   TRIGLYCERIDE 116       Imaging  IR-PICC LINE PLACEMENT W/ GUIDANCE > AGE 5   Final Result                  Ultrasound-guided PICC placement performed by qualified nursing staff as    above.          CT-ABDOMEN-PELVIS W/O   Final Result      Interval laparotomy with right lower quadrant surgical drain, similar fat stranding in the pericecal region that is likely postoperative in nature. No noncontrast evidence of abscess formation      Small free fluid and intraperitoneal air is not outside normal limits in the perioperative period      Contrast in the colon is from the CT 3 days ago, air-fluid levels and mild small bowel  dilatation. In combination these findings are most compatible with ileus      Urinary bladder decompressed around the Pabon catheter. No hydroureteronephrosis      MB-HAEEETI-4 VIEW   Final Result      Dilated loops of small bowel most likely represent ileus. Partial obstruction is not excluded.      Residual enteric contrast is seen from the recent CT.      Mild bibasilar atelectasis.         IR-US GUIDED PIV   Final Result    Ultrasound-guided PERIPHERAL IV INSERTION performed by    qualified nursing staff as above.           Assessment/Plan  * Acute appendicitis with localized peritonitis  Assessment & Plan  Status post appendectomy.  Initially attempted with laparoscopy but then had to be converted to open resection.  Pain management  NG tube to suctioning due to persistent postoperative ileus  Fluid resuscitation  Due to lack of nutrition will have PICC line placed and TPN started.    4/28: Continue TPN, we appreciate any further recommendations by general surgery. Continue Zosyn.    Hypernatremia  Assessment & Plan  Na this morning was 148.  We will stop NS continous IV fluids for now.    Strict I's and O's  Monitor and make changes accordingly.    Constipation  Assessment & Plan  Possibly post surgical etiology.  General surgery following patient, we appreciate further recommendations  NPO, currently on TPN.    Leukocytosis  Assessment & Plan  Improving  In the setting of ruptured appendicitis and abdominal abscess.  Continue zosyn    Essential hypertension- (present on admission)  Assessment & Plan  Continue losartan  PRN labetalol    Hyperglycemia  Assessment & Plan  Patient's A1c is 6.5.  Patient currently on ISS and hypoglycemia protocol.  Patient would require close follow up as outpatient.    Hypothyroidism  Assessment & Plan  Continue home medication.    Stage 3 chronic kidney disease- (present on admission)  Assessment & Plan  As per chart review, apparently history of  BUN 29 creatinine 1.41  Avoid  nephrotoxic medications    4/28: Creatinine .99 today. Patient NPO on TPN.      BMI 50.0-59.9, adult (HCC)- (present on admission)  Assessment & Plan  Body mass index is 57.29 kg/m².  Outpatient follow-ups with weight program      HENRI (obstructive sleep apnea)- (present on admission)  Assessment & Plan  CPAP which was being given nasally has been held for now because of the NG tube.  Continue with oxygen support by facemask         VTE prophylaxis: SCDs

## 2021-04-28 NOTE — FLOWSHEET NOTE
04/28/21 0205   Events/Summary/Plan   Events/Summary/Plan CPAP Check - Removed CPAP - Not using   Skin Inspection Respiratory Device Intact

## 2021-04-28 NOTE — PROGRESS NOTES
Pharmacy TPN Day # 3       2021    Dosing Weight   76 kg TPN currently providing 50% of goal      TPN goal: 1380 kcal/day including 1.2 gm/kg/day Protein      TPN indication: NPO s/p appendectomy w/abscess present (I&D).       Pertinent PMH: HTN, HENRI, CKD  Temp (24hrs), Av.8 °C (98.2 °F), Min:36.6 °C (97.9 °F), Max:37 °C (98.6 °F)  .  Recent Labs     21  1235 21  0245 21  0352   SODIUM 147* 146* 148*   POTASSIUM 4.3 4.2 3.6   CHLORIDE 111 108 107   CO2 28 32 33   BUN 27* 27* 24*   CREATININE 1.32 1.12 0.99   GLUCOSE 125* 131* 135*   CALCIUM 8.7 8.7 8.4   ASTSGOT 20 28 54*   ALTSGPT 18 22 37   ALBUMIN 2.6* 2.6* 2.6*   TBILIRUBIN 0.3 0.3 0.3   PHOSPHORUS  --  2.5 2.6   MAGNESIUM  --  2.0  --      Accu-Checks  Recent Labs     21  1812 21  2338 21  0620   POCGLUCOSE 107* 119* 117*       Vitals:    21 0447 21 1725 21 2233 21 0454   BP: 138/65 140/71 139/74 126/67   Weight:       Height:           Intake/Output Summary (Last 24 hours) at 2021 0917  Last data filed at 2021 0429  Gross per 24 hour   Intake --   Output 2940 ml   Net -2940 ml       Orders Placed This Encounter   Procedures   • Diet NPO     Standing Status:   Standing     Number of Occurrences:   1     Order Specific Question:   Restrict to:     Answer:   Sips with Medications [3]         TPN for past 72 hours (Show up to 3 orders; newest on the left. Changes between the two most recent orders are indicated.)     Start date and time   2021      TPN Central Line Formulation [[id number]] TPN Central Line Formulation [[id number]] TPN Central Line Formulation [[id number]]    Order Status  Active Active Active    Last Admin   Rate Verify at 2021 07 by Blayne Keller, RJerelN. New Bag at 2021 by Blayne Keller R.N.       Base    Clinisol 15%  45 g 45 g 45 g    dextrose 70%  100 g 100 g 100 g    fat emulsions 20%  17 g 17 g 17 g        Additives    potassium phosphate  15 mmol 15 mmol --    potassium chloride  40 mEq -- --    potassium acetate  -- 10 mEq 20 mEq    sodium acetate  -- 50 mEq 50 mEq    sodium chloride  170 mEq 150 mEq 150 mEq    magnesium sulfate  4 mEq 4.06 mEq 4.06 mEq    calcium GLUConate  9.4 mEq 4.65 mEq 4.65 mEq    M.T.E.-4 Adult  1 mL 1 mL 1 mL    M.V.I. Adult  10 mL 10 mL 10 mL       QS Base    sterile water  1,364.39 mL 1,369.6 mL 1,369.6 mL       Energy Contribution    Proteins  -- -- --    Dextrose  -- -- --    Lipids  -- -- --    Total  -- -- --       Electrolyte Ion Calculated Amount    Sodium  170 mEq 200 mEq 200 mEq    Potassium  62 mEq 32 mEq 20 mEq    Calcium  9.4 mEq 4.65 mEq 4.65 mEq    Magnesium  4.02 mEq 4.06 mEq 4.06 mEq    Aluminum  -- -- --    Phosphate  15 mmol 15 mmol --    Chloride  210 mEq 150 mEq 150 mEq    Acetate  38.1 mEq 98.1 mEq 108.1 mEq       Other    Total Protein  45 g 45 g 45 g    Total Protein/kg  0.31 g/kg 0.31 g/kg 0.31 g/kg    Total Amino Acid  -- -- --    Total Amino Acid/kg  -- -- --    Glucose Infusion Rate  0.47 mg/kg/min 0.47 mg/kg/min 0.24 mg/kg/min    Osmolarity (Estimated)  -- -- --    Volume  1,992 mL 1,992 mL 1,992 mL    Rate  83 mL/hr 83 mL/hr 42 mL/hr    Dosing Weight  147 kg 147 kg 147 kg    Infusion Site  Central Central Central            This formula provides:  % kcal as lipids = 25  Grams protein/kg = 0.6  Non-protein calories = 510  Kcals/kg = 9  Total daily calories = 690    Comments:  Will keep at 50% goal for now, increase Potassium, sodium and adjust acetate and chloride in TPN.  CMP mag and phos in am.      [facility]

## 2021-04-28 NOTE — FLOWSHEET NOTE
04/28/21 0205   Events/Summary/Plan   Events/Summary/Plan CPAP Check - Removed CPAP - Not using   Skin Inspection Respiratory Device Intact   Vital Signs   Pulse 69   Respiration 20   Pulse Oximetry 98 %   $ Pulse Oximetry (Spot Check) Yes   O2 Alarms Set & Reviewed Yes   Respiratory Assessment   Respiratory Pattern Within Normal Limits   Level of Consciousness Alert   Chest Exam   Work Of Breathing / Effort Within Normal Limits   Breath Sounds   RUL Breath Sounds Diminished   RML Breath Sounds Diminished   RLL Breath Sounds Diminished   CHRISTINA Breath Sounds Diminished   LLL Breath Sounds Diminished   Oxygen   O2 (LPM) 4   O2 Delivery Device Oxymask   Non-Invasive Ventilation [facility]   Nocturnal CPAP or BIPAP CPAP - [facility]   $ System Evaluation Yes   FiO2 or LPM 4

## 2021-04-28 NOTE — ASSESSMENT & PLAN NOTE
Na this morning was 148.  We will stop NS continous IV fluids for now.    Strict I's and O's  Monitor and make changes accordingly.    4/29: Resolved.

## 2021-04-28 NOTE — DISCHARGE PLANNING
Anticipated Discharge Disposition: Home to [facility] in [place] with HH     Action: LSW met with pt at bedside to complete assessment and discuss d/c plan. PT/OT able to see pt on 4/27 and both recommending post acute. However, pt refusing post acute placement.     LSW met with pt at bedside and spouse, Alex also at bedside. Pt states to live in [place], [place] and has a friend here in [place] who is a physical therapist and has offered her home to pt. Pt also open to HH services upon d/c. Not sure what her friends physical address is. Pt and spouse will gather address.     Barriers to Discharge: None     Plan: LSW to continue to follow for d/c needs, LSW to assist as needed     Addendum 1126  Pt discussed during IDT rounds. Per Dr. Malin pt anticipated to be cleared in a few days. LSW provided update above.     Addendum 1233  LSW met with pt at bedside to provide Advanced Directive booklet to pt. LSW attempted to discuss HH with pt. Pt very sleepy and requested LSW come back when spouse was at bedside.     Addendum 7241  LSW received a call from pt's friend, Mary [phone] and stated that she thinks pt needs to go to SNF. Mary is a good friend of pt and considers her a sister. Mary suggesting [facility]. LSW informed Mary that LSW would provide SNF CHOICE to pt.   LSW met with pt and provided list of local SNF's. LSW informed that a selection of 3 SNF's is required. Pt to review with spouse and Mary. Pt provided consent for Mary to be added as an emergency contact.   Dr. Malin informed LSW that pt would benefit from SNF.         Care Transition Team Assessment  The information provided for this assessment was provided by pt and chart review. Pt confirmed the information on facesheet to be accurate. Pt's PCP is Dr MUNROE with [facility].  Pt lives in a single story home in [place], [place] with spouse. Pt is retired. Prior to admit pt independent with ADL's/IADL's.  Pt on service with [employer] for Cpap machine. Pt's insurance covers medications. Pt uses [facility] for pharmacy and has her medications delivered via mail.     Information Source  Orientation : Oriented x 4  Information Given By: Patient  Who is responsible for making decisions for patient? : Patient    Elopement Risk  Legal Hold: No  Ambulatory or Self Mobile in Wheelchair: No-Not an Elopement Risk  Disoriented: No  Psychiatric Symptoms: None  History of Wandering: No  Elopement this Admit: No  Vocalizing Wanting to Leave: No  Displays Behaviors, Body Language Wanting to Leave: No-Not at Risk for Elopement  Elopement Risk: Not at Risk for Elopement    Interdisciplinary Discharge Planning  Does Admitting Nurse Feel This Could be a Complex Discharge?: No  Lives with - Patient's Self Care Capacity: Spouse  Patient or legal guardian wants to designate a caregiver: No  Support Systems: Friends / Neighbors, Spouse / Significant Other  Housing / Facility: [address]  Do You Take your Prescribed Medications Regularly: Yes  Able to Return to Previous ADL's: Future Time w/Therapy  Mobility Issues: Yes  Prior Services: None  Patient Prefers to be Discharged to:: home  Assistance Needed: Unknown at this Time    Discharge Preparedness  What is your plan after discharge?: Home with help, Home health care  What are your discharge supports?: Spouse  Prior Functional Level: Independent with Activities of Daily Living, Independent with Medication Management  Difficulity with ADLs: None  Difficulity with IADLs: None    Functional Assesment  Prior Functional Level: Independent with Activities of Daily Living, Independent with Medication Management    Finances  Financial Barriers to Discharge: No  Prescription Coverage: Yes    Vision / Hearing Impairment  Right Eye Vision: Impaired, Wears Glasses  Left Eye Vision: Impaired, Wears Glasses    Advance Directive  Advance Directive?: None  Advance Directive offered?: AD  Booklet given    Domestic Abuse  Have you ever been the victim of abuse or violence?: No  Physical Abuse or Sexual Abuse: No  Verbal Abuse or Emotional Abuse: No  Possible Abuse/Neglect Reported to:: Not Applicable    Psychological Assessment  History of Substance Abuse: None  History of Psychiatric Problems: No    Discharge Risks or Barriers  Discharge risks or barriers?: No  Patient risk factors: Complex medical needs    Anticipated Discharge Information  Discharge Disposition: D/T to home under care of home health w/planned hosp IP readmit (86)

## 2021-04-28 NOTE — FLOWSHEET NOTE
04/27/21 2002   Events/Summary/Plan   Events/Summary/Plan CPAP Check   Skin Inspection Respiratory Device Intact   Vital Signs   Pulse 74   Respiration 18   Pulse Oximetry 94 %   $ Pulse Oximetry (Spot Check) Yes   O2 Alarms Set & Reviewed Yes   Respiratory Assessment   Respiratory Pattern Within Normal Limits   Level of Consciousness Alert   Chest Exam   Work Of Breathing / Effort Within Normal Limits   Breath Sounds   RUL Breath Sounds Diminished   RML Breath Sounds Diminished   RLL Breath Sounds Diminished   CHRISTINA Breath Sounds Diminished   LLL Breath Sounds Diminished   Secretions   Cough Non Productive   How Sputum Obtained Cough on Request   Sputum Amount Unable to Evaluate   Sputum Color Unable to Evaluate   Sputum Consistency Unable to Evaluate   Oxygen   O2 (LPM) 4   O2 Delivery Device Mask   Non-Invasive Ventilation [facility]   Nocturnal CPAP or BIPAP CPAP - [facility]   $ System Evaluation Yes   FiO2 or LPM 4

## 2021-04-29 PROBLEM — R79.89 ELEVATED LFTS: Status: ACTIVE | Noted: 2021-04-29

## 2021-04-29 LAB
ALBUMIN SERPL BCP-MCNC: 2.7 G/DL (ref 3.2–4.9)
ALBUMIN/GLOB SERPL: 0.8 G/DL
ALP SERPL-CCNC: 90 U/L (ref 30–99)
ALT SERPL-CCNC: 68 U/L (ref 2–50)
ANION GAP SERPL CALC-SCNC: 8 MMOL/L (ref 7–16)
AST SERPL-CCNC: 81 U/L (ref 12–45)
BASOPHILS # BLD AUTO: 0.5 % (ref 0–1.8)
BASOPHILS # BLD: 0.07 K/UL (ref 0–0.12)
BILIRUB SERPL-MCNC: 0.4 MG/DL (ref 0.1–1.5)
BUN SERPL-MCNC: 21 MG/DL (ref 8–22)
CALCIUM SERPL-MCNC: 8.5 MG/DL (ref 8.4–10.2)
CHLORIDE SERPL-SCNC: 103 MMOL/L (ref 96–112)
CO2 SERPL-SCNC: 32 MMOL/L (ref 20–33)
CREAT SERPL-MCNC: 0.89 MG/DL (ref 0.5–1.4)
EOSINOPHIL # BLD AUTO: 0.46 K/UL (ref 0–0.51)
EOSINOPHIL NFR BLD: 3.6 % (ref 0–6.9)
ERYTHROCYTE [DISTWIDTH] IN BLOOD BY AUTOMATED COUNT: 47.5 FL (ref 35.9–50)
GLOBULIN SER CALC-MCNC: 3.4 G/DL (ref 1.9–3.5)
GLUCOSE BLD-MCNC: 117 MG/DL (ref 65–99)
GLUCOSE BLD-MCNC: 119 MG/DL (ref 65–99)
GLUCOSE SERPL-MCNC: 132 MG/DL (ref 65–99)
HCT VFR BLD AUTO: 35.6 % (ref 37–47)
HGB BLD-MCNC: 11.3 G/DL (ref 12–16)
IMM GRANULOCYTES # BLD AUTO: 0.38 K/UL (ref 0–0.11)
IMM GRANULOCYTES NFR BLD AUTO: 3 % (ref 0–0.9)
LYMPHOCYTES # BLD AUTO: 2.61 K/UL (ref 1–4.8)
LYMPHOCYTES NFR BLD: 20.5 % (ref 22–41)
MAGNESIUM SERPL-MCNC: 1.9 MG/DL (ref 1.5–2.5)
MCH RBC QN AUTO: 29.5 PG (ref 27–33)
MCHC RBC AUTO-ENTMCNC: 31.7 G/DL (ref 33.6–35)
MCV RBC AUTO: 93 FL (ref 81.4–97.8)
MONOCYTES # BLD AUTO: 0.49 K/UL (ref 0–0.85)
MONOCYTES NFR BLD AUTO: 3.8 % (ref 0–13.4)
NEUTROPHILS # BLD AUTO: 8.74 K/UL (ref 2–7.15)
NEUTROPHILS NFR BLD: 68.6 % (ref 44–72)
NRBC # BLD AUTO: 0 K/UL
NRBC BLD-RTO: 0 /100 WBC
PHOSPHATE SERPL-MCNC: 3.2 MG/DL (ref 2.5–4.5)
PLATELET # BLD AUTO: 281 K/UL (ref 164–446)
PMV BLD AUTO: 9.7 FL (ref 9–12.9)
POTASSIUM SERPL-SCNC: 3.8 MMOL/L (ref 3.6–5.5)
PROT SERPL-MCNC: 6.1 G/DL (ref 6–8.2)
RBC # BLD AUTO: 3.83 M/UL (ref 4.2–5.4)
SODIUM SERPL-SCNC: 143 MMOL/L (ref 135–145)
WBC # BLD AUTO: 12.8 K/UL (ref 4.8–10.8)

## 2021-04-29 PROCEDURE — 700105 HCHG RX REV CODE 258: Performed by: INTERNAL MEDICINE

## 2021-04-29 PROCEDURE — 700102 HCHG RX REV CODE 250 W/ 637 OVERRIDE(OP): Performed by: INTERNAL MEDICINE

## 2021-04-29 PROCEDURE — 94760 N-INVAS EAR/PLS OXIMETRY 1: CPT

## 2021-04-29 PROCEDURE — 94660 CPAP INITIATION&MGMT: CPT

## 2021-04-29 PROCEDURE — 82962 GLUCOSE BLOOD TEST: CPT

## 2021-04-29 PROCEDURE — A9270 NON-COVERED ITEM OR SERVICE: HCPCS | Performed by: INTERNAL MEDICINE

## 2021-04-29 PROCEDURE — 770006 HCHG ROOM/CARE - MED/SURG/GYN SEMI*

## 2021-04-29 PROCEDURE — 83735 ASSAY OF MAGNESIUM: CPT

## 2021-04-29 PROCEDURE — 99232 SBSQ HOSP IP/OBS MODERATE 35: CPT | Performed by: INTERNAL MEDICINE

## 2021-04-29 PROCEDURE — 84100 ASSAY OF PHOSPHORUS: CPT

## 2021-04-29 PROCEDURE — 700111 HCHG RX REV CODE 636 W/ 250 OVERRIDE (IP): Performed by: INTERNAL MEDICINE

## 2021-04-29 PROCEDURE — 99223 1ST HOSP IP/OBS HIGH 75: CPT | Performed by: INTERNAL MEDICINE

## 2021-04-29 PROCEDURE — 85025 COMPLETE CBC W/AUTO DIFF WBC: CPT

## 2021-04-29 PROCEDURE — 80053 COMPREHEN METABOLIC PANEL: CPT

## 2021-04-29 PROCEDURE — 700101 HCHG RX REV CODE 250: Performed by: INTERNAL MEDICINE

## 2021-04-29 RX ORDER — LOSARTAN POTASSIUM 25 MG/1
50 TABLET ORAL 2 TIMES DAILY
Status: DISCONTINUED | OUTPATIENT
Start: 2021-04-29 | End: 2021-05-14 | Stop reason: HOSPADM

## 2021-04-29 RX ORDER — HEPARIN SODIUM 5000 [USP'U]/ML
5000 INJECTION, SOLUTION INTRAVENOUS; SUBCUTANEOUS EVERY 8 HOURS
Status: DISCONTINUED | OUTPATIENT
Start: 2021-04-29 | End: 2021-05-14 | Stop reason: HOSPADM

## 2021-04-29 RX ADMIN — POTASSIUM CHLORIDE: 2 INJECTION, SOLUTION, CONCENTRATE INTRAVENOUS at 20:06

## 2021-04-29 RX ADMIN — HYDROMORPHONE HYDROCHLORIDE 0.5 MG: 1 INJECTION, SOLUTION INTRAMUSCULAR; INTRAVENOUS; SUBCUTANEOUS at 22:15

## 2021-04-29 RX ADMIN — HEPARIN SODIUM 5000 UNITS: 5000 INJECTION, SOLUTION INTRAVENOUS; SUBCUTANEOUS at 21:37

## 2021-04-29 RX ADMIN — LOSARTAN POTASSIUM 50 MG: 25 TABLET, FILM COATED ORAL at 04:54

## 2021-04-29 RX ADMIN — LOSARTAN POTASSIUM 50 MG: 25 TABLET, FILM COATED ORAL at 18:37

## 2021-04-29 RX ADMIN — HEPARIN SODIUM 5000 UNITS: 5000 INJECTION, SOLUTION INTRAVENOUS; SUBCUTANEOUS at 14:01

## 2021-04-29 RX ADMIN — PIPERACILLIN AND TAZOBACTAM 4.5 G: 4; .5 INJECTION, POWDER, LYOPHILIZED, FOR SOLUTION INTRAVENOUS; PARENTERAL at 21:38

## 2021-04-29 RX ADMIN — PIPERACILLIN AND TAZOBACTAM 4.5 G: 4; .5 INJECTION, POWDER, LYOPHILIZED, FOR SOLUTION INTRAVENOUS; PARENTERAL at 04:51

## 2021-04-29 RX ADMIN — THYROID, PORCINE 90 MG: 30 TABLET ORAL at 04:54

## 2021-04-29 RX ADMIN — LORATADINE 10 MG: 10 TABLET ORAL at 04:53

## 2021-04-29 RX ADMIN — PIPERACILLIN AND TAZOBACTAM 4.5 G: 4; .5 INJECTION, POWDER, LYOPHILIZED, FOR SOLUTION INTRAVENOUS; PARENTERAL at 14:01

## 2021-04-29 ASSESSMENT — ENCOUNTER SYMPTOMS
PALPITATIONS: 0
SHORTNESS OF BREATH: 0
CONSTIPATION: 1
BLURRED VISION: 0
COUGH: 0
NERVOUS/ANXIOUS: 1
HEARTBURN: 0
MYALGIAS: 1
VOMITING: 0
FEVER: 0
ABDOMINAL PAIN: 1
FALLS: 0
NAUSEA: 1
BACK PAIN: 1
DOUBLE VISION: 0
HEADACHES: 1
CHILLS: 0
DIZZINESS: 0

## 2021-04-29 NOTE — CARE PLAN
Problem: Safety  Goal: Will remain free from falls  Outcome: PROGRESSING AS EXPECTED  Note: Pt uses call light appropriately to call for help with mobility. Call light within reach. Bed locked and in lowest position.     Problem: Pain Management  Goal: Pain level will decrease to patient's comfort goal  Outcome: PROGRESSING AS EXPECTED  Flowsheets (Taken 4/28/2021 1945)  Pain Rating Scale (NPRS): 1

## 2021-04-29 NOTE — FLOWSHEET NOTE
04/28/21 2211   Events/Summary/Plan   Events/Summary/Plan CPAP Check   Skin Inspection Respiratory Device Intact

## 2021-04-29 NOTE — PROGRESS NOTES
Report received from WILIAM Rios. Dx, medications, O2 needs, and poc reviewed. Safety precautions in place and pt has no sign of distress or acute needs at this time. Care fully assumed. Will continue to monitor.

## 2021-04-29 NOTE — DISCHARGE PLANNING
Anticipated Discharge Disposition: SNF    Action: Met with pt at bedside to follow up on SNF choice. Pt provided completed choice form.   Choice form faxed to [facility]    Barriers to Discharge: SNF acceptance    Plan: F/U with SNF referrals    PASRR completed # [id number]

## 2021-04-29 NOTE — FLOWSHEET NOTE
04/29/21 0233   Events/Summary/Plan   Events/Summary/Plan CPAP Check   Skin Inspection Respiratory Device Intact

## 2021-04-29 NOTE — DISCHARGE PLANNING
Received Choice form at 2607  Agency/Facility Name: [facility]  Referral sent per Choice form @ 1933

## 2021-04-29 NOTE — PROGRESS NOTES
Received report and assumed care. Pt AOx4.  at bedside. Reporting mild abdominal pain but declines intervention at this time. Denies nausea. Denies SOB. 1300 on the IS. Pabon CDI, draining clear yellow urine. Bag below bladder. Pt reports passing gas. Bowel sounds distant and hypoactive. New bag of TPN hung.  Abdominal xray ordered to check NG tube placement.  Plan of care reviewed with pt and . No other needs at this time. Bed locked in lowest position. Call light within reach. TPN infusing. Zosyn infusing.    2030 - xray at bedside.

## 2021-04-29 NOTE — ASSESSMENT & PLAN NOTE
Mild elevation of AST and ALT.  We will continue to monitor and repeat CMP.    4/30: Trending down, repeat CMP.    5/2: LFTs are within normal limits today. Continue to monitor.

## 2021-04-29 NOTE — PROGRESS NOTES
"  Trauma/Surgical Progress Note    Author: Jett Ruiz M.D. Date & Time created: 4/29/2021   9:13 AM   4/21 open appendectomy drain abscess     Interval Events:  Awake alert  reports pain control good  Reports feeling improved   increased activity working with PT   flatus,  bm    Hemodynamics:  /80   Pulse 72   Temp 36.9 °C (98.5 °F) (Oral)   Resp 20   Ht 1.6 m (5' 3\")   Wt (!) 147 kg (323 lb 6.6 oz)   SpO2 95%      Respiratory:    Respiration: 20, Pulse Oximetry: 95 %     Work Of Breathing / Effort: Within Normal Limits  RUL Breath Sounds: Clear, RML Breath Sounds: Clear, RLL Breath Sounds: Diminished, CHRISTINA Breath Sounds: Clear, LLL Breath Sounds: Diminished  Fluids:    Intake/Output Summary (Last 24 hours) at 4/29/2021 0913  Last data filed at 4/29/2021 0500  Gross per 24 hour   Intake --   Output 2620 ml   Net -2620 ml     Admit Weight: (!) 147 kg (323 lb 6.6 oz)  Current     Physical Exam  Awake alert appropriate  Breathing with ease  Appropriate incisional tenderness    Medical Decision Making/Problem List:    Active Hospital Problems    Diagnosis    • Acute appendicitis with localized peritonitis [K35.30]      Priority: High   • Hypernatremia [E87.0]      Priority: Medium   • Leukocytosis [D72.829]      Priority: Medium   • Constipation [K59.00]      Priority: Medium   • Essential hypertension [I10]      Priority: Medium   • Stage 3 chronic kidney disease [N18.30]      Priority: Low   • BMI 50.0-59.9, adult (HCC) [Z68.43]      Priority: Low   • HENRI (obstructive sleep apnea) [G47.33]      Priority: Low     AHI 55.7, minimum saturation 84%, on CPAP 13 cm.     • Hyperglycemia [R73.9]    • Hypothyroidism [E03.9]      Core Measures & Quality Metrics:  Core Measures & Quality Metrics  JULIOCESAR Score    Discussed  with patient and family  Assessment/Plan  appreciate care medicine service  Trend Wbc   Encourage ambulation, IS  Monitoring and support  "

## 2021-04-29 NOTE — PROGRESS NOTES
[facility] Medicine Daily Progress Note    Date of Service  4/29/2021    Chief Complaint  65 y.o. female admitted 4/21/2021 with abdominal pain.    Hospital Course  4/22/2021-patient is a 65-year-old female comes in with abdominal pain that started last Friday.  After suffering with the pain the patient went to see urgent care where they did a CAT scan of her abdomen which came back with early appendicitis.  Patient was referred over to the emergency room for evaluation.  Patient was admitted for surgery and was found to have a large abscess along with the appendicitis.  Appendix was removed and the abscess has been drained.  She still having chills and fevers afterwards.  Continue with IV antibiotics as well as pain management and fluid resuscitation.  Await culture results.  4/23/2021-this morning patient is in quite a bit of distress.  Her pain has escalated in the abdomen and she is unable to urinate or defecate.  Upon further evaluation patient has a postoperative ileus in her bowel sounds have not returned.  She has not passed any gas or had a bowel movement.  Continue at this point with supportive fluid resuscitation to ensure the patient's bowel functions returned to normal.  Patient at this point is also complaining of urinary retention and the bladder scanner was only showing 70 to 100 mL of retained urine.  This was inconsistent with the amount of pain the patient was having thus I ordered a immediate straight catheterization which yielded over 800 mL of urine.  After the urine came out the patient's abdominal pain eased up and her condition improved.  We will continue to monitor her urine output and if the patient should have urinary retention we will straight catheter again.  For the time being because of her abdominal distention pain and ileus as well as urinary retention we are unable to discharge the patient at this point in time.  Continue at this point with aggressive management and  stabilization.  4/24/2021-patient is still having difficulty with abdominal pain.  She also has not had a bowel movement and her intestinal sounds are returning very slowly.  Concern is that there is still an abscess present.  Flatplate of the abdomen did not show anything her white blood cell count however is increasing at this point I am going to obtain a CT of the abdomen.  Discussed it with surgery.  Because of the bladder distention and inability urinate a Pabon catheter had to be placed.  Hopefully this will decompress the bladder.  4/25/2021-postoperative day #4 after laparoscopic appendectomy had to be converted to an open appendectomy and drainage of abscess cavity.  Cultures have not grown out any significant organism.  White blood cell count has gone up, repeat CT of the abdomen does not demonstrate worsening abscess formation and actually is consistent with routine healing.  Patient CT demonstrates an ileus.  Since her abdominal distention is getting worse and her nausea is persisting I am putting in an NG to low intermittent suctioning.  This was discussed with the bedside nurse and surgery.  4/26/2021-patient's abdominal distention persists.  Ileus persists status postoperative.  Yesterday the patient's Pabon catheter became obstructed.  It had to be replaced.  Yesterday he also had some technical issues with the hospital bed and at this point had to be replaced a few times.  Pain seems to be better controlled.  Her labs are improving.  The patient may need a repeat CT in 24 to 48 hours if her white blood cell count does not improve.  Today's count is down to 14.8.  Continue with IV Zosyn    Interval Problem Update  4/27: Patient seen at bedside this morning.  The patient still has not had a bowel movement, however she mentions that she thinks she is passing gas.  General surgery continues to recommend ambulation.  Patient currently n.p.o. with NG tube and the patient is on TPN.  As per nursing no  other overnight events were reported.    4/28: Patient seen at bedside this morning.  The patient still has not had a bowel movement.  She says she is passing gas.  General Surgery following the patient along.  We will continue TPN and n.p.o. for now.  No other overnight events were reported by nursing.    4/29: Patient seen at bedside this morning.  She mentions she is passing gas, however she has not had a bowel movement yet.  She was encouraged to ambulate.  We have consulted ID for further antibiotic coverage, we appreciate further recommendations.  As per nursing no other overnight events reported.    Consultants/Specialty  General Surgery    Code Status  Full Code    Disposition  Pending    Review of Systems  Review of Systems   Constitutional: Negative for chills and fever.   HENT: Negative for hearing loss and nosebleeds.    Eyes: Negative for blurred vision and double vision.   Respiratory: Negative for cough and shortness of breath.    Cardiovascular: Negative for chest pain and palpitations.   Gastrointestinal: Positive for abdominal pain, constipation and nausea. Negative for heartburn and vomiting.   Genitourinary: Negative for dysuria and urgency.   Musculoskeletal: Positive for back pain and myalgias. Negative for falls.   Skin: Negative for itching and rash.   Neurological: Positive for headaches. Negative for dizziness.   Psychiatric/Behavioral: The patient is nervous/anxious.    All other systems reviewed and are negative.       Physical Exam  Temp:  [36.6 °C (97.8 °F)-36.9 °C (98.5 °F)] 36.7 °C (98 °F)  Pulse:  [65-72] 65  Resp:  [17-20] 20  BP: (139-145)/(55-80) 145/55  SpO2:  [95 %-100 %] 98 %    Physical Exam  Constitutional:       Appearance: She is obese. She is ill-appearing.   HENT:      Head: Normocephalic and atraumatic.      Nose: No congestion or rhinorrhea.      Mouth/Throat:      Pharynx: No oropharyngeal exudate or posterior oropharyngeal erythema.   Eyes:      General:         Right  eye: No discharge.         Left eye: No discharge.   Cardiovascular:      Rate and Rhythm: Normal rate and regular rhythm.      Pulses: Normal pulses.      Heart sounds: No murmur. No gallop.    Pulmonary:      Effort: Pulmonary effort is normal. No respiratory distress.      Breath sounds: No wheezing or rales.   Abdominal:      General: There is distension.      Tenderness: There is abdominal tenderness.   Musculoskeletal:         General: Normal range of motion.      Cervical back: Normal range of motion.   Skin:     General: Skin is warm and dry.   Neurological:      General: No focal deficit present.      Mental Status: She is alert and oriented to person, place, and time.      Cranial Nerves: No cranial nerve deficit.      Motor: No weakness.   Psychiatric:         Mood and Affect: Mood normal.         Behavior: Behavior normal.         Fluids    Intake/Output Summary (Last 24 hours) at 4/29/2021 1232  Last data filed at 4/29/2021 0500  Gross per 24 hour   Intake --   Output 1820 ml   Net -1820 ml       Laboratory  Recent Labs     04/27/21  0814 04/28/21  0352 04/29/21  0513   WBC 14.0* 12.9* 12.8*   RBC 3.80* 3.76* 3.83*   HEMOGLOBIN 11.3* 11.1* 11.3*   HEMATOCRIT 36.0* 35.5* 35.6*   MCV 94.7 94.4 93.0   MCH 29.7 29.5 29.5   MCHC 31.4* 31.3* 31.7*   RDW 50.3* 48.5 47.5   PLATELETCT 335 301 281   MPV 9.5 9.8 9.7     Recent Labs     04/27/21  0245 04/28/21  0352 04/29/21  0513   SODIUM 146* 148* 143   POTASSIUM 4.2 3.6 3.8   CHLORIDE 108 107 103   CO2 32 33 32   GLUCOSE 131* 135* 132*   BUN 27* 24* 21   CREATININE 1.12 0.99 0.89   CALCIUM 8.7 8.4 8.5             Recent Labs     04/27/21  0245   TRIGLYCERIDE 116       Imaging  DX-ABDOMEN FOR TUBE PLACEMENT   Final Result         1.  Nonspecific bowel gas pattern.   2.  Dobbhoff tube is coiled within the pylorus, the tip terminates overlying the expected location of the gastric pylorus or gastric antrum.   3.  Left basilar atelectasis versus early infiltrate.       IR-PICC LINE PLACEMENT W/ GUIDANCE > AGE 5   Final Result                  Ultrasound-guided PICC placement performed by qualified nursing staff as    above.          CT-ABDOMEN-PELVIS W/O   Final Result      Interval laparotomy with right lower quadrant surgical drain, similar fat stranding in the pericecal region that is likely postoperative in nature. No noncontrast evidence of abscess formation      Small free fluid and intraperitoneal air is not outside normal limits in the perioperative period      Contrast in the colon is from the CT 3 days ago, air-fluid levels and mild small bowel dilatation. In combination these findings are most compatible with ileus      Urinary bladder decompressed around the Pabon catheter. No hydroureteronephrosis      [id number] VIEW   Final Result      Dilated loops of small bowel most likely represent ileus. Partial obstruction is not excluded.      Residual enteric contrast is seen from the recent CT.      Mild bibasilar atelectasis.         IR-US GUIDED PIV   Final Result    Ultrasound-guided PERIPHERAL IV INSERTION performed by    qualified nursing staff as above.           Assessment/Plan  * Acute appendicitis with localized peritonitis  Assessment & Plan  Status post appendectomy.  Initially attempted with laparoscopy but then had to be converted to open resection.  Pain management  NG tube to suctioning due to persistent postoperative ileus  Fluid resuscitation  Due to lack of nutrition will have PICC line placed and TPN started.    4/29: Continue TPN, we appreciate any further recommendations by general surgery. Continue Zosyn. We have consulted ID for further antibiotic coverage, we appreciate further recommendations.    Elevated LFTs  Assessment & Plan  Mild elevation of AST and ALT.  We will continue to monitor and repeat CMP.    Hypernatremia  Assessment & Plan  Na this morning was 148.  We will stop NS continous IV fluids for now.    Strict I's and O's  Monitor and  make changes accordingly.    4/29: Resolved.    Constipation  Assessment & Plan  Possibly post surgical etiology.  General surgery following patient, we appreciate further recommendations  NPO, currently on TPN.    Leukocytosis  Assessment & Plan  Improving  In the setting of ruptured appendicitis and abdominal abscess.  Continue zosyn    4/29: We have consulted ID, we appreciate further recommendations.    Essential hypertension- (present on admission)  Assessment & Plan  Continue losartan  PRN labetalol    4/29: We have increased losartan to 50 mg BID.    Hyperglycemia  Assessment & Plan  Patient's A1c is 6.5.  Patient currently on ISS and hypoglycemia protocol.  Patient would require close follow up as outpatient.    Hypothyroidism  Assessment & Plan  Continue home medication.    Stage 3 chronic kidney disease- (present on admission)  Assessment & Plan  As per chart review, apparently history of  Avoid nephrotoxic medications    4/29: Creatinine .89 today. Patient NPO on TPN.      BMI 50.0-59.9, adult (HCC)- (present on admission)  Assessment & Plan  Body mass index is 57.29 kg/m².  Outpatient follow-ups with weight program      HENRI (obstructive sleep apnea)- (present on admission)  Assessment & Plan  CPAP nocturnal.         VTE prophylaxis: Heparin

## 2021-04-29 NOTE — FLOWSHEET NOTE
04/28/21 2211   Events/Summary/Plan   Events/Summary/Plan CPAP Check   Skin Inspection Respiratory Device Intact   Vital Signs   Pulse 68   Respiration 18   Pulse Oximetry 97 %   $ Pulse Oximetry (Spot Check) Yes   O2 Alarms Set & Reviewed Yes   Respiratory Assessment   Respiratory Pattern Within Normal Limits   Level of Consciousness Alert   Level of Consciousness Alert   Chest Exam   Work Of Breathing / Effort Within Normal Limits   Breath Sounds   RUL Breath Sounds Clear   RML Breath Sounds Clear   RLL Breath Sounds Diminished   CHRISTINA Breath Sounds Clear   LLL Breath Sounds Diminished   Secretions   Cough Non Productive   How Sputum Obtained Cough on Request   Sputum Amount Unable to Evaluate   Sputum Color Unable to Evaluate   Sputum Consistency Unable to Evaluate   Oxygen   O2 (LPM) 2   O2 Delivery Device CPAP   Non-Invasive Ventilation HENRI [facility]   Nocturnal CPAP or BIPAP CPAP - [facility]   $ System Evaluation Yes   FiO2 or LPM 2

## 2021-04-29 NOTE — THERAPY
Missed Therapy     Patient Name: Mady Yang  Age:  65 y.o., Sex:  female  Medical Record #: [id number]  Today's Date: 4/29/2021    Discussed missed therapy with RN. Attempted OT tx today, however, pt politely declined. She reports she was already up twice earlier when they changed beds, and is expecting family from [place] this afternoon. Confirmed by RN. Will reattempt as pt is able and appropriate.       04/29/21 1317   Treatment Variance   Reason For Missed Therapy Non-Medical - Patient Refused   Interdisciplinary Plan of Care Collaboration   IDT Collaboration with  Nursing;Physical Therapist;Family / Caregiver   Patient Position at End of Therapy In Bed   Collaboration Comments RN aware of pt's refusal   Session Information   Date / Session Number  4/29 attempted (last seen 4/27 #1 (1/3, 5/3)) LF   Priority 2

## 2021-04-29 NOTE — PROGRESS NOTES
Pharmacy TPN Day # 4       2021    Dosing Weight   76 kg TPN currently providing 50% of goal      TPN goal: 1380 kcal/day including 1.2 gm/kg/day Protein      TPN indication: NPO s/p appendectomy w/abscess present (I&D)       Pertinent PMH: HTN, HENRI< CKD  Temp (24hrs), Av.7 °C (98.1 °F), Min:36.6 °C (97.8 °F), Max:36.9 °C (98.5 °F)  .  Recent Labs     21  0245 21  0352 21  0513   SODIUM 146* 148* 143   POTASSIUM 4.2 3.6 3.8   CHLORIDE 108 107 103   CO2 32 33 32   BUN 27* 24* 21   CREATININE 1.12 0.99 0.89   GLUCOSE 131* 135* 132*   CALCIUM 8.7 8.4 8.5   ASTSGOT 28 54* 81*   ALTSGPT 22 37 68*   ALBUMIN 2.6* 2.6* 2.7*   TBILIRUBIN 0.3 0.3 0.4   PHOSPHORUS 2.5 2.6 3.2   MAGNESIUM 2.0  --  1.9     Accu-Checks  Recent Labs     21  1135 21  1742 21  0503   POCGLUCOSE 112* 116* 117*       Vitals:    21 1734 21 2204 21 0409 21 1038   BP: 139/71 143/62 144/80 145/55   Weight:       Height:           Intake/Output Summary (Last 24 hours) at 2021 1204  Last data filed at 2021 0500  Gross per 24 hour   Intake --   Output 1820 ml   Net -1820 ml       Orders Placed This Encounter   Procedures   • Diet NPO     Standing Status:   Standing     Number of Occurrences:   1     Order Specific Question:   Restrict to:     Answer:   Sips with Medications [3]         TPN for past 72 hours (Show up to 3 orders; newest on the left. Changes between the two most recent orders are indicated.)     Start date and time   2021      TPN Central Line Formulation [[id number]] TPN Central Line Formulation [[id number]] TPN Central Line Formulation [[id number]]    Order Status  Active Last Dose in Progress Active    Last Admin   [place] [address] by Blanca Lisa RPASTOR Rate Verify at 2021 by Elizabeth Mobley R.N.       Base    Clinisol 15%  90 g 45 g 45 g    dextrose 70%  200 g 100 g 100 g    fat emulsions 20%  34 g  17 g 17 g       Additives    potassium phosphate  15 mmol 15 mmol 15 mmol    potassium chloride  70 mEq 40 mEq --    potassium acetate  -- -- 10 mEq    sodium acetate  -- -- 50 mEq    sodium chloride  170 mEq 170 mEq 150 mEq    magnesium sulfate  8 mEq 4 mEq 4.06 mEq    calcium GLUConate  9.4 mEq 9.4 mEq 4.65 mEq    M.T.E.-4 Adult  1 mL 1 mL 1 mL    M.V.I. Adult  10 mL 10 mL 10 mL       QS Base    sterile water  820.61 mL 1,364.39 mL 1,369.6 mL       Energy Contribution    Proteins  -- -- --    Dextrose  -- -- --    Lipids  -- -- --    Total  -- -- --       Electrolyte Ion Calculated Amount    Sodium  170 mEq 170 mEq 200 mEq    Potassium  92 mEq 62 mEq 32 mEq    Calcium  9.4 mEq 9.4 mEq 4.65 mEq    Magnesium  8 mEq 4.02 mEq 4.06 mEq    Aluminum  -- -- --    Phosphate  15 mmol 15 mmol 15 mmol    Chloride  240 mEq 210 mEq 150 mEq    Acetate  76.2 mEq 38.1 mEq 98.1 mEq       Other    Total Protein  90 g 45 g 45 g    Total Protein/kg  0.61 g/kg 0.31 g/kg 0.31 g/kg    Total Amino Acid  -- -- --    Total Amino Acid/kg  -- -- --    Glucose Infusion Rate  0.94 mg/kg/min 0.47 mg/kg/min 0.47 mg/kg/min    Osmolarity (Estimated)  -- -- --    Volume  1,992 mL 1,992 mL 1,992 mL    Rate  83 mL/hr 83 mL/hr 83 mL/hr    Dosing Weight  147 kg 147 kg 147 kg    Infusion Site  [facility]            This formula provides:  % kcal as lipids = 25  Grams protein/kg = 1.2  Non-protein calories = 1020  Kcals/kg = 18.1  Total daily calories = 1380    Comments:  Lytes stable, adjust potassium, and magnesium.  Will increase to 100% goal kcals today in 2 Liters of fluid.  Will check CMP, Magnesium and phosphorous levels tomorrow am.      Dhaval Macedo [facility]

## 2021-04-30 LAB
ALBUMIN SERPL BCP-MCNC: 2.7 G/DL (ref 3.2–4.9)
ALBUMIN/GLOB SERPL: 0.8 G/DL
ALP SERPL-CCNC: 93 U/L (ref 30–99)
ALT SERPL-CCNC: 64 U/L (ref 2–50)
ANION GAP SERPL CALC-SCNC: 8 MMOL/L (ref 7–16)
AST SERPL-CCNC: 51 U/L (ref 12–45)
BACTERIA BLD CULT: NORMAL
BACTERIA BLD CULT: NORMAL
BASOPHILS # BLD AUTO: 0.5 % (ref 0–1.8)
BASOPHILS # BLD AUTO: NORMAL % (ref 0–1.8)
BASOPHILS # BLD: 0.06 K/UL (ref 0–0.12)
BASOPHILS # BLD: NORMAL K/UL (ref 0–0.12)
BILIRUB SERPL-MCNC: 0.4 MG/DL (ref 0.1–1.5)
BUN SERPL-MCNC: 20 MG/DL (ref 8–22)
CALCIUM SERPL-MCNC: 8.7 MG/DL (ref 8.4–10.2)
CHLORIDE SERPL-SCNC: 107 MMOL/L (ref 96–112)
CO2 SERPL-SCNC: 30 MMOL/L (ref 20–33)
CREAT SERPL-MCNC: 0.95 MG/DL (ref 0.5–1.4)
EOSINOPHIL # BLD AUTO: 0.39 K/UL (ref 0–0.51)
EOSINOPHIL # BLD AUTO: NORMAL K/UL (ref 0–0.51)
EOSINOPHIL NFR BLD: 2.9 % (ref 0–6.9)
EOSINOPHIL NFR BLD: NORMAL % (ref 0–6.9)
ERYTHROCYTE [DISTWIDTH] IN BLOOD BY AUTOMATED COUNT: 46.5 FL (ref 35.9–50)
ERYTHROCYTE [DISTWIDTH] IN BLOOD BY AUTOMATED COUNT: NORMAL FL (ref 35.9–50)
GLOBULIN SER CALC-MCNC: 3.3 G/DL (ref 1.9–3.5)
GLUCOSE BLD-MCNC: 119 MG/DL (ref 65–99)
GLUCOSE BLD-MCNC: 122 MG/DL (ref 65–99)
GLUCOSE BLD-MCNC: 124 MG/DL (ref 65–99)
GLUCOSE BLD-MCNC: 132 MG/DL (ref 65–99)
GLUCOSE BLD-MCNC: 134 MG/DL (ref 65–99)
GLUCOSE BLD-MCNC: 137 MG/DL (ref 65–99)
GLUCOSE SERPL-MCNC: 124 MG/DL (ref 65–99)
HCT VFR BLD AUTO: 36.1 % (ref 37–47)
HCT VFR BLD AUTO: NORMAL % (ref 37–47)
HGB BLD-MCNC: 11.4 G/DL (ref 12–16)
HGB BLD-MCNC: NORMAL G/DL (ref 12–16)
IMM GRANULOCYTES # BLD AUTO: 0.53 K/UL (ref 0–0.11)
IMM GRANULOCYTES # BLD AUTO: NORMAL K/UL (ref 0–0.11)
IMM GRANULOCYTES NFR BLD AUTO: 4 % (ref 0–0.9)
IMM GRANULOCYTES NFR BLD AUTO: NORMAL % (ref 0–0.9)
LYMPHOCYTES # BLD AUTO: 2.63 K/UL (ref 1–4.8)
LYMPHOCYTES # BLD AUTO: NORMAL K/UL (ref 1–4.8)
LYMPHOCYTES NFR BLD: 19.7 % (ref 22–41)
LYMPHOCYTES NFR BLD: NORMAL % (ref 22–41)
MAGNESIUM SERPL-MCNC: 2 MG/DL (ref 1.5–2.5)
MCH RBC QN AUTO: 29.1 PG (ref 27–33)
MCH RBC QN AUTO: NORMAL PG (ref 27–33)
MCHC RBC AUTO-ENTMCNC: 31.6 G/DL (ref 33.6–35)
MCHC RBC AUTO-ENTMCNC: NORMAL G/DL (ref 33.6–35)
MCV RBC AUTO: 92.1 FL (ref 81.4–97.8)
MCV RBC AUTO: NORMAL FL (ref 81.4–97.8)
MONOCYTES # BLD AUTO: 0.67 K/UL (ref 0–0.85)
MONOCYTES # BLD AUTO: NORMAL K/UL (ref 0–0.85)
MONOCYTES NFR BLD AUTO: 5 % (ref 0–13.4)
MONOCYTES NFR BLD AUTO: NORMAL % (ref 0–13.4)
NEUTROPHILS # BLD AUTO: 9.04 K/UL (ref 2–7.15)
NEUTROPHILS # BLD AUTO: NORMAL K/UL (ref 2–7.15)
NEUTROPHILS NFR BLD: 67.9 % (ref 44–72)
NEUTROPHILS NFR BLD: NORMAL % (ref 44–72)
NRBC # BLD AUTO: 0 K/UL
NRBC # BLD AUTO: NORMAL K/UL
NRBC BLD-RTO: 0 /100 WBC
NRBC BLD-RTO: NORMAL /100 WBC
PHOSPHATE SERPL-MCNC: 2.9 MG/DL (ref 2.5–4.5)
PLATELET # BLD AUTO: 180 K/UL (ref 164–446)
PLATELET # BLD AUTO: NORMAL K/UL (ref 164–446)
PMV BLD AUTO: 10.9 FL (ref 9–12.9)
PMV BLD AUTO: NORMAL FL (ref 9–12.9)
POTASSIUM SERPL-SCNC: 4 MMOL/L (ref 3.6–5.5)
PROT SERPL-MCNC: 6 G/DL (ref 6–8.2)
RBC # BLD AUTO: 3.92 M/UL (ref 4.2–5.4)
RBC # BLD AUTO: NORMAL M/UL (ref 4.2–5.4)
SIGNIFICANT IND 70042: NORMAL
SIGNIFICANT IND 70042: NORMAL
SITE SITE: NORMAL
SITE SITE: NORMAL
SODIUM SERPL-SCNC: 145 MMOL/L (ref 135–145)
SOURCE SOURCE: NORMAL
SOURCE SOURCE: NORMAL
WBC # BLD AUTO: 13.3 K/UL (ref 4.8–10.8)
WBC # BLD AUTO: NORMAL K/UL (ref 4.8–10.8)

## 2021-04-30 PROCEDURE — 82962 GLUCOSE BLOOD TEST: CPT | Mod: 91

## 2021-04-30 PROCEDURE — 700111 HCHG RX REV CODE 636 W/ 250 OVERRIDE (IP): Performed by: INTERNAL MEDICINE

## 2021-04-30 PROCEDURE — A9270 NON-COVERED ITEM OR SERVICE: HCPCS | Performed by: INTERNAL MEDICINE

## 2021-04-30 PROCEDURE — 700101 HCHG RX REV CODE 250: Performed by: INTERNAL MEDICINE

## 2021-04-30 PROCEDURE — 700102 HCHG RX REV CODE 250 W/ 637 OVERRIDE(OP): Performed by: INTERNAL MEDICINE

## 2021-04-30 PROCEDURE — 85025 COMPLETE CBC W/AUTO DIFF WBC: CPT

## 2021-04-30 PROCEDURE — 83735 ASSAY OF MAGNESIUM: CPT

## 2021-04-30 PROCEDURE — 97530 THERAPEUTIC ACTIVITIES: CPT

## 2021-04-30 PROCEDURE — 80053 COMPREHEN METABOLIC PANEL: CPT

## 2021-04-30 PROCEDURE — 84100 ASSAY OF PHOSPHORUS: CPT

## 2021-04-30 PROCEDURE — 99232 SBSQ HOSP IP/OBS MODERATE 35: CPT | Performed by: INTERNAL MEDICINE

## 2021-04-30 PROCEDURE — 94660 CPAP INITIATION&MGMT: CPT

## 2021-04-30 PROCEDURE — 770006 HCHG ROOM/CARE - MED/SURG/GYN SEMI*

## 2021-04-30 PROCEDURE — 700105 HCHG RX REV CODE 258: Performed by: INTERNAL MEDICINE

## 2021-04-30 PROCEDURE — 94760 N-INVAS EAR/PLS OXIMETRY 1: CPT

## 2021-04-30 PROCEDURE — 97116 GAIT TRAINING THERAPY: CPT

## 2021-04-30 RX ORDER — SODIUM CHLORIDE, SODIUM LACTATE, POTASSIUM CHLORIDE, CALCIUM CHLORIDE 600; 310; 30; 20 MG/100ML; MG/100ML; MG/100ML; MG/100ML
INJECTION, SOLUTION INTRAVENOUS CONTINUOUS
Status: DISCONTINUED | OUTPATIENT
Start: 2021-04-30 | End: 2021-05-14 | Stop reason: HOSPADM

## 2021-04-30 RX ADMIN — LOSARTAN POTASSIUM 50 MG: 25 TABLET, FILM COATED ORAL at 05:46

## 2021-04-30 RX ADMIN — PIPERACILLIN AND TAZOBACTAM 4.5 G: 4; .5 INJECTION, POWDER, LYOPHILIZED, FOR SOLUTION INTRAVENOUS; PARENTERAL at 14:19

## 2021-04-30 RX ADMIN — LOSARTAN POTASSIUM 50 MG: 25 TABLET, FILM COATED ORAL at 18:52

## 2021-04-30 RX ADMIN — HEPARIN SODIUM 5000 UNITS: 5000 INJECTION, SOLUTION INTRAVENOUS; SUBCUTANEOUS at 14:18

## 2021-04-30 RX ADMIN — OXYCODONE HYDROCHLORIDE 10 MG: 10 TABLET ORAL at 21:23

## 2021-04-30 RX ADMIN — HEPARIN SODIUM 5000 UNITS: 5000 INJECTION, SOLUTION INTRAVENOUS; SUBCUTANEOUS at 05:46

## 2021-04-30 RX ADMIN — HEPARIN SODIUM 5000 UNITS: 5000 INJECTION, SOLUTION INTRAVENOUS; SUBCUTANEOUS at 21:25

## 2021-04-30 RX ADMIN — PIPERACILLIN AND TAZOBACTAM 4.5 G: 4; .5 INJECTION, POWDER, LYOPHILIZED, FOR SOLUTION INTRAVENOUS; PARENTERAL at 05:46

## 2021-04-30 RX ADMIN — SODIUM CHLORIDE, POTASSIUM CHLORIDE, SODIUM LACTATE AND CALCIUM CHLORIDE: 600; 310; 30; 20 INJECTION, SOLUTION INTRAVENOUS at 18:49

## 2021-04-30 RX ADMIN — OXYCODONE HYDROCHLORIDE 10 MG: 10 TABLET ORAL at 05:45

## 2021-04-30 RX ADMIN — LORATADINE 10 MG: 10 TABLET ORAL at 05:46

## 2021-04-30 RX ADMIN — PIPERACILLIN AND TAZOBACTAM 4.5 G: 4; .5 INJECTION, POWDER, LYOPHILIZED, FOR SOLUTION INTRAVENOUS; PARENTERAL at 21:27

## 2021-04-30 RX ADMIN — POTASSIUM CHLORIDE: 2 INJECTION, SOLUTION, CONCENTRATE INTRAVENOUS at 21:28

## 2021-04-30 RX ADMIN — THYROID, PORCINE 90 MG: 30 TABLET ORAL at 05:45

## 2021-04-30 ASSESSMENT — PAIN DESCRIPTION - PAIN TYPE
TYPE: ACUTE PAIN

## 2021-04-30 ASSESSMENT — ENCOUNTER SYMPTOMS
MYALGIAS: 1
VOMITING: 0
HEARTBURN: 0
PALPITATIONS: 0
DOUBLE VISION: 0
CONSTIPATION: 1
DIZZINESS: 0
CHILLS: 0
BLURRED VISION: 0
NAUSEA: 1
FALLS: 0
SHORTNESS OF BREATH: 0
COUGH: 0
BACK PAIN: 1
FEVER: 0
ABDOMINAL PAIN: 1
NERVOUS/ANXIOUS: 1
HEADACHES: 1

## 2021-04-30 ASSESSMENT — COGNITIVE AND FUNCTIONAL STATUS - GENERAL
MOBILITY SCORE: 11
MOVING FROM LYING ON BACK TO SITTING ON SIDE OF FLAT BED: A LOT
SUGGESTED CMS G CODE MODIFIER MOBILITY: CL
MOVING TO AND FROM BED TO CHAIR: A LOT
CLIMB 3 TO 5 STEPS WITH RAILING: TOTAL
TURNING FROM BACK TO SIDE WHILE IN FLAT BAD: A LOT
STANDING UP FROM CHAIR USING ARMS: A LOT
WALKING IN HOSPITAL ROOM: A LOT

## 2021-04-30 ASSESSMENT — GAIT ASSESSMENTS
GAIT LEVEL OF ASSIST: MINIMAL ASSIST
ASSISTIVE DEVICE: FRONT WHEEL WALKER
DEVIATION: ANTALGIC
DISTANCE (FEET): 60

## 2021-04-30 NOTE — DISCHARGE PLANNING
Anticipated Discharge Disposition: LTAC    Action: Pt discussed during IDT rounds. Pt a potential candidate for LTAC.   LSW was able to receive assistance from Carl CEE for CHOICE. Pt provided signature for referral to be placed to [employer].     LSW messaged Dr. Malin and requested order for LTAC.     Barriers to Discharge: None     Plan: Await order for LTAC, LSW to continue to follow for d/c needs

## 2021-04-30 NOTE — FLOWSHEET NOTE
04/30/21 0227   Events/Summary/Plan   Events/Summary/Plan CPAP Check   Skin Inspection Respiratory Device Intact

## 2021-04-30 NOTE — PROGRESS NOTES
Report received from WILIAM Loja. Dx, medications, O2 needs, and poc reviewed. Safety precautions in place and pt has no sign of distress or acute needs at this time. Care fully assumed. Will continue to monitor.

## 2021-04-30 NOTE — PROGRESS NOTES
[facility] Medicine Daily Progress Note    Date of Service  4/30/2021    Chief Complaint  65 y.o. female admitted 4/21/2021 with abdominal pain.    Hospital Course  4/22/2021-patient is a 65-year-old female comes in with abdominal pain that started last Friday.  After suffering with the pain the patient went to see urgent care where they did a CAT scan of her abdomen which came back with early appendicitis.  Patient was referred over to the emergency room for evaluation.  Patient was admitted for surgery and was found to have a large abscess along with the appendicitis.  Appendix was removed and the abscess has been drained.  She still having chills and fevers afterwards.  Continue with IV antibiotics as well as pain management and fluid resuscitation.  Await culture results.  4/23/2021-this morning patient is in quite a bit of distress.  Her pain has escalated in the abdomen and she is unable to urinate or defecate.  Upon further evaluation patient has a postoperative ileus in her bowel sounds have not returned.  She has not passed any gas or had a bowel movement.  Continue at this point with supportive fluid resuscitation to ensure the patient's bowel functions returned to normal.  Patient at this point is also complaining of urinary retention and the bladder scanner was only showing 70 to 100 mL of retained urine.  This was inconsistent with the amount of pain the patient was having thus I ordered a immediate straight catheterization which yielded over 800 mL of urine.  After the urine came out the patient's abdominal pain eased up and her condition improved.  We will continue to monitor her urine output and if the patient should have urinary retention we will straight catheter again.  For the time being because of her abdominal distention pain and ileus as well as urinary retention we are unable to discharge the patient at this point in time.  Continue at this point with aggressive management and  stabilization.  4/24/2021-patient is still having difficulty with abdominal pain.  She also has not had a bowel movement and her intestinal sounds are returning very slowly.  Concern is that there is still an abscess present.  Flatplate of the abdomen did not show anything her white blood cell count however is increasing at this point I am going to obtain a CT of the abdomen.  Discussed it with surgery.  Because of the bladder distention and inability urinate a Pabon catheter had to be placed.  Hopefully this will decompress the bladder.  4/25/2021-postoperative day #4 after laparoscopic appendectomy had to be converted to an open appendectomy and drainage of abscess cavity.  Cultures have not grown out any significant organism.  White blood cell count has gone up, repeat CT of the abdomen does not demonstrate worsening abscess formation and actually is consistent with routine healing.  Patient CT demonstrates an ileus.  Since her abdominal distention is getting worse and her nausea is persisting I am putting in an NG to low intermittent suctioning.  This was discussed with the bedside nurse and surgery.  4/26/2021-patient's abdominal distention persists.  Ileus persists status postoperative.  Yesterday the patient's Pabon catheter became obstructed.  It had to be replaced.  Yesterday he also had some technical issues with the hospital bed and at this point had to be replaced a few times.  Pain seems to be better controlled.  Her labs are improving.  The patient may need a repeat CT in 24 to 48 hours if her white blood cell count does not improve.  Today's count is down to 14.8.  Continue with IV Zosyn    Interval Problem Update  4/27: Patient seen at bedside this morning.  The patient still has not had a bowel movement, however she mentions that she thinks she is passing gas.  General surgery continues to recommend ambulation.  Patient currently n.p.o. with NG tube and the patient is on TPN.  As per nursing no  other overnight events were reported.    4/28: Patient seen at bedside this morning.  The patient still has not had a bowel movement.  She says she is passing gas.  General Surgery following the patient along.  We will continue TPN and n.p.o. for now.  No other overnight events were reported by nursing.    4/29: Patient seen at bedside this morning.  She mentions she is passing gas, however she has not had a bowel movement yet.  She was encouraged to ambulate.  We have consulted ID for further antibiotic coverage, we appreciate further recommendations.  As per nursing no other overnight events reported.    4/30: Patient seen at bedside this morning.  The patient still without a bowel movement.  ID recommends to continue IV antibiotics for now.  As per nursing no other overnight events reported.    Consultants/Specialty  General Surgery    Code Status  Full Code    Disposition  Pending    Review of Systems  Review of Systems   Constitutional: Negative for chills and fever.   HENT: Negative for hearing loss and nosebleeds.    Eyes: Negative for blurred vision and double vision.   Respiratory: Negative for cough and shortness of breath.    Cardiovascular: Negative for chest pain and palpitations.   Gastrointestinal: Positive for abdominal pain, constipation and nausea. Negative for heartburn and vomiting.   Genitourinary: Negative for dysuria and urgency.   Musculoskeletal: Positive for back pain and myalgias. Negative for falls.   Skin: Negative for itching and rash.   Neurological: Positive for headaches. Negative for dizziness.   Psychiatric/Behavioral: The patient is nervous/anxious.    All other systems reviewed and are negative.       Physical Exam  Temp:  [36.2 °C (97.2 °F)-36.7 °C (98 °F)] 36.4 °C (97.6 °F)  Pulse:  [69-77] 74  Resp:  [18-20] 18  BP: (135-154)/(67-84) 135/67  SpO2:  [94 %-98 %] 98 %    Physical Exam  Constitutional:       Appearance: She is obese. She is ill-appearing.   HENT:      Head:  Normocephalic and atraumatic.      Nose: No congestion or rhinorrhea.      Mouth/Throat:      Pharynx: No oropharyngeal exudate or posterior oropharyngeal erythema.   Eyes:      General:         Right eye: No discharge.         Left eye: No discharge.   Cardiovascular:      Rate and Rhythm: Normal rate and regular rhythm.      Pulses: Normal pulses.      Heart sounds: No murmur. No gallop.    Pulmonary:      Effort: Pulmonary effort is normal. No respiratory distress.      Breath sounds: No wheezing or rales.   Abdominal:      General: There is distension.      Tenderness: There is abdominal tenderness.   Musculoskeletal:         General: Normal range of motion.      Cervical back: Normal range of motion.   Skin:     General: Skin is warm and dry.   Neurological:      General: No focal deficit present.      Mental Status: She is alert and oriented to person, place, and time.      Cranial Nerves: No cranial nerve deficit.      Motor: No weakness.   Psychiatric:         Mood and Affect: Mood normal.         Behavior: Behavior normal.         Fluids    Intake/Output Summary (Last 24 hours) at 4/30/2021 1600  Last data filed at 4/30/2021 0500  Gross per 24 hour   Intake --   Output 1930 ml   Net -1930 ml       Laboratory  Recent Labs     04/29/21  0513 04/30/21  0335 04/30/21  0624   WBC 12.8* RR 13.3*   RBC 3.83* RR 3.92*   HEMOGLOBIN 11.3* RR 11.4*   HEMATOCRIT 35.6* RR 36.1*   MCV 93.0 RR 92.1   MCH 29.5 RR 29.1   MCHC 31.7* RR 31.6*   RDW 47.5 RR 46.5   PLATELETCT 281    MPV 9.7 RR 10.9     Recent Labs     04/28/21  0352 04/29/21  0513 04/30/21  0624   SODIUM 148* 143 145   POTASSIUM 3.6 3.8 4.0   CHLORIDE 107 103 107   CO2 33 32 30   GLUCOSE 135* 132* 124*   BUN 24* 21 20   CREATININE 0.99 0.89 0.95   CALCIUM 8.4 8.5 8.7                   Imaging  DX-ABDOMEN FOR TUBE PLACEMENT   Final Result         1.  Nonspecific bowel gas pattern.   2.  Dobbhoff tube is coiled within the pylorus, the tip terminates  overlying the expected location of the gastric pylorus or gastric antrum.   3.  Left basilar atelectasis versus early infiltrate.      IR-PICC LINE PLACEMENT W/ GUIDANCE > AGE 5   Final Result                  Ultrasound-guided PICC placement performed by qualified nursing staff as    above.          CT-ABDOMEN-PELVIS W/O   Final Result      Interval laparotomy with right lower quadrant surgical drain, similar fat stranding in the pericecal region that is likely postoperative in nature. No noncontrast evidence of abscess formation      Small free fluid and intraperitoneal air is not outside normal limits in the perioperative period      Contrast in the colon is from the CT 3 days ago, air-fluid levels and mild small bowel dilatation. In combination these findings are most compatible with ileus      Urinary bladder decompressed around the Pabon catheter. No hydroureteronephrosis      [id number] VIEW   Final Result      Dilated loops of small bowel most likely represent ileus. Partial obstruction is not excluded.      Residual enteric contrast is seen from the recent CT.      Mild bibasilar atelectasis.         IR-US GUIDED PIV   Final Result    Ultrasound-guided PERIPHERAL IV INSERTION performed by    qualified nursing staff as above.           Assessment/Plan  * Acute appendicitis with localized peritonitis  Assessment & Plan  Status post appendectomy.  Initially attempted with laparoscopy but then had to be converted to open resection.  Pain management  NG tube to suctioning due to persistent postoperative ileus  Fluid resuscitation  Due to lack of nutrition will have PICC line placed and TPN started.    4/29: Continue TPN, we appreciate any further recommendations by general surgery. Continue Zosyn. We have consulted ID for further antibiotic coverage, we appreciate further recommendations.    4/30: We consulted ID, and they recommended to continue IV antibiotics for now.    Elevated LFTs  Assessment &  Plan  Mild elevation of AST and ALT.  We will continue to monitor and repeat CMP.    4/30: Trending down, repeat CMP.    Hypernatremia  Assessment & Plan  Na this morning was 148.  We will stop NS continous IV fluids for now.    Strict I's and O's  Monitor and make changes accordingly.    4/29: Resolved.    Constipation  Assessment & Plan  Possibly post surgical etiology.  General surgery following patient, we appreciate further recommendations  NPO, currently on TPN.    Leukocytosis  Assessment & Plan  In the setting of ruptured appendicitis and abdominal abscess.  Continue zosyn    4/30: WBC today are 13.3. ID consulted, who recommend to continue IV antibiotics. We appreciate further recommendations.    Essential hypertension- (present on admission)  Assessment & Plan  Continue losartan  PRN labetalol    4/29: We have increased losartan to 50 mg BID.    Hyperglycemia  Assessment & Plan  Patient's A1c is 6.5.  Patient currently on ISS and hypoglycemia protocol.  Patient would require close follow up as outpatient.    Hypothyroidism  Assessment & Plan  Continue home medication.    Stage 3 chronic kidney disease- (present on admission)  Assessment & Plan  As per chart review, apparently history of  Avoid nephrotoxic medications    4/30: Creatinine .95 today. Patient NPO on TPN.      BMI 50.0-59.9, adult (HCC)- (present on admission)  Assessment & Plan  Body mass index is 57.29 kg/m².  Outpatient follow-ups with weight program      HENRI (obstructive sleep apnea)- (present on admission)  Assessment & Plan  CPAP nocturnal.         VTE prophylaxis: Heparin

## 2021-04-30 NOTE — FLOWSHEET NOTE
04/29/21 2211   Events/Summary/Plan   Events/Summary/Plan CPAP Check   Skin Inspection Respiratory Device Intact

## 2021-04-30 NOTE — THERAPY
Physical Therapy   Daily Treatment     Patient Name: Mady Yang  Age:  65 y.o., Sex:  female  Medical Record #: [id number]  Today's Date: 4/30/2021     Precautions: Fall Risk, Nasogastric Tube    Assessment    Improved transfers and ambulation today   fatigued following ambulation.   Pt C/o L knee pain    Plan    Continue current treatment plan.      04/30/21 1600   Total Time Spent   Total Time Spent (Mins) 30   Charge Group   PT Gait Training 1   PT Therapeutic Activities 1   Supine Lower Body Exercise   Supine Lower Body Exercises Yes   Sitting Lower Body Exercises   Sitting Lower Body Exercises Yes   Balance   Sitting Balance (Static) Fair +   Sitting Balance (Dynamic) Fair   Standing Balance (Static) Fair   Standing Balance (Dynamic) Fair   Weight Shift Sitting Fair   Weight Shift Standing Fair   Gait Analysis   Gait Level Of Assist Minimal Assist   Assistive Device Front Wheel Walker   Distance (Feet) 60   # of Times Distance was Traveled 1   Deviation Antalgic   Weight Bearing Status full   Bed Mobility    Supine to Sit Moderate Assist   Scooting Minimal Assist   Functional Mobility   Sit to Stand Minimal Assist   Bed, Chair, Wheelchair Transfer Minimal Assist   Transfer Method Stand Step   How much difficulty does the patient currently have...   Turning over in bed (including adjusting bedclothes, sheets and blankets)? 2   Sitting down on and standing up from a chair with arms (e.g., wheelchair, bedside commode, etc.) 2   Moving from lying on back to sitting on the side of the bed? 2   How much help from another person does the patient currently need...   Moving to and from a bed to a chair (including a wheelchair)? 2   Need to walk in a hospital room? 2   Climbing 3-5 steps with a railing? 1   6 clicks Mobility Score 11   Activity Tolerance   Sitting Edge of Bed 10   Standing 10   Short Term Goals    Short Term Goal # 1 pt will go supine<>sit w/hob flat and rails down w/spv in 6tx for safe d/c  home    Goal Outcome # 1 Progressing slower than expected   Short Term Goal # 2 pt will go sit<>stand w/fww w/spv in 6tx for safe d/c home    Goal Outcome # 2 Progressing slower than expected   Short Term Goal # 3 pt will transfer bed<>chair w/fww w/spv in 6tx for safe d/c home    Goal Outcome # 3 Progressing slower than expected   Short Term Goal # 4 pt will ambulate for 150ft w/fww w/spv in 6tx for safe d/c home    Goal Outcome # 4 Progressing slower than expected   Short Term Goal # 5 pt will go up/down 2 steps w/spv in 6tx for safe d/c home    Goal Outcome # 5 Goal not met   Interdisciplinary Plan of Care Collaboration   IDT Collaboration with  Nursing   Session Information   Date / Session Number  4/28-2 2/3 5/3       DC Equipment Recommendations: Unable to determine at this time  Discharge Recommendations: Recommend post-acute placement for additional physical therapy services prior to discharge home

## 2021-04-30 NOTE — FLOWSHEET NOTE
04/29/21 2215   Events/Summary/Plan   Events/Summary/Plan CPAP Check   Skin Inspection Respiratory Device Intact   Vital Signs   Pulse 70   Respiration 18   Pulse Oximetry 97 %   $ Pulse Oximetry (Spot Check) Yes   O2 Alarms Set & Reviewed Yes   Respiratory Assessment   Respiratory Pattern Within Normal Limits   Level of Consciousness Alert   Level of Consciousness Alert   Chest Exam   Work Of Breathing / Effort Within Normal Limits   Breath Sounds   RUL Breath Sounds Clear   RML Breath Sounds Clear   RLL Breath Sounds Diminished   CHRISTINA Breath Sounds Clear   LLL Breath Sounds Diminished   Secretions   Cough Non Productive   How Sputum Obtained Spontaneous   Sputum Amount Unable to Evaluate   Sputum Color Unable to Evaluate   Sputum Consistency Unable to Evaluate   Oxygen   O2 (LPM) 2   O2 Delivery Device CPAP   Non-Invasive Ventilation [facility]   Nocturnal CPAP or BIPAP CPAP - [facility]   $ System Evaluation Yes   FiO2 or LPM 2

## 2021-04-30 NOTE — FLOWSHEET NOTE
04/30/21 0227   Events/Summary/Plan   Events/Summary/Plan CPAP Check   Skin Inspection Respiratory Device Intact   Vital Signs   Pulse 69   Respiration 18   Pulse Oximetry 95 %   $ Pulse Oximetry (Spot Check) Yes   O2 Alarms Set & Reviewed Yes   Respiratory Assessment   Respiratory Pattern Within Normal Limits   Level of Consciousness Alert   Level of Consciousness Alert   Chest Exam   Work Of Breathing / Effort Within Normal Limits   Oxygen   O2 (LPM) 2   O2 Delivery Device CPAP   Non-Invasive Ventilation [facility]   Nocturnal CPAP or BIPAP CPAP - [facility]   $ System Evaluation Yes   FiO2 or LPM 2

## 2021-04-30 NOTE — CARE PLAN
Problem: Nutritional:  Goal: Nutrition support tolerated and meeting greater than 85% of estimated needs  Outcome: MET   TPN advanced to 100% goal.

## 2021-04-30 NOTE — PROGRESS NOTES
"  Trauma/Surgical Progress Note    Author: Jett Ruiz M.D. Date & Time created: 4/30/2021   8:30 AM   4/21 open appendectomy drain abscess     Interval Events:  Awake alert  reports pain control good  Reports feeling improved   increased activity working with PT   flatus,  no bm  Hemodynamics:  /84   Pulse 77   Temp 36.5 °C (97.7 °F) (Temporal)   Resp 18   Ht 1.6 m (5' 3\")   Wt (!) 147 kg (323 lb 6.6 oz)   SpO2 97%      Respiratory:    Respiration: 18, Pulse Oximetry: 97 %     Work Of Breathing / Effort: Within Normal Limits  RUL Breath Sounds: Clear, RML Breath Sounds: Clear, RLL Breath Sounds: Diminished, CHRISTINA Breath Sounds: Clear, LLL Breath Sounds: Diminished  Fluids:    Intake/Output Summary (Last 24 hours) at 4/30/2021 0830  Last data filed at 4/30/2021 0500  Gross per 24 hour   Intake --   Output 2625 ml   Net -2625 ml     Admit Weight: (!) 147 kg (323 lb 6.6 oz)  Current     Physical Exam  Awake alert appropriate  Breathing with ease  Appropriate incisional tenderness  Drain 75/24  Medical Decision Making/Problem List:    Active Hospital Problems    Diagnosis    • Acute appendicitis with localized peritonitis [K35.30]      Priority: High   • Elevated LFTs [R79.89]      Priority: Medium   • Hypernatremia [E87.0]      Priority: Medium   • Leukocytosis [D72.829]      Priority: Medium   • Constipation [K59.00]      Priority: Medium   • Essential hypertension [I10]      Priority: Medium   • Stage 3 chronic kidney disease [N18.30]      Priority: Low   • BMI 50.0-59.9, adult (HCC) [Z68.43]      Priority: Low   • HENRI (obstructive sleep apnea) [G47.33]      Priority: Low     AHI 55.7, minimum saturation 84%, on CPAP 13 cm.     • Hyperglycemia [R73.9]    • Hypothyroidism [E03.9]      Core Measures & Quality Metrics:  Core Measures & Quality Metrics  JULIOCESAR Score  Discussed  with patient and family    Assessment/Plan  [facility] care medicine service  Trend Wbc   Encourage ambulation, IS  Monitoring and " support

## 2021-04-30 NOTE — CONSULTS
DATE OF SERVICE:  04/29/2021     INFECTIOUS DISEASE CONSULTATION     REASON FOR CONSULTATION:  Perforated appendix.     CONSULTING PHYSICIAN:  Piero King MD     HISTORY OF PRESENT ILLNESS:  This is a 65-year-old female who was originally   admitted to the hospital on 04/21/2021 due to abdominal pain that started on   the Friday prior to admission.  She initially went to urgent care, where they   did a CT scan, which showed early appendicitis.  She was sent to the ER.  She   was seen and evaluated by surgery and was found to have an associated abscess   along with her appendicitis, consistent with perforation.  She underwent a   laparoscopic appendectomy, which had to be converted to an open appendectomy   on 04/21/2021 with drainage of the abscess cavity. Her hospital course has   been complicated by ileus requiring NG tube as well as TPN.  She has been   receiving initially ceftriaxone and Flagyl from 04/21 to 04/24.  Then she was   switched to Zosyn on 04/24, which she is tolerating fairly well.  She does   continue to have significant abdominal pain.  She has also had problems with   urinary retention.  Infectious disease is consulted for antibiotic   recommendations and management.     REVIEW OF SYSTEMS:  Positive for abdominal pain, malaise, nausea, back pain.    All other systems are reviewed and are negative.     ALLERGIES: She has no known antibiotic allergies.     PAST MEDICAL HISTORY:  Chronic back pain, nephrolithiasis, obstructive sleep   apnea, allergies, thyroid disease.     FAMILY HISTORY:  Congestive heart failure.     SOCIAL HISTORY:  She is a former smoker.  She quit 3 years ago.  She does not   currently drink or use any illicit drugs.     PHYSICAL EXAMINATION:  GENERAL:  She is well-nourished, well-developed female in mild distress, looks   uncomfortable.  VITAL SIGNS:  She has been afebrile.  Current temperature is 98, blood   pressure 145/55, pulse 65, respiratory rate 20, oxygen  saturation 98% on 2   liters nasal cannula.  She is 5 feet 3 inches, weighs 147 kilograms.  HEENT:  Normocephalic, atraumatic.  Pupils equal, round, reactive to light.    She has anicteric sclerae.  Oropharynx is clear.  She has an NG tube in place.  NECK:  Supple.  CARDIOVASCULAR:  Regular rate and rhythm.  She has decreased heart sounds.  CHEST:  Decreased breath sounds bilaterally.  Respirations are unlabored.  ABDOMEN:  Obese, soft, mildly distended.  EXTREMITIES:  Show no cyanosis, clubbing.  There is edema.  SKIN:  Reveals ecchymoses.  NEUROLOGIC:  She is awake, alert and oriented.  PSYCHIATRIC:  Mood is dysphoric.  Behavior is normal.     CURRENT LABORATORY DATA:  White blood cell count on 04/25 was 16.7, now 12.8,   H and H 11.3 and 35.6, platelets of 281.  She has no left shift.  Sodium 143,   potassium 3.8, chloride 103, bicarbonate 32, glucose 132, BUN 21, creatinine   0.89.  Admitting creatinine was 1.8, alkaline phosphatase of 90.  Albumin of   2.7.  Urinalysis had 2-5 WBCs, 2-5 RBCs, moderate uric acid crystals.  COVID   has been negative.  Pathology on 04/21 showed acute appendicitis with   localized peritonitis.  Blood cultures x2 on 04/25 were negative.  Peritoneal   fluid cultures were negative.  Urine culture was negative.     DIAGNOSTIC DATA:  CT scan on admission showed inflammatory changes in the   right lower quadrant with dilated appendix.  Exam is limited due to patient   body habitus.  Repeat CT scan on 04/24 showed hepatic steatosis, multiple air   fluid levels in the small bowel, multiple diverticula, prior contrast was   noted.  No abscess was noted.  EKG:  QTc of 493.     ASSESSMENT AND PLAN:  A 65-year-old female admitted on 04/21/2021 with   perforated appendicitis with associated large abscess necessitating conversion   from laparoscopic appendectomy to open appendectomy.  Her postoperative   course has been complicated by urinary retention, ileus and continued need for   NG tube as  well as total peripheral nutrition.  She has been on IV   antibiotics.  All cultures to date have been negative.  Her initial acute   kidney injury has resolved.  There is a question on duration of antibiotics.    Her followup CT scan did not show any definitive abscess formation.  Her white   blood cell count has been decreasing; however, it has not resolved.    Typically, she is not a candidate to switch to oral antibiotics due to her   continued ileus as well as she has a prolonged QTc, which obviates the use of   quinolones.  Stop date on her antibiotics likely be a minimum of 7 days from   her followup CT scan assuming her white blood cell count continues to   decrease, which would give her stop date of 05/01/2021.  This may need to be   extended depending on her clinical course.  She remains at significant risk   for abscess formation.  A CT scan has limited sensitivity due to patient body   habitus, so we will continue to monitor closely.  It is concerning that she   continues to have an ileus, which is not typically expected.  Continue to   monitor closely.     Thank you, we will follow with you.        ______________________________  MD JESSIE CRUZ/ONOFRE/YASMANY    DD:  04/29/2021 14:47  DT:  04/29/2021 18:23    Job#:  [id number]

## 2021-04-30 NOTE — DISCHARGE PLANNING
Anticipated Discharge Disposition: Likely LTAC    Action: Pt was discussed in IDT rounds, pt is appropriate for LTAC care. LSW met with pt at bedside to discuss this, answered questions related to what LTAC was vs. SNF, pt stated she would like to send referrals to LTAC [facility]. LSW collected choice, waiting to send in for LTAC referral. LSW Julia notified.     Barriers to Discharge: :LTAC acceptance    Plan: LSW to assist as needed

## 2021-04-30 NOTE — PROGRESS NOTES
Report given to WILIAM Loja. Dx, medications, O2 needs, and poc reviewed. Pt has no signs of distress or complaints at this time. Pt has no acute needs at this time. Care fully relinquished.

## 2021-05-01 LAB
ALBUMIN SERPL BCP-MCNC: 2.7 G/DL (ref 3.2–4.9)
ALBUMIN/GLOB SERPL: 0.8 G/DL
ALP SERPL-CCNC: 92 U/L (ref 30–99)
ALT SERPL-CCNC: 50 U/L (ref 2–50)
ANION GAP SERPL CALC-SCNC: 7 MMOL/L (ref 7–16)
AST SERPL-CCNC: 38 U/L (ref 12–45)
BASOPHILS # BLD AUTO: 0.5 % (ref 0–1.8)
BASOPHILS # BLD: 0.07 K/UL (ref 0–0.12)
BILIRUB SERPL-MCNC: 0.4 MG/DL (ref 0.1–1.5)
BUN SERPL-MCNC: 18 MG/DL (ref 8–22)
CALCIUM SERPL-MCNC: 8.5 MG/DL (ref 8.4–10.2)
CHLORIDE SERPL-SCNC: 107 MMOL/L (ref 96–112)
CO2 SERPL-SCNC: 28 MMOL/L (ref 20–33)
CREAT SERPL-MCNC: 0.89 MG/DL (ref 0.5–1.4)
EOSINOPHIL # BLD AUTO: 0.33 K/UL (ref 0–0.51)
EOSINOPHIL NFR BLD: 2.4 % (ref 0–6.9)
ERYTHROCYTE [DISTWIDTH] IN BLOOD BY AUTOMATED COUNT: 47 FL (ref 35.9–50)
GLOBULIN SER CALC-MCNC: 3.4 G/DL (ref 1.9–3.5)
GLUCOSE BLD-MCNC: 125 MG/DL (ref 65–99)
GLUCOSE BLD-MCNC: 131 MG/DL (ref 65–99)
GLUCOSE BLD-MCNC: 143 MG/DL (ref 65–99)
GLUCOSE SERPL-MCNC: 124 MG/DL (ref 65–99)
HCT VFR BLD AUTO: 34.3 % (ref 37–47)
HGB BLD-MCNC: 10.9 G/DL (ref 12–16)
IMM GRANULOCYTES # BLD AUTO: 0.48 K/UL (ref 0–0.11)
IMM GRANULOCYTES NFR BLD AUTO: 3.6 % (ref 0–0.9)
LYMPHOCYTES # BLD AUTO: 2.65 K/UL (ref 1–4.8)
LYMPHOCYTES NFR BLD: 19.6 % (ref 22–41)
MAGNESIUM SERPL-MCNC: 2 MG/DL (ref 1.5–2.5)
MCH RBC QN AUTO: 29 PG (ref 27–33)
MCHC RBC AUTO-ENTMCNC: 31.8 G/DL (ref 33.6–35)
MCV RBC AUTO: 91.2 FL (ref 81.4–97.8)
MONOCYTES # BLD AUTO: 0.66 K/UL (ref 0–0.85)
MONOCYTES NFR BLD AUTO: 4.9 % (ref 0–13.4)
NEUTROPHILS # BLD AUTO: 9.3 K/UL (ref 2–7.15)
NEUTROPHILS NFR BLD: 69 % (ref 44–72)
NRBC # BLD AUTO: 0 K/UL
NRBC BLD-RTO: 0 /100 WBC
PHOSPHATE SERPL-MCNC: 3.1 MG/DL (ref 2.5–4.5)
PLATELET # BLD AUTO: 214 K/UL (ref 164–446)
PMV BLD AUTO: 10.6 FL (ref 9–12.9)
POTASSIUM SERPL-SCNC: 4 MMOL/L (ref 3.6–5.5)
PROT SERPL-MCNC: 6.1 G/DL (ref 6–8.2)
RBC # BLD AUTO: 3.76 M/UL (ref 4.2–5.4)
SODIUM SERPL-SCNC: 142 MMOL/L (ref 135–145)
WBC # BLD AUTO: 13.5 K/UL (ref 4.8–10.8)

## 2021-05-01 PROCEDURE — 85025 COMPLETE CBC W/AUTO DIFF WBC: CPT

## 2021-05-01 PROCEDURE — 770006 HCHG ROOM/CARE - MED/SURG/GYN SEMI*

## 2021-05-01 PROCEDURE — 99232 SBSQ HOSP IP/OBS MODERATE 35: CPT | Performed by: INTERNAL MEDICINE

## 2021-05-01 PROCEDURE — 84100 ASSAY OF PHOSPHORUS: CPT

## 2021-05-01 PROCEDURE — 700105 HCHG RX REV CODE 258: Performed by: INTERNAL MEDICINE

## 2021-05-01 PROCEDURE — 82962 GLUCOSE BLOOD TEST: CPT

## 2021-05-01 PROCEDURE — 700111 HCHG RX REV CODE 636 W/ 250 OVERRIDE (IP): Performed by: INTERNAL MEDICINE

## 2021-05-01 PROCEDURE — 94660 CPAP INITIATION&MGMT: CPT

## 2021-05-01 PROCEDURE — 94760 N-INVAS EAR/PLS OXIMETRY 1: CPT

## 2021-05-01 PROCEDURE — 700102 HCHG RX REV CODE 250 W/ 637 OVERRIDE(OP): Performed by: INTERNAL MEDICINE

## 2021-05-01 PROCEDURE — A9270 NON-COVERED ITEM OR SERVICE: HCPCS | Performed by: INTERNAL MEDICINE

## 2021-05-01 PROCEDURE — 80053 COMPREHEN METABOLIC PANEL: CPT

## 2021-05-01 PROCEDURE — 700101 HCHG RX REV CODE 250: Performed by: INTERNAL MEDICINE

## 2021-05-01 PROCEDURE — 83735 ASSAY OF MAGNESIUM: CPT

## 2021-05-01 RX ORDER — AMLODIPINE BESYLATE 5 MG/1
5 TABLET ORAL
Status: DISCONTINUED | OUTPATIENT
Start: 2021-05-01 | End: 2021-05-05

## 2021-05-01 RX ADMIN — OXYCODONE HYDROCHLORIDE 10 MG: 10 TABLET ORAL at 05:11

## 2021-05-01 RX ADMIN — HEPARIN SODIUM 5000 UNITS: 5000 INJECTION, SOLUTION INTRAVENOUS; SUBCUTANEOUS at 05:10

## 2021-05-01 RX ADMIN — SODIUM CHLORIDE, POTASSIUM CHLORIDE, SODIUM LACTATE AND CALCIUM CHLORIDE: 600; 310; 30; 20 INJECTION, SOLUTION INTRAVENOUS at 15:03

## 2021-05-01 RX ADMIN — LOSARTAN POTASSIUM 50 MG: 25 TABLET, FILM COATED ORAL at 05:09

## 2021-05-01 RX ADMIN — THYROID, PORCINE 90 MG: 30 TABLET ORAL at 05:09

## 2021-05-01 RX ADMIN — PIPERACILLIN AND TAZOBACTAM 4.5 G: 4; .5 INJECTION, POWDER, LYOPHILIZED, FOR SOLUTION INTRAVENOUS; PARENTERAL at 21:38

## 2021-05-01 RX ADMIN — HEPARIN SODIUM 5000 UNITS: 5000 INJECTION, SOLUTION INTRAVENOUS; SUBCUTANEOUS at 13:01

## 2021-05-01 RX ADMIN — AMLODIPINE BESYLATE 5 MG: 5 TABLET ORAL at 07:17

## 2021-05-01 RX ADMIN — PIPERACILLIN AND TAZOBACTAM 4.5 G: 4; .5 INJECTION, POWDER, LYOPHILIZED, FOR SOLUTION INTRAVENOUS; PARENTERAL at 13:02

## 2021-05-01 RX ADMIN — POTASSIUM CHLORIDE: 2 INJECTION, SOLUTION, CONCENTRATE INTRAVENOUS at 21:30

## 2021-05-01 RX ADMIN — HEPARIN SODIUM 5000 UNITS: 5000 INJECTION, SOLUTION INTRAVENOUS; SUBCUTANEOUS at 21:37

## 2021-05-01 RX ADMIN — OXYCODONE HYDROCHLORIDE 10 MG: 10 TABLET ORAL at 21:30

## 2021-05-01 RX ADMIN — PIPERACILLIN AND TAZOBACTAM 4.5 G: 4; .5 INJECTION, POWDER, LYOPHILIZED, FOR SOLUTION INTRAVENOUS; PARENTERAL at 05:20

## 2021-05-01 RX ADMIN — LOSARTAN POTASSIUM 50 MG: 25 TABLET, FILM COATED ORAL at 17:37

## 2021-05-01 RX ADMIN — LORATADINE 10 MG: 10 TABLET ORAL at 05:09

## 2021-05-01 ASSESSMENT — ENCOUNTER SYMPTOMS
HEADACHES: 1
CONSTIPATION: 1
COUGH: 0
BLURRED VISION: 0
ABDOMINAL PAIN: 0
SHORTNESS OF BREATH: 0
ABDOMINAL PAIN: 1
FEVER: 0
DOUBLE VISION: 0
NERVOUS/ANXIOUS: 0
MYALGIAS: 0
CHILLS: 0
HEARTBURN: 0
VOMITING: 0
BACK PAIN: 1
FALLS: 0
NAUSEA: 0
DIARRHEA: 0
DIZZINESS: 0
PALPITATIONS: 0

## 2021-05-01 ASSESSMENT — PAIN DESCRIPTION - PAIN TYPE
TYPE: ACUTE PAIN
TYPE: ACUTE PAIN

## 2021-05-01 NOTE — FLOWSHEET NOTE
04/30/21 2242   Events/Summary/Plan   Events/Summary/Plan CPAP continuous   Skin Inspection Respiratory Device Intact   Vital Signs   Pulse 86   Respiration 17   Pulse Oximetry 94 %   $ Pulse Oximetry (Spot Check) Yes   Oxygen   O2 (LPM) 2   O2 Delivery Device CPAP   Non-Invasive Ventilation [facility]   Nocturnal CPAP or BIPAP CPAP - [facility]   $ System Evaluation Yes   Settings (If Known) Autopap   FiO2 or LPM 2

## 2021-05-01 NOTE — FLOWSHEET NOTE
04/30/21 1928   Events/Summary/Plan   Events/Summary/Plan CPAP started, pt dozing with spouse here @ bedside   Skin Inspection Respiratory Device Intact   Vital Signs   Pulse 82   Respiration 18   Pulse Oximetry 95 %   $ Pulse Oximetry (Spot Check) Yes   Respiratory Assessment   Respiratory Pattern Within Normal Limits   Level of Consciousness Alert   Chest Exam   Work Of Breathing / Effort Within Normal Limits   Oxygen   O2 (LPM) 2   O2 Delivery Device CPAP   Non-Invasive Ventilation [facility]   Nocturnal CPAP or BIPAP CPAP - [facility]   $ System Evaluation Yes   Settings (If Known) autopap   FiO2 or LPM 2

## 2021-05-01 NOTE — PROGRESS NOTES
Assumed care of pt at 0715. Pt is alert and oriented, CPAP in use, denies any pain at this time. NG observed to low intermittent suction, yusuf observed draining urine appropriately. RN encouraging mobility. Bed locked in lowest position, personal belongings and call light within reach. Pt denies further needs at this time.

## 2021-05-01 NOTE — PROGRESS NOTES
Pt is awake in bed. VSS. No pain or n/v at this time. Dressing to mid abdomen is  Clean, dry, and intact. NG tube is placed to suction. Small amount of brown drainage noted in cannister. Bowel sounds hypoactive. Pt reports she has not yet had a BM. Pt is requesting to rest. Fall precautions in place.

## 2021-05-01 NOTE — PROGRESS NOTES
Infectious Disease Progress Note    Author: Collin Patton M.D. Date & Time of service: 2021  10:34 AM    Chief Complaint:  Follow-up for appendicitis and abscess    Interval History:   patient is afebrile, white count 15.5, tolerating Zosyn.  Still no bowel movements but passing gas.  Surgery and internal medicine are following.    Labs Reviewed and Medications Reviewed.    Review of Systems:  Review of Systems   Constitutional: Negative for chills and fever.   Gastrointestinal: Positive for constipation. Negative for abdominal pain, diarrhea, nausea and vomiting.   Skin: Negative for itching and rash.   All other systems reviewed and are negative.      Hemodynamics:  Temp (24hrs), Av.1 °C (98.7 °F), Min:36.8 °C (98.2 °F), Max:37.7 °C (99.9 °F)  Temperature: 37.7 °C (99.9 °F)  Pulse  Av.3  Min: 59  Max: 112   Blood Pressure: 158/73       Physical Exam:  Physical Exam  Vitals and nursing note reviewed.   Constitutional:       General: She is not in acute distress.     Appearance: She is not toxic-appearing.      Comments: Chronically ill-appearing   HENT:      Nose:      Comments: NG tube in place  Eyes:      General: No scleral icterus.        Right eye: No discharge.         Left eye: No discharge.      Conjunctiva/sclera: Conjunctivae normal.   Cardiovascular:      Rate and Rhythm: Normal rate and regular rhythm.   Pulmonary:      Effort: Pulmonary effort is normal. No respiratory distress.      Breath sounds: No stridor.   Abdominal:      Tenderness: There is abdominal tenderness.   Musculoskeletal:      Cervical back: No rigidity.      Right lower leg: No edema.      Left lower leg: No edema.   Skin:     Findings: No erythema or rash.   Neurological:      General: No focal deficit present.      Mental Status: She is alert and oriented to person, place, and time.   Psychiatric:         Mood and Affect: Mood normal.         Behavior: Behavior normal.      Comments: Very pleasant          Meds:    Current Facility-Administered Medications:   •  amLODIPine  •  TPN Central Line Formulation  •  TPN Central Line Formulation  •  LR  •  piperacillin-tazobactam  •  losartan  •  heparin  •  diphenhydrAMINE  •  MD Alert...TPN per Pharmacy  •  loratadine  •  thyroid  •  labetalol  •  ondansetron  •  ondansetron  •  promethazine  •  prochlorperazine  •  insulin regular **AND** POC blood glucose manual result **AND** NOTIFY MD and PharmD **AND** glucose **AND** dextrose 50%  •  acetaminophen  •  Notify provider if pain remains uncontrolled **AND** Use the Numeric Rating Scale (NRS), Peters-Baker Faces (WBF), or FLACC on regular floors and Critical-Care Pain Observation Tool (CPOT) on ICUs/Trauma to assess pain **AND** Pulse Ox **AND** Pharmacy Consult Request **AND** If patient difficult to arouse and/or has respiratory depression (respiratory rate of 10 or less), stop any opiates that are currently infusing and call a Rapid Response.  •  oxyCODONE immediate-release **OR** oxyCODONE immediate-release **OR** HYDROmorphone    Labs:  Recent Labs     04/30/21 0335 04/30/21 0624 05/01/21 0415   WBC RR 13.3* 13.5*   RBC RR 3.92* 3.76*   HEMOGLOBIN RR 11.4* 10.9*   HEMATOCRIT RR 36.1* 34.3*   MCV RR 92.1 91.2   MCH RR 29.1 29.0   RDW RR 46.5 47.0   PLATELETCT  214   MPV RR 10.9 10.6   NEUTSPOLYS RR 67.90 69.00   LYMPHOCYTES RR 19.70* 19.60*   MONOCYTES RR 5.00 4.90   EOSINOPHILS RR 2.90 2.40   BASOPHILS RR 0.50 0.50     Recent Labs     04/29/21 0513 04/30/21 0624 05/01/21 0415   SODIUM 143 145 142   POTASSIUM 3.8 4.0 4.0   CHLORIDE 103 107 107   CO2 32 30 28   GLUCOSE 132* 124* 124*   BUN 21 20 18     Recent Labs     04/29/21 0513 04/30/21 0624 05/01/21 0415   ALBUMIN 2.7* 2.7* 2.7*   TBILIRUBIN 0.4 0.4 0.4   ALKPHOSPHAT 90 93 92   TOTPROTEIN 6.1 6.0 6.1   ALTSGPT 68* 64* 50   ASTSGOT 81* 51* 38   CREATININE 0.89 0.95 0.89       Imaging:  CT-ABDOMEN-PELVIS W/O    Result Date: 4/24/2021 4/24/2021  1:32 PM HISTORY/REASON FOR EXAM:  Abdominal pain, fever, post-op. Urinary retention. Chills and fevers. Acute appendicitis and abscess. TECHNIQUE/EXAM DESCRIPTION: CT scan of the abdomen and pelvis without contrast. Noncontrast helical scanning was obtained from the diaphragmatic domes through the pubic symphysis. Low dose optimization technique was utilized for this CT exam including automated exposure control and adjustment of the mA and/or kV according to patient size. COMPARISON: 4/21/2021 CT FINDINGS: Body habitus and lack of contrast decreases sensitivity of the study. Mostly right lower lobe subpleural atelectasis CT Abdomen: Small free intraperitoneal air is seen New laparotomy skin staples. Due to body habitus factors the entire abdominal wall is not included in the field-of-view. Abscess in the subcutaneous tissues cannot be excluded. There is a new right lower quadrant surgical drain adjacent to cecum. No loculated fluid collection is suggested in the vicinity. There is small free fluid in the pelvis without convexity to suggest abscess. There is some fat stranding in the mesentery and omentum in the lower abdomen especially right of midline and this is similar to comparison Multiple air-fluid levels are seen in the small bowel which measures up to 40 mm. No pneumatosis. No transition point is visualized. The colon contains contrast and is partially decompressed. Multiple diverticula are seen There is no contrast extravasation Cholecystectomy without biliary dilatation Hepatic steatosis Normal spleen No hydronephrosis or urolithiasis. No adrenal mass. Normal configuration of the pancreas. CT Pelvis: No uterine or ovarian enlargement Bladder decompressed around a Pabon catheter Trace free fluid No findings to suggest abscess No acute bony abnormality     Interval laparotomy with right lower quadrant surgical drain, similar fat stranding in the pericecal region that is likely postoperative in nature. No  noncontrast evidence of abscess formation Small free fluid and intraperitoneal air is not outside normal limits in the perioperative period Contrast in the colon is from the CT 3 days ago, air-fluid levels and mild small bowel dilatation. In combination these findings are most compatible with ileus Urinary bladder decompressed around the Pabon catheter. No hydroureteronephrosis    CT-ABDOMEN-PELVIS WITH    Result Date: 4/21/2021 4/21/2021 3:48 PM HISTORY/REASON FOR EXAM:  Abd pain, unspecified. Right lower quadrant pain. TECHNIQUE/EXAM DESCRIPTION:  CT scan of the abdomen and pelvis with contrast. Contrast-enhanced helical scanning was obtained from the diaphragmatic domes through the pubic symphysis following the bolus administration of nonionic contrast without complication. 100 mL of Omnipaque 350 nonionic contrast was administered without complication. Low dose optimization technique was utilized for this CT exam including automated exposure control and adjustment of the mA and/or kV according to patient size. COMPARISON: No prior studies available. FINDINGS: Examination is limited by patient body habitus. Lung bases: The visualized lung bases are clear. Abdomen: No free air is seen in the abdomen or pelvis.  The liver is hypodense compatible with hepatic steatosis. Gallbladder is surgically absent. The right lobe of the liver measures 20 cm in length. The spleen measures 10.7 cm in length. No adrenal mass is identified. There is no evidence of hydronephrosis. Pancreas appears unremarkable. Aorta is not aneurysmal. There is mild atherosclerotic plaque. There are small inguinal, retroperitoneal and mesenteric lymph nodes. There is no evidence of bowel obstruction. The sigmoid colon is relatively redundant. There are inflammatory changes in the right lower quadrant. There is a dilated blind-ending structure which likely represents a dilated and inflamed appendix in the right lower quadrant. Bladder is mildly  distended. Uterus is anteverted. Calcific densities in the pelvis likely represent phleboliths. No abscess is identified. Degenerative changes are seen in the spine.     Inflammatory changes in the right lower quadrant with a probable dilated appendix likely related to appendicitis. Examination is limited by patient body habitus. Hepatomegaly and hepatic steatosis. Mild atherosclerotic plaque. Status post cholecystectomy.     [id number] VIEW    Result Date: 4/24/2021 4/24/2021 11:47 AM HISTORY/REASON FOR EXAM:  Abdominal Pain. TECHNIQUE/EXAM DESCRIPTION AND NUMBER OF VIEWS:   1 views of the abdomen. COMPARISON: None FINDINGS: There is a small amount of residual enteric contrast. There are dilated loops of small bowel particularly in the central and left abdomen. There is a surgical drain in the pelvis. Surgical clips are seen in the right upper quadrant. No free intraperitoneal air is identified though evaluation is limited on supine imaging. There is bibasilar atelectasis. Skin staples are seen.     Dilated loops of small bowel most likely represent ileus. Partial obstruction is not excluded. Residual enteric contrast is seen from the recent CT. Mild bibasilar atelectasis.     IR-US GUIDED PIV    Result Date: 4/23/2021  EXAMINATION:                                                                    HISTORY/REASON FOR EXAM:  Ultrasound Guided PIV.  TECHNIQUE/EXAM DESCRIPTION AND NUMBER OF VIEWS:  Peripheral IV insertion with ultrasound guidance.  The procedure was prepared using maximal sterile barrier technique including sterile gown, mask, cap, and donning of sterile gloves following appropriate hand hygiene and/or sterile scrub. Patient skin site was prepped with 2% Chlorhexidine solution.   FINDINGS: Peripheral IV insertion with Ultrasound Guidance was performed by qualified imaging nursing staff without the assistance of a Radiologist.      Ultrasound-guided PERIPHERAL IV INSERTION performed by qualified  nursing staff as above.    DX-ABDOMEN FOR TUBE PLACEMENT    Result Date: 4/28/2021 4/28/2021 8:26 PM HISTORY/REASON FOR EXAM: Nasogastric tube placement TECHNIQUE/EXAM DESCRIPTION:  Single AP view the abdomen. COMPARISON:  April 24, 2021 FINDINGS: Linear densities in the left lung base are noted. Nasogastric tube is seen, the tip overlies the lumbar spine.  The bowel gas pattern appears nonspecific. The bony structures appear age-appropriate.     1.  Nonspecific bowel gas pattern. 2.  Dobbhoff tube is coiled within the pylorus, the tip terminates overlying the expected location of the gastric pylorus or gastric antrum. 3.  Left basilar atelectasis versus early infiltrate.    IR-PICC LINE PLACEMENT W/ GUIDANCE > AGE 5    Result Date: 4/26/2021  HISTORY/REASON FOR EXAM:   PICC placement. TECHNIQUE/EXAM DESCRIPTION AND NUMBER OF VIEWS:   PICC line insertion with ultrasound guidance.  The procedure was performed using maximal sterile barrier technique including sterile gown, mask, cap, and donning of sterile gloves following appropriate hand hygiene and/or sterile scrub. Patient skin site was prepped with 2% Chlorhexidine solution. FINDINGS:  PICC line insertion with Ultrasound Guidance was performed by qualified nursing staff without the assistance of a Radiologist. PICC positioning appropriateness confirmed by 3CG technology; chest xray only needed in the instance 3CG unable to confirm placement.              Ultrasound-guided PICC placement performed by qualified nursing staff as above.       Micro:  Results     Procedure Component Value Units Date/Time    BLOOD CULTURE [[id number]] Collected: 04/25/21 1445    Order Status: Completed Specimen: Blood from Peripheral Updated: 04/30/21 1617     Significant Indicator NEG     Source BLD     Site PERIPHERAL     Culture Result No growth after 5 days of incubation.  Blood culture testing and Gram stain, if indicated, are  performed at [facility]  "Laboratory, [address] [place] [address].  Positive blood cultures are  sent to [facility], [address] [place] [address], for organism identification and  susceptibility testing.      Narrative:      Per Hospital Policy: Only change Specimen Src: to \"Line\" if  specified by physician order.  Left Forearm/Arm    BLOOD CULTURE [[id number]] Collected: 04/25/21 1445    Order Status: Completed Specimen: Blood from Peripheral Updated: 04/30/21 1617     Significant Indicator NEG     Source BLD     Site PERIPHERAL     Culture Result No growth after 5 days of incubation.  Blood culture testing and Gram stain, if indicated, are  performed at [facility], [address] [place] [address].  Positive blood cultures are  sent to [facility], [address] [place] [address], for organism identification and  susceptibility testing.      Narrative:      Per Hospital Policy: Only change Specimen Src: to \"Line\" if  specified by physician order.  Left AC    FLUID CULTURE W/GRAM STAIN [[id number]] Collected: 04/25/21 0946    Order Status: Completed Specimen: Body Fluid from Peritoneal Fluid Updated: 04/28/21 0641     Significant Indicator NEG     Source BF     Site PERITONEAL FLUID     Culture Result No growth at 72 hours.     Gram Stain Result Many WBCs.  No organisms seen.      Narrative:      Collected By:[id number] ALFA MILLER  Collected By:[id number] ALFA MILLER    URINALYSIS [[id number]]  (Abnormal) Collected: 04/25/21 2050    Order Status: Completed Specimen: Urine, Pabon Cath Updated: 04/25/21 2238     Color Yellow     Character Hazy     Specific Gravity 1.020     Ph 5.5     Glucose Negative mg/dL      Ketones Negative mg/dL      Protein Negative mg/dL      Bilirubin Negative     Nitrite Negative     Leukocyte Esterase Negative     Occult Blood Trace     Micro Urine Req Microscopic    Narrative:      Collected By:[id number] ERICK STRICKLAND    GRAM STAIN [[id number]] " Collected: 04/25/21 0946    Order Status: Completed Specimen: Body Fluid Updated: 04/25/21 1458     Significant Indicator .     Source BF     Site PERITONEAL FLUID     Gram Stain Result Many WBCs.  No organisms seen.      Narrative:      Collected By:[id number] ALFA MILLER  Collected By:[id number] ALFA MILLER          Assessment:  This is a pleasant 65-year-old female patient admitted on 4/21/2021 with perforated appendicitis with associated large abscess necessitating conversion from laparoscopic appendectomy to open appendectomy.  Follow-up CT scan with no abscess formation.  Postop course complicated by continued ileus and urinary retention.    Plan:  -Surgical source control was achieved on 4/25/2021, with repeat imaging showing no residual abscess.  Counting from day of source control, patient complete 7 days of IV Zosyn today which would be appropriate  -OR cultures no growth  -Postop course complicated by ongoing ileus and urinary retention, possibly contributing to the mild persistent leukocytosis.  If white count trends back up off of antibiotics, recommend repeat CT scan to look for development of new abscess    Discussed with internal medicine, Dr. King.  ID will sign off. Please call us back if recurrence of infection

## 2021-05-01 NOTE — PROGRESS NOTES
"  Trauma/Surgical Progress Note    Author: Jett Ruiz M.D. Date & Time created: 5/1/2021   9:48 AM   4/21 open appendectomy drain abscess     Interval Events:  Awake alert  reports pain control good  Reports feeling improved   increased activity working with PT   flatus, 0 bm  Hemodynamics:  /73   Pulse 82   Temp 37.7 °C (99.9 °F) (Temporal)   Resp 17   Ht 1.6 m (5' 3\")   Wt (!) 147 kg (323 lb 6.6 oz)   SpO2 97%      Respiratory:    Respiration: 17, Pulse Oximetry: 97 %     Work Of Breathing / Effort: Mild  RUL Breath Sounds: Clear, RML Breath Sounds: Clear, RLL Breath Sounds: Diminished, CHRISTINA Breath Sounds: Clear, LLL Breath Sounds: Diminished  Fluids:    Intake/Output Summary (Last 24 hours) at 5/1/2021 0948  Last data filed at 5/1/2021 0900  Gross per 24 hour   Intake 145 ml   Output 3160 ml   Net -3015 ml     Admit Weight: (!) 147 kg (323 lb 6.6 oz)  Current     Physical Exam  Awake alert appropriate  Breathing with ease  Appropriate incisional tenderness  Drain out 60  Medical Decision Making/Problem List:    Active Hospital Problems    Diagnosis    • Acute appendicitis with localized peritonitis [K35.30]      Priority: High   • Elevated LFTs [R79.89]      Priority: Medium   • Hypernatremia [E87.0]      Priority: Medium   • Leukocytosis [D72.829]      Priority: Medium   • Constipation [K59.00]      Priority: Medium   • Essential hypertension [I10]      Priority: Medium   • Stage 3 chronic kidney disease [N18.30]      Priority: Low   • BMI 50.0-59.9, adult (HCC) [Z68.43]      Priority: Low   • HENRI (obstructive sleep apnea) [G47.33]      Priority: Low     AHI 55.7, minimum saturation 84%, on CPAP 13 cm.     • Hyperglycemia [R73.9]    • Hypothyroidism [E03.9]      Core Measures & Quality Metrics:  Core Measures & Quality Metrics  JULIOCESAR Score      Assessment/Plan  [facility] care medicine service  Appreciate ID assistance  Trend Wbc   Encourage ambulation, IS  Monitoring and support  "

## 2021-05-01 NOTE — CARE PLAN
Problem: Communication  Goal: The ability to communicate needs accurately and effectively will improve  Outcome: PROGRESSING AS EXPECTED     Problem: Safety  Goal: Will remain free from injury  Outcome: PROGRESSING AS EXPECTED  Goal: Will remain free from falls  Outcome: PROGRESSING AS EXPECTED     Problem: Pain Management  Goal: Pain level will decrease to patient's comfort goal  Outcome: PROGRESSING AS EXPECTED     Problem: Bowel/Gastric:  Goal: Normal bowel function is maintained or improved  Outcome: PROGRESSING SLOWER THAN EXPECTED

## 2021-05-01 NOTE — PROGRESS NOTES
[facility] Medicine Daily Progress Note    Date of Service  5/1/2021    Chief Complaint  65 y.o. female admitted 4/21/2021 with abdominal pain.    Hospital Course  4/22/2021-patient is a 65-year-old female comes in with abdominal pain that started last Friday.  After suffering with the pain the patient went to see urgent care where they did a CAT scan of her abdomen which came back with early appendicitis.  Patient was referred over to the emergency room for evaluation.  Patient was admitted for surgery and was found to have a large abscess along with the appendicitis.  Appendix was removed and the abscess has been drained.  She still having chills and fevers afterwards.  Continue with IV antibiotics as well as pain management and fluid resuscitation.  Await culture results.  4/23/2021-this morning patient is in quite a bit of distress.  Her pain has escalated in the abdomen and she is unable to urinate or defecate.  Upon further evaluation patient has a postoperative ileus in her bowel sounds have not returned.  She has not passed any gas or had a bowel movement.  Continue at this point with supportive fluid resuscitation to ensure the patient's bowel functions returned to normal.  Patient at this point is also complaining of urinary retention and the bladder scanner was only showing 70 to 100 mL of retained urine.  This was inconsistent with the amount of pain the patient was having thus I ordered a immediate straight catheterization which yielded over 800 mL of urine.  After the urine came out the patient's abdominal pain eased up and her condition improved.  We will continue to monitor her urine output and if the patient should have urinary retention we will straight catheter again.  For the time being because of her abdominal distention pain and ileus as well as urinary retention we are unable to discharge the patient at this point in time.  Continue at this point with aggressive management and  stabilization.  4/24/2021-patient is still having difficulty with abdominal pain.  She also has not had a bowel movement and her intestinal sounds are returning very slowly.  Concern is that there is still an abscess present.  Flatplate of the abdomen did not show anything her white blood cell count however is increasing at this point I am going to obtain a CT of the abdomen.  Discussed it with surgery.  Because of the bladder distention and inability urinate a Pabon catheter had to be placed.  Hopefully this will decompress the bladder.  4/25/2021-postoperative day #4 after laparoscopic appendectomy had to be converted to an open appendectomy and drainage of abscess cavity.  Cultures have not grown out any significant organism.  White blood cell count has gone up, repeat CT of the abdomen does not demonstrate worsening abscess formation and actually is consistent with routine healing.  Patient CT demonstrates an ileus.  Since her abdominal distention is getting worse and her nausea is persisting I am putting in an NG to low intermittent suctioning.  This was discussed with the bedside nurse and surgery.  4/26/2021-patient's abdominal distention persists.  Ileus persists status postoperative.  Yesterday the patient's Pabon catheter became obstructed.  It had to be replaced.  Yesterday he also had some technical issues with the hospital bed and at this point had to be replaced a few times.  Pain seems to be better controlled.  Her labs are improving.  The patient may need a repeat CT in 24 to 48 hours if her white blood cell count does not improve.  Today's count is down to 14.8.  Continue with IV Zosyn    Interval Problem Update  4/27: Patient seen at bedside this morning.  The patient still has not had a bowel movement, however she mentions that she thinks she is passing gas.  General surgery continues to recommend ambulation.  Patient currently n.p.o. with NG tube and the patient is on TPN.  As per nursing no  other overnight events were reported.    4/28: Patient seen at bedside this morning.  The patient still has not had a bowel movement.  She says she is passing gas.  General Surgery following the patient along.  We will continue TPN and n.p.o. for now.  No other overnight events were reported by nursing.    4/29: Patient seen at bedside this morning.  She mentions she is passing gas, however she has not had a bowel movement yet.  She was encouraged to ambulate.  We have consulted ID for further antibiotic coverage, we appreciate further recommendations.  As per nursing no other overnight events reported.    4/30: Patient seen at bedside this morning.  The patient still without a bowel movement.  ID recommends to continue IV antibiotics for now.  As per nursing no other overnight events reported.    5/1: Patient seen at bedside this morning.  She is lying comfortably in bed, however she still having bowel movements, but she says she is passing gas.  ID recommended to stop antibiotics after today and if WBC trend upwards to repeat CT scan.  As per nursing no other overnight events were reported.    Consultants/Specialty  General Surgery    Code Status  Full Code    Disposition  Pending    Review of Systems  Review of Systems   Constitutional: Negative for chills and fever.   HENT: Negative for hearing loss and nosebleeds.    Eyes: Negative for blurred vision and double vision.   Respiratory: Negative for cough and shortness of breath.    Cardiovascular: Negative for chest pain and palpitations.   Gastrointestinal: Positive for abdominal pain and constipation. Negative for heartburn, nausea and vomiting.   Genitourinary: Negative for dysuria and urgency.   Musculoskeletal: Positive for back pain. Negative for falls and myalgias.   Skin: Negative for itching and rash.   Neurological: Positive for headaches. Negative for dizziness.   Psychiatric/Behavioral: The patient is not nervous/anxious.    All other systems reviewed  and are negative.       Physical Exam  Temp:  [36.8 °C (98.2 °F)-37.7 °C (99.9 °F)] 37.1 °C (98.8 °F)  Pulse:  [81-95] 95  Resp:  [16-20] 20  BP: (133-158)/(65-84) 133/65  SpO2:  [90 %-97 %] 90 %    Physical Exam  Constitutional:       Appearance: She is obese. She is ill-appearing.   HENT:      Head: Normocephalic and atraumatic.      Nose: No congestion or rhinorrhea.      Mouth/Throat:      Pharynx: No oropharyngeal exudate or posterior oropharyngeal erythema.   Eyes:      General:         Right eye: No discharge.         Left eye: No discharge.   Cardiovascular:      Rate and Rhythm: Normal rate and regular rhythm.      Pulses: Normal pulses.      Heart sounds: No murmur. No gallop.    Pulmonary:      Effort: Pulmonary effort is normal. No respiratory distress.      Breath sounds: No wheezing or rales.   Abdominal:      General: There is distension.      Tenderness: There is abdominal tenderness.      Comments: Bandages in place on surgical site, does not appear soaked or bleeding.   Musculoskeletal:         General: Normal range of motion.      Cervical back: Normal range of motion.   Skin:     General: Skin is warm and dry.   Neurological:      General: No focal deficit present.      Mental Status: She is alert and oriented to person, place, and time.      Cranial Nerves: No cranial nerve deficit.      Motor: No weakness.   Psychiatric:         Mood and Affect: Mood normal.         Behavior: Behavior normal.         Fluids    Intake/Output Summary (Last 24 hours) at 5/1/2021 1537  Last data filed at 5/1/2021 1503  Gross per 24 hour   Intake 1156.67 ml   Output 4260 ml   Net -3103.33 ml       Laboratory  Recent Labs     04/30/21  0335 04/30/21  0624 05/01/21  0415   WBC RR 13.3* 13.5*   RBC RR 3.92* 3.76*   HEMOGLOBIN RR 11.4* 10.9*   HEMATOCRIT RR 36.1* 34.3*   MCV RR 92.1 91.2   MCH RR 29.1 29.0   MCHC RR 31.6* 31.8*   RDW RR 46.5 47.0   PLATELETCT  214   MPV RR 10.9 10.6     Recent Labs      04/29/21  0513 04/30/21  0624 05/01/21  0415   SODIUM 143 145 142   POTASSIUM 3.8 4.0 4.0   CHLORIDE 103 107 107   CO2 32 30 28   GLUCOSE 132* 124* 124*   BUN 21 20 18   CREATININE 0.89 0.95 0.89   CALCIUM 8.5 8.7 8.5                   Imaging  DX-ABDOMEN FOR TUBE PLACEMENT   Final Result         1.  Nonspecific bowel gas pattern.   2.  Dobbhoff tube is coiled within the pylorus, the tip terminates overlying the expected location of the gastric pylorus or gastric antrum.   3.  Left basilar atelectasis versus early infiltrate.      IR-PICC LINE PLACEMENT W/ GUIDANCE > AGE 5   Final Result                  Ultrasound-guided PICC placement performed by qualified nursing staff as    above.          CT-ABDOMEN-PELVIS W/O   Final Result      Interval laparotomy with right lower quadrant surgical drain, similar fat stranding in the pericecal region that is likely postoperative in nature. No noncontrast evidence of abscess formation      Small free fluid and intraperitoneal air is not outside normal limits in the perioperative period      Contrast in the colon is from the CT 3 days ago, air-fluid levels and mild small bowel dilatation. In combination these findings are most compatible with ileus      Urinary bladder decompressed around the Pabon catheter. No hydroureteronephrosis      [id number] VIEW   Final Result      Dilated loops of small bowel most likely represent ileus. Partial obstruction is not excluded.      Residual enteric contrast is seen from the recent CT.      Mild bibasilar atelectasis.         IR-US GUIDED PIV   Final Result    Ultrasound-guided PERIPHERAL IV INSERTION performed by    qualified nursing staff as above.           Assessment/Plan  * Acute appendicitis with localized peritonitis  Assessment & Plan  Status post appendectomy.  Initially attempted with laparoscopy but then had to be converted to open resection.  Pain management  NG tube to suctioning due to persistent postoperative ileus  Fluid  resuscitation  Due to lack of nutrition will have PICC line placed and TPN started.    4/29: Continue TPN, we appreciate any further recommendations by general surgery. Continue Zosyn. We have consulted ID for further antibiotic coverage, we appreciate further recommendations.    4/30: We consulted ID, and they recommended to continue IV antibiotics for now.    5/1: ID recommends to stop antibiotics after today. If WBC trends upwards, we will consider repeat CT scan.    Elevated LFTs  Assessment & Plan  Mild elevation of AST and ALT.  We will continue to monitor and repeat CMP.    4/30: Trending down, repeat CMP.    5/1: LFTs are within normal limits today. Continue to monitor.    Hypernatremia  Assessment & Plan  Na this morning was 148.  We will stop NS continous IV fluids for now.    Strict I's and O's  Monitor and make changes accordingly.    4/29: Resolved.    Constipation  Assessment & Plan  Possibly post surgical etiology.  General surgery following patient, we appreciate further recommendations  NPO, currently on TPN.    Leukocytosis  Assessment & Plan  In the setting of ruptured appendicitis and abdominal abscess.  Continue zosyn    4/30: WBC today are 13.3. ID consulted, who recommend to continue IV antibiotics. We appreciate further recommendations.    5/1: ID recommends to stop antibiotics after today. If WBC trends upwards, we will consider repeat CT scan.    Essential hypertension- (present on admission)  Assessment & Plan  Continue losartan  PRN labetalol    4/29: We have increased losartan to 50 mg BID.    Hyperglycemia  Assessment & Plan  Patient's A1c is 6.5.  Patient currently on ISS and hypoglycemia protocol.  Patient would require close follow up as outpatient.    Hypothyroidism  Assessment & Plan  Continue home medication.    Stage 3 chronic kidney disease- (present on admission)  Assessment & Plan  As per chart review, apparently history of  Avoid nephrotoxic medications    5/1: Creatinine .89  today. Patient NPO on TPN.      BMI 50.0-59.9, adult (HCC)- (present on admission)  Assessment & Plan  Body mass index is 57.29 kg/m².  Outpatient follow-ups with weight program      HENRI (obstructive sleep apnea)- (present on admission)  Assessment & Plan  CPAP nocturnal.         VTE prophylaxis: Heparin

## 2021-05-01 NOTE — PROGRESS NOTES
Pharmacy TPN Day # 6       2021    Dosing Weight   76 kg.     TPN currently providing 100% of goal                TPN goal: 1380 kcal/day including 1.2  gm/kg/day Protein     TPN indication: NPO s/p appendectomy w/ abscess present     Pertinent PMH: HTN, HENRI< CKD     Temp (24hrs), Av.9 °C (98.5 °F), Min:36.4 °C (97.6 °F), Max:37.7 °C (99.9 °F)  .  Recent Labs     21  0513 21  0624 21  0415   SODIUM 143 145 142   POTASSIUM 3.8 4.0 4.0   CHLORIDE 103 107 107   CO2 32 30 28   BUN 21 20 18   CREATININE 0.89 0.95 0.89   GLUCOSE 132* 124* 124*   CALCIUM 8.5 8.7 8.5   ASTSGOT 81* 51* 38   ALTSGPT 68* 64* 50   ALBUMIN 2.7* 2.7* 2.7*   TBILIRUBIN 0.4 0.4 0.4   PHOSPHORUS 3.2 2.9 3.1   MAGNESIUM 1.9 2.0 2.0     Accu-Checks  Recent Labs     21  1843 21  2326 21  0519   POCGLUCOSE 132* 134* 131*       Vitals:    21 1638 21 2300 21 0500 21 0715   BP: 141/84 151/79 152/70 158/73   Weight:       Height:           Intake/Output Summary (Last 24 hours) at 2021 0939  Last data filed at 2021 0900  Gross per 24 hour   Intake 145 ml   Output 3160 ml   Net -3015 ml       Orders Placed This Encounter   Procedures   • Diet NPO     Standing Status:   Standing     Number of Occurrences:   1     Order Specific Question:   Restrict to:     Answer:   Sips with Medications [3]         TPN for past 72 hours (Show up to 3 orders; newest on the left. Changes between the two most recent orders are indicated.)     Start date and time   2021      TPN Central Line Formulation [[id number]] TPN Central Line Formulation [[id number]] TPN Central Line Formulation [[id number]]    Order Status  Active Last Dose in Progress Completed    Last Admin   New Bag at 202[address] by Piero Camacho R.N. New Bag at 2021 by Purvi Romo R.N.       Base    Clinisol 15%  90 g 90 g 90 g    dextrose 70%  200 g 200 g 200 g    fat emulsions 20%   34 g 34 g 34 g       Additives    potassium phosphate  15 mmol 15 mmol 15 mmol    potassium chloride  70 mEq 70 mEq 70 mEq    sodium chloride  170 mEq 170 mEq 170 mEq    magnesium sulfate  8 mEq 8 mEq 8 mEq    calcium GLUConate  9.4 mEq 9.4 mEq 9.4 mEq    M.T.E.-4 Adult  1 mL 1 mL 1 mL    M.V.I. Adult  10 mL 10 mL 10 mL       QS Base    sterile water  820.61 mL 820.61 mL 820.61 mL       Energy Contribution    Proteins  -- -- --    Dextrose  -- -- --    Lipids  -- -- --    Total  -- -- --       Electrolyte Ion Calculated Amount    Sodium  170 mEq 170 mEq 170 mEq    Potassium  92 mEq 92 mEq 92 mEq    Calcium  9.4 mEq 9.4 mEq 9.4 mEq    Magnesium  8 mEq 8 mEq 8 mEq    Aluminum  -- -- --    Phosphate  15 mmol 15 mmol 15 mmol    Chloride  240 mEq 240 mEq 240 mEq    Acetate  76.2 mEq 76.2 mEq 76.2 mEq       Other    Total Protein  90 g 90 g 90 g    Total Protein/kg  0.61 g/kg 0.61 g/kg 0.61 g/kg    Total Amino Acid  -- -- --    Total Amino Acid/kg  -- -- --    Glucose Infusion Rate  0.94 mg/kg/min 0.94 mg/kg/min 0.94 mg/kg/min    Osmolarity (Estimated)  -- -- --    Volume  1,992 mL 1,992 mL 1,992 mL    Rate  83 mL/hr 83 mL/hr 83 mL/hr    Dosing Weight  147 kg 147 kg 147 kg    Infusion Site  [facility]            This formula provides:  % kcal as lipids = 25  Grams protein/kg = 1.2  Non-protein calories = 1020  Kcals/kg = 18.1  Total daily calories = 1380    Comments:  Lytes stable.  Repeat formulation.      [facility]UNFormerly [facility]

## 2021-05-01 NOTE — PROGRESS NOTES
Report given to WILIAM Harry. Dx, medications, O2 needs, and poc reviewed. Pt has no signs of distress or complaints at this time. Pt has no acute needs at this time. Care fully relinquished.

## 2021-05-02 LAB
ALBUMIN SERPL BCP-MCNC: 2.6 G/DL (ref 3.2–4.9)
ALBUMIN/GLOB SERPL: 0.7 G/DL
ALP SERPL-CCNC: 92 U/L (ref 30–99)
ALT SERPL-CCNC: 46 U/L (ref 2–50)
ANION GAP SERPL CALC-SCNC: 11 MMOL/L (ref 7–16)
AST SERPL-CCNC: 33 U/L (ref 12–45)
BASOPHILS # BLD AUTO: 0.5 % (ref 0–1.8)
BASOPHILS # BLD: 0.07 K/UL (ref 0–0.12)
BILIRUB SERPL-MCNC: 0.5 MG/DL (ref 0.1–1.5)
BUN SERPL-MCNC: 16 MG/DL (ref 8–22)
CALCIUM SERPL-MCNC: 8.2 MG/DL (ref 8.4–10.2)
CHLORIDE SERPL-SCNC: 109 MMOL/L (ref 96–112)
CO2 SERPL-SCNC: 25 MMOL/L (ref 20–33)
CREAT SERPL-MCNC: 0.93 MG/DL (ref 0.5–1.4)
EOSINOPHIL # BLD AUTO: 0.3 K/UL (ref 0–0.51)
EOSINOPHIL NFR BLD: 2.1 % (ref 0–6.9)
ERYTHROCYTE [DISTWIDTH] IN BLOOD BY AUTOMATED COUNT: 46.5 FL (ref 35.9–50)
GLOBULIN SER CALC-MCNC: 3.5 G/DL (ref 1.9–3.5)
GLUCOSE BLD-MCNC: 120 MG/DL (ref 65–99)
GLUCOSE BLD-MCNC: 123 MG/DL (ref 65–99)
GLUCOSE BLD-MCNC: 125 MG/DL (ref 65–99)
GLUCOSE BLD-MCNC: 134 MG/DL (ref 65–99)
GLUCOSE BLD-MCNC: 137 MG/DL (ref 65–99)
GLUCOSE SERPL-MCNC: 121 MG/DL (ref 65–99)
HCT VFR BLD AUTO: 32.6 % (ref 37–47)
HGB BLD-MCNC: 10.5 G/DL (ref 12–16)
IMM GRANULOCYTES # BLD AUTO: 0.45 K/UL (ref 0–0.11)
IMM GRANULOCYTES NFR BLD AUTO: 3.2 % (ref 0–0.9)
LYMPHOCYTES # BLD AUTO: 2.92 K/UL (ref 1–4.8)
LYMPHOCYTES NFR BLD: 20.8 % (ref 22–41)
MAGNESIUM SERPL-MCNC: 1.9 MG/DL (ref 1.5–2.5)
MCH RBC QN AUTO: 29.3 PG (ref 27–33)
MCHC RBC AUTO-ENTMCNC: 32.2 G/DL (ref 33.6–35)
MCV RBC AUTO: 91.1 FL (ref 81.4–97.8)
MONOCYTES # BLD AUTO: 0.83 K/UL (ref 0–0.85)
MONOCYTES NFR BLD AUTO: 5.9 % (ref 0–13.4)
NEUTROPHILS # BLD AUTO: 9.49 K/UL (ref 2–7.15)
NEUTROPHILS NFR BLD: 67.5 % (ref 44–72)
NRBC # BLD AUTO: 0 K/UL
NRBC BLD-RTO: 0 /100 WBC
PLATELET # BLD AUTO: 193 K/UL (ref 164–446)
PMV BLD AUTO: 10.6 FL (ref 9–12.9)
POTASSIUM SERPL-SCNC: 3.7 MMOL/L (ref 3.6–5.5)
PROT SERPL-MCNC: 6.1 G/DL (ref 6–8.2)
RBC # BLD AUTO: 3.58 M/UL (ref 4.2–5.4)
SODIUM SERPL-SCNC: 145 MMOL/L (ref 135–145)
WBC # BLD AUTO: 14.1 K/UL (ref 4.8–10.8)

## 2021-05-02 PROCEDURE — A9270 NON-COVERED ITEM OR SERVICE: HCPCS | Performed by: INTERNAL MEDICINE

## 2021-05-02 PROCEDURE — 700105 HCHG RX REV CODE 258: Performed by: INTERNAL MEDICINE

## 2021-05-02 PROCEDURE — 85025 COMPLETE CBC W/AUTO DIFF WBC: CPT

## 2021-05-02 PROCEDURE — 94660 CPAP INITIATION&MGMT: CPT

## 2021-05-02 PROCEDURE — 770006 HCHG ROOM/CARE - MED/SURG/GYN SEMI*

## 2021-05-02 PROCEDURE — 80053 COMPREHEN METABOLIC PANEL: CPT

## 2021-05-02 PROCEDURE — 99232 SBSQ HOSP IP/OBS MODERATE 35: CPT | Performed by: INTERNAL MEDICINE

## 2021-05-02 PROCEDURE — 94760 N-INVAS EAR/PLS OXIMETRY 1: CPT

## 2021-05-02 PROCEDURE — 82962 GLUCOSE BLOOD TEST: CPT

## 2021-05-02 PROCEDURE — 700102 HCHG RX REV CODE 250 W/ 637 OVERRIDE(OP): Performed by: INTERNAL MEDICINE

## 2021-05-02 PROCEDURE — 700111 HCHG RX REV CODE 636 W/ 250 OVERRIDE (IP): Performed by: INTERNAL MEDICINE

## 2021-05-02 PROCEDURE — 83735 ASSAY OF MAGNESIUM: CPT

## 2021-05-02 PROCEDURE — 700101 HCHG RX REV CODE 250: Performed by: INTERNAL MEDICINE

## 2021-05-02 RX ORDER — LIDOCAINE 50 MG/G
1 PATCH TOPICAL EVERY 24 HOURS
Status: DISCONTINUED | OUTPATIENT
Start: 2021-05-02 | End: 2021-05-14 | Stop reason: HOSPADM

## 2021-05-02 RX ORDER — LIDOCAINE 50 MG/G
PATCH TOPICAL
Status: ACTIVE
Start: 2021-05-02 | End: 2021-05-03

## 2021-05-02 RX ADMIN — ACETAMINOPHEN 650 MG: 325 TABLET, FILM COATED ORAL at 16:46

## 2021-05-02 RX ADMIN — POTASSIUM CHLORIDE: 2 INJECTION, SOLUTION, CONCENTRATE INTRAVENOUS at 22:03

## 2021-05-02 RX ADMIN — LIDOCAINE 1 PATCH: 50 PATCH TOPICAL at 18:39

## 2021-05-02 RX ADMIN — LORATADINE 10 MG: 10 TABLET ORAL at 05:27

## 2021-05-02 RX ADMIN — LOSARTAN POTASSIUM 50 MG: 25 TABLET, FILM COATED ORAL at 17:16

## 2021-05-02 RX ADMIN — LOSARTAN POTASSIUM 50 MG: 25 TABLET, FILM COATED ORAL at 05:27

## 2021-05-02 RX ADMIN — SODIUM CHLORIDE, POTASSIUM CHLORIDE, SODIUM LACTATE AND CALCIUM CHLORIDE: 600; 310; 30; 20 INJECTION, SOLUTION INTRAVENOUS at 16:46

## 2021-05-02 RX ADMIN — OXYCODONE HYDROCHLORIDE 10 MG: 10 TABLET ORAL at 18:44

## 2021-05-02 RX ADMIN — HEPARIN SODIUM 5000 UNITS: 5000 INJECTION, SOLUTION INTRAVENOUS; SUBCUTANEOUS at 05:27

## 2021-05-02 RX ADMIN — OXYCODONE HYDROCHLORIDE 10 MG: 10 TABLET ORAL at 13:51

## 2021-05-02 RX ADMIN — AMLODIPINE BESYLATE 5 MG: 5 TABLET ORAL at 05:27

## 2021-05-02 RX ADMIN — SODIUM CHLORIDE, POTASSIUM CHLORIDE, SODIUM LACTATE AND CALCIUM CHLORIDE: 600; 310; 30; 20 INJECTION, SOLUTION INTRAVENOUS at 03:22

## 2021-05-02 RX ADMIN — HEPARIN SODIUM 5000 UNITS: 5000 INJECTION, SOLUTION INTRAVENOUS; SUBCUTANEOUS at 13:52

## 2021-05-02 RX ADMIN — HEPARIN SODIUM 5000 UNITS: 5000 INJECTION, SOLUTION INTRAVENOUS; SUBCUTANEOUS at 22:02

## 2021-05-02 RX ADMIN — OXYCODONE HYDROCHLORIDE 10 MG: 10 TABLET ORAL at 05:37

## 2021-05-02 RX ADMIN — THYROID, PORCINE 90 MG: 30 TABLET ORAL at 05:27

## 2021-05-02 ASSESSMENT — PAIN DESCRIPTION - PAIN TYPE
TYPE: ACUTE PAIN
TYPE: ACUTE PAIN
TYPE: ACUTE PAIN;SURGICAL PAIN
TYPE: ACUTE PAIN;SURGICAL PAIN

## 2021-05-02 ASSESSMENT — ENCOUNTER SYMPTOMS
FEVER: 0
NAUSEA: 0
CHILLS: 0
CONSTIPATION: 1
ABDOMINAL PAIN: 1
SHORTNESS OF BREATH: 0
MYALGIAS: 0
COUGH: 0
BACK PAIN: 1
DIZZINESS: 0
PALPITATIONS: 0
HEARTBURN: 0
BLURRED VISION: 0
HEADACHES: 0
DOUBLE VISION: 0
VOMITING: 0
FALLS: 0
NERVOUS/ANXIOUS: 0

## 2021-05-02 ASSESSMENT — FIBROSIS 4 INDEX: FIB4 SCORE: 1.59

## 2021-05-02 NOTE — PROGRESS NOTES
[facility] Medicine Daily Progress Note    Date of Service  5/2/2021    Chief Complaint  65 y.o. female admitted 4/21/2021 with abdominal pain.    Hospital Course  4/22/2021-patient is a 65-year-old female comes in with abdominal pain that started last Friday.  After suffering with the pain the patient went to see urgent care where they did a CAT scan of her abdomen which came back with early appendicitis.  Patient was referred over to the emergency room for evaluation.  Patient was admitted for surgery and was found to have a large abscess along with the appendicitis.  Appendix was removed and the abscess has been drained.  She still having chills and fevers afterwards.  Continue with IV antibiotics as well as pain management and fluid resuscitation.  Await culture results.  4/23/2021-this morning patient is in quite a bit of distress.  Her pain has escalated in the abdomen and she is unable to urinate or defecate.  Upon further evaluation patient has a postoperative ileus in her bowel sounds have not returned.  She has not passed any gas or had a bowel movement.  Continue at this point with supportive fluid resuscitation to ensure the patient's bowel functions returned to normal.  Patient at this point is also complaining of urinary retention and the bladder scanner was only showing 70 to 100 mL of retained urine.  This was inconsistent with the amount of pain the patient was having thus I ordered a immediate straight catheterization which yielded over 800 mL of urine.  After the urine came out the patient's abdominal pain eased up and her condition improved.  We will continue to monitor her urine output and if the patient should have urinary retention we will straight catheter again.  For the time being because of her abdominal distention pain and ileus as well as urinary retention we are unable to discharge the patient at this point in time.  Continue at this point with aggressive management and  stabilization.  4/24/2021-patient is still having difficulty with abdominal pain.  She also has not had a bowel movement and her intestinal sounds are returning very slowly.  Concern is that there is still an abscess present.  Flatplate of the abdomen did not show anything her white blood cell count however is increasing at this point I am going to obtain a CT of the abdomen.  Discussed it with surgery.  Because of the bladder distention and inability urinate a Pabon catheter had to be placed.  Hopefully this will decompress the bladder.  4/25/2021-postoperative day #4 after laparoscopic appendectomy had to be converted to an open appendectomy and drainage of abscess cavity.  Cultures have not grown out any significant organism.  White blood cell count has gone up, repeat CT of the abdomen does not demonstrate worsening abscess formation and actually is consistent with routine healing.  Patient CT demonstrates an ileus.  Since her abdominal distention is getting worse and her nausea is persisting I am putting in an NG to low intermittent suctioning.  This was discussed with the bedside nurse and surgery.  4/26/2021-patient's abdominal distention persists.  Ileus persists status postoperative.  Yesterday the patient's Pabon catheter became obstructed.  It had to be replaced.  Yesterday he also had some technical issues with the hospital bed and at this point had to be replaced a few times.  Pain seems to be better controlled.  Her labs are improving.  The patient may need a repeat CT in 24 to 48 hours if her white blood cell count does not improve.  Today's count is down to 14.8.  Continue with IV Zosyn    Interval Problem Update  4/27: Patient seen at bedside this morning.  The patient still has not had a bowel movement, however she mentions that she thinks she is passing gas.  General surgery continues to recommend ambulation.  Patient currently n.p.o. with NG tube and the patient is on TPN.  As per nursing no  other overnight events were reported.    4/28: Patient seen at bedside this morning.  The patient still has not had a bowel movement.  She says she is passing gas.  General Surgery following the patient along.  We will continue TPN and n.p.o. for now.  No other overnight events were reported by nursing.    4/29: Patient seen at bedside this morning.  She mentions she is passing gas, however she has not had a bowel movement yet.  She was encouraged to ambulate.  We have consulted ID for further antibiotic coverage, we appreciate further recommendations.  As per nursing no other overnight events reported.    4/30: Patient seen at bedside this morning.  The patient still without a bowel movement.  ID recommends to continue IV antibiotics for now.  As per nursing no other overnight events reported.    5/1: Patient seen at bedside this morning.  She is lying comfortably in bed, however she still having bowel movements, but she says she is passing gas.  ID recommended to stop antibiotics after today and if WBC trend upwards to repeat CT scan.  As per nursing no other overnight events were reported.    5/2: WBC count slightly elevated today from yesterday.  The last dose of Zosyn she got last night.  Patient lying in bed comfortably, currently not complaining of other pain.  She is still has not had a bowel movement.  We will repeat WBC tomorrow and consider CT scan if it continues to trend up.  As per nursing no other overnight events reported.    Consultants/Specialty  General Surgery  ID    Code Status  Full Code    Disposition  Pending    Review of Systems  Review of Systems   Constitutional: Negative for chills and fever.   HENT: Negative for hearing loss and nosebleeds.    Eyes: Negative for blurred vision and double vision.   Respiratory: Negative for cough and shortness of breath.    Cardiovascular: Negative for chest pain and palpitations.   Gastrointestinal: Positive for abdominal pain and constipation. Negative  for heartburn, nausea and vomiting.   Genitourinary: Negative for dysuria and urgency.   Musculoskeletal: Positive for back pain. Negative for falls and myalgias.   Skin: Negative for itching and rash.   Neurological: Negative for dizziness and headaches.   Psychiatric/Behavioral: The patient is not nervous/anxious.    All other systems reviewed and are negative.       Physical Exam  Temp:  [37 °C (98.6 °F)-37.2 °C (98.9 °F)] 37.1 °C (98.7 °F)  Pulse:  [88-92] 88  Resp:  [18-20] 20  BP: (153-158)/(68-75) 154/75  SpO2:  [92 %-94 %] 92 %    Physical Exam  Constitutional:       Appearance: She is obese. She is ill-appearing.   HENT:      Head: Normocephalic and atraumatic.      Nose: No congestion or rhinorrhea.      Mouth/Throat:      Pharynx: No oropharyngeal exudate or posterior oropharyngeal erythema.   Eyes:      General:         Right eye: No discharge.         Left eye: No discharge.   Cardiovascular:      Rate and Rhythm: Normal rate and regular rhythm.      Pulses: Normal pulses.      Heart sounds: No murmur. No gallop.    Pulmonary:      Effort: Pulmonary effort is normal. No respiratory distress.      Breath sounds: No wheezing or rales.   Abdominal:      General: There is distension.      Tenderness: There is abdominal tenderness.      Comments: Bandages in place on surgical site, does not appear soaked or bleeding.   Musculoskeletal:         General: Normal range of motion.      Cervical back: Normal range of motion.   Skin:     General: Skin is warm and dry.   Neurological:      General: No focal deficit present.      Mental Status: She is alert and oriented to person, place, and time.      Cranial Nerves: No cranial nerve deficit.      Motor: No weakness.   Psychiatric:         Mood and Affect: Mood normal.         Behavior: Behavior normal.         Fluids    Intake/Output Summary (Last 24 hours) at 5/2/2021 1159  Last data filed at 5/2/2021 0542  Gross per 24 hour   Intake 1486.67 ml   Output 4900 ml    Net -3413.33 ml       Laboratory  Recent Labs     04/30/21  0624 05/01/21  0415 05/02/21  0440   WBC 13.3* 13.5* 14.1*   RBC 3.92* 3.76* 3.58*   HEMOGLOBIN 11.4* 10.9* 10.5*   HEMATOCRIT 36.1* 34.3* 32.6*   MCV 92.1 91.2 91.1   MCH 29.1 29.0 29.3   MCHC 31.6* 31.8* 32.2*   RDW 46.5 47.0 46.5   PLATELETCT 180 214 193   MPV 10.9 10.6 10.6     Recent Labs     04/30/21 0624 05/01/21  0415 05/02/21  0440   SODIUM 145 142 145   POTASSIUM 4.0 4.0 3.7   CHLORIDE 107 107 109   CO2 30 28 25   GLUCOSE 124* 124* 121*   BUN 20 18 16   CREATININE 0.95 0.89 0.93   CALCIUM 8.7 8.5 8.2*                   Imaging  DX-ABDOMEN FOR TUBE PLACEMENT   Final Result         1.  Nonspecific bowel gas pattern.   2.  Dobbhoff tube is coiled within the pylorus, the tip terminates overlying the expected location of the gastric pylorus or gastric antrum.   3.  Left basilar atelectasis versus early infiltrate.      IR-PICC LINE PLACEMENT W/ GUIDANCE > AGE 5   Final Result                  Ultrasound-guided PICC placement performed by qualified nursing staff as    above.          CT-ABDOMEN-PELVIS W/O   Final Result      Interval laparotomy with right lower quadrant surgical drain, similar fat stranding in the pericecal region that is likely postoperative in nature. No noncontrast evidence of abscess formation      Small free fluid and intraperitoneal air is not outside normal limits in the perioperative period      Contrast in the colon is from the CT 3 days ago, air-fluid levels and mild small bowel dilatation. In combination these findings are most compatible with ileus      Urinary bladder decompressed around the Pabon catheter. No hydroureteronephrosis      [id number] VIEW   Final Result      Dilated loops of small bowel most likely represent ileus. Partial obstruction is not excluded.      Residual enteric contrast is seen from the recent CT.      Mild bibasilar atelectasis.         IR-US GUIDED PIV   Final Result    Ultrasound-guided  PERIPHERAL IV INSERTION performed by    qualified nursing staff as above.           Assessment/Plan  * Acute appendicitis with localized peritonitis  Assessment & Plan  Status post appendectomy.  Initially attempted with laparoscopy but then had to be converted to open resection.  Pain management  NG tube to suctioning due to persistent postoperative ileus  Fluid resuscitation  Due to lack of nutrition will have PICC line placed and TPN started.    4/29: Continue TPN, we appreciate any further recommendations by general surgery. Continue Zosyn. We have consulted ID for further antibiotic coverage, we appreciate further recommendations.    4/30: We consulted ID, and they recommended to continue IV antibiotics for now.    5/1: ID recommends to stop antibiotics after today.     5/2: WBC today is 14.1, yesterday it was 13.5. We just stopped Zosyn last night, we will repeat CBC tomorrow and if it continues to trend upwards, we will consider repeat CT scan. If WBC trends upwards, we will consider repeat CT scan. Patient afebrile, without hypotension.    Elevated LFTs  Assessment & Plan  Mild elevation of AST and ALT.  We will continue to monitor and repeat CMP.    4/30: Trending down, repeat CMP.    5/2: LFTs are within normal limits today. Continue to monitor.    Hypernatremia  Assessment & Plan  Na this morning was 148.  We will stop NS continous IV fluids for now.    Strict I's and O's  Monitor and make changes accordingly.    4/29: Resolved.    Constipation  Assessment & Plan  Possibly post surgical etiology.  General surgery following patient, we appreciate further recommendations  NPO, currently on TPN.    Leukocytosis  Assessment & Plan  In the setting of ruptured appendicitis and abdominal abscess.  Continue zosyn    4/30: WBC today are 13.3. ID consulted, who recommend to continue IV antibiotics. We appreciate further recommendations.    5/1: ID recommends to stop antibiotics after today. If WBC trends upwards,  we will consider repeat CT scan.    5/2: Mild elevation today from 13.5 to 14.1. We will monitor and consider CT scan if it continues to trend up.    Essential hypertension- (present on admission)  Assessment & Plan  Continue losartan  PRN labetalol    4/29: We have increased losartan to 50 mg BID.    5/2: We  Have started amlodipine 5 mg.    Hyperglycemia  Assessment & Plan  Patient's A1c is 6.5.  Patient currently on ISS and hypoglycemia protocol.  Patient would require close follow up as outpatient.    Hypothyroidism  Assessment & Plan  Continue home medication.    Stage 3 chronic kidney disease- (present on admission)  Assessment & Plan  As per chart review, apparently history of  Avoid nephrotoxic medications    5/2: Creatinine .93 today. Patient NPO on TPN.      BMI 50.0-59.9, adult (HCC)- (present on admission)  Assessment & Plan  Body mass index is 57.29 kg/m².  Outpatient follow-ups with weight program      HENRI (obstructive sleep apnea)- (present on admission)  Assessment & Plan  CPAP nocturnal.         VTE prophylaxis: Heparin

## 2021-05-02 NOTE — PROGRESS NOTES
Pharmacy TPN Day # 7       2021    Dosing Weight   76 kg TPN currently providing 100% of goal        TPN goal:    Calories: 1617 - 1991 kcal/day (kcal/k - 13.5)   Protein: 104 - 130 (2-2.5 gProtein / kg IBW)     TPN indication: NPO s/p appendectomy w/ abscess present  Pertinent PMH: Back pain, Chronic low back pain (2018), Elevated blood sugar, Hypertension, Kidney stone (), Obesity, HENRI (obstructive sleep apnea) (2017), Osteoporosis, Post-nasal drip, Seasonal allergies, Sleep apnea, and Thyroid disease.    Temp (24hrs), Av.1 °C (98.8 °F), Min:37 °C (98.6 °F), Max:37.2 °C (98.9 °F)    Recent Labs     21  0624 21  0415 21  0440   SODIUM 145 142 145   POTASSIUM 4.0 4.0 3.7   CHLORIDE 107 107 109   CO2 30 28 25   BUN 20 18 16   CREATININE 0.95 0.89 0.93   GLUCOSE 124* 124* 121*   CALCIUM 8.7 8.5 8.2*   ASTSGOT 51* 38 33   ALTSGPT 64* 50 46   ALBUMIN 2.7* 2.7* 2.6*   TBILIRUBIN 0.4 0.4 0.5   PHOSPHORUS 2.9 3.1  --    MAGNESIUM 2.0 2.0 1.9     Accu-Checks  Recent Labs     21  1736 21  2342 21  0526   POCGLUCOSE 125* 134* 120*     Vitals:    21 1730 21 2300 21 0500 21 0542   BP: 153/68 157/71 158/75 154/75   Weight:       Height:         Intake/Output Summary (Last 24 hours) at 2021 1019  Last data filed at 2021 0542  Gross per 24 hour   Intake 1486.67 ml   Output 4900 ml   Net -3413.33 ml     Orders Placed This Encounter   Procedures   • Diet NPO     Standing Status:   Standing     Number of Occurrences:   1     Order Specific Question:   Restrict to:     Answer:   Sips with Medications [3]     TPN for past 72 hours (Show up to 3 orders; newest on the left. Changes between the two most recent orders are indicated.)     Start date and time   2021      TPN Central Line Formulation [[id number]] TPN Central Line Formulation [[id number]] TPN Central Line Formulation [[id number]]    Order  Status  Active Last Dose in Progress Completed    Last Admin   New Bag at 2021 by Piero Camacho R.N. New Bag at 2021 by Piero Camacho R.N.       Base    Clinisol 15%  120 g 90 g 90 g    dextrose 70%  220 g 200 g 200 g    fat emulsions 20%  40 g 34 g 34 g       Additives    potassium phosphate  15 mmol 15 mmol 15 mmol    potassium chloride  80 mEq 70 mEq 70 mEq    sodium chloride  170 mEq 170 mEq 170 mEq    magnesium sulfate  8 mEq 8 mEq 8 mEq    calcium GLUConate  11.63 mEq 9.4 mEq 9.4 mEq    M.T.E.-4 Adult  1 mL 1 mL 1 mL    M.V.I. Adult  10 mL 10 mL 10 mL       QS Base    sterile water  552.23 mL 820.61 mL 820.61 mL       Energy Contribution    Proteins  -- -- --    Dextrose  -- -- --    Lipids  -- -- --    Total  -- -- --       Electrolyte Ion Calculated Amount    Sodium  170 mEq 170 mEq 170 mEq    Potassium  102 mEq 92 mEq 92 mEq    Calcium  11.63 mEq 9.4 mEq 9.4 mEq    Magnesium  8 mEq 8 mEq 8 mEq    Aluminum  -- -- --    Phosphate  15 mmol 15 mmol 15 mmol    Chloride  250 mEq 240 mEq 240 mEq    Acetate  101.6 mEq 76.2 mEq 76.2 mEq       Other    Total Protein  120 g 90 g 90 g    Total Protein/kg  0.82 g/kg 0.61 g/kg 0.61 g/kg    Total Amino Acid  -- -- --    Total Amino Acid/kg  -- -- --    Glucose Infusion Rate  1.04 mg/kg/min 0.94 mg/kg/min 0.94 mg/kg/min    Osmolarity (Estimated)  -- -- --    Volume  1,992 mL 1,992 mL 1,992 mL    Rate  83 mL/hr 83 mL/hr 83 mL/hr    Dosing Weight  147 kg 147 kg 147 kg    Infusion Site  [facility]        Comments:  Per dietary - Calculation/Equation: REE with MSJ =  kcal  Total Calories / day: 1617 - 1991 (Calories / k - 13.5)  Total Grams Protein / day: 104 - 130 (Grams Protein / kg IBW: 2 - 2.5)    Patient with 6/10 pain in abdomen. hypoactive bowel sounds and is reporting that she is still passing gas. Pt has not yet had a BM    Increase patient to dietary goal recommendation (at least 1617 calories and at least 104g  protein)  Increase potassium, calcium, protein, dextrose, lipids  Check Tg q3days    LR at 83mL/hr - ok with MD Olive Arciniega, PharmD

## 2021-05-02 NOTE — PROGRESS NOTES
Received report from night shift RN. Assumed care of patient. Pt resting at this time, no signs of distress. Assessment completed. Dressings in place to abdominal surgical incisions. Pt updated on plan of care for the day. All questions answered. No other needs at this time. Call light within reach, bed locked and in lowest position. Pt educated to call for assistance.

## 2021-05-02 NOTE — CARE PLAN
Pt is awake in bed. VSS. Pt c/o 6/10 pain in abdomen. Medicated per MAR. No n/v. Pt has hypoactive bowel sounds and is reporting that she is still passing gas. Pt has not yet had a BM. Dressing to abdomen is clean, dry, and intact. LENI drain is collecting serosanguineous drainage. Pt is requesting to rest. No other needs at this time. Moderate fall precautions in place.      Problem: Safety  Goal: Will remain free from falls  Outcome: PROGRESSING AS EXPECTED     Problem: Venous Thromboembolism (VTW)/Deep Vein Thrombosis (DVT) Prevention:  Goal: Patient will participate in Venous Thrombosis (VTE)/Deep Vein Thrombosis (DVT)Prevention Measures  Outcome: PROGRESSING AS EXPECTED     Problem: Bowel/Gastric:  Goal: Will not experience complications related to bowel motility  Outcome: PROGRESSING SLOWER THAN EXPECTED

## 2021-05-02 NOTE — PROGRESS NOTES
"  Trauma/Surgical Progress Note    Author: Jett Ruiz M.D. Date & Time created: 5/2/2021   10:45 AM   4/21 open appendectomy drain abscess     Interval Events:  Awake alert  Up chair  reports pain control good  Reports feeling improved   increased activity working with PT   flatus, 0 bm  Hemodynamics:  /75   Pulse 88   Temp 37.1 °C (98.7 °F) (Temporal)   Resp 20   Ht 1.6 m (5' 3\")   Wt (!) 147 kg (323 lb 6.6 oz)   SpO2 92%      Respiratory:    Respiration: 20, Pulse Oximetry: 92 %     Work Of Breathing / Effort: Mild  RUL Breath Sounds: Clear, RML Breath Sounds: Clear, RLL Breath Sounds: Diminished, CHRISTINA Breath Sounds: Clear, LLL Breath Sounds: Diminished  Fluids:    Intake/Output Summary (Last 24 hours) at 5/2/2021 1045  Last data filed at 5/2/2021 0542  Gross per 24 hour   Intake 1486.67 ml   Output 4900 ml   Net -3413.33 ml     Admit Weight: (!) 147 kg (323 lb 6.6 oz)  Current     Physical Exam  Awake alert appropriate  Breathing with ease  Appropriate incisional tenderness  Medical Decision Making/Problem List:    Active Hospital Problems    Diagnosis    • Acute appendicitis with localized peritonitis [K35.30]      Priority: High   • Elevated LFTs [R79.89]      Priority: Medium   • Hypernatremia [E87.0]      Priority: Medium   • Leukocytosis [D72.829]      Priority: Medium   • Constipation [K59.00]      Priority: Medium   • Essential hypertension [I10]      Priority: Medium   • Stage 3 chronic kidney disease [N18.30]      Priority: Low   • BMI 50.0-59.9, adult (HCC) [Z68.43]      Priority: Low   • HENRI (obstructive sleep apnea) [G47.33]      Priority: Low     AHI 55.7, minimum saturation 84%, on CPAP 13 cm.     • Hyperglycemia [R73.9]    • Hypothyroidism [E03.9]      Core Measures & Quality Metrics:  Core Measures & Quality Metrics  JULIOCESAR Score  Discussed with patient family bedside nurse    Assessment/Plan  [facility] care medicine service  Appreciate ID assistance  Trend Wbc   Encourage " ambulation, IS  Monitoring and support

## 2021-05-02 NOTE — CARE PLAN
Problem: Communication  Goal: The ability to communicate needs accurately and effectively will improve  Outcome: PROGRESSING AS EXPECTED  Note: Pt communicating effectively, educated to call for assistance and on use of call light. Hourly rounding in place.      Problem: Safety  Goal: Will remain free from injury  Outcome: PROGRESSING AS EXPECTED     Problem: Infection  Goal: Will remain free from infection  Outcome: PROGRESSING AS EXPECTED

## 2021-05-02 NOTE — FLOWSHEET NOTE
05/01/21 5372   Non-Invasive Ventilation [facility]   Nocturnal CPAP or BIPAP CPAP - [facility]   $ System Evaluation Yes   Settings (If Known) auto   FiO2 or LPM 2

## 2021-05-03 ENCOUNTER — APPOINTMENT (OUTPATIENT)
Dept: RADIOLOGY | Facility: MEDICAL CENTER | Age: 66
DRG: 339 | End: 2021-05-03
Attending: INTERNAL MEDICINE
Payer: OTHER GOVERNMENT

## 2021-05-03 PROBLEM — R79.89 ELEVATED LFTS: Status: RESOLVED | Noted: 2021-04-29 | Resolved: 2021-05-03

## 2021-05-03 PROBLEM — E87.0 HYPERNATREMIA: Status: RESOLVED | Noted: 2021-04-28 | Resolved: 2021-05-03

## 2021-05-03 PROBLEM — D64.9 ANEMIA: Status: ACTIVE | Noted: 2021-05-03

## 2021-05-03 LAB
ALBUMIN SERPL BCP-MCNC: 2.7 G/DL (ref 3.2–4.9)
ALBUMIN/GLOB SERPL: 0.9 G/DL
ALP SERPL-CCNC: 98 U/L (ref 30–99)
ALT SERPL-CCNC: 37 U/L (ref 2–50)
ANION GAP SERPL CALC-SCNC: 9 MMOL/L (ref 7–16)
AST SERPL-CCNC: 24 U/L (ref 12–45)
BASOPHILS # BLD AUTO: 0.4 % (ref 0–1.8)
BASOPHILS # BLD: 0.07 K/UL (ref 0–0.12)
BILIRUB SERPL-MCNC: 0.4 MG/DL (ref 0.1–1.5)
BUN SERPL-MCNC: 16 MG/DL (ref 8–22)
CALCIUM SERPL-MCNC: 8.4 MG/DL (ref 8.4–10.2)
CHLORIDE SERPL-SCNC: 105 MMOL/L (ref 96–112)
CO2 SERPL-SCNC: 24 MMOL/L (ref 20–33)
CREAT SERPL-MCNC: 0.84 MG/DL (ref 0.5–1.4)
CRP SERPL HS-MCNC: 14.37 MG/DL (ref 0–0.75)
EOSINOPHIL # BLD AUTO: 0.25 K/UL (ref 0–0.51)
EOSINOPHIL NFR BLD: 1.6 % (ref 0–6.9)
ERYTHROCYTE [DISTWIDTH] IN BLOOD BY AUTOMATED COUNT: 46.3 FL (ref 35.9–50)
GLOBULIN SER CALC-MCNC: 3 G/DL (ref 1.9–3.5)
GLUCOSE BLD-MCNC: 121 MG/DL (ref 65–99)
GLUCOSE BLD-MCNC: 122 MG/DL (ref 65–99)
GLUCOSE BLD-MCNC: 139 MG/DL (ref 65–99)
GLUCOSE SERPL-MCNC: 137 MG/DL (ref 65–99)
HCT VFR BLD AUTO: 32.5 % (ref 37–47)
HGB BLD-MCNC: 10.2 G/DL (ref 12–16)
IMM GRANULOCYTES # BLD AUTO: 0.42 K/UL (ref 0–0.11)
IMM GRANULOCYTES NFR BLD AUTO: 2.7 % (ref 0–0.9)
LYMPHOCYTES # BLD AUTO: 2.8 K/UL (ref 1–4.8)
LYMPHOCYTES NFR BLD: 17.7 % (ref 22–41)
MAGNESIUM SERPL-MCNC: 1.8 MG/DL (ref 1.5–2.5)
MCH RBC QN AUTO: 28.3 PG (ref 27–33)
MCHC RBC AUTO-ENTMCNC: 31.4 G/DL (ref 33.6–35)
MCV RBC AUTO: 90 FL (ref 81.4–97.8)
MONOCYTES # BLD AUTO: 1.07 K/UL (ref 0–0.85)
MONOCYTES NFR BLD AUTO: 6.8 % (ref 0–13.4)
NEUTROPHILS # BLD AUTO: 11.23 K/UL (ref 2–7.15)
NEUTROPHILS NFR BLD: 70.8 % (ref 44–72)
NRBC # BLD AUTO: 0 K/UL
NRBC BLD-RTO: 0 /100 WBC
PHOSPHATE SERPL-MCNC: 3 MG/DL (ref 2.5–4.5)
PLATELET # BLD AUTO: 189 K/UL (ref 164–446)
PMV BLD AUTO: 11.1 FL (ref 9–12.9)
POTASSIUM SERPL-SCNC: 4.3 MMOL/L (ref 3.6–5.5)
PREALB SERPL-MCNC: 12.7 MG/DL (ref 18–38)
PROT SERPL-MCNC: 5.7 G/DL (ref 6–8.2)
RBC # BLD AUTO: 3.61 M/UL (ref 4.2–5.4)
SODIUM SERPL-SCNC: 138 MMOL/L (ref 135–145)
TRIGL SERPL-MCNC: 179 MG/DL (ref 0–149)
WBC # BLD AUTO: 15.8 K/UL (ref 4.8–10.8)

## 2021-05-03 PROCEDURE — 80053 COMPREHEN METABOLIC PANEL: CPT

## 2021-05-03 PROCEDURE — 74177 CT ABD & PELVIS W/CONTRAST: CPT

## 2021-05-03 PROCEDURE — 82962 GLUCOSE BLOOD TEST: CPT | Mod: 91

## 2021-05-03 PROCEDURE — 84134 ASSAY OF PREALBUMIN: CPT

## 2021-05-03 PROCEDURE — A9270 NON-COVERED ITEM OR SERVICE: HCPCS | Performed by: INTERNAL MEDICINE

## 2021-05-03 PROCEDURE — 99232 SBSQ HOSP IP/OBS MODERATE 35: CPT | Performed by: INTERNAL MEDICINE

## 2021-05-03 PROCEDURE — 700102 HCHG RX REV CODE 250 W/ 637 OVERRIDE(OP): Performed by: INTERNAL MEDICINE

## 2021-05-03 PROCEDURE — 94760 N-INVAS EAR/PLS OXIMETRY 1: CPT

## 2021-05-03 PROCEDURE — 84478 ASSAY OF TRIGLYCERIDES: CPT

## 2021-05-03 PROCEDURE — 97535 SELF CARE MNGMENT TRAINING: CPT

## 2021-05-03 PROCEDURE — 83735 ASSAY OF MAGNESIUM: CPT

## 2021-05-03 PROCEDURE — 97112 NEUROMUSCULAR REEDUCATION: CPT

## 2021-05-03 PROCEDURE — 85025 COMPLETE CBC W/AUTO DIFF WBC: CPT

## 2021-05-03 PROCEDURE — 97530 THERAPEUTIC ACTIVITIES: CPT

## 2021-05-03 PROCEDURE — 700105 HCHG RX REV CODE 258: Performed by: INTERNAL MEDICINE

## 2021-05-03 PROCEDURE — 86140 C-REACTIVE PROTEIN: CPT

## 2021-05-03 PROCEDURE — 700111 HCHG RX REV CODE 636 W/ 250 OVERRIDE (IP): Performed by: INTERNAL MEDICINE

## 2021-05-03 PROCEDURE — 700101 HCHG RX REV CODE 250: Performed by: INTERNAL MEDICINE

## 2021-05-03 PROCEDURE — 700117 HCHG RX CONTRAST REV CODE 255: Performed by: INTERNAL MEDICINE

## 2021-05-03 PROCEDURE — 84100 ASSAY OF PHOSPHORUS: CPT

## 2021-05-03 PROCEDURE — 770006 HCHG ROOM/CARE - MED/SURG/GYN SEMI*

## 2021-05-03 PROCEDURE — 94660 CPAP INITIATION&MGMT: CPT

## 2021-05-03 RX ORDER — HYDROMORPHONE HYDROCHLORIDE 1 MG/ML
0.5 INJECTION, SOLUTION INTRAMUSCULAR; INTRAVENOUS; SUBCUTANEOUS EVERY 6 HOURS PRN
Status: DISCONTINUED | OUTPATIENT
Start: 2021-05-03 | End: 2021-05-14 | Stop reason: HOSPADM

## 2021-05-03 RX ORDER — ACETAMINOPHEN 325 MG/1
650 TABLET ORAL EVERY 6 HOURS PRN
Status: DISCONTINUED | OUTPATIENT
Start: 2021-05-03 | End: 2021-05-14 | Stop reason: HOSPADM

## 2021-05-03 RX ORDER — OXYCODONE HYDROCHLORIDE 5 MG/1
5 TABLET ORAL EVERY 6 HOURS PRN
Status: DISCONTINUED | OUTPATIENT
Start: 2021-05-03 | End: 2021-05-14 | Stop reason: HOSPADM

## 2021-05-03 RX ORDER — OXYCODONE HYDROCHLORIDE 10 MG/1
10 TABLET ORAL EVERY 6 HOURS PRN
Status: DISCONTINUED | OUTPATIENT
Start: 2021-05-03 | End: 2021-05-14 | Stop reason: HOSPADM

## 2021-05-03 RX ORDER — IBUPROFEN 400 MG/1
400 TABLET ORAL ONCE
Status: COMPLETED | OUTPATIENT
Start: 2021-05-03 | End: 2021-05-03

## 2021-05-03 RX ADMIN — THYROID, PORCINE 90 MG: 30 TABLET ORAL at 05:04

## 2021-05-03 RX ADMIN — POTASSIUM CHLORIDE: 2 INJECTION, SOLUTION, CONCENTRATE INTRAVENOUS at 20:23

## 2021-05-03 RX ADMIN — SODIUM CHLORIDE, POTASSIUM CHLORIDE, SODIUM LACTATE AND CALCIUM CHLORIDE: 600; 310; 30; 20 INJECTION, SOLUTION INTRAVENOUS at 18:30

## 2021-05-03 RX ADMIN — LOSARTAN POTASSIUM 50 MG: 25 TABLET, FILM COATED ORAL at 18:21

## 2021-05-03 RX ADMIN — IBUPROFEN 400 MG: 400 TABLET, FILM COATED ORAL at 11:05

## 2021-05-03 RX ADMIN — OXYCODONE HYDROCHLORIDE 10 MG: 10 TABLET ORAL at 08:24

## 2021-05-03 RX ADMIN — IOHEXOL 100 ML: 350 INJECTION, SOLUTION INTRAVENOUS at 16:28

## 2021-05-03 RX ADMIN — ACETAMINOPHEN 650 MG: 325 TABLET, FILM COATED ORAL at 18:28

## 2021-05-03 RX ADMIN — HEPARIN SODIUM 5000 UNITS: 5000 INJECTION, SOLUTION INTRAVENOUS; SUBCUTANEOUS at 05:04

## 2021-05-03 RX ADMIN — IOHEXOL 25 ML: 240 INJECTION, SOLUTION INTRATHECAL; INTRAVASCULAR; INTRAVENOUS; ORAL at 16:28

## 2021-05-03 RX ADMIN — HEPARIN SODIUM 5000 UNITS: 5000 INJECTION, SOLUTION INTRAVENOUS; SUBCUTANEOUS at 21:10

## 2021-05-03 RX ADMIN — AMLODIPINE BESYLATE 5 MG: 5 TABLET ORAL at 05:05

## 2021-05-03 RX ADMIN — LORATADINE 10 MG: 10 TABLET ORAL at 05:05

## 2021-05-03 RX ADMIN — LOSARTAN POTASSIUM 50 MG: 25 TABLET, FILM COATED ORAL at 05:05

## 2021-05-03 ASSESSMENT — PAIN DESCRIPTION - PAIN TYPE
TYPE: ACUTE PAIN;CHRONIC PAIN
TYPE: ACUTE PAIN
TYPE: CHRONIC PAIN

## 2021-05-03 ASSESSMENT — ENCOUNTER SYMPTOMS
NAUSEA: 0
FALLS: 0
VOMITING: 0
BACK PAIN: 1
MYALGIAS: 0
HEARTBURN: 0
CONSTIPATION: 1
BLURRED VISION: 0
SHORTNESS OF BREATH: 0
COUGH: 0
NERVOUS/ANXIOUS: 0
DOUBLE VISION: 0
PALPITATIONS: 0
DIZZINESS: 0
CHILLS: 0
ABDOMINAL PAIN: 0
HEADACHES: 0
FEVER: 0

## 2021-05-03 ASSESSMENT — COGNITIVE AND FUNCTIONAL STATUS - GENERAL
PERSONAL GROOMING: A LITTLE
SUGGESTED CMS G CODE MODIFIER MOBILITY: CN
CLIMB 3 TO 5 STEPS WITH RAILING: TOTAL
HELP NEEDED FOR BATHING: A LOT
EATING MEALS: A LITTLE
WALKING IN HOSPITAL ROOM: TOTAL
MOVING FROM LYING ON BACK TO SITTING ON SIDE OF FLAT BED: UNABLE
TURNING FROM BACK TO SIDE WHILE IN FLAT BAD: UNABLE
STANDING UP FROM CHAIR USING ARMS: TOTAL
MOBILITY SCORE: 6
DAILY ACTIVITIY SCORE: 15
MOVING TO AND FROM BED TO CHAIR: UNABLE
DRESSING REGULAR UPPER BODY CLOTHING: A LITTLE
SUGGESTED CMS G CODE MODIFIER DAILY ACTIVITY: CK
TOILETING: A LOT
DRESSING REGULAR LOWER BODY CLOTHING: A LOT

## 2021-05-03 ASSESSMENT — GAIT ASSESSMENTS
GAIT LEVEL OF ASSIST: UNABLE TO PARTICIPATE
DISTANCE (FEET): 0

## 2021-05-03 NOTE — CARE PLAN
Problem: Communication  Goal: The ability to communicate needs accurately and effectively will improve  Outcome: PROGRESSING AS EXPECTED     Problem: Knowledge Deficit  Goal: Knowledge of disease process/condition, treatment plan, diagnostic tests, and medications will improve  Outcome: PROGRESSING AS EXPECTED     Problem: Bowel/Gastric:  Goal: Normal bowel function is maintained or improved  Outcome: PROGRESSING SLOWER THAN EXPECTED     Problem: Urinary Elimination:  Goal: Ability to reestablish a normal urinary elimination pattern will improve  Outcome: PROGRESSING SLOWER THAN EXPECTED     Problem: Mobility  Goal: Risk for activity intolerance will decrease  Outcome: PROGRESSING SLOWER THAN EXPECTED

## 2021-05-03 NOTE — THERAPY
Occupational Therapy  Daily Treatment     Patient Name: Mady Yang  Age:  65 y.o., Sex:  female  Medical Record #: [id number]  Today's Date: 5/3/2021     Precautions  Precautions: Fall Risk, Nasogastric Tube  Comments: abdominal incision; LENI drain: HOB at 30 deg     Assessment    Pt reports being in much greater pain today.  Required 2-3 person assist to get to EOB with HOB elevated and use of rails and trapeze.  Multiple attempts at sit to stand with FWW- pt unable to stand even with maxAx2 and max encouragement.  She requires extensive time to talk herself into trying to stand or do any mobility related tasks.  Finally able to stand for about 20 sec using the [employer] device to let her pull up to  a protected environment.  Remained in partial  device for a couple minutes to allow for linen change.  Pt greatly limited by pain in her feet today, and unable to perform mobility or transfers.  Pt is NOT safe to return home at this time and will need further inpt post acute therapy prior to home.  OT will follow while in house.    Plan    Continue current treatment plan.    DC Equipment Recommendations: Unable to determine at this time  Discharge Recommendations: Recommend post-acute placement for additional occupational therapy services prior to discharge home       05/03/21 0958   Cognition    Comments needs max encouragement to participate with OOB activities   Active ROM Upper Body   Active ROM Upper Body  WDL   Strength Upper Body   Upper Body Strength  X   Gross Strength Generalized Weakness, Equal Bilaterally.    Comments not sufficient to compensate for BLE weakness when using FWW   Sitting Upper Body Exercises   Comments trying to use BUE to pull or push from sit to stand   Balance   Sitting Balance (Static) Fair +   Sitting Balance (Dynamic) Fair   Standing Balance (Static) Poor   Standing Balance (Dynamic) Trace   Weight Shift Sitting Good   Weight Shift Standing Poor   Skilled  "Intervention Postural Facilitation;Facilitation;Sequencing;Verbal Cuing   Comments standing attempts with FWW   Bed Mobility    Supine to Sit Maximal Assist  (x2 with HOB up and use of trapeze)   Sit to Supine Maximal Assist  (x2)   Scooting Maximal Assist   Comments HOB up use of rails   Activities of Daily Living   Upper Body Dressing Minimal Assist   Lower Body Dressing Maximal Assist  (athletic shoes)   Toileting Total Assist  (yusuf)   Skilled Intervention Compensatory Strategies   Functional Mobility   Sit to Stand Maximal Assist   Bed, Chair, Wheelchair Transfer Unable to Participate   Mobility EOB attempted sit to stand 2-3 times, stood with Deepa Stedy 20 sec   Distance (Feet) 0   Comments used Deepa Stedy for more stable device for sit to stand   Activity Tolerance   Sitting in Chair unable   Sitting Edge of Bed 30 min   Standing 20 sec full  Deepa Stedy,  Attempted sit to stand 4-5 times total   Comments limited by pain, pt states she's having an arthritis flare up   Short Term Goals   Short Term Goal # 1 pt will complete ADL txfs with LRAD at spv level   Goal Outcome # 1 Goal not met   Short Term Goal # 2 pt will complete FB dressing with spv   Goal Outcome # 2 Goal not met   Short Term Goal # 3 pt will complete and tolerate 10\" of standing G/H at sinkside with spv   Goal Outcome # 3 Goal not met         "

## 2021-05-03 NOTE — PROGRESS NOTES
Pharmacy TPN Day # 8       5/3/2021    Dosing Weight   76 kg           TPN currently providing 100% of goal                                                    TPN goal:               Calories: 1617 - 1991 kcal/day (kcal/k - 13.5)              Protein: 104 - 130 (2-2.5 gProtein / kg IBW)      TPN indication: NPO s/p appendectomy w/ abscess present  Pertinent PMH: Back pain, Chronic low back pain (2018), Elevated blood sugar, Hypertension, Kidney stone (), Obesity, HENRI (obstructive sleep apnea) (2017), Osteoporosis, Post-nasal drip, Seasonal allergies, Sleep apnea, and Thyroid disease..  Recent Labs     21  0415 21  0440 21  0441   SODIUM 142 145 138   POTASSIUM 4.0 3.7 4.3   CHLORIDE 107 109 105   CO2 28 25 24   BUN 18 16 16   CREATININE 0.89 0.93 0.84   GLUCOSE 124* 121* 137*   CALCIUM 8.5 8.2* 8.4   ASTSGOT 38 33 24   ALTSGPT 50 46 37   ALBUMIN 2.7* 2.6* 2.7*   TBILIRUBIN 0.4 0.5 0.4   PHOSPHORUS 3.1  --  3.0   MAGNESIUM 2.0 1.9 1.8     Accu-Checks  Recent Labs     21  1713 21  2310 21  0513   POCGLUCOSE 125* 123* 122*       Vitals:    21 1627 21 2300 21 0500 21 0830   BP: 125/66 151/80 154/71 148/68   Weight:       Height:           Intake/Output Summary (Last 24 hours) at 5/3/2021 1034  Last data filed at 5/3/2021 0600  Gross per 24 hour   Intake 332 ml   Output 3980 ml   Net -3648 ml       Orders Placed This Encounter   Procedures    Diet NPO     Standing Status:   Standing     Number of Occurrences:   1     Order Specific Question:   Restrict to:     Answer:   Sips with Medications [3]         TPN for past 72 hours (Show up to 3 orders; newest on the left. Changes between the two most recent orders are indicated.)       Start date and time   2021      TPN Central Line Formulation [[id number]] TPN Central Line Formulation [[id number]] TPN Central Line Formulation [[id number]]    Order Status   Active Last Dose in Progress Completed    Last Admin   New Bag at 05/02/2021 2203 by Piero Camacho R.N. New Bag at 05/01/2021 2130 by Piero Camacho R.N.       Base    Clinisol 15%  120 g 120 g 90 g    dextrose 70%  220 g 220 g 200 g    fat emulsions 20%  40 g 40 g 34 g       Additives    potassium phosphate  15 mmol 15 mmol 15 mmol    potassium chloride  70 mEq 80 mEq 70 mEq    sodium chloride  170 mEq 170 mEq 170 mEq    magnesium sulfate  8 mEq 8 mEq 8 mEq    calcium GLUConate  11.63 mEq 11.63 mEq 9.4 mEq    M.T.E.-4 Adult  1 mL 1 mL 1 mL    M.V.I. Adult  10 mL 10 mL 10 mL       QS Base    sterile water  557.23 mL 552.23 mL 820.61 mL       Energy Contribution    Proteins  -- -- --    Dextrose  -- -- --    Lipids  -- -- --    Total  -- -- --       Electrolyte Ion Calculated Amount    Sodium  170 mEq 170 mEq 170 mEq    Potassium  92 mEq 102 mEq 92 mEq    Calcium  11.63 mEq 11.63 mEq 9.4 mEq    Magnesium  8 mEq 8 mEq 8 mEq    Aluminum  -- -- --    Phosphate  15 mmol 15 mmol 15 mmol    Chloride  240 mEq 250 mEq 240 mEq    Acetate  101.6 mEq 101.6 mEq 76.2 mEq       Other    Total Protein  120 g 120 g 90 g    Total Protein/kg  0.81 g/kg 0.82 g/kg 0.61 g/kg    Total Amino Acid  -- -- --    Total Amino Acid/kg  -- -- --    Glucose Infusion Rate  1.03 mg/kg/min 1.04 mg/kg/min 0.94 mg/kg/min    Osmolarity (Estimated)  -- -- --    Volume  1,992 mL 1,992 mL 1,992 mL    Rate  83 mL/hr 83 mL/hr 83 mL/hr    Dosing Weight  148 kg 147 kg 147 kg    Infusion Site  [facility]              This formula provides:  % kcal as lipids = 25  Grams protein/kg = 1.6  Non-protein calories = 1148  Kcals/kg = 21.4  Total daily calories = 1628    Comments:  Decrease K= to 70 mEq today.  The rest of the formula to remain the same.      Gabriela TRONCOSO Ph.

## 2021-05-03 NOTE — THERAPY
"Physical Therapy   Daily Treatment     Patient Name: Mady Yang  Age:  65 y.o., Sex:  female  Medical Record #: [id number]  Today's Date: 5/3/2021     Precautions: Fall Risk, Nasogastric Tube    Assessment    Pt reports more pain in L knee and foot, states her arthritis \"is bad\", and was unable to stand ~20 sec, required maxA x2 to get to upright standing, pt was unable to transfer or ambulate. Pt required frequent re-direction and encouagement to focus on attempt standing. After 3 attempts, utilized Deepa Steady to improve success and allow pt to fully weight bear on B LE. Recommend SNF for further therapy.     Plan    Treatment plan modified to 4 times per week until therapy goals are met for the following treatments:  Bed Mobility, Gait Training, Neuro Re-Education / Balance, Therapeutic Activities and Therapeutic Exercises.    DC Equipment Recommendations: Unable to determine at this time  Discharge Recommendations: Recommend post-acute placement for additional physical therapy services prior to discharge home       05/03/21 0957   Cognition    Level of Consciousness Alert   Comments Pt needing encouragement to participate, spouse present   Supine Lower Body Exercise   Comments QSx10, HS x10 (assisted L knee)   Sitting Lower Body Exercises   Comments LAQx10 B LE   Balance   Sitting Balance (Static) Fair +   Sitting Balance (Dynamic) Fair   Standing Balance (Static) Poor   Standing Balance (Dynamic) Trace   Weight Shift Sitting Fair   Weight Shift Standing Poor   Skilled Intervention Postural Facilitation;Sequencing;Tactile Cuing;Verbal Cuing   Comments stdg with FWW   Gait Analysis   Gait Level Of Assist Unable to Participate   Bed Mobility    Supine to Sit Maximal Assist  (x2)   Sit to Supine Maximal Assist  (x2)   Comments HOB elevated   Functional Mobility   Sit to Stand Maximal Assist   Bed, Chair, Wheelchair Transfer Unable to Participate   Comments utilized Deepa steady to assist with safe sit <> " stand   Activity Tolerance   Sitting Edge of Bed 30 min   Standing In Deepa steady pt able to pul self up in standing ~20 sec x2   Short Term Goals    Short Term Goal # 1 pt will go supine<>sit w/hob flat and rails down w/spv in 6tx for safe d/c home    Goal Outcome # 1 Progressing slower than expected   Short Term Goal # 2 pt will go sit<>stand w/fww w/spv in 6tx for safe d/c home    Goal Outcome # 2 Progressing slower than expected   Short Term Goal # 3 pt will transfer bed<>chair w/fww w/spv in 6tx for safe d/c home    Goal Outcome # 3 Progressing slower than expected   Short Term Goal # 4 pt will ambulate for 150ft w/fww w/spv in 6tx for safe d/c home    Goal Outcome # 4 Progressing slower than expected

## 2021-05-03 NOTE — CARE PLAN
Problem: Communication  Goal: The ability to communicate needs accurately and effectively will improve  Outcome: PROGRESSING AS EXPECTED     Problem: Safety  Goal: Will remain free from falls  Outcome: PROGRESSING AS EXPECTED     Problem: Bowel/Gastric:  Goal: Normal bowel function is maintained or improved  Outcome: PROGRESSING SLOWER THAN EXPECTED

## 2021-05-03 NOTE — ASSESSMENT & PLAN NOTE
Hb today is 10.2, has remained stable.  This is most likely post-surgical  No signs of bleeding at this time  Monitor, repeat CBC, and make changes accordingly.

## 2021-05-03 NOTE — PROGRESS NOTES
"Assumed care of pt at 0715. Pt is on room air, c/o pain \"all over from osteoarthritis\" but denies any intervention at this time. Assessment complete. TPN running at 83ml/hr, LR running at 83ml/hr, LENI drain observed. NG on to low intermittent suction. [place] observed draining clear yellow urine appropriately. Pt states she still has not had a BM, pt does report passing flatus. RN encouraging mobility. Bed is locked in lowest position, personal belongings and call light within reach. Pt denies further needs at this time.  "

## 2021-05-03 NOTE — PROGRESS NOTES
[facility] Medicine Daily Progress Note    Date of Service  5/3/2021    Chief Complaint  65 y.o. female admitted 4/21/2021 with abdominal pain.    Hospital Course  4/22/2021-patient is a 65-year-old female comes in with abdominal pain that started last Friday.  After suffering with the pain the patient went to see urgent care where they did a CAT scan of her abdomen which came back with early appendicitis.  Patient was referred over to the emergency room for evaluation.  Patient was admitted for surgery and was found to have a large abscess along with the appendicitis.  Appendix was removed and the abscess has been drained.  She still having chills and fevers afterwards.  Continue with IV antibiotics as well as pain management and fluid resuscitation.  Await culture results.  4/23/2021-this morning patient is in quite a bit of distress.  Her pain has escalated in the abdomen and she is unable to urinate or defecate.  Upon further evaluation patient has a postoperative ileus in her bowel sounds have not returned.  She has not passed any gas or had a bowel movement.  Continue at this point with supportive fluid resuscitation to ensure the patient's bowel functions returned to normal.  Patient at this point is also complaining of urinary retention and the bladder scanner was only showing 70 to 100 mL of retained urine.  This was inconsistent with the amount of pain the patient was having thus I ordered a immediate straight catheterization which yielded over 800 mL of urine.  After the urine came out the patient's abdominal pain eased up and her condition improved.  We will continue to monitor her urine output and if the patient should have urinary retention we will straight catheter again.  For the time being because of her abdominal distention pain and ileus as well as urinary retention we are unable to discharge the patient at this point in time.  Continue at this point with aggressive management and  stabilization.  4/24/2021-patient is still having difficulty with abdominal pain.  She also has not had a bowel movement and her intestinal sounds are returning very slowly.  Concern is that there is still an abscess present.  Flatplate of the abdomen did not show anything her white blood cell count however is increasing at this point I am going to obtain a CT of the abdomen.  Discussed it with surgery.  Because of the bladder distention and inability urinate a Pabon catheter had to be placed.  Hopefully this will decompress the bladder.  4/25/2021-postoperative day #4 after laparoscopic appendectomy had to be converted to an open appendectomy and drainage of abscess cavity.  Cultures have not grown out any significant organism.  White blood cell count has gone up, repeat CT of the abdomen does not demonstrate worsening abscess formation and actually is consistent with routine healing.  Patient CT demonstrates an ileus.  Since her abdominal distention is getting worse and her nausea is persisting I am putting in an NG to low intermittent suctioning.  This was discussed with the bedside nurse and surgery.  4/26/2021-patient's abdominal distention persists.  Ileus persists status postoperative.  Yesterday the patient's Pabon catheter became obstructed.  It had to be replaced.  Yesterday he also had some technical issues with the hospital bed and at this point had to be replaced a few times.  Pain seems to be better controlled.  Her labs are improving.  The patient may need a repeat CT in 24 to 48 hours if her white blood cell count does not improve.  Today's count is down to 14.8.  Continue with IV Zosyn    Interval Problem Update  4/27: Patient seen at bedside this morning.  The patient still has not had a bowel movement, however she mentions that she thinks she is passing gas.  General surgery continues to recommend ambulation.  Patient currently n.p.o. with NG tube and the patient is on TPN.  As per nursing no  other overnight events were reported.    4/28: Patient seen at bedside this morning.  The patient still has not had a bowel movement.  She says she is passing gas.  General Surgery following the patient along.  We will continue TPN and n.p.o. for now.  No other overnight events were reported by nursing.    4/29: Patient seen at bedside this morning.  She mentions she is passing gas, however she has not had a bowel movement yet.  She was encouraged to ambulate.  We have consulted ID for further antibiotic coverage, we appreciate further recommendations.  As per nursing no other overnight events reported.    4/30: Patient seen at bedside this morning.  The patient still without a bowel movement.  ID recommends to continue IV antibiotics for now.  As per nursing no other overnight events reported.    5/1: Patient seen at bedside this morning.  She is lying comfortably in bed, however she still having bowel movements, but she says she is passing gas.  ID recommended to stop antibiotics after today and if WBC trend upwards to repeat CT scan.  As per nursing no other overnight events were reported.    5/2: WBC count slightly elevated today from yesterday.  The last dose of Zosyn she got last night.  Patient lying in bed comfortably, currently not complaining of other pain.  She is still has not had a bowel movement.  We will repeat WBC tomorrow and consider CT scan if it continues to trend up.  As per nursing no other overnight events reported.    5/3: WBC increased today. We have ordered CT scan abdomen and pelvis, which we are pending. If CT scan find abscess collection we will restart zosyn and surgery will be notified. Patient complaining of knee pain. Patient still without a BM, but mentions she is passing gas. As per nursing no other over night events reported.    Consultants/Specialty  General Surgery  ID    Code Status  Full Code    Disposition  Pending    Review of Systems  Review of Systems   Constitutional:  Negative for chills and fever.   HENT: Negative for hearing loss and nosebleeds.    Eyes: Negative for blurred vision and double vision.   Respiratory: Negative for cough and shortness of breath.    Cardiovascular: Negative for chest pain and palpitations.   Gastrointestinal: Positive for constipation. Negative for abdominal pain, heartburn, nausea and vomiting.   Genitourinary: Negative for dysuria and urgency.   Musculoskeletal: Positive for back pain and joint pain (knee pain). Negative for falls and myalgias.   Skin: Negative for itching and rash.   Neurological: Negative for dizziness and headaches.   Psychiatric/Behavioral: The patient is not nervous/anxious.    All other systems reviewed and are negative.       Physical Exam  Temp:  [36.7 °C (98 °F)-37.6 °C (99.7 °F)] 36.7 °C (98 °F)  Pulse:  [] 100  Resp:  [16-20] 16  BP: (125-154)/(66-80) 148/68  SpO2:  [93 %-99 %] 93 %    Physical Exam  Constitutional:       Appearance: She is obese. She is ill-appearing.   HENT:      Head: Normocephalic and atraumatic.      Nose: No congestion or rhinorrhea.      Mouth/Throat:      Pharynx: No oropharyngeal exudate or posterior oropharyngeal erythema.   Eyes:      General:         Right eye: No discharge.         Left eye: No discharge.   Cardiovascular:      Rate and Rhythm: Normal rate and regular rhythm.      Pulses: Normal pulses.      Heart sounds: No murmur. No gallop.    Pulmonary:      Effort: Pulmonary effort is normal. No respiratory distress.      Breath sounds: No wheezing or rales.   Abdominal:      General: There is distension.      Tenderness: There is abdominal tenderness.      Comments: Bandages in place on surgical site, does not appear soaked or bleeding.   Musculoskeletal:         General: Normal range of motion.      Cervical back: Normal range of motion.   Skin:     General: Skin is warm and dry.   Neurological:      General: No focal deficit present.      Mental Status: She is alert and  oriented to person, place, and time.      Cranial Nerves: No cranial nerve deficit.      Motor: No weakness.   Psychiatric:         Mood and Affect: Mood normal.         Behavior: Behavior normal.         Fluids    Intake/Output Summary (Last 24 hours) at 5/3/2021 1433  Last data filed at 5/3/2021 0600  Gross per 24 hour   Intake 332 ml   Output 3030 ml   Net -2698 ml       Laboratory  Recent Labs     05/01/21  0415 05/02/21  0440 05/03/21  0441   WBC 13.5* 14.1* 15.8*   RBC 3.76* 3.58* 3.61*   HEMOGLOBIN 10.9* 10.5* 10.2*   HEMATOCRIT 34.3* 32.6* 32.5*   MCV 91.2 91.1 90.0   MCH 29.0 29.3 28.3   MCHC 31.8* 32.2* 31.4*   RDW 47.0 46.5 46.3   PLATELETCT 214 193 189   MPV 10.6 10.6 11.1     Recent Labs     05/01/21 0415 05/02/21  0440 05/03/21  0441   SODIUM 142 145 138   POTASSIUM 4.0 3.7 4.3   CHLORIDE 107 109 105   CO2 28 25 24   GLUCOSE 124* 121* 137*   BUN 18 16 16   CREATININE 0.89 0.93 0.84   CALCIUM 8.5 8.2* 8.4             Recent Labs     05/03/21  0441   TRIGLYCERIDE 179*       Imaging  DX-ABDOMEN FOR TUBE PLACEMENT   Final Result         1.  Nonspecific bowel gas pattern.   2.  Dobbhoff tube is coiled within the pylorus, the tip terminates overlying the expected location of the gastric pylorus or gastric antrum.   3.  Left basilar atelectasis versus early infiltrate.      IR-PICC LINE PLACEMENT W/ GUIDANCE > AGE 5   Final Result                  Ultrasound-guided PICC placement performed by qualified nursing staff as    above.          CT-ABDOMEN-PELVIS W/O   Final Result      Interval laparotomy with right lower quadrant surgical drain, similar fat stranding in the pericecal region that is likely postoperative in nature. No noncontrast evidence of abscess formation      Small free fluid and intraperitoneal air is not outside normal limits in the perioperative period      Contrast in the colon is from the CT 3 days ago, air-fluid levels and mild small bowel dilatation. In combination these findings are  most compatible with ileus      Urinary bladder decompressed around the Pabon catheter. No hydroureteronephrosis      [id number] VIEW   Final Result      Dilated loops of small bowel most likely represent ileus. Partial obstruction is not excluded.      Residual enteric contrast is seen from the recent CT.      Mild bibasilar atelectasis.         IR-US GUIDED PIV   Final Result    Ultrasound-guided PERIPHERAL IV INSERTION performed by    qualified nursing staff as above.      CT-ABDOMEN-PELVIS WITH    (Results Pending)        Assessment/Plan  * Acute appendicitis with localized peritonitis  Assessment & Plan  Status post appendectomy.  Initially attempted with laparoscopy but then had to be converted to open resection.  Pain management  NG tube to suctioning due to persistent postoperative ileus  Fluid resuscitation  Due to lack of nutrition will have PICC line placed and TPN started.    4/29: Continue TPN, we appreciate any further recommendations by general surgery. Continue Zosyn. We have consulted ID for further antibiotic coverage, we appreciate further recommendations.    4/30: We consulted ID, and they recommended to continue IV antibiotics for now.    5/1: ID recommends to stop antibiotics after today.     5/2: WBC today is 14.1, yesterday it was 13.5. We just stopped Zosyn last night, we will repeat CBC tomorrow and if it continues to trend upwards, we will consider repeat CT scan. If WBC trends upwards, we will consider repeat CT scan. Patient afebrile, without hypotension.    5/3: WBC today is 15.8. I spoke to ID and they recommend repeat CT scan. ID does not recommend initiating antibiotics at this time, pending CT scan. We have ordered abdominal/pelvic CT scan result. Monitor. Patient currently afebrile, and not complaining of worsening abdominal pain at this time.     Constipation  Assessment & Plan  Possibly post surgical etiology.  General surgery following patient, we appreciate further  recommendations  NPO, currently on TPN.    Leukocytosis  Assessment & Plan  In the setting of ruptured appendicitis and abdominal abscess.  Continue zosyn    4/30: WBC today are 13.3. ID consulted, who recommend to continue IV antibiotics. We appreciate further recommendations.    5/1: ID recommends to stop antibiotics after today. If WBC trends upwards, we will consider repeat CT scan.    5/2: Mild elevation today from 13.5 to 14.1. We will monitor and consider CT scan if it continues to trend up.    5/3: WBC 15.8 today. We have repeated CT scan as per ID's recommendations, pending result.    Essential hypertension- (present on admission)  Assessment & Plan  Continue losartan  PRN labetalol    4/29: We have increased losartan to 50 mg BID.    5/2: We  Have started amlodipine 5 mg.    Osteoarthritis- (present on admission)  Assessment & Plan  Patient with a history of osteoarthritis.  The patient today complaining of knee pain.  The patient states that she usually takes ibuprofen at home.  We will order 1 dose of ibuprofen 400 mg.  However we will encourage Tylenol for arthritic pain at this time due to the patient's history of CKD.  The patient currently receiving IV fluids, we will monitor closely and make changes accordingly.    Anemia  Assessment & Plan  Hb today is 10.2, has remained stable.  This is most likely post-surgical  No signs of bleeding at this time  Monitor, repeat CBC, and make changes accordingly.    Hyperglycemia  Assessment & Plan  Patient's A1c is 6.5.  Patient currently on ISS and hypoglycemia protocol.  Patient would require close follow up as outpatient.    Hypothyroidism  Assessment & Plan  Continue home medication.    Stage 3 chronic kidney disease- (present on admission)  Assessment & Plan  As per chart review, apparently history of  Avoid nephrotoxic medications    5/3: Creatinine .84 today. Patient NPO on TPN.  Patient also currently on IVF, patient will get CT scan with contrast today,  so we will monitor kidney function.      BMI 50.0-59.9, adult (HCC)- (present on admission)  Assessment & Plan  Body mass index is 57.29 kg/m².  Outpatient follow-ups with weight program      HENRI (obstructive sleep apnea)- (present on admission)  Assessment & Plan  CPAP nocturnal.         VTE prophylaxis: Heparin

## 2021-05-03 NOTE — DIETARY
Nutrition support weekly update:  Day 12 of admit.  Mady Yang is a 65 y.o. female with admitting DX of Appendicitis.  TPN initiated on 4/26/21.     Assessment:  Weight 5/2/21: 148.3 kg (bed scale). Weight up by 1.6 kg since admit. This may be due to scale variation with change in scale from standing to bed.   Re-estimate of nutritional needs as indicated: not indicated.      Evaluation:   1. Labs: 5/3/21: potassium=4.3, glucose=137 (H), Bun=16, sodium=138, phosphorus=3, magnesium=1.8, triglycerides=179 (H). Glucose accuchecks: 120-137 since 5/2/21  2. Meds: SSI  3. IV fluids: LR @ 83 mL/hour  4. Pharmacist increased kcals and protein to meet pt's estimated kcal and protein needs. TPN is at 100% of goal providing 1628 kcals and 120 gm of protein.       Malnutrition risk: criteria not met    Recommendations/Plan:  1. TPN per pharmacy  2. Additional fluids per MD  3. Monitor weights    RD will continue to follow.

## 2021-05-03 NOTE — PROGRESS NOTES
"  Trauma/Surgical Progress Note    Author: Jett Ruiz M.D. Date & Time created: 5/3/2021   11:01 AM   4/21 open appendectomy drain abscess     Interval Events:  Awake alert  Up chair  reports pain control good  Reports feeling improved   increased activity working with PT   flatus, 0 bm  Hemodynamics:  /68   Pulse 100   Temp 36.7 °C (98 °F) (Oral)   Resp 16   Ht 1.6 m (5' 3\")   Wt (!) 148 kg (327 lb)   SpO2 93%      Respiratory:    Respiration: 16, Pulse Oximetry: 93 %     Work Of Breathing / Effort: Mild  RUL Breath Sounds: Clear, RML Breath Sounds: Diminished, RLL Breath Sounds: Diminished, CHRISTINA Breath Sounds: Clear, LLL Breath Sounds: Diminished  Fluids:    Intake/Output Summary (Last 24 hours) at 5/3/2021 1101  Last data filed at 5/3/2021 0600  Gross per 24 hour   Intake 332 ml   Output 3980 ml   Net -3648 ml     Admit Weight: (!) 147 kg (323 lb 6.6 oz)  Current     Physical Exam  Awake alert appropriate  Breathing with ease  Appropriate incisional tenderness  Drain 30  Incision clean staples in place  Medical Decision Making/Problem List:    Active Hospital Problems    Diagnosis    • Acute appendicitis with localized peritonitis [K35.30]      Priority: High   • Elevated LFTs [R79.89]      Priority: Medium   • Hypernatremia [E87.0]      Priority: Medium   • Leukocytosis [D72.829]      Priority: Medium   • Constipation [K59.00]      Priority: Medium   • Essential hypertension [I10]      Priority: Medium   • Stage 3 chronic kidney disease [N18.30]      Priority: Low   • BMI 50.0-59.9, adult (HCC) [Z68.43]      Priority: Low   • HENRI (obstructive sleep apnea) [G47.33]      Priority: Low     AHI 55.7, minimum saturation 84%, on CPAP 13 cm.     • Hyperglycemia [R73.9]    • Hypothyroidism [E03.9]    • Osteoarthritis [M19.90]      Core Measures & Quality Metrics:  Core Measures & Quality Metrics  JULIOCESAR Score    Discussed with patient and family Assessment/Plan  appreciate care medicine " service  Appreciate ID assistance  Follow up imaging  Trend Wbc   Encourage ambulation, IS  Monitoring and support

## 2021-05-03 NOTE — ASSESSMENT & PLAN NOTE
Patient with a history of osteoarthritis.  Continues with daily arthralgias and myalgias, as needed Tylenol  Continue fluids

## 2021-05-04 ENCOUNTER — APPOINTMENT (OUTPATIENT)
Dept: RADIOLOGY | Facility: MEDICAL CENTER | Age: 66
DRG: 339 | End: 2021-05-04
Attending: STUDENT IN AN ORGANIZED HEALTH CARE EDUCATION/TRAINING PROGRAM
Payer: OTHER GOVERNMENT

## 2021-05-04 LAB
ALBUMIN SERPL BCP-MCNC: 2.6 G/DL (ref 3.2–4.9)
ALBUMIN/GLOB SERPL: 0.6 G/DL
ALP SERPL-CCNC: 94 U/L (ref 30–99)
ALT SERPL-CCNC: 26 U/L (ref 2–50)
ANION GAP SERPL CALC-SCNC: 10 MMOL/L (ref 7–16)
AST SERPL-CCNC: 17 U/L (ref 12–45)
BASOPHILS # BLD AUTO: 0.5 % (ref 0–1.8)
BASOPHILS # BLD: 0.08 K/UL (ref 0–0.12)
BILIRUB SERPL-MCNC: 0.3 MG/DL (ref 0.1–1.5)
BUN SERPL-MCNC: 18 MG/DL (ref 8–22)
CALCIUM SERPL-MCNC: 9.1 MG/DL (ref 8.4–10.2)
CHLORIDE SERPL-SCNC: 105 MMOL/L (ref 96–112)
CO2 SERPL-SCNC: 24 MMOL/L (ref 20–33)
CREAT SERPL-MCNC: 0.8 MG/DL (ref 0.5–1.4)
EOSINOPHIL # BLD AUTO: 0.13 K/UL (ref 0–0.51)
EOSINOPHIL NFR BLD: 0.8 % (ref 0–6.9)
ERYTHROCYTE [DISTWIDTH] IN BLOOD BY AUTOMATED COUNT: 47.8 FL (ref 35.9–50)
GLOBULIN SER CALC-MCNC: 4.1 G/DL (ref 1.9–3.5)
GLUCOSE BLD-MCNC: 102 MG/DL (ref 65–99)
GLUCOSE BLD-MCNC: 120 MG/DL (ref 65–99)
GLUCOSE BLD-MCNC: 125 MG/DL (ref 65–99)
GLUCOSE BLD-MCNC: 132 MG/DL (ref 65–99)
GLUCOSE BLD-MCNC: 153 MG/DL (ref 65–99)
GLUCOSE SERPL-MCNC: 141 MG/DL (ref 65–99)
HCT VFR BLD AUTO: 31.6 % (ref 37–47)
HGB BLD-MCNC: 10 G/DL (ref 12–16)
IMM GRANULOCYTES # BLD AUTO: 0.34 K/UL (ref 0–0.11)
IMM GRANULOCYTES NFR BLD AUTO: 2 % (ref 0–0.9)
LYMPHOCYTES # BLD AUTO: 2.63 K/UL (ref 1–4.8)
LYMPHOCYTES NFR BLD: 15.3 % (ref 22–41)
MAGNESIUM SERPL-MCNC: 2.1 MG/DL (ref 1.5–2.5)
MCH RBC QN AUTO: 28.7 PG (ref 27–33)
MCHC RBC AUTO-ENTMCNC: 31.6 G/DL (ref 33.6–35)
MCV RBC AUTO: 90.5 FL (ref 81.4–97.8)
MONOCYTES # BLD AUTO: 1.51 K/UL (ref 0–0.85)
MONOCYTES NFR BLD AUTO: 8.8 % (ref 0–13.4)
NEUTROPHILS # BLD AUTO: 12.47 K/UL (ref 2–7.15)
NEUTROPHILS NFR BLD: 72.6 % (ref 44–72)
NRBC # BLD AUTO: 0 K/UL
NRBC BLD-RTO: 0 /100 WBC
PHOSPHATE SERPL-MCNC: 3.1 MG/DL (ref 2.5–4.5)
PLATELET # BLD AUTO: 265 K/UL (ref 164–446)
PMV BLD AUTO: 11 FL (ref 9–12.9)
POTASSIUM SERPL-SCNC: 4.2 MMOL/L (ref 3.6–5.5)
PROT SERPL-MCNC: 6.7 G/DL (ref 6–8.2)
RBC # BLD AUTO: 3.49 M/UL (ref 4.2–5.4)
SODIUM SERPL-SCNC: 139 MMOL/L (ref 135–145)
WBC # BLD AUTO: 17.2 K/UL (ref 4.8–10.8)

## 2021-05-04 PROCEDURE — 700111 HCHG RX REV CODE 636 W/ 250 OVERRIDE (IP): Performed by: INTERNAL MEDICINE

## 2021-05-04 PROCEDURE — 700105 HCHG RX REV CODE 258: Performed by: STUDENT IN AN ORGANIZED HEALTH CARE EDUCATION/TRAINING PROGRAM

## 2021-05-04 PROCEDURE — A9270 NON-COVERED ITEM OR SERVICE: HCPCS | Performed by: INTERNAL MEDICINE

## 2021-05-04 PROCEDURE — 94760 N-INVAS EAR/PLS OXIMETRY 1: CPT

## 2021-05-04 PROCEDURE — 700102 HCHG RX REV CODE 250 W/ 637 OVERRIDE(OP): Performed by: INTERNAL MEDICINE

## 2021-05-04 PROCEDURE — 82962 GLUCOSE BLOOD TEST: CPT

## 2021-05-04 PROCEDURE — 700101 HCHG RX REV CODE 250: Performed by: STUDENT IN AN ORGANIZED HEALTH CARE EDUCATION/TRAINING PROGRAM

## 2021-05-04 PROCEDURE — 84100 ASSAY OF PHOSPHORUS: CPT

## 2021-05-04 PROCEDURE — 99232 SBSQ HOSP IP/OBS MODERATE 35: CPT | Performed by: STUDENT IN AN ORGANIZED HEALTH CARE EDUCATION/TRAINING PROGRAM

## 2021-05-04 PROCEDURE — 85025 COMPLETE CBC W/AUTO DIFF WBC: CPT

## 2021-05-04 PROCEDURE — 94660 CPAP INITIATION&MGMT: CPT

## 2021-05-04 PROCEDURE — 770006 HCHG ROOM/CARE - MED/SURG/GYN SEMI*

## 2021-05-04 PROCEDURE — 700111 HCHG RX REV CODE 636 W/ 250 OVERRIDE (IP): Performed by: STUDENT IN AN ORGANIZED HEALTH CARE EDUCATION/TRAINING PROGRAM

## 2021-05-04 PROCEDURE — 700105 HCHG RX REV CODE 258: Performed by: INTERNAL MEDICINE

## 2021-05-04 PROCEDURE — 83735 ASSAY OF MAGNESIUM: CPT

## 2021-05-04 PROCEDURE — 80053 COMPREHEN METABOLIC PANEL: CPT

## 2021-05-04 RX ADMIN — ACETAMINOPHEN 650 MG: 325 TABLET, FILM COATED ORAL at 22:58

## 2021-05-04 RX ADMIN — OXYCODONE HYDROCHLORIDE 10 MG: 10 TABLET ORAL at 09:54

## 2021-05-04 RX ADMIN — OXYCODONE HYDROCHLORIDE 5 MG: 5 TABLET ORAL at 15:55

## 2021-05-04 RX ADMIN — CALCIUM GLUCONATE: 98 INJECTION, SOLUTION INTRAVENOUS at 20:32

## 2021-05-04 RX ADMIN — AMLODIPINE BESYLATE 5 MG: 5 TABLET ORAL at 05:29

## 2021-05-04 RX ADMIN — LOSARTAN POTASSIUM 50 MG: 25 TABLET, FILM COATED ORAL at 17:48

## 2021-05-04 RX ADMIN — HEPARIN SODIUM 5000 UNITS: 5000 INJECTION, SOLUTION INTRAVENOUS; SUBCUTANEOUS at 05:29

## 2021-05-04 RX ADMIN — ACETAMINOPHEN 650 MG: 325 TABLET, FILM COATED ORAL at 07:59

## 2021-05-04 RX ADMIN — HEPARIN SODIUM 5000 UNITS: 5000 INJECTION, SOLUTION INTRAVENOUS; SUBCUTANEOUS at 14:11

## 2021-05-04 RX ADMIN — SODIUM CHLORIDE, POTASSIUM CHLORIDE, SODIUM LACTATE AND CALCIUM CHLORIDE: 600; 310; 30; 20 INJECTION, SOLUTION INTRAVENOUS at 23:01

## 2021-05-04 RX ADMIN — SODIUM CHLORIDE, POTASSIUM CHLORIDE, SODIUM LACTATE AND CALCIUM CHLORIDE: 600; 310; 30; 20 INJECTION, SOLUTION INTRAVENOUS at 06:32

## 2021-05-04 RX ADMIN — HYDROMORPHONE HYDROCHLORIDE 0.5 MG: 1 INJECTION, SOLUTION INTRAMUSCULAR; INTRAVENOUS; SUBCUTANEOUS at 03:49

## 2021-05-04 RX ADMIN — OXYCODONE HYDROCHLORIDE 10 MG: 10 TABLET ORAL at 00:31

## 2021-05-04 RX ADMIN — LORATADINE 10 MG: 10 TABLET ORAL at 05:29

## 2021-05-04 RX ADMIN — HEPARIN SODIUM 5000 UNITS: 5000 INJECTION, SOLUTION INTRAVENOUS; SUBCUTANEOUS at 22:59

## 2021-05-04 RX ADMIN — THYROID, PORCINE 90 MG: 30 TABLET ORAL at 05:29

## 2021-05-04 RX ADMIN — LOSARTAN POTASSIUM 50 MG: 25 TABLET, FILM COATED ORAL at 05:29

## 2021-05-04 ASSESSMENT — ENCOUNTER SYMPTOMS
DIZZINESS: 0
FALLS: 0
PALPITATIONS: 0
MYALGIAS: 0
VOMITING: 0
BLURRED VISION: 0
CHILLS: 0
NAUSEA: 0
FEVER: 0
CONSTIPATION: 1
SHORTNESS OF BREATH: 0
HEADACHES: 0
DOUBLE VISION: 0
NERVOUS/ANXIOUS: 0
COUGH: 0
HEARTBURN: 0
ABDOMINAL PAIN: 0
BACK PAIN: 1

## 2021-05-04 ASSESSMENT — PAIN DESCRIPTION - PAIN TYPE
TYPE: ACUTE PAIN

## 2021-05-04 NOTE — PROGRESS NOTES
Pharmacy TPN Day # 9       2021    Dosing Weight   76 kg           TPN currently providing 100% of goal                                                    TPN goal:               Calories: 1617 - 1991 kcal/day (kcal/k - 13.5)              Protein: 104 - 130 (2-2.5 gProtein / kg IBW)      TPN indication: NPO s/p appendectomy w/ abscess present  Pertinent PMH: Back pain, Chronic low back pain (2018), Elevated blood sugar, Hypertension, Kidney stone (), Obesity, HENRI (obstructive sleep apnea) (2017), Osteoporosis, Post-nasal drip, Seasonal allergies, Sleep apnea, and Thyroid disease..    Temp (24hrs), Av.1 °C (98.7 °F), Min:36.8 °C (98.3 °F), Max:37.3 °C (99.1 °F)  .  Recent Labs     21  0440 21  0441 21  0625   SODIUM 145 138 139   POTASSIUM 3.7 4.3 4.2   CHLORIDE 109 105 105   CO2 25 24 24   BUN 16 16 18   CREATININE 0.93 0.84 0.80   GLUCOSE 121* 137* 141*   CALCIUM 8.2* 8.4 9.1   ASTSGOT 33 24 17   ALTSGPT 46 37 26   ALBUMIN 2.6* 2.7* 2.6*   TBILIRUBIN 0.5 0.4 0.3   PHOSPHORUS  --  3.0 3.1   MAGNESIUM 1.9 1.8 2.1   PREALBUMIN  --  12.7*  --      Accu-Checks  Recent Labs     21  1823 21  2322 21  0535   POCGLUCOSE 121* 120* 132*       Vitals:    21 0500 21 0830 21 2300 21 0600   BP: 154/71 148/68 147/65 141/75   Weight:       Height:           Intake/Output Summary (Last 24 hours) at 2021 0938  Last data filed at 2021 0632  Gross per 24 hour   Intake 1000 ml   Output 6335 ml   Net -5335 ml       Orders Placed This Encounter   Procedures   • Diet NPO     Standing Status:   Standing     Number of Occurrences:   1     Order Specific Question:   Restrict to:     Answer:   Sips with Medications [3]         TPN for past 72 hours (Show up to 3 orders; newest on the left. Changes between the two most recent orders are indicated.)     Start date and time   2021      TPN Central Line  Formulation [[id number]] TPN Central Line Formulation [[id number]] TPN Central Line Formulation [[id number]]    Order Status  Active Last Dose in Progress Completed    Last Admin   New Bag at 05/03/2021 2023 by Jaleesa Aleman R.N. New Bag at 05/02/2021 2203 by Piero Camacho R.N.       Base    Clinisol 15%  120 g 120 g 120 g    dextrose 70%  220 g 220 g 220 g    fat emulsions 20%  40 g 40 g 40 g       Additives    potassium phosphate  15 mmol 15 mmol 15 mmol    potassium chloride  60 mEq 70 mEq 80 mEq    sodium chloride  170 mEq 170 mEq 170 mEq    magnesium sulfate  8 mEq 8 mEq 8 mEq    calcium GLUConate  9.4 mEq 11.63 mEq 11.63 mEq    M.T.E.-4 Adult  1 mL 1 mL 1 mL    M.V.I. Adult  10 mL 10 mL 10 mL       QS Base    sterile water  567.01 mL 557.23 mL 552.23 mL       Energy Contribution    Proteins  -- -- --    Dextrose  -- -- --    Lipids  -- -- --    Total  -- -- --       Electrolyte Ion Calculated Amount    Sodium  170 mEq 170 mEq 170 mEq    Potassium  82 mEq 92 mEq 102 mEq    Calcium  9.4 mEq 11.63 mEq 11.63 mEq    Magnesium  8 mEq 8 mEq 8 mEq    Aluminum  -- -- --    Phosphate  15 mmol 15 mmol 15 mmol    Chloride  230 mEq 240 mEq 250 mEq    Acetate  101.6 mEq 101.6 mEq 101.6 mEq       Other    Total Protein  120 g 120 g 120 g    Total Protein/kg  0.81 g/kg 0.81 g/kg 0.82 g/kg    Total Amino Acid  -- -- --    Total Amino Acid/kg  -- -- --    Glucose Infusion Rate  1.03 mg/kg/min 1.03 mg/kg/min 1.04 mg/kg/min    Osmolarity (Estimated)  -- -- --    Volume  1,992 mL 1,992 mL 1,992 mL    Rate  83 mL/hr 83 mL/hr 83 mL/hr    Dosing Weight  148 kg 148 kg 147 kg    Infusion Site  [facility]            This formula provides:  % kcal as lipids = 25  Grams protein/kg = 1.6  Non-protein calories = 1148  Kcals/kg = 21.4  Total daily calories = 1628    Comments:  Decrease K+ to 60 mEq and decrease CA+ to 9.4 mEq.  The rest of the formula to remain the same.      Gabriela TRONCOSO Ph.

## 2021-05-04 NOTE — FLOWSHEET NOTE
05/04/21 0700   Vital Signs   Pulse 100   Respiration (!) 22   Pulse Oximetry 95 %   $ Pulse Oximetry (Spot Check) Yes   Non-Invasive Ventilation [facility]   Nocturnal CPAP or BIPAP CPAP - [facility]   $ System Evaluation Yes   Settings (If Known) autoPAP   FiO2 or LPM 0

## 2021-05-04 NOTE — PROGRESS NOTES
"  Trauma/Surgical Progress Note    Author: Jett Ruiz M.D. Date & Time created: 5/4/2021   10:54 AM   4/21 open appendectomy drain abscess     Interval Events:  Awake alert  Up chair  reports pain control good  Reports feeling improved   increased activity working with PT   flatus, 0 bm    Hemodynamics:  /75   Pulse 100   Temp 37.3 °C (99.1 °F) (Oral)   Resp (!) 22   Ht 1.6 m (5' 3\")   Wt (!) 148 kg (327 lb)   SpO2 95%      Respiratory:    Respiration: (!) 22, Pulse Oximetry: 95 %     Work Of Breathing / Effort: Mild  RUL Breath Sounds: Clear, RML Breath Sounds: Diminished, RLL Breath Sounds: Diminished, CHRISTINA Breath Sounds: Clear, LLL Breath Sounds: Diminished  Fluids:    Intake/Output Summary (Last 24 hours) at 5/4/2021 1054  Last data filed at 5/4/2021 0632  Gross per 24 hour   Intake 1000 ml   Output 6335 ml   Net -5335 ml     Admit Weight: (!) 147 kg (323 lb 6.6 oz)  Current     Physical Exam  Awake alert appropriate  Breathing with ease  Appropriate incisional tenderness  Incision clean staples in place  Medical Decision Making/Problem List:    Active Hospital Problems    Diagnosis    • Acute appendicitis with localized peritonitis [K35.30]      Priority: High   • Leukocytosis [D72.829]      Priority: Medium   • Constipation [K59.00]      Priority: Medium   • Essential hypertension [I10]      Priority: Medium   • Osteoarthritis [M19.90]      Priority: Medium   • Stage 3 chronic kidney disease [N18.30]      Priority: Low   • BMI 50.0-59.9, adult (HCC) [Z68.43]      Priority: Low   • HENRI (obstructive sleep apnea) [G47.33]      Priority: Low     AHI 55.7, minimum saturation 84%, on CPAP 13 cm.     • Anemia [D64.9]    • Hyperglycemia [R73.9]    • Hypothyroidism [E03.9]       TECHNIQUE/EXAM DESCRIPTION:   CT scan of the abdomen and pelvis with contrast.     Contrast-enhanced helical scanning was obtained from the diaphragmatic domes through the pubic symphysis following the bolus administration of " nonionic contrast without complication.     100 mL of Omnipaque 350 nonionic contrast was administered without complication.     Low dose optimization technique was utilized for this CT exam including automated exposure control and adjustment of the mA and/or kV according to patient size.     COMPARISON: 4/24/21     FINDINGS:  Chest Base: Lung bases are clear.     Liver:  Diffuse hypoattenuation of the liver suggesting fatty infiltration.     Gallbladder: Gallbladder is surgically absent.     Biliary tract: Nondilated.     Pancreas: Normal.     Spleen: Normal.     Adrenals: Normal.     Kidneys and Collecting Systems:  Normal.     Gastrointestinal tract:  Mildly distended small bowel loops are somewhat improved from prior study likely improving ileus.   Drop-off tube is looped in terminates in the stomach. The appendix is surgically absent.     Peritoneum: Surgical drain is again noted in the right lower quadrant. There is some improved fat stranding in the lower abdomen, likely improved inflammation and postoperative changes. No significant free air, free fluid or abscess.     Reproductive organs:  Normal.     Bladder:  Bladder is decompressed by Pabon catheter.     Vessels:  There is mild atherosclerosis.  Normal caliber vessels.     Lymph  Nodes:  No lymphadenopathy.     Abdominal wall: Within normal limits.     Bones:  No acute or aggressive abnormality.     IMPRESSION:        1.  Improving fat stranding in the lower abdomen likely improving inflammation and postoperative changes. A surgical drain in the right lower quadrant is again noted. No significant free fluid or abscess.  2.  Mildly improved ileus.  3.  Hepatic steatosis.     Core Measures & Quality Metrics:  Core Measures & Quality Metrics  JULIOCESAR Score  Discussed patient condition with Family and Patient.      Assessment/Plan  [facility] care medicine service  Appreciate ID assistance  Imaging as above  Trend Wbc   Encourage ambulation, IS  Monitoring and  support

## 2021-05-04 NOTE — CARE PLAN
Problem: Communication  Goal: The ability to communicate needs accurately and effectively will improve  Outcome: PROGRESSING AS EXPECTED     Problem: Safety  Goal: Will remain free from injury  Outcome: PROGRESSING AS EXPECTED  Goal: Will remain free from falls  Outcome: PROGRESSING AS EXPECTED     Problem: Venous Thromboembolism (VTW)/Deep Vein Thrombosis (DVT) Prevention:  Goal: Patient will participate in Venous Thrombosis (VTE)/Deep Vein Thrombosis (DVT)Prevention Measures  Outcome: PROGRESSING AS EXPECTED     Problem: Knowledge Deficit  Goal: Knowledge of disease process/condition, treatment plan, diagnostic tests, and medications will improve  Outcome: PROGRESSING AS EXPECTED     Problem: Pain Management  Goal: Pain level will decrease to patient's comfort goal  Outcome: PROGRESSING AS EXPECTED     Problem: Respiratory:  Goal: Respiratory status will improve  Outcome: PROGRESSING AS EXPECTED     Problem: Skin Integrity  Goal: Risk for impaired skin integrity will decrease  Outcome: PROGRESSING AS EXPECTED     Problem: Fluid Volume:  Goal: Will maintain balanced intake and output  Outcome: PROGRESSING AS EXPECTED     Problem: Infection  Goal: Will remain free from infection  Outcome: PROGRESSING SLOWER THAN EXPECTED     Problem: Bowel/Gastric:  Goal: Normal bowel function is maintained or improved  Outcome: PROGRESSING SLOWER THAN EXPECTED  Goal: Will not experience complications related to bowel motility  Outcome: PROGRESSING SLOWER THAN EXPECTED     Problem: Knowledge Deficit  Goal: Knowledge of the prescribed therapeutic regimen will improve  Outcome: PROGRESSING SLOWER THAN EXPECTED     Problem: Discharge Barriers/Planning  Goal: Patient's continuum of care needs will be met  Outcome: PROGRESSING SLOWER THAN EXPECTED     Problem: Urinary Elimination:  Goal: Ability to reestablish a normal urinary elimination pattern will improve  Outcome: PROGRESSING SLOWER THAN EXPECTED     Problem: Mobility  Goal: Risk  for activity intolerance will decrease  Outcome: PROGRESSING SLOWER THAN EXPECTED

## 2021-05-04 NOTE — FLOWSHEET NOTE
05/03/21 2000   Events/Summary/Plan   Events/Summary/Plan CPAP continuous w/o supplemental FI02 added   Skin Inspection Respiratory Device Intact   Vital Signs   Pulse 88   Respiration 20   Pulse Oximetry 95 %   $ Pulse Oximetry (Spot Check) Yes   Respiratory Assessment   Respiratory Pattern Within Normal Limits   Level of Consciousness Alert   Chest Exam   Work Of Breathing / Effort Within Normal Limits   Non-Invasive Ventilation H[facility]RI [facility]   Nocturnal CPAP or BIPAP CPAP - [facility]   $ System Evaluation Yes   Settings (If Known) AUTOPAP   FiO2 or LPM 0

## 2021-05-04 NOTE — PROGRESS NOTES
Bedside shift report received from WILIAM Sarkar.  Safety check complete.  All patient needs met at this time.  Will continue to monitor.

## 2021-05-04 NOTE — FLOWSHEET NOTE
05/04/21 0029   Events/Summary/Plan   Events/Summary/Plan cpap continuous   Skin Inspection Respiratory Device Intact   Vital Signs   Pulse 68   Respiration 20   Pulse Oximetry 100 %   $ Pulse Oximetry (Spot Check) Yes   Respiratory Assessment   Respiratory Pattern Within Normal Limits   Level of Consciousness Alert   Chest Exam   Work Of Breathing / Effort Mild   Non-Invasive Ventilation H[facility]   Nocturnal CPAP or BIPAP CPAP - [facility]   $ System Evaluation Yes   Settings (If Known) autopap   FiO2 or LPM 0

## 2021-05-04 NOTE — PROGRESS NOTES
[facility] Medicine Daily Progress Note    Date of Service  5/4/2021    Chief Complaint  65 y.o. female admitted 4/21/2021 with abdominal pain.    Hospital Course  4/22/2021-patient is a 65-year-old female comes in with abdominal pain that started last Friday.  After suffering with the pain the patient went to see urgent care where they did a CAT scan of her abdomen which came back with early appendicitis.  Patient was referred over to the emergency room for evaluation.  Patient was admitted for surgery and was found to have a large abscess along with the appendicitis.  Appendix was removed and the abscess has been drained.  She still having chills and fevers afterwards.  Continue with IV antibiotics as well as pain management and fluid resuscitation.  Await culture results.  4/23/2021-this morning patient is in quite a bit of distress.  Her pain has escalated in the abdomen and she is unable to urinate or defecate.  Upon further evaluation patient has a postoperative ileus in her bowel sounds have not returned.  She has not passed any gas or had a bowel movement.  Continue at this point with supportive fluid resuscitation to ensure the patient's bowel functions returned to normal.  Patient at this point is also complaining of urinary retention and the bladder scanner was only showing 70 to 100 mL of retained urine.  This was inconsistent with the amount of pain the patient was having thus I ordered a immediate straight catheterization which yielded over 800 mL of urine.  After the urine came out the patient's abdominal pain eased up and her condition improved.  We will continue to monitor her urine output and if the patient should have urinary retention we will straight catheter again.  For the time being because of her abdominal distention pain and ileus as well as urinary retention we are unable to discharge the patient at this point in time.  Continue at this point with aggressive management and  stabilization.  4/24/2021-patient is still having difficulty with abdominal pain.  She also has not had a bowel movement and her intestinal sounds are returning very slowly.  Concern is that there is still an abscess present.  Flatplate of the abdomen did not show anything her white blood cell count however is increasing at this point I am going to obtain a CT of the abdomen.  Discussed it with surgery.  Because of the bladder distention and inability urinate a Pabon catheter had to be placed.  Hopefully this will decompress the bladder.  4/25/2021-postoperative day #4 after laparoscopic appendectomy had to be converted to an open appendectomy and drainage of abscess cavity.  Cultures have not grown out any significant organism.  White blood cell count has gone up, repeat CT of the abdomen does not demonstrate worsening abscess formation and actually is consistent with routine healing.  Patient CT demonstrates an ileus.  Since her abdominal distention is getting worse and her nausea is persisting I am putting in an NG to low intermittent suctioning.  This was discussed with the bedside nurse and surgery.  4/26/2021-patient's abdominal distention persists.  Ileus persists status postoperative.  Yesterday the patient's Pabon catheter became obstructed.  It had to be replaced.  Yesterday he also had some technical issues with the hospital bed and at this point had to be replaced a few times.  Pain seems to be better controlled.  Her labs are improving.  The patient may need a repeat CT in 24 to 48 hours if her white blood cell count does not improve.  Today's count is down to 14.8.  Continue with IV Zosyn    Interval Problem Update  4/27: Patient seen at bedside this morning.  The patient still has not had a bowel movement, however she mentions that she thinks she is passing gas.  General surgery continues to recommend ambulation.  Patient currently n.p.o. with NG tube and the patient is on TPN.  As per nursing no  other overnight events were reported.    4/28: Patient seen at bedside this morning.  The patient still has not had a bowel movement.  She says she is passing gas.  General Surgery following the patient along.  We will continue TPN and n.p.o. for now.  No other overnight events were reported by nursing.    4/29: Patient seen at bedside this morning.  She mentions she is passing gas, however she has not had a bowel movement yet.  She was encouraged to ambulate.  We have consulted ID for further antibiotic coverage, we appreciate further recommendations.  As per nursing no other overnight events reported.    4/30: Patient seen at bedside this morning.  The patient still without a bowel movement.  ID recommends to continue IV antibiotics for now.  As per nursing no other overnight events reported.    5/1: Patient seen at bedside this morning.  She is lying comfortably in bed, however she still having bowel movements, but she says she is passing gas.  ID recommended to stop antibiotics after today and if WBC trend upwards to repeat CT scan.  As per nursing no other overnight events were reported.    5/2: WBC count slightly elevated today from yesterday.  The last dose of Zosyn she got last night.  Patient lying in bed comfortably, currently not complaining of other pain.  She is still has not had a bowel movement.  We will repeat WBC tomorrow and consider CT scan if it continues to trend up.  As per nursing no other overnight events reported.    5/3: WBC increased today. We have ordered CT scan abdomen and pelvis, which we are pending. If CT scan find abscess collection we will restart zosyn and surgery will be notified. Patient complaining of knee pain. Patient still without a BM, but mentions she is passing gas. As per nursing no other over night events reported.    5/4: WBC still up trending. CT A/P without any finding of residual abscess. We will hold off antibiotics for now and clinically monitor.  If it continues  to uptrend, will work-up other source for infection.  She still has complains of feeling bloated/constipated, passing gas but still without a bowel movement. We will plan to remove Pabon in a day or 2 and TOV.    Consultants/Specialty  General Surgery  ID    Code Status  Full Code    Disposition  Pending    Review of Systems  Review of Systems   Constitutional: Negative for chills and fever.   HENT: Negative for hearing loss and nosebleeds.    Eyes: Negative for blurred vision and double vision.   Respiratory: Negative for cough and shortness of breath.    Cardiovascular: Negative for chest pain and palpitations.   Gastrointestinal: Positive for constipation. Negative for abdominal pain, heartburn, nausea and vomiting.   Genitourinary: Negative for dysuria and urgency.   Musculoskeletal: Positive for back pain and joint pain (knee pain). Negative for falls and myalgias.   Skin: Negative for itching and rash.   Neurological: Negative for dizziness and headaches.   Psychiatric/Behavioral: The patient is not nervous/anxious.    All other systems reviewed and are negative.       Physical Exam  Temp:  [36.8 °C (98.3 °F)-37.3 °C (99.1 °F)] 37.3 °C (99.1 °F)  Pulse:  [] 100  Resp:  [20-22] 22  BP: (141-147)/(65-75) 141/75  SpO2:  [95 %-100 %] 95 %    Physical Exam  Constitutional:       Appearance: She is obese. She is ill-appearing.   HENT:      Head: Normocephalic and atraumatic.      Nose: No congestion or rhinorrhea.      Mouth/Throat:      Pharynx: No oropharyngeal exudate or posterior oropharyngeal erythema.   Eyes:      General:         Right eye: No discharge.         Left eye: No discharge.   Cardiovascular:      Rate and Rhythm: Normal rate and regular rhythm.      Pulses: Normal pulses.      Heart sounds: No murmur. No gallop.    Pulmonary:      Effort: Pulmonary effort is normal. No respiratory distress.      Breath sounds: No wheezing or rales.   Abdominal:      General: There is distension.       Tenderness: There is abdominal tenderness.      Comments: Bandages in place on surgical site, does not appear soaked or bleeding.   Musculoskeletal:         General: Normal range of motion.      Cervical back: Normal range of motion.   Skin:     General: Skin is warm and dry.   Neurological:      General: No focal deficit present.      Mental Status: She is alert and oriented to person, place, and time.      Cranial Nerves: No cranial nerve deficit.      Motor: No weakness.   Psychiatric:         Mood and Affect: Mood normal.         Behavior: Behavior normal.         Fluids    Intake/Output Summary (Last 24 hours) at 5/4/2021 0924  Last data filed at 5/4/2021 0632  Gross per 24 hour   Intake 1000 ml   Output 6335 ml   Net -5335 ml       Laboratory  Recent Labs     05/02/21 0440 05/03/21  0441 05/04/21  0625   WBC 14.1* 15.8* 17.2*   RBC 3.58* 3.61* 3.49*   HEMOGLOBIN 10.5* 10.2* 10.0*   HEMATOCRIT 32.6* 32.5* 31.6*   MCV 91.1 90.0 90.5   MCH 29.3 28.3 28.7   MCHC 32.2* 31.4* 31.6*   RDW 46.5 46.3 47.8   PLATELETCT 193 189 265   MPV 10.6 11.1 11.0     Recent Labs     05/02/21  0440 05/03/21  0441 05/04/21  0625   SODIUM 145 138 139   POTASSIUM 3.7 4.3 4.2   CHLORIDE 109 105 105   CO2 25 24 24   GLUCOSE 121* 137* 141*   BUN 16 16 18   CREATININE 0.93 0.84 0.80   CALCIUM 8.2* 8.4 9.1             Recent Labs     05/03/21  0441   TRIGLYCERIDE 179*       Imaging  CT-ABDOMEN-PELVIS WITH   Final Result         1.  Improving fat stranding in the lower abdomen likely improving inflammation and postoperative changes. A surgical drain in the right lower quadrant is again noted. No significant free fluid or abscess.   2.  Mildly improved ileus.   3.  Hepatic steatosis.               DX-ABDOMEN FOR TUBE PLACEMENT   Final Result         1.  Nonspecific bowel gas pattern.   2.  Dobbhoff tube is coiled within the pylorus, the tip terminates overlying the expected location of the gastric pylorus or gastric antrum.   3.  Left  basilar atelectasis versus early infiltrate.      IR-PICC LINE PLACEMENT W/ GUIDANCE > AGE 5   Final Result                  Ultrasound-guided PICC placement performed by qualified nursing staff as    above.          CT-ABDOMEN-PELVIS W/O   Final Result      Interval laparotomy with right lower quadrant surgical drain, similar fat stranding in the pericecal region that is likely postoperative in nature. No noncontrast evidence of abscess formation      Small free fluid and intraperitoneal air is not outside normal limits in the perioperative period      Contrast in the colon is from the CT 3 days ago, air-fluid levels and mild small bowel dilatation. In combination these findings are most compatible with ileus      Urinary bladder decompressed around the Pabon catheter. No hydroureteronephrosis      [id number] VIEW   Final Result      Dilated loops of small bowel most likely represent ileus. Partial obstruction is not excluded.      Residual enteric contrast is seen from the recent CT.      Mild bibasilar atelectasis.         IR-US GUIDED PIV   Final Result    Ultrasound-guided PERIPHERAL IV INSERTION performed by    qualified nursing staff as above.           Assessment/Plan  * Acute appendicitis with localized peritonitis  Assessment & Plan  Status post appendectomy.  Initially attempted with laparoscopy but then had to be converted to open resection.  Pain management  NG tube to suctioning due to persistent postoperative ileus  Fluid resuscitation  Due to lack of nutrition will have PICC line placed and TPN started.    4/29: Continue TPN, we appreciate any further recommendations by general surgery. Continue Zosyn. We have consulted ID for further antibiotic coverage, we appreciate further recommendations.    4/30: We consulted ID, and they recommended to continue IV antibiotics for now.    5/1: ID recommends to stop antibiotics after today.     5/2: WBC today is 14.1, yesterday it was 13.5. We just stopped  Zosyn last night, we will repeat CBC tomorrow and if it continues to trend upwards, we will consider repeat CT scan. If WBC trends upwards, we will consider repeat CT scan. Patient afebrile, without hypotension.    5/3: WBC today is 15.8. I spoke to ID and they recommend repeat CT scan. ID does not recommend initiating antibiotics at this time, pending CT scan. We have ordered abdominal/pelvic CT scan result.     CT A/P without any findings of free fluid or abscess.    Patient currently afebrile, and not complaining of worsening abdominal pain at this time.     Constipation  Assessment & Plan  Possibly post surgical etiology.  General surgery following patient, we appreciate further recommendations  NPO, currently on TPN.    Leukocytosis  Assessment & Plan  In the setting of ruptured appendicitis and abdominal abscess.  Continue zosyn    4/30: WBC today are 13.3. ID consulted, who recommend to continue IV antibiotics. We appreciate further recommendations.    5/1: ID recommends to stop antibiotics after today. If WBC trends upwards, we will consider repeat CT scan.    5/2: Mild elevation today from 13.5 to 14.1. We will monitor and consider CT scan if it continues to trend up.    5/3: WBC 15.8 today. We have repeated CT scan as per ID's recommendations, pending result.    CT A/P with no findings of free fluid or abscess.  Will clinically monitor for now and keep trending CBC, if still uptrending, will look for OTHER source of infection.    Essential hypertension- (present on admission)  Assessment & Plan  Continue losartan  PRN labetalol    4/29: We have increased losartan to 50 mg BID.    5/2: We  Have started amlodipine 5 mg.    Osteoarthritis- (present on admission)  Assessment & Plan  Patient with a history of osteoarthritis.  The patient today complaining of knee pain.  The patient states that she usually takes ibuprofen at home.  We will order 1 dose of ibuprofen 400 mg.  However we will encourage Tylenol for  arthritic pain at this time due to the patient's history of CKD.  The patient currently receiving IV fluids, we will monitor closely and make changes accordingly.    Anemia  Assessment & Plan  Hb today is 10.2, has remained stable.  This is most likely post-surgical  No signs of bleeding at this time  Monitor, repeat CBC, and make changes accordingly.    Hyperglycemia  Assessment & Plan  Patient's A1c is 6.5.  Patient currently on ISS and hypoglycemia protocol.  Patient would require close follow up as outpatient.    Hypothyroidism  Assessment & Plan  Continue home medication.    Stage 3 chronic kidney disease- (present on admission)  Assessment & Plan  As per chart review, apparently history of  Avoid nephrotoxic medications    5/3: Creatinine .84 today. Patient NPO on TPN.  Patient also currently on IVF, patient will get CT scan with contrast today, so we will monitor kidney function.    Stable.    BMI 50.0-59.9, adult (HCC)- (present on admission)  Assessment & Plan  Body mass index is 57.29 kg/m².  Outpatient follow-ups with weight program      HENRI (obstructive sleep apnea)- (present on admission)  Assessment & Plan  CPAP nocturnal.         VTE prophylaxis: heparin

## 2021-05-04 NOTE — CARE PLAN
Problem: Communication  Goal: The ability to communicate needs accurately and effectively will improve  5/4/2021 0938 by Dafne Silva R.N.  Outcome: PROGRESSING AS EXPECTED  Note: Patient communicates needs and uses call light appropriately.   5/4/2021 0937 by Dafne Silva R.N.  Outcome: PROGRESSING AS EXPECTED  Note: Patient is able to verbalize needs and uses call light appropriately.      Problem: Venous Thromboembolism (VTW)/Deep Vein Thrombosis (DVT) Prevention:  Goal: Patient will participate in Venous Thrombosis (VTE)/Deep Vein Thrombosis (DVT)Prevention Measures  5/4/2021 0938 by Dafne Silva R.N.  Outcome: PROGRESSING AS EXPECTED  Flowsheets  Taken 5/4/2021 0938  SCDs, Sequential Compression Device: On  Pharmacologic Prophylaxis Used: Unfractionated Heparin  Taken 5/4/2021 0933  Mechanical Prophylaxis: SCDs, Sequential Compression Device  Taken 5/4/2021 0800  Risk Assessment Score: 5  VTE RISK: Very High  Note: Patient has SCDs in place and on. Heparin given, per MAR.   5/4/2021 0937 by Dafne Silva R.N.  Outcome: PROGRESSING AS EXPECTED  Flowsheets  Taken 5/4/2021 0933  Mechanical Prophylaxis: SCDs, Sequential Compression Device  SCDs, Sequential Compression Device: On  Pharmacologic Prophylaxis Used: Unfractionated Heparin  Taken 5/4/2021 0800  VTE RISK: Very High  Note: Patient has SCDs in place and are on, heparin ordered per MAR.

## 2021-05-05 ENCOUNTER — APPOINTMENT (OUTPATIENT)
Dept: RADIOLOGY | Facility: MEDICAL CENTER | Age: 66
DRG: 339 | End: 2021-05-05
Attending: STUDENT IN AN ORGANIZED HEALTH CARE EDUCATION/TRAINING PROGRAM
Payer: OTHER GOVERNMENT

## 2021-05-05 LAB
ALBUMIN SERPL BCP-MCNC: 2.6 G/DL (ref 3.2–4.9)
ALBUMIN/GLOB SERPL: 0.6 G/DL
ALP SERPL-CCNC: 95 U/L (ref 30–99)
ALT SERPL-CCNC: 21 U/L (ref 2–50)
ANION GAP SERPL CALC-SCNC: 9 MMOL/L (ref 7–16)
AST SERPL-CCNC: 16 U/L (ref 12–45)
BILIRUB SERPL-MCNC: 0.3 MG/DL (ref 0.1–1.5)
BUN SERPL-MCNC: 19 MG/DL (ref 8–22)
CALCIUM SERPL-MCNC: 9 MG/DL (ref 8.4–10.2)
CHLORIDE SERPL-SCNC: 104 MMOL/L (ref 96–112)
CO2 SERPL-SCNC: 24 MMOL/L (ref 20–33)
CREAT SERPL-MCNC: 0.89 MG/DL (ref 0.5–1.4)
ERYTHROCYTE [DISTWIDTH] IN BLOOD BY AUTOMATED COUNT: 48.4 FL (ref 35.9–50)
GLOBULIN SER CALC-MCNC: 4.3 G/DL (ref 1.9–3.5)
GLUCOSE BLD-MCNC: 146 MG/DL (ref 65–99)
GLUCOSE BLD-MCNC: 152 MG/DL (ref 65–99)
GLUCOSE SERPL-MCNC: 118 MG/DL (ref 65–99)
HCT VFR BLD AUTO: 31.8 % (ref 37–47)
HGB BLD-MCNC: 10.1 G/DL (ref 12–16)
MAGNESIUM SERPL-MCNC: 2 MG/DL (ref 1.5–2.5)
MCH RBC QN AUTO: 29.3 PG (ref 27–33)
MCHC RBC AUTO-ENTMCNC: 31.8 G/DL (ref 33.6–35)
MCV RBC AUTO: 92.2 FL (ref 81.4–97.8)
PHOSPHATE SERPL-MCNC: 3.8 MG/DL (ref 2.5–4.5)
PLATELET # BLD AUTO: 299 K/UL (ref 164–446)
PMV BLD AUTO: 11.5 FL (ref 9–12.9)
POTASSIUM SERPL-SCNC: 4.2 MMOL/L (ref 3.6–5.5)
PROT SERPL-MCNC: 6.9 G/DL (ref 6–8.2)
RBC # BLD AUTO: 3.45 M/UL (ref 4.2–5.4)
SODIUM SERPL-SCNC: 137 MMOL/L (ref 135–145)
WBC # BLD AUTO: 15.6 K/UL (ref 4.8–10.8)

## 2021-05-05 PROCEDURE — 80053 COMPREHEN METABOLIC PANEL: CPT

## 2021-05-05 PROCEDURE — 94760 N-INVAS EAR/PLS OXIMETRY 1: CPT

## 2021-05-05 PROCEDURE — 94660 CPAP INITIATION&MGMT: CPT

## 2021-05-05 PROCEDURE — 83735 ASSAY OF MAGNESIUM: CPT

## 2021-05-05 PROCEDURE — A9270 NON-COVERED ITEM OR SERVICE: HCPCS | Performed by: INTERNAL MEDICINE

## 2021-05-05 PROCEDURE — 85027 COMPLETE CBC AUTOMATED: CPT

## 2021-05-05 PROCEDURE — 99232 SBSQ HOSP IP/OBS MODERATE 35: CPT | Performed by: STUDENT IN AN ORGANIZED HEALTH CARE EDUCATION/TRAINING PROGRAM

## 2021-05-05 PROCEDURE — 700102 HCHG RX REV CODE 250 W/ 637 OVERRIDE(OP): Performed by: INTERNAL MEDICINE

## 2021-05-05 PROCEDURE — 97530 THERAPEUTIC ACTIVITIES: CPT

## 2021-05-05 PROCEDURE — 74018 RADEX ABDOMEN 1 VIEW: CPT

## 2021-05-05 PROCEDURE — 84100 ASSAY OF PHOSPHORUS: CPT

## 2021-05-05 PROCEDURE — 700111 HCHG RX REV CODE 636 W/ 250 OVERRIDE (IP): Performed by: INTERNAL MEDICINE

## 2021-05-05 PROCEDURE — 700111 HCHG RX REV CODE 636 W/ 250 OVERRIDE (IP): Performed by: STUDENT IN AN ORGANIZED HEALTH CARE EDUCATION/TRAINING PROGRAM

## 2021-05-05 PROCEDURE — 82962 GLUCOSE BLOOD TEST: CPT | Mod: 91

## 2021-05-05 PROCEDURE — 36592 COLLECT BLOOD FROM PICC: CPT

## 2021-05-05 PROCEDURE — 700105 HCHG RX REV CODE 258: Performed by: STUDENT IN AN ORGANIZED HEALTH CARE EDUCATION/TRAINING PROGRAM

## 2021-05-05 PROCEDURE — 770006 HCHG ROOM/CARE - MED/SURG/GYN SEMI*

## 2021-05-05 PROCEDURE — 700101 HCHG RX REV CODE 250: Performed by: STUDENT IN AN ORGANIZED HEALTH CARE EDUCATION/TRAINING PROGRAM

## 2021-05-05 RX ORDER — AMLODIPINE BESYLATE 5 MG/1
10 TABLET ORAL
Status: DISCONTINUED | OUTPATIENT
Start: 2021-05-06 | End: 2021-05-08

## 2021-05-05 RX ADMIN — OXYCODONE HYDROCHLORIDE 10 MG: 10 TABLET ORAL at 05:23

## 2021-05-05 RX ADMIN — CALCIUM GLUCONATE: 98 INJECTION, SOLUTION INTRAVENOUS at 21:42

## 2021-05-05 RX ADMIN — HEPARIN SODIUM 5000 UNITS: 5000 INJECTION, SOLUTION INTRAVENOUS; SUBCUTANEOUS at 14:42

## 2021-05-05 RX ADMIN — HEPARIN SODIUM 5000 UNITS: 5000 INJECTION, SOLUTION INTRAVENOUS; SUBCUTANEOUS at 23:12

## 2021-05-05 RX ADMIN — LORATADINE 10 MG: 10 TABLET ORAL at 05:25

## 2021-05-05 RX ADMIN — LOSARTAN POTASSIUM 50 MG: 25 TABLET, FILM COATED ORAL at 06:00

## 2021-05-05 RX ADMIN — LOSARTAN POTASSIUM 50 MG: 25 TABLET, FILM COATED ORAL at 18:40

## 2021-05-05 RX ADMIN — OXYCODONE HYDROCHLORIDE 10 MG: 10 TABLET ORAL at 11:26

## 2021-05-05 RX ADMIN — AMLODIPINE BESYLATE 5 MG: 5 TABLET ORAL at 06:00

## 2021-05-05 RX ADMIN — OXYCODONE HYDROCHLORIDE 10 MG: 10 TABLET ORAL at 23:13

## 2021-05-05 RX ADMIN — THYROID, PORCINE 90 MG: 30 TABLET ORAL at 05:24

## 2021-05-05 RX ADMIN — HEPARIN SODIUM 5000 UNITS: 5000 INJECTION, SOLUTION INTRAVENOUS; SUBCUTANEOUS at 05:25

## 2021-05-05 ASSESSMENT — PAIN DESCRIPTION - PAIN TYPE
TYPE: ACUTE PAIN
TYPE: ACUTE PAIN
TYPE: ACUTE PAIN;CHRONIC PAIN;SURGICAL PAIN
TYPE: ACUTE PAIN;SURGICAL PAIN
TYPE: ACUTE PAIN;SURGICAL PAIN;CHRONIC PAIN
TYPE: ACUTE PAIN;SURGICAL PAIN

## 2021-05-05 ASSESSMENT — COGNITIVE AND FUNCTIONAL STATUS - GENERAL
MOBILITY SCORE: 7
CLIMB 3 TO 5 STEPS WITH RAILING: TOTAL
MOVING FROM LYING ON BACK TO SITTING ON SIDE OF FLAT BED: UNABLE
WALKING IN HOSPITAL ROOM: TOTAL
SUGGESTED CMS G CODE MODIFIER MOBILITY: CM
MOVING TO AND FROM BED TO CHAIR: UNABLE
STANDING UP FROM CHAIR USING ARMS: A LOT
TURNING FROM BACK TO SIDE WHILE IN FLAT BAD: UNABLE

## 2021-05-05 ASSESSMENT — ENCOUNTER SYMPTOMS
NERVOUS/ANXIOUS: 0
CONSTIPATION: 1
DIZZINESS: 0
PALPITATIONS: 0
MYALGIAS: 0
CHILLS: 0
NAUSEA: 0
SHORTNESS OF BREATH: 0
ABDOMINAL PAIN: 0
COUGH: 0
BLURRED VISION: 0
DOUBLE VISION: 0
HEARTBURN: 0
BACK PAIN: 1
FALLS: 0
VOMITING: 0
FEVER: 0
HEADACHES: 0

## 2021-05-05 ASSESSMENT — GAIT ASSESSMENTS: GAIT LEVEL OF ASSIST: UNABLE TO PARTICIPATE

## 2021-05-05 NOTE — FLOWSHEET NOTE
05/05/21 0100   Events/Summary/Plan   Events/Summary/Plan Continuous cpap, without supplemented oxygen   Skin Inspection Respiratory Device Intact   Vital Signs   Pulse 98   Respiration 20   Pulse Oximetry 93 %   $ Pulse Oximetry (Spot Check) Yes   Respiratory Assessment   Respiratory Pattern Within Normal Limits   Chest Exam   Work Of Breathing / Effort Mild   Oxygen   O2 (LPM) 0   O2 Delivery Device CPAP   Non-Invasive Ventilation [facility] [employer]   Nocturnal CPAP or BIPAP CPAP - [facility]   $ System Evaluation Yes   Settings (If Known) Autopap   FiO2 or LPM 0

## 2021-05-05 NOTE — DISCHARGE PLANNING
Received Choice form at 6163  Agency/Facility Name: [facility]  Referral sent per Choice form @ [phone]

## 2021-05-05 NOTE — THERAPY
Physical Therapy   Daily Treatment     Patient Name: Mady Yang  Age:  65 y.o., Sex:  female  Medical Record #: [id number]  Today's Date: 5/5/2021     Precautions: Fall Risk, Nasogastric Tube    Assessment    Pt making progress with standing tolerance and ability to transfer to [facility] with FWW. Pt very fatigued at end of session. Pt is approp for DC to SNF.     Plan    Continue current treatment plan.    DC Equipment Recommendations: Unable to determine at this time  Discharge Recommendations: Recommend post-acute placement for additional physical therapy services prior to discharge home       05/05/21 1505   Cognition    Level of Consciousness Alert   Comments Pt agreeable for PT, wants to get up to BSC   Balance   Sitting Balance (Static) Fair +   Sitting Balance (Dynamic) Fair   Standing Balance (Static) Poor +   Standing Balance (Dynamic) Poor   Weight Shift Sitting Fair   Weight Shift Standing Fair   Skilled Intervention Postural Facilitation;Sequencing;Tactile Cuing;Verbal Cuing   Comments stdg wotj FWW   Gait Analysis   Gait Level Of Assist Unable to Participate   Comments Pt was able to take a few side steps up side of bed with FWW modA x2   Bed Mobility    Supine to Sit Maximal Assist  (x2)   Sit to Supine Maximal Assist  (x2)   Functional Mobility   Sit to Stand Maximal Assist  (x2)   Bed, Chair, Wheelchair Transfer Moderate Assist  (x2)   Transfer Method Stand Step  (with FWW)   Activity Tolerance   Sitting in Chair pt on BSC x20 min   Standing stdg x 1 min during transfer   Short Term Goals    Short Term Goal # 1 pt will go supine<>sit w/hob flat and rails down w/spv in 6tx for safe d/c home    Goal Outcome # 1 Progressing slower than expected   Short Term Goal # 2 pt will go sit<>stand w/fww w/spv in 6tx for safe d/c home    Goal Outcome # 2 Progressing slower than expected   Short Term Goal # 3 pt will transfer bed<>chair w/fww w/spv in 6tx for safe d/c home    Goal Outcome # 3 Progressing slower  than expected   Short Term Goal # 4 pt will ambulate for 150ft w/fww w/spv in 6tx for safe d/c home    Goal Outcome # 4 Progressing slower than expected   Short Term Goal # 5 pt will go up/down 2 steps w/spv in 6tx for safe d/c home    Goal Outcome # 5 Goal not met

## 2021-05-05 NOTE — CARE PLAN
Bedside report received from day RN. Pt is AAO x4  Pt reports no pain.POC discussed to continue with TPN and IV fluids.Evening assessment done. All needs met at this time.Bed in low position.Call light within reach.Rounding in place.      Problem: Communication  Goal: The ability to communicate needs accurately and effectively will improve  Outcome: PROGRESSING AS EXPECTED     Problem: Safety  Goal: Will remain free from injury  Outcome: PROGRESSING AS EXPECTED     Problem: Urinary Elimination:  Goal: Ability to reestablish a normal urinary elimination pattern will improve  Outcome: PROGRESSING AS EXPECTED

## 2021-05-05 NOTE — CARE PLAN
Problem: Knowledge Deficit  Goal: Knowledge of disease process/condition, treatment plan, diagnostic tests, and medications will improve  Outcome: PROGRESSING AS EXPECTED  Note: The plan of care for today discussed with patient and family (rest, movement with staff, removal or urinary catheter today 5/5/21)     Problem: Safety  Goal: Will remain free from injury  Note: While in bed patient bed in low position with wheels of bed locked and call bell at side

## 2021-05-05 NOTE — DISCHARGE PLANNING
Anticipated Discharge Disposition:   TBD, Possibly SNF or LTAC    Action:   Chart review complete.     Per chart review, patient is not medically cleared at this time. LTAC choice received 4/30 but no LTAC order in place. RN CM messaged MD to inquire about medical clearance and possible LTAC appropriateness.     1230: Per Dr. Benito, this patient is not medically cleared to discharge.     1505: RN CM met with the patient at bedside to discuss LTAC. Choice was initially collected on 4/30. Today, an order was placed for LTAC. Patient is still agreeable to LTAC and gave choice for post acute medical. Patient is requesting that RN CM send an email to her with referral that will be sent out. Patient email: [email or web address].     1515: Choice form faxed to [facility]    Barriers to Discharge:   Medical Clearance  LTAC acceptance    Plan:   Follow up with post acute medical for acceptance   Hospital care management will continue to assist with discharge planning needs.

## 2021-05-05 NOTE — PROGRESS NOTES
[facility] Medicine Daily Progress Note    Date of Service  5/5/2021    Chief Complaint  65 y.o. female admitted 4/21/2021 with abdominal pain.    Hospital Course  4/22/2021-patient is a 65-year-old female comes in with abdominal pain that started last Friday.  After suffering with the pain the patient went to see urgent care where they did a CAT scan of her abdomen which came back with early appendicitis.  Patient was referred over to the emergency room for evaluation.  Patient was admitted for surgery and was found to have a large abscess along with the appendicitis.  Appendix was removed and the abscess has been drained.  She still having chills and fevers afterwards.  Continue with IV antibiotics as well as pain management and fluid resuscitation.  Await culture results.  4/23/2021-this morning patient is in quite a bit of distress.  Her pain has escalated in the abdomen and she is unable to urinate or defecate.  Upon further evaluation patient has a postoperative ileus in her bowel sounds have not returned.  She has not passed any gas or had a bowel movement.  Continue at this point with supportive fluid resuscitation to ensure the patient's bowel functions returned to normal.  Patient at this point is also complaining of urinary retention and the bladder scanner was only showing 70 to 100 mL of retained urine.  This was inconsistent with the amount of pain the patient was having thus I ordered a immediate straight catheterization which yielded over 800 mL of urine.  After the urine came out the patient's abdominal pain eased up and her condition improved.  We will continue to monitor her urine output and if the patient should have urinary retention we will straight catheter again.  For the time being because of her abdominal distention pain and ileus as well as urinary retention we are unable to discharge the patient at this point in time.  Continue at this point with aggressive management and  stabilization.  4/24/2021-patient is still having difficulty with abdominal pain.  She also has not had a bowel movement and her intestinal sounds are returning very slowly.  Concern is that there is still an abscess present.  Flatplate of the abdomen did not show anything her white blood cell count however is increasing at this point I am going to obtain a CT of the abdomen.  Discussed it with surgery.  Because of the bladder distention and inability urinate a Pabon catheter had to be placed.  Hopefully this will decompress the bladder.  4/25/2021-postoperative day #4 after laparoscopic appendectomy had to be converted to an open appendectomy and drainage of abscess cavity.  Cultures have not grown out any significant organism.  White blood cell count has gone up, repeat CT of the abdomen does not demonstrate worsening abscess formation and actually is consistent with routine healing.  Patient CT demonstrates an ileus.  Since her abdominal distention is getting worse and her nausea is persisting I am putting in an NG to low intermittent suctioning.  This was discussed with the bedside nurse and surgery.  4/26/2021-patient's abdominal distention persists.  Ileus persists status postoperative.  Yesterday the patient's Pabon catheter became obstructed.  It had to be replaced.  Yesterday he also had some technical issues with the hospital bed and at this point had to be replaced a few times.  Pain seems to be better controlled.  Her labs are improving.  The patient may need a repeat CT in 24 to 48 hours if her white blood cell count does not improve.  Today's count is down to 14.8.  Continue with IV Zosyn    Interval Problem Update  4/27: Patient seen at bedside this morning.  The patient still has not had a bowel movement, however she mentions that she thinks she is passing gas.  General surgery continues to recommend ambulation.  Patient currently n.p.o. with NG tube and the patient is on TPN.  As per nursing no  other overnight events were reported.    4/28: Patient seen at bedside this morning.  The patient still has not had a bowel movement.  She says she is passing gas.  General Surgery following the patient along.  We will continue TPN and n.p.o. for now.  No other overnight events were reported by nursing.    4/29: Patient seen at bedside this morning.  She mentions she is passing gas, however she has not had a bowel movement yet.  She was encouraged to ambulate.  We have consulted ID for further antibiotic coverage, we appreciate further recommendations.  As per nursing no other overnight events reported.    4/30: Patient seen at bedside this morning.  The patient still without a bowel movement.  ID recommends to continue IV antibiotics for now.  As per nursing no other overnight events reported.    5/1: Patient seen at bedside this morning.  She is lying comfortably in bed, however she still having bowel movements, but she says she is passing gas.  ID recommended to stop antibiotics after today and if WBC trend upwards to repeat CT scan.  As per nursing no other overnight events were reported.    5/2: WBC count slightly elevated today from yesterday.  The last dose of Zosyn she got last night.  Patient lying in bed comfortably, currently not complaining of other pain.  She is still has not had a bowel movement.  We will repeat WBC tomorrow and consider CT scan if it continues to trend up.  As per nursing no other overnight events reported.    5/3: WBC increased today. We have ordered CT scan abdomen and pelvis, which we are pending. If CT scan find abscess collection we will restart zosyn and surgery will be notified. Patient complaining of knee pain. Patient still without a BM, but mentions she is passing gas. As per nursing no other over night events reported.    5/4: WBC still up trending. CT A/P without any finding of residual abscess. We will hold off antibiotics for now and clinically monitor.  If it continues  to uptrend, will work-up other source for infection.  She still has complains of feeling bloated/constipated, passing gas but still without a bowel movement. We will plan to remove Pabon in a day or 2 and TOV.    5/5: WBC down to 15,000 this morning. Still no clear signs of infection noted at this time. Still passing gas but no bowel movements. Will remove Pabon today and perform a trial void. KUB ordered to evaluate bowels.     Consultants/Specialty  General Surgery  ID    Code Status  Full Code    Disposition  Pending    Review of Systems  Review of Systems   Constitutional: Negative for chills and fever.   HENT: Negative for hearing loss and nosebleeds.    Eyes: Negative for blurred vision and double vision.   Respiratory: Negative for cough and shortness of breath.    Cardiovascular: Negative for chest pain and palpitations.   Gastrointestinal: Positive for constipation. Negative for abdominal pain, heartburn, nausea and vomiting.   Genitourinary: Negative for dysuria and urgency.   Musculoskeletal: Positive for back pain and joint pain (knee pain). Negative for falls and myalgias.   Skin: Negative for itching and rash.   Neurological: Negative for dizziness and headaches.   Psychiatric/Behavioral: The patient is not nervous/anxious.    All other systems reviewed and are negative.       Physical Exam  Temp:  [36.6 °C (97.9 °F)-36.7 °C (98.1 °F)] 36.6 °C (97.9 °F)  Pulse:  [] 99  Resp:  [17-20] 20  BP: (133-150)/(61-69) 148/66  SpO2:  [92 %-98 %] 94 %    Physical Exam  Constitutional:       Appearance: She is obese. She is ill-appearing.   HENT:      Head: Normocephalic and atraumatic.      Nose: No congestion or rhinorrhea.      Mouth/Throat:      Pharynx: No oropharyngeal exudate or posterior oropharyngeal erythema.   Eyes:      General:         Right eye: No discharge.         Left eye: No discharge.   Cardiovascular:      Rate and Rhythm: Normal rate and regular rhythm.      Pulses: Normal pulses.       Heart sounds: No murmur. No gallop.    Pulmonary:      Effort: Pulmonary effort is normal. No respiratory distress.      Breath sounds: No wheezing or rales.   Abdominal:      General: There is distension.      Tenderness: There is abdominal tenderness.      Comments: Bandages in place on surgical site, does not appear soaked or bleeding.   Musculoskeletal:         General: Normal range of motion.      Cervical back: Normal range of motion.   Skin:     General: Skin is warm and dry.   Neurological:      General: No focal deficit present.      Mental Status: She is alert and oriented to person, place, and time.      Cranial Nerves: No cranial nerve deficit.      Motor: No weakness.   Psychiatric:         Mood and Affect: Mood normal.         Behavior: Behavior normal.         Fluids    Intake/Output Summary (Last 24 hours) at 5/5/2021 0742  Last data filed at 5/5/2021 0524  Gross per 24 hour   Intake 979.43 ml   Output 4925 ml   Net -3945.57 ml       Laboratory  Recent Labs     05/03/21 0441 05/04/21  0625 05/05/21  0340   WBC 15.8* 17.2* 15.6*   RBC 3.61* 3.49* 3.45*   HEMOGLOBIN 10.2* 10.0* 10.1*   HEMATOCRIT 32.5* 31.6* 31.8*   MCV 90.0 90.5 92.2   MCH 28.3 28.7 29.3   MCHC 31.4* 31.6* 31.8*   RDW 46.3 47.8 48.4   PLATELETCT 189 265 299   MPV 11.1 11.0 11.5     Recent Labs     05/03/21  0441 05/04/21  0625 05/05/21  0340   SODIUM 138 139 137   POTASSIUM 4.3 4.2 4.2   CHLORIDE 105 105 104   CO2 24 24 24   GLUCOSE 137* 141* 118*   BUN 16 18 19   CREATININE 0.84 0.80 0.89   CALCIUM 8.4 9.1 9.0             Recent Labs     05/03/21  0441   TRIGLYCERIDE 179*       Imaging  CT-ABDOMEN-PELVIS WITH   Final Result         1.  Improving fat stranding in the lower abdomen likely improving inflammation and postoperative changes. A surgical drain in the right lower quadrant is again noted. No significant free fluid or abscess.   2.  Mildly improved ileus.   3.  Hepatic steatosis.               DX-ABDOMEN FOR TUBE PLACEMENT    Final Result         1.  Nonspecific bowel gas pattern.   2.  Dobbhoff tube is coiled within the pylorus, the tip terminates overlying the expected location of the gastric pylorus or gastric antrum.   3.  Left basilar atelectasis versus early infiltrate.      IR-PICC LINE PLACEMENT W/ GUIDANCE > AGE 5   Final Result                  Ultrasound-guided PICC placement performed by qualified nursing staff as    above.          CT-ABDOMEN-PELVIS W/O   Final Result      Interval laparotomy with right lower quadrant surgical drain, similar fat stranding in the pericecal region that is likely postoperative in nature. No noncontrast evidence of abscess formation      Small free fluid and intraperitoneal air is not outside normal limits in the perioperative period      Contrast in the colon is from the CT 3 days ago, air-fluid levels and mild small bowel dilatation. In combination these findings are most compatible with ileus      Urinary bladder decompressed around the Pabon catheter. No hydroureteronephrosis      [id number] VIEW   Final Result      Dilated loops of small bowel most likely represent ileus. Partial obstruction is not excluded.      Residual enteric contrast is seen from the recent CT.      Mild bibasilar atelectasis.         IR-US GUIDED PIV   Final Result    Ultrasound-guided PERIPHERAL IV INSERTION performed by    qualified nursing staff as above.      [id number] VIEW    (Results Pending)        Assessment/Plan  * Acute appendicitis with localized peritonitis  Assessment & Plan  Status post appendectomy.  Initially attempted with laparoscopy but then had to be converted to open resection.  Pain management  NG tube to suctioning due to persistent postoperative ileus  Fluid resuscitation  Due to lack of nutrition will have PICC line placed and TPN started.    4/29: Continue TPN, we appreciate any further recommendations by general surgery. Continue Zosyn. We have consulted ID for further antibiotic  coverage, we appreciate further recommendations.    4/30: We consulted ID, and they recommended to continue IV antibiotics for now.    5/1: ID recommends to stop antibiotics after today.     5/2: WBC today is 14.1, yesterday it was 13.5. We just stopped Zosyn last night, we will repeat CBC tomorrow and if it continues to trend upwards, we will consider repeat CT scan. If WBC trends upwards, we will consider repeat CT scan. Patient afebrile, without hypotension.    5/3: WBC today is 15.8. I spoke to ID and they recommend repeat CT scan. ID does not recommend initiating antibiotics at this time, pending CT scan. We have ordered abdominal/pelvic CT scan result.     CT A/P without any findings of free fluid or abscess.    Patient currently afebrile, and not complaining of worsening abdominal pain at this time.     Constipation  Assessment & Plan  Possibly post surgical etiology.  General surgery following patient, we appreciate further recommendations  NPO, currently on TPN.    Leukocytosis  Assessment & Plan  In the setting of ruptured appendicitis and abdominal abscess.  Continue zosyn    4/30: WBC today are 13.3. ID consulted, who recommend to continue IV antibiotics. We appreciate further recommendations.    5/1: ID recommends to stop antibiotics after today. If WBC trends upwards, we will consider repeat CT scan.    5/2: Mild elevation today from 13.5 to 14.1. We will monitor and consider CT scan if it continues to trend up.    5/3: WBC 15.8 today. We have repeated CT scan as per ID's recommendations, pending result.    CT A/P with no findings of free fluid or abscess.  Will clinically monitor for now and keep trending CBC, if still uptrending, will look for OTHER source of infection.    Essential hypertension- (present on admission)  Assessment & Plan  Continue losartan  PRN labetalol    4/29: We have increased losartan to 50 mg BID.    5/2: We  Have started amlodipine 5 mg.    Osteoarthritis- (present on  admission)  Assessment & Plan  Patient with a history of osteoarthritis.  The patient today complaining of knee pain.  The patient states that she usually takes ibuprofen at home.  We will order 1 dose of ibuprofen 400 mg.  However we will encourage Tylenol for arthritic pain at this time due to the patient's history of CKD.  The patient currently receiving IV fluids, we will monitor closely and make changes accordingly.    Anemia  Assessment & Plan  Hb today is 10.2, has remained stable.  This is most likely post-surgical  No signs of bleeding at this time  Monitor, repeat CBC, and make changes accordingly.    Hyperglycemia  Assessment & Plan  Patient's A1c is 6.5.  Patient currently on ISS and hypoglycemia protocol.  Patient would require close follow up as outpatient.    Hypothyroidism  Assessment & Plan  Continue home medication.    Stage 3 chronic kidney disease- (present on admission)  Assessment & Plan  As per chart review, apparently history of  Avoid nephrotoxic medications    5/3: Creatinine .84 today. Patient NPO on TPN.  Patient also currently on IVF, patient will get CT scan with contrast today, so we will monitor kidney function.    Stable.    BMI 50.0-59.9, adult (HCC)- (present on admission)  Assessment & Plan  Body mass index is 57.29 kg/m².  Outpatient follow-ups with weight program      HENRI (obstructive sleep apnea)- (present on admission)  Assessment & Plan  CPAP nocturnal.         VTE prophylaxis: heparin

## 2021-05-05 NOTE — THERAPY
Occupational Therapy  Daily Treatment     Patient Name: Mady Yang  Age:  65 y.o., Sex:  female  Medical Record #: [id number]  Today's Date: 5/5/2021     Precautions  Precautions: Fall Risk, Nasogastric Tube  Comments: abdominal incision; LENI drain: HOB at 30 deg     Assessment    Pt agreeable to therapy, able to tolerate a transfer to commode today.  Still very weak, highly limited in her mobility, transfers and ADl's.  Pt will benefit from further inpt post acute therapy prior to home.  OT will follow while in house.    Plan    Continue current treatment plan.    DC Equipment Recommendations: Unable to determine at this time  Discharge Recommendations: Recommend post-acute placement for additional occupational therapy services prior to discharge home       05/05/21 1506   Cognition    Cognition / Consciousness WDL   Level of Consciousness Alert   Comments Agreeable to therapy, motivated to get up to BSC   Strength Upper Body   Comments not sufficient to compensate for BLE weakness when using FWW   Sitting Upper Body Exercises   Comments using BUE for support on walker during transfer   Balance   Sitting Balance (Static) Fair +   Sitting Balance (Dynamic) Fair   Standing Balance (Static) Poor +   Standing Balance (Dynamic) Poor   Weight Shift Sitting Fair   Weight Shift Standing Fair   Skilled Intervention Verbal Cuing;Sequencing;Postural Facilitation;Tactile Cuing   Comments standing with FWW   Bed Mobility    Supine to Sit Maximal Assist  (x2)   Sit to Supine Maximal Assist  (x2)   Scooting Maximal Assist   Comments HOB up, used rail, air mattress deflated prior to EOB sitting   Activities of Daily Living   Lower Body Dressing Maximal Assist  (shoes)   Toileting Maximal Assist  (urinated on commode, unable to clean self)   Functional Mobility   Sit to Stand Maximal Assist  (x2)   Bed, Chair, Wheelchair Transfer Moderate Assist  (x2 with FWW)   Toilet Transfers Moderate Assist  (x2 to bariatric BSC)  "  Transfer Method Stand Step   Mobility stand and step to BSC with FWW and BTB   Activity Tolerance   Sitting in Chair up on bsc 20 min   Sitting Edge of Bed 2 min, 5 min   Standing 1 min during transfer   Short Term Goals   Short Term Goal # 1 pt will complete ADL txfs with LRAD at spv level   Goal Outcome # 1 Progressing slower than expected   Short Term Goal # 2 pt will complete FB dressing with spv   Goal Outcome # 2 Goal not met   Short Term Goal # 3 pt will complete and tolerate 10\" of standing G/H at sinkside with spv   Goal Outcome # 3 Goal not met       "

## 2021-05-05 NOTE — PROGRESS NOTES
Pharmacy TPN Day # 10       2021    Dosing Weight   76 kg TPN currently providing 100% of goal      TPN goal: 1628 kcal/day including 1.6 gm/kg/day Protein      TPN indication: NPO s/p appendectomy w/abscess present       Pertinent PMH: Back pain, Chronic low back pain (2018), Elevated blood sugar, Hypertension, Kidney stone (), Obesity, HENRI (obstructive sleep apnea) (2017), Osteoporosis, Post-nasal drip, Seasonal allergies, Sleep apnea, and Thyroid disease..     Temp (24hrs), Av.7 °C (98 °F), Min:36.6 °C (97.9 °F), Max:36.7 °C (98.1 °F)  .  Recent Labs     21  0441 21  0625 21  0340   SODIUM 138 139 137   POTASSIUM 4.3 4.2 4.2   CHLORIDE 105 105 104   CO2 24 24 24   BUN 16 18 19   CREATININE 0.84 0.80 0.89   GLUCOSE 137* 141* 118*   CALCIUM 8.4 9.1 9.0   ASTSGOT 24 17 16   ALTSGPT 37 26 21   ALBUMIN 2.7* 2.6* 2.6*   TBILIRUBIN 0.4 0.3 0.3   PHOSPHORUS 3.0 3.1 3.8   MAGNESIUM 1.8 2.1 2.0   PREALBUMIN 12.7*  --   --      Accu-Checks  Recent Labs     21  1743 21  2303 21  0537   POCGLUCOSE 102* 125* 152*       Vitals:    21 1700 21 1746 21 2229 21 0524   BP: 133/68 147/69 150/61 148/66   Weight:       Height:           Intake/Output Summary (Last 24 hours) at 2021 0843  Last data filed at 2021 0524  Gross per 24 hour   Intake 979.43 ml   Output 4925 ml   Net -3945.57 ml       Orders Placed This Encounter   Procedures   • Diet NPO     Standing Status:   Standing     Number of Occurrences:   1     Order Specific Question:   Restrict to:     Answer:   Sips with Medications [3]         TPN for past 72 hours (Show up to 3 orders; newest on the left. Changes between the two most recent orders are indicated.)     Start date and time   2021      TPN Central Line Formulation [[id number]] TPN Central Line Formulation [[id number]] TPN Central Line Formulation [[id number]]    Order Status  Active  Last Dose in Progress Completed    Last Admin   New Bag at 05/04/2021 2032 by Edita Martinez R.N. New Bag at 05/03/2021 2023 by Jaleesa Aleman R.N.       Base    Clinisol 15%  120 g 120 g 120 g    dextrose 70%  220 g 220 g 220 g    fat emulsions 20%  40 g 40 g 40 g       Additives    potassium phosphate  15 mmol 15 mmol 15 mmol    potassium chloride  60 mEq 60 mEq 70 mEq    sodium chloride  170 mEq 170 mEq 170 mEq    magnesium sulfate  8 mEq 8 mEq 8 mEq    calcium GLUConate  9.4 mEq 9.4 mEq 11.63 mEq    M.T.E.-4 Adult  1 mL 1 mL 1 mL    M.V.I. Adult  10 mL 10 mL 10 mL       QS Base    sterile water  567.01 mL 567.01 mL 557.23 mL       Energy Contribution    Proteins  -- -- --    Dextrose  -- -- --    Lipids  -- -- --    Total  -- -- --       Electrolyte Ion Calculated Amount    Sodium  170 mEq 170 mEq 170 mEq    Potassium  82 mEq 82 mEq 92 mEq    Calcium  9.4 mEq 9.4 mEq 11.63 mEq    Magnesium  8 mEq 8 mEq 8 mEq    Aluminum  -- -- --    Phosphate  15 mmol 15 mmol 15 mmol    Chloride  230 mEq 230 mEq 240 mEq    Acetate  101.6 mEq 101.6 mEq 101.6 mEq       Other    Total Protein  120 g 120 g 120 g    Total Protein/kg  0.81 g/kg 0.81 g/kg 0.81 g/kg    Total Amino Acid  -- -- --    Total Amino Acid/kg  -- -- --    Glucose Infusion Rate  1.03 mg/kg/min 1.03 mg/kg/min 1.03 mg/kg/min    Osmolarity (Estimated)  -- -- --    Volume  1,992 mL 1,992 mL 1,992 mL    Rate  83 mL/hr 83 mL/hr 83 mL/hr    Dosing Weight  148 kg 148 kg 148 kg    Infusion Site  Central Central Central            This formula provides:  % kcal as lipids = 25  Grams protein/kg = 1.6  Non-protein calories = 1148  Kcals/kg = 21.4  Total daily calories = 1628    Comments:  Continue same, no change in rate or formula, labs in am per protocol.      Dhaval Macedo [facility]

## 2021-05-06 LAB
ALBUMIN SERPL BCP-MCNC: 2.7 G/DL (ref 3.2–4.9)
ALBUMIN/GLOB SERPL: 0.6 G/DL
ALP SERPL-CCNC: 102 U/L (ref 30–99)
ALT SERPL-CCNC: 19 U/L (ref 2–50)
ANION GAP SERPL CALC-SCNC: 10 MMOL/L (ref 7–16)
AST SERPL-CCNC: 17 U/L (ref 12–45)
BILIRUB SERPL-MCNC: 0.4 MG/DL (ref 0.1–1.5)
BUN SERPL-MCNC: 20 MG/DL (ref 8–22)
CALCIUM SERPL-MCNC: 8.9 MG/DL (ref 8.4–10.2)
CHLORIDE SERPL-SCNC: 102 MMOL/L (ref 96–112)
CO2 SERPL-SCNC: 25 MMOL/L (ref 20–33)
CREAT SERPL-MCNC: 0.76 MG/DL (ref 0.5–1.4)
ERYTHROCYTE [DISTWIDTH] IN BLOOD BY AUTOMATED COUNT: 48.1 FL (ref 35.9–50)
GLOBULIN SER CALC-MCNC: 4.5 G/DL (ref 1.9–3.5)
GLUCOSE BLD-MCNC: 123 MG/DL (ref 65–99)
GLUCOSE BLD-MCNC: 136 MG/DL (ref 65–99)
GLUCOSE BLD-MCNC: 139 MG/DL (ref 65–99)
GLUCOSE BLD-MCNC: 153 MG/DL (ref 65–99)
GLUCOSE SERPL-MCNC: 130 MG/DL (ref 65–99)
HCT VFR BLD AUTO: 31.5 % (ref 37–47)
HGB BLD-MCNC: 10.1 G/DL (ref 12–16)
MAGNESIUM SERPL-MCNC: 2.1 MG/DL (ref 1.5–2.5)
MCH RBC QN AUTO: 29.4 PG (ref 27–33)
MCHC RBC AUTO-ENTMCNC: 32.1 G/DL (ref 33.6–35)
MCV RBC AUTO: 91.6 FL (ref 81.4–97.8)
PLATELET # BLD AUTO: 341 K/UL (ref 164–446)
PMV BLD AUTO: 11.3 FL (ref 9–12.9)
POTASSIUM SERPL-SCNC: 4.3 MMOL/L (ref 3.6–5.5)
PROT SERPL-MCNC: 7.2 G/DL (ref 6–8.2)
RBC # BLD AUTO: 3.44 M/UL (ref 4.2–5.4)
SODIUM SERPL-SCNC: 137 MMOL/L (ref 135–145)
TRIGL SERPL-MCNC: 155 MG/DL (ref 0–149)
WBC # BLD AUTO: 14.1 K/UL (ref 4.8–10.8)

## 2021-05-06 PROCEDURE — 700102 HCHG RX REV CODE 250 W/ 637 OVERRIDE(OP): Performed by: INTERNAL MEDICINE

## 2021-05-06 PROCEDURE — 700111 HCHG RX REV CODE 636 W/ 250 OVERRIDE (IP): Performed by: INTERNAL MEDICINE

## 2021-05-06 PROCEDURE — A9270 NON-COVERED ITEM OR SERVICE: HCPCS | Performed by: INTERNAL MEDICINE

## 2021-05-06 PROCEDURE — 700111 HCHG RX REV CODE 636 W/ 250 OVERRIDE (IP): Performed by: STUDENT IN AN ORGANIZED HEALTH CARE EDUCATION/TRAINING PROGRAM

## 2021-05-06 PROCEDURE — 770006 HCHG ROOM/CARE - MED/SURG/GYN SEMI*

## 2021-05-06 PROCEDURE — 700105 HCHG RX REV CODE 258: Performed by: STUDENT IN AN ORGANIZED HEALTH CARE EDUCATION/TRAINING PROGRAM

## 2021-05-06 PROCEDURE — 99232 SBSQ HOSP IP/OBS MODERATE 35: CPT | Performed by: STUDENT IN AN ORGANIZED HEALTH CARE EDUCATION/TRAINING PROGRAM

## 2021-05-06 PROCEDURE — 700101 HCHG RX REV CODE 250: Performed by: STUDENT IN AN ORGANIZED HEALTH CARE EDUCATION/TRAINING PROGRAM

## 2021-05-06 PROCEDURE — 84478 ASSAY OF TRIGLYCERIDES: CPT

## 2021-05-06 PROCEDURE — 94760 N-INVAS EAR/PLS OXIMETRY 1: CPT

## 2021-05-06 PROCEDURE — 94660 CPAP INITIATION&MGMT: CPT

## 2021-05-06 PROCEDURE — 85027 COMPLETE CBC AUTOMATED: CPT

## 2021-05-06 PROCEDURE — 80053 COMPREHEN METABOLIC PANEL: CPT

## 2021-05-06 PROCEDURE — A9270 NON-COVERED ITEM OR SERVICE: HCPCS | Performed by: STUDENT IN AN ORGANIZED HEALTH CARE EDUCATION/TRAINING PROGRAM

## 2021-05-06 PROCEDURE — 700102 HCHG RX REV CODE 250 W/ 637 OVERRIDE(OP): Performed by: STUDENT IN AN ORGANIZED HEALTH CARE EDUCATION/TRAINING PROGRAM

## 2021-05-06 PROCEDURE — 82962 GLUCOSE BLOOD TEST: CPT | Mod: 91

## 2021-05-06 PROCEDURE — 83735 ASSAY OF MAGNESIUM: CPT

## 2021-05-06 RX ORDER — AMOXICILLIN 250 MG
1 CAPSULE ORAL DAILY
Status: DISCONTINUED | OUTPATIENT
Start: 2021-05-06 | End: 2021-05-14 | Stop reason: HOSPADM

## 2021-05-06 RX ORDER — CYCLOBENZAPRINE HCL 10 MG
5 TABLET ORAL 3 TIMES DAILY
Status: DISCONTINUED | OUTPATIENT
Start: 2021-05-06 | End: 2021-05-14 | Stop reason: HOSPADM

## 2021-05-06 RX ADMIN — LORATADINE 10 MG: 10 TABLET ORAL at 05:31

## 2021-05-06 RX ADMIN — HYDROMORPHONE HYDROCHLORIDE 0.5 MG: 1 INJECTION, SOLUTION INTRAMUSCULAR; INTRAVENOUS; SUBCUTANEOUS at 12:13

## 2021-05-06 RX ADMIN — CALCIUM GLUCONATE: 98 INJECTION, SOLUTION INTRAVENOUS at 20:27

## 2021-05-06 RX ADMIN — AMLODIPINE BESYLATE 10 MG: 5 TABLET ORAL at 05:32

## 2021-05-06 RX ADMIN — HEPARIN SODIUM 5000 UNITS: 5000 INJECTION, SOLUTION INTRAVENOUS; SUBCUTANEOUS at 05:31

## 2021-05-06 RX ADMIN — HEPARIN SODIUM 5000 UNITS: 5000 INJECTION, SOLUTION INTRAVENOUS; SUBCUTANEOUS at 23:06

## 2021-05-06 RX ADMIN — SENNOSIDES AND DOCUSATE SODIUM 1 TABLET: 8.6; 5 TABLET ORAL at 17:21

## 2021-05-06 RX ADMIN — LOSARTAN POTASSIUM 50 MG: 25 TABLET, FILM COATED ORAL at 05:31

## 2021-05-06 RX ADMIN — ACETAMINOPHEN 650 MG: 325 TABLET, FILM COATED ORAL at 23:06

## 2021-05-06 RX ADMIN — THYROID, PORCINE 90 MG: 30 TABLET ORAL at 05:31

## 2021-05-06 RX ADMIN — HYDROMORPHONE HYDROCHLORIDE 0.5 MG: 1 INJECTION, SOLUTION INTRAMUSCULAR; INTRAVENOUS; SUBCUTANEOUS at 04:11

## 2021-05-06 RX ADMIN — HEPARIN SODIUM 5000 UNITS: 5000 INJECTION, SOLUTION INTRAVENOUS; SUBCUTANEOUS at 14:23

## 2021-05-06 RX ADMIN — CYCLOBENZAPRINE 5 MG: 10 TABLET, FILM COATED ORAL at 17:21

## 2021-05-06 RX ADMIN — LOSARTAN POTASSIUM 50 MG: 25 TABLET, FILM COATED ORAL at 17:21

## 2021-05-06 ASSESSMENT — PAIN DESCRIPTION - PAIN TYPE
TYPE: ACUTE PAIN;CHRONIC PAIN
TYPE: ACUTE PAIN

## 2021-05-06 ASSESSMENT — ENCOUNTER SYMPTOMS
ABDOMINAL PAIN: 0
HEADACHES: 0
DIZZINESS: 0
VOMITING: 0
NAUSEA: 0
BLURRED VISION: 0
FEVER: 0
BACK PAIN: 1
CHILLS: 0
COUGH: 0
MYALGIAS: 0
DOUBLE VISION: 0
HEARTBURN: 0
FALLS: 0
NERVOUS/ANXIOUS: 0
PALPITATIONS: 0
CONSTIPATION: 1
SHORTNESS OF BREATH: 0

## 2021-05-06 NOTE — PROGRESS NOTES
Pharmacy TPN Day # 11       2021    Dosing Weight   76 kg TPN currently providing 100% of goal                                                  TPN goal: 1628 kcal/day including 1.6 gm/kg/day Protein                                                  TPN indication: NPO s/p appendectomy w/abscess present                                                              Pertinent PMH: Back pain, Chronic low back pain (2018), Elevated blood sugar, Hypertension, Kidney stone (), Obesity, HENRI (obstructive sleep apnea) (2017), Osteoporosis, Post-nasal drip, Seasonal allergies, Sleep apnea, and Thyroid disease..     Temp (24hrs), Av.7 °C (98 °F), Min:36.6 °C (97.9 °F), Max:36.7 °C (98.1 °F)  .  Recent Labs     21  0625 21  0340 21  0557   SODIUM 139 137 137   POTASSIUM 4.2 4.2 4.3   CHLORIDE 105 104 102   CO2 24 24 25   BUN 18 19 20   CREATININE 0.80 0.89 0.76   GLUCOSE 141* 118* 130*   CALCIUM 9.1 9.0 8.9   ASTSGOT 17 16 17   ALTSGPT 26 21 19   ALBUMIN 2.6* 2.6* 2.7*   TBILIRUBIN 0.3 0.3 0.4   PHOSPHORUS 3.1 3.8  --    MAGNESIUM 2.1 2.0 2.1     Accu-Checks  Recent Labs     21  1834 21  2316 21  0529   POCGLUCOSE 146* 136* 123*       Vitals:    21 1117 21 1400 21 2300 21 0529   BP: 120/54 158/89 149/66 145/57   Weight:       Height:           Intake/Output Summary (Last 24 hours) at 2021 0949  Last data filed at 2021 0630  Gross per 24 hour   Intake --   Output 2455 ml   Net -2455 ml       Orders Placed This Encounter   Procedures   • Diet NPO     Standing Status:   Standing     Number of Occurrences:   1     Order Specific Question:   Restrict to:     Answer:   Sips with Medications [3]         TPN for past 72 hours (Show up to 3 orders; newest on the left. Changes between the two most recent orders are indicated.)     Start date and time   2021      TPN Central Line Formulation [[id number]] TPN  Central Line Formulation [[id number]] TPN Central Line Formulation [[id number]]    Order Status  Active Last Dose in Progress Completed    Last Admin   New Bag at 05/05/2021 2142 by Edita Martinez R.N. New Bag at 05/04/2021 2032 by Edita Martinez R.N.       Base    Clinisol 15%  120 g 120 g 120 g    dextrose 70%  220 g 220 g 220 g    fat emulsions 20%  40 g 40 g 40 g       Additives    potassium phosphate  15 mmol 15 mmol 15 mmol    potassium chloride  60 mEq 60 mEq 60 mEq    sodium chloride  170 mEq 170 mEq 170 mEq    magnesium sulfate  8 mEq 8 mEq 8 mEq    calcium GLUConate  9.4 mEq 9.4 mEq 9.4 mEq    M.T.E.-4 Adult  1 mL 1 mL 1 mL    M.V.I. Adult  10 mL 10 mL 10 mL       QS Base    sterile water  567.01 mL 567.01 mL 567.01 mL       Energy Contribution    Proteins  -- -- --    Dextrose  -- -- --    Lipids  -- -- --    Total  -- -- --       Electrolyte Ion Calculated Amount    Sodium  170 mEq 170 mEq 170 mEq    Potassium  82 mEq 82 mEq 82 mEq    Calcium  9.4 mEq 9.4 mEq 9.4 mEq    Magnesium  8 mEq 8 mEq 8 mEq    Aluminum  -- -- --    Phosphate  15 mmol 15 mmol 15 mmol    Chloride  230 mEq 230 mEq 230 mEq    Acetate  101.6 mEq 101.6 mEq 101.6 mEq       Other    Total Protein  120 g 120 g 120 g    Total Protein/kg  0.81 g/kg 0.81 g/kg 0.81 g/kg    Total Amino Acid  -- -- --    Total Amino Acid/kg  -- -- --    Glucose Infusion Rate  1.03 mg/kg/min 1.03 mg/kg/min 1.03 mg/kg/min    Osmolarity (Estimated)  -- -- --    Volume  1,992 mL 1,992 mL 1,992 mL    Rate  83 mL/hr 83 mL/hr 83 mL/hr    Dosing Weight  148 kg 148 kg 148 kg    Infusion Site  Central Central Central            This formula provides:  % kcal as lipids = 25  Grams protein/kg = 1.6  Non-protein calories = 1148  Kcals/kg = 21.4  Total daily calories = 1628    Comments:  Per provider notes, NG tube will be clamped today. No word on diet advancement at this time. Pharmacy to continue to f/u and monitor per TPN protocol.       Cornell Phelps PharmD.

## 2021-05-06 NOTE — PROGRESS NOTES
"  Trauma/Surgical Progress Note    Author: Jett Ruiz M.D. Date & Time created: 5/5/2021   5:36 PM   4/21 open appendectomy drain abscess     Interval Events:  Awake alert  Up chair  reports pain control good  Reports feeling improved   increased activity working with PT   flatus, 0 bm  Hemodynamics:  /54   Pulse (!) 104   Temp 37.3 °C (99.1 °F) (Temporal)   Resp 18   Ht 1.6 m (5' 3\")   Wt (!) 148 kg (327 lb)   SpO2 93%      Respiratory:    Respiration: 18, Pulse Oximetry: 93 %     Work Of Breathing / Effort: Within Normal Limits  RUL Breath Sounds: Clear, RML Breath Sounds: Diminished, RLL Breath Sounds: Diminished, CHRISTINA Breath Sounds: Clear, LLL Breath Sounds: Diminished  Fluids:    Intake/Output Summary (Last 24 hours) at 5/5/2021 1736  Last data filed at 5/5/2021 1500  Gross per 24 hour   Intake 979.43 ml   Output 3025 ml   Net -2045.57 ml     Admit Weight: (!) 147 kg (323 lb 6.6 oz)  Current     Physical Exam  Awake alert appropriate  Breathing with ease  Appropriate incisional tenderness  Incision clean staples in place  Medical Decision Making/Problem List:    Active Hospital Problems    Diagnosis    • Acute appendicitis with localized peritonitis [K35.30]      Priority: High   • Leukocytosis [D72.829]      Priority: Medium   • Constipation [K59.00]      Priority: Medium   • Essential hypertension [I10]      Priority: Medium   • Osteoarthritis [M19.90]      Priority: Medium   • Stage 3 chronic kidney disease [N18.30]      Priority: Low   • BMI 50.0-59.9, adult (HCC) [Z68.43]      Priority: Low   • HENRI (obstructive sleep apnea) [G47.33]      Priority: Low     AHI 55.7, minimum saturation 84%, on CPAP 13 cm.     • Anemia [D64.9]    • Hyperglycemia [R73.9]    • Hypothyroidism [E03.9]      Core Measures & Quality Metrics:  Core Measures & Quality Metrics  JULIOCESAR Score  Discussed patient condition with Family and Patient.        Assessment/Plan  appreciate care medicine service  Appreciate ID " [email or web address]  Imaging as above  Trend Wbc   Encourage ambulation, IS  Monitoring and support

## 2021-05-06 NOTE — FLOWSHEET NOTE
05/05/21 2200   Events/Summary/Plan   Events/Summary/Plan CPAP started   Skin Inspection Respiratory Device Intact   Vital Signs   Pulse 69   Respiration 18   Pulse Oximetry 92 %   $ Pulse Oximetry (Spot Check) Yes   Respiratory Assessment   Respiratory Pattern Within Normal Limits   Level of Consciousness Alert   Level of Consciousness Alert   Chest Exam   Work Of Breathing / Effort Mild   Oxygen   O2 (LPM) 0   O2 Delivery Device CPAP   Non-Invasive Ventilation HENRI Group   Nocturnal CPAP or BIPAP CPAP - [facility]   $ System Evaluation Yes   Settings (If Known) autopap   FiO2 or LPM 0

## 2021-05-06 NOTE — CARE PLAN
Problem: Communication  Goal: The ability to communicate needs accurately and effectively will improve  Outcome: PROGRESSING AS EXPECTED     Problem: Safety  Goal: Will remain free from injury  Outcome: PROGRESSING AS EXPECTED  Note: Fall precautions in place. Bed alarm is on. Pt educated on use of call light if in need of assistance.   Goal: Will remain free from falls  Outcome: PROGRESSING AS EXPECTED     Problem: Infection  Goal: Will remain free from infection  Outcome: PROGRESSING AS EXPECTED     Problem: Bowel/Gastric:  Goal: Normal bowel function is maintained or improved  Outcome: PROGRESSING SLOWER THAN EXPECTED

## 2021-05-06 NOTE — PROGRESS NOTES
Received report, assumed pt care. Discussed POC.  Pt taught to inform nurse if experiencing pain above comfort goal, SOB, chest pain or for any further assistance. Assessment done. Pain 4/10, denies medication at this time. VSS. Will cont to monitor.

## 2021-05-06 NOTE — DISCHARGE PLANNING
Anticipated Discharge Disposition: [facility]- [facility]     Action: LSW staffed pt with Moira with [employer]. Referral still under review.     Barriers to Discharge: None    Plan:  Await update from [employer], LSW to continue to follow for d/c needs, LSW to assist as needed

## 2021-05-06 NOTE — PROGRESS NOTES
[facility] Medicine Daily Progress Note    Date of Service  5/6/2021    Chief Complaint  65 y.o. female admitted 4/21/2021 with abdominal pain.    Hospital Course  4/22/2021-patient is a 65-year-old female comes in with abdominal pain that started last Friday.  After suffering with the pain the patient went to see urgent care where they did a CAT scan of her abdomen which came back with early appendicitis.  Patient was referred over to the emergency room for evaluation.  Patient was admitted for surgery and was found to have a large abscess along with the appendicitis.  Appendix was removed and the abscess has been drained.  She still having chills and fevers afterwards.  Continue with IV antibiotics as well as pain management and fluid resuscitation.  Await culture results.  4/23/2021-this morning patient is in quite a bit of distress.  Her pain has escalated in the abdomen and she is unable to urinate or defecate.  Upon further evaluation patient has a postoperative ileus in her bowel sounds have not returned.  She has not passed any gas or had a bowel movement.  Continue at this point with supportive fluid resuscitation to ensure the patient's bowel functions returned to normal.  Patient at this point is also complaining of urinary retention and the bladder scanner was only showing 70 to 100 mL of retained urine.  This was inconsistent with the amount of pain the patient was having thus I ordered a immediate straight catheterization which yielded over 800 mL of urine.  After the urine came out the patient's abdominal pain eased up and her condition improved.  We will continue to monitor her urine output and if the patient should have urinary retention we will straight catheter again.  For the time being because of her abdominal distention pain and ileus as well as urinary retention we are unable to discharge the patient at this point in time.  Continue at this point with aggressive management and  stabilization.  4/24/2021-patient is still having difficulty with abdominal pain.  She also has not had a bowel movement and her intestinal sounds are returning very slowly.  Concern is that there is still an abscess present.  Flatplate of the abdomen did not show anything her white blood cell count however is increasing at this point I am going to obtain a CT of the abdomen.  Discussed it with surgery.  Because of the bladder distention and inability urinate a Pabon catheter had to be placed.  Hopefully this will decompress the bladder.  4/25/2021-postoperative day #4 after laparoscopic appendectomy had to be converted to an open appendectomy and drainage of abscess cavity.  Cultures have not grown out any significant organism.  White blood cell count has gone up, repeat CT of the abdomen does not demonstrate worsening abscess formation and actually is consistent with routine healing.  Patient CT demonstrates an ileus.  Since her abdominal distention is getting worse and her nausea is persisting I am putting in an NG to low intermittent suctioning.  This was discussed with the bedside nurse and surgery.  4/26/2021-patient's abdominal distention persists.  Ileus persists status postoperative.  Yesterday the patient's Pabon catheter became obstructed.  It had to be replaced.  Yesterday he also had some technical issues with the hospital bed and at this point had to be replaced a few times.  Pain seems to be better controlled.  Her labs are improving.  The patient may need a repeat CT in 24 to 48 hours if her white blood cell count does not improve.  Today's count is down to 14.8.  Continue with IV Zosyn    Interval Problem Update  4/27: Patient seen at bedside this morning.  The patient still has not had a bowel movement, however she mentions that she thinks she is passing gas.  General surgery continues to recommend ambulation.  Patient currently n.p.o. with NG tube and the patient is on TPN.  As per nursing no  other overnight events were reported.    4/28: Patient seen at bedside this morning.  The patient still has not had a bowel movement.  She says she is passing gas.  General Surgery following the patient along.  We will continue TPN and n.p.o. for now.  No other overnight events were reported by nursing.    4/29: Patient seen at bedside this morning.  She mentions she is passing gas, however she has not had a bowel movement yet.  She was encouraged to ambulate.  We have consulted ID for further antibiotic coverage, we appreciate further recommendations.  As per nursing no other overnight events reported.    4/30: Patient seen at bedside this morning.  The patient still without a bowel movement.  ID recommends to continue IV antibiotics for now.  As per nursing no other overnight events reported.    5/1: Patient seen at bedside this morning.  She is lying comfortably in bed, however she still having bowel movements, but she says she is passing gas.  ID recommended to stop antibiotics after today and if WBC trend upwards to repeat CT scan.  As per nursing no other overnight events were reported.    5/2: WBC count slightly elevated today from yesterday.  The last dose of Zosyn she got last night.  Patient lying in bed comfortably, currently not complaining of other pain.  She is still has not had a bowel movement.  We will repeat WBC tomorrow and consider CT scan if it continues to trend up.  As per nursing no other overnight events reported.    5/3: WBC increased today. We have ordered CT scan abdomen and pelvis, which we are pending. If CT scan find abscess collection we will restart zosyn and surgery will be notified. Patient complaining of knee pain. Patient still without a BM, but mentions she is passing gas. As per nursing no other over night events reported.    5/4: WBC still up trending. CT A/P without any finding of residual abscess. We will hold off antibiotics for now and clinically monitor.  If it continues  to uptrend, will work-up other source for infection.  She still has complains of feeling bloated/constipated, passing gas but still without a bowel movement. We will plan to remove Pabon in a day or 2 and TOV.    5/5: WBC down to 15,000 this morning. Still no clear signs of infection noted at this time. Still passing gas but no bowel movements. Will remove Pabon today and perform a trial void. KUB ordered to evaluate bowels.     5/6: WBC downtrending. No obvious signs of infection noted at this time. Continues to pass gas but without any bowel movements. KUB performed yesterday was mostly unremarkable.  Pabon catheter removed and patient has been able to void.  We will plan for NG tube clamp today and hopefully remove it in the next few hours.   working on LTAC placement.    Consultants/Specialty  General Surgery  ID    Code Status  Full Code    Disposition  Pending    Review of Systems  Review of Systems   Constitutional: Negative for chills and fever.   HENT: Negative for hearing loss and nosebleeds.    Eyes: Negative for blurred vision and double vision.   Respiratory: Negative for cough and shortness of breath.    Cardiovascular: Negative for chest pain and palpitations.   Gastrointestinal: Positive for constipation. Negative for abdominal pain, heartburn, nausea and vomiting.   Genitourinary: Negative for dysuria and urgency.   Musculoskeletal: Positive for back pain and joint pain (knee pain). Negative for falls and myalgias.   Skin: Negative for itching and rash.   Neurological: Negative for dizziness and headaches.   Psychiatric/Behavioral: The patient is not nervous/anxious.    All other systems reviewed and are negative.       Physical Exam  Temp:  [36.5 °C (97.7 °F)-37.4 °C (99.4 °F)] 36.5 °C (97.7 °F)  Pulse:  [] 103  Resp:  [18-20] 20  BP: (120-158)/(54-89) 145/57  SpO2:  [92 %-94 %] 94 %    Physical Exam  Constitutional:       Appearance: She is obese. She is ill-appearing.   HENT:       Head: Normocephalic and atraumatic.      Nose: No congestion or rhinorrhea.      Mouth/Throat:      Pharynx: No oropharyngeal exudate or posterior oropharyngeal erythema.   Eyes:      General:         Right eye: No discharge.         Left eye: No discharge.   Cardiovascular:      Rate and Rhythm: Normal rate and regular rhythm.      Pulses: Normal pulses.      Heart sounds: No murmur. No gallop.    Pulmonary:      Effort: Pulmonary effort is normal. No respiratory distress.      Breath sounds: No wheezing or rales.   Abdominal:      General: There is distension.      Tenderness: There is abdominal tenderness.      Comments: Bandages in place on surgical site, does not appear soaked or bleeding.   Musculoskeletal:         General: Normal range of motion.      Cervical back: Normal range of motion.   Skin:     General: Skin is warm and dry.   Neurological:      General: No focal deficit present.      Mental Status: She is alert and oriented to person, place, and time.      Cranial Nerves: No cranial nerve deficit.      Motor: No weakness.   Psychiatric:         Mood and Affect: Mood normal.         Behavior: Behavior normal.         Fluids    Intake/Output Summary (Last 24 hours) at 5/6/2021 0752  Last data filed at 5/6/2021 0630  Gross per 24 hour   Intake --   Output 2455 ml   Net -2455 ml       Laboratory  Recent Labs     05/04/21  0625 05/05/21  0340 05/06/21  0557   WBC 17.2* 15.6* 14.1*   RBC 3.49* 3.45* 3.44*   HEMOGLOBIN 10.0* 10.1* 10.1*   HEMATOCRIT 31.6* 31.8* 31.5*   MCV 90.5 92.2 91.6   MCH 28.7 29.3 29.4   MCHC 31.6* 31.8* 32.1*   RDW 47.8 48.4 48.1   PLATELETCT 265 299 341   MPV 11.0 11.5 11.3     Recent Labs     05/04/21  0625 05/05/21  0340 05/06/21  0557   SODIUM 139 137 137   POTASSIUM 4.2 4.2 4.3   CHLORIDE 105 104 102   CO2 24 24 25   GLUCOSE 141* 118* 130*   BUN 18 19 20   CREATININE 0.80 0.89 0.76   CALCIUM 9.1 9.0 8.9             Recent Labs     05/06/21  0557   TRIGLYCERIDE 155*        Imaging  [id number] VIEW   Final Result         Mildly dilated gas-filled loop of small bowel in the right lower quadrant could relate to mild focal ileus.      CT-ABDOMEN-PELVIS WITH   Final Result         1.  Improving fat stranding in the lower abdomen likely improving inflammation and postoperative changes. A surgical drain in the right lower quadrant is again noted. No significant free fluid or abscess.   2.  Mildly improved ileus.   3.  Hepatic steatosis.               DX-ABDOMEN FOR TUBE PLACEMENT   Final Result         1.  Nonspecific bowel gas pattern.   2.  Dobbhoff tube is coiled within the pylorus, the tip terminates overlying the expected location of the gastric pylorus or gastric antrum.   3.  Left basilar atelectasis versus early infiltrate.      IR-PICC LINE PLACEMENT W/ GUIDANCE > AGE 5   Final Result                  Ultrasound-guided PICC placement performed by qualified nursing staff as    above.          CT-ABDOMEN-PELVIS W/O   Final Result      Interval laparotomy with right lower quadrant surgical drain, similar fat stranding in the pericecal region that is likely postoperative in nature. No noncontrast evidence of abscess formation      Small free fluid and intraperitoneal air is not outside normal limits in the perioperative period      Contrast in the colon is from the CT 3 days ago, air-fluid levels and mild small bowel dilatation. In combination these findings are most compatible with ileus      Urinary bladder decompressed around the Pabon catheter. No hydroureteronephrosis      [id number] VIEW   Final Result      Dilated loops of small bowel most likely represent ileus. Partial obstruction is not excluded.      Residual enteric contrast is seen from the recent CT.      Mild bibasilar atelectasis.         IR-US GUIDED PIV   Final Result    Ultrasound-guided PERIPHERAL IV INSERTION performed by    qualified nursing staff as above.           Assessment/Plan  * Acute appendicitis  with localized peritonitis  Assessment & Plan  Status post appendectomy.  Initially attempted with laparoscopy but then had to be converted to open resection.  Pain management  NG tube to suctioning due to persistent postoperative ileus  Fluid resuscitation  Due to lack of nutrition will have PICC line placed and TPN started.    4/29: Continue TPN, we appreciate any further recommendations by general surgery. Continue Zosyn. We have consulted ID for further antibiotic coverage, we appreciate further recommendations.    4/30: We consulted ID, and they recommended to continue IV antibiotics for now.    5/1: ID recommends to stop antibiotics after today.     5/2: WBC today is 14.1, yesterday it was 13.5. We just stopped Zosyn last night, we will repeat CBC tomorrow and if it continues to trend upwards, we will consider repeat CT scan. If WBC trends upwards, we will consider repeat CT scan. Patient afebrile, without hypotension.    5/3: WBC today is 15.8. I spoke to ID and they recommend repeat CT scan. ID does not recommend initiating antibiotics at this time, pending CT scan. We have ordered abdominal/pelvic CT scan result.     CT A/P without any findings of free fluid or abscess.    Patient currently afebrile, and not complaining of worsening abdominal pain at this time.     Constipation  Assessment & Plan  Possibly post surgical etiology.  General surgery following patient, we appreciate further recommendations  NPO, currently on TPN.    Leukocytosis  Assessment & Plan  In the setting of ruptured appendicitis and abdominal abscess.  Continue zosyn    4/30: WBC today are 13.3. ID consulted, who recommend to continue IV antibiotics. We appreciate further recommendations.    5/1: ID recommends to stop antibiotics after today. If WBC trends upwards, we will consider repeat CT scan.    5/2: Mild elevation today from 13.5 to 14.1. We will monitor and consider CT scan if it continues to trend up.    5/3: WBC 15.8 today. We  have repeated CT scan as per ID's recommendations, pending result.    CT A/P with no findings of free fluid or abscess.  Will clinically monitor for now and keep trending CBC, if still uptrending, will look for OTHER source of infection.    Essential hypertension- (present on admission)  Assessment & Plan  Continue losartan  PRN labetalol    4/29: We have increased losartan to 50 mg BID.    5/2: We  Have started amlodipine 5 mg.    Osteoarthritis- (present on admission)  Assessment & Plan  Patient with a history of osteoarthritis.  The patient today complaining of knee pain.  The patient states that she usually takes ibuprofen at home.  We will order 1 dose of ibuprofen 400 mg.  However we will encourage Tylenol for arthritic pain at this time due to the patient's history of CKD.  The patient currently receiving IV fluids, we will monitor closely and make changes accordingly.    Anemia  Assessment & Plan  Hb today is 10.2, has remained stable.  This is most likely post-surgical  No signs of bleeding at this time  Monitor, repeat CBC, and make changes accordingly.    Hyperglycemia  Assessment & Plan  Patient's A1c is 6.5.  Patient currently on ISS and hypoglycemia protocol.  Patient would require close follow up as outpatient.    Hypothyroidism  Assessment & Plan  Continue home medication.    Stage 3 chronic kidney disease- (present on admission)  Assessment & Plan  As per chart review, apparently history of  Avoid nephrotoxic medications    5/3: Creatinine .84 today. Patient NPO on TPN.  Patient also currently on IVF, patient will get CT scan with contrast today, so we will monitor kidney function.    Stable.    BMI 50.0-59.9, adult (HCC)- (present on admission)  Assessment & Plan  Body mass index is 57.29 kg/m².  Outpatient follow-ups with weight program      HENRI (obstructive sleep apnea)- (present on admission)  Assessment & Plan  CPAP nocturnal.         VTE prophylaxis: heparin

## 2021-05-07 LAB
ALBUMIN SERPL BCP-MCNC: 2.5 G/DL (ref 3.2–4.9)
ALBUMIN/GLOB SERPL: 0.6 G/DL
ALP SERPL-CCNC: 115 U/L (ref 30–99)
ALT SERPL-CCNC: 22 U/L (ref 2–50)
ANION GAP SERPL CALC-SCNC: 11 MMOL/L (ref 7–16)
AST SERPL-CCNC: 26 U/L (ref 12–45)
BILIRUB SERPL-MCNC: 0.4 MG/DL (ref 0.1–1.5)
BUN SERPL-MCNC: 22 MG/DL (ref 8–22)
CALCIUM SERPL-MCNC: 8.9 MG/DL (ref 8.4–10.2)
CHLORIDE SERPL-SCNC: 105 MMOL/L (ref 96–112)
CO2 SERPL-SCNC: 22 MMOL/L (ref 20–33)
CREAT SERPL-MCNC: 0.82 MG/DL (ref 0.5–1.4)
ERYTHROCYTE [DISTWIDTH] IN BLOOD BY AUTOMATED COUNT: 47.6 FL (ref 35.9–50)
GLOBULIN SER CALC-MCNC: 4.5 G/DL (ref 1.9–3.5)
GLUCOSE BLD-MCNC: 116 MG/DL (ref 65–99)
GLUCOSE BLD-MCNC: 132 MG/DL (ref 65–99)
GLUCOSE BLD-MCNC: 144 MG/DL (ref 65–99)
GLUCOSE BLD-MCNC: 171 MG/DL (ref 65–99)
GLUCOSE SERPL-MCNC: 148 MG/DL (ref 65–99)
HCT VFR BLD AUTO: 30.5 % (ref 37–47)
HGB BLD-MCNC: 9.6 G/DL (ref 12–16)
MAGNESIUM SERPL-MCNC: 2.1 MG/DL (ref 1.5–2.5)
MCH RBC QN AUTO: 28.5 PG (ref 27–33)
MCHC RBC AUTO-ENTMCNC: 31.5 G/DL (ref 33.6–35)
MCV RBC AUTO: 90.5 FL (ref 81.4–97.8)
PHOSPHATE SERPL-MCNC: 3.6 MG/DL (ref 2.5–4.5)
PLATELET # BLD AUTO: 416 K/UL (ref 164–446)
PMV BLD AUTO: 10.6 FL (ref 9–12.9)
POTASSIUM SERPL-SCNC: 4 MMOL/L (ref 3.6–5.5)
PROT SERPL-MCNC: 7 G/DL (ref 6–8.2)
RBC # BLD AUTO: 3.37 M/UL (ref 4.2–5.4)
SODIUM SERPL-SCNC: 138 MMOL/L (ref 135–145)
WBC # BLD AUTO: 15.5 K/UL (ref 4.8–10.8)

## 2021-05-07 PROCEDURE — A9270 NON-COVERED ITEM OR SERVICE: HCPCS | Performed by: INTERNAL MEDICINE

## 2021-05-07 PROCEDURE — 700102 HCHG RX REV CODE 250 W/ 637 OVERRIDE(OP): Performed by: INTERNAL MEDICINE

## 2021-05-07 PROCEDURE — 80053 COMPREHEN METABOLIC PANEL: CPT

## 2021-05-07 PROCEDURE — 82962 GLUCOSE BLOOD TEST: CPT | Mod: 91

## 2021-05-07 PROCEDURE — A9270 NON-COVERED ITEM OR SERVICE: HCPCS | Performed by: STUDENT IN AN ORGANIZED HEALTH CARE EDUCATION/TRAINING PROGRAM

## 2021-05-07 PROCEDURE — 700111 HCHG RX REV CODE 636 W/ 250 OVERRIDE (IP): Performed by: STUDENT IN AN ORGANIZED HEALTH CARE EDUCATION/TRAINING PROGRAM

## 2021-05-07 PROCEDURE — 36592 COLLECT BLOOD FROM PICC: CPT

## 2021-05-07 PROCEDURE — 700105 HCHG RX REV CODE 258: Performed by: STUDENT IN AN ORGANIZED HEALTH CARE EDUCATION/TRAINING PROGRAM

## 2021-05-07 PROCEDURE — 84100 ASSAY OF PHOSPHORUS: CPT

## 2021-05-07 PROCEDURE — 94660 CPAP INITIATION&MGMT: CPT

## 2021-05-07 PROCEDURE — 85027 COMPLETE CBC AUTOMATED: CPT

## 2021-05-07 PROCEDURE — 700111 HCHG RX REV CODE 636 W/ 250 OVERRIDE (IP): Performed by: INTERNAL MEDICINE

## 2021-05-07 PROCEDURE — 99232 SBSQ HOSP IP/OBS MODERATE 35: CPT | Performed by: STUDENT IN AN ORGANIZED HEALTH CARE EDUCATION/TRAINING PROGRAM

## 2021-05-07 PROCEDURE — 700102 HCHG RX REV CODE 250 W/ 637 OVERRIDE(OP): Performed by: STUDENT IN AN ORGANIZED HEALTH CARE EDUCATION/TRAINING PROGRAM

## 2021-05-07 PROCEDURE — 83735 ASSAY OF MAGNESIUM: CPT

## 2021-05-07 PROCEDURE — 770006 HCHG ROOM/CARE - MED/SURG/GYN SEMI*

## 2021-05-07 PROCEDURE — 700101 HCHG RX REV CODE 250: Performed by: STUDENT IN AN ORGANIZED HEALTH CARE EDUCATION/TRAINING PROGRAM

## 2021-05-07 RX ADMIN — THYROID, PORCINE 90 MG: 30 TABLET ORAL at 06:14

## 2021-05-07 RX ADMIN — CYCLOBENZAPRINE 5 MG: 10 TABLET, FILM COATED ORAL at 17:27

## 2021-05-07 RX ADMIN — LOSARTAN POTASSIUM 50 MG: 25 TABLET, FILM COATED ORAL at 06:14

## 2021-05-07 RX ADMIN — AMLODIPINE BESYLATE 10 MG: 5 TABLET ORAL at 06:13

## 2021-05-07 RX ADMIN — HEPARIN SODIUM 5000 UNITS: 5000 INJECTION, SOLUTION INTRAVENOUS; SUBCUTANEOUS at 15:00

## 2021-05-07 RX ADMIN — CYCLOBENZAPRINE 5 MG: 10 TABLET, FILM COATED ORAL at 12:51

## 2021-05-07 RX ADMIN — OXYCODONE HYDROCHLORIDE 10 MG: 10 TABLET ORAL at 09:58

## 2021-05-07 RX ADMIN — SODIUM CHLORIDE, POTASSIUM CHLORIDE, SODIUM LACTATE AND CALCIUM CHLORIDE: 600; 310; 30; 20 INJECTION, SOLUTION INTRAVENOUS at 12:51

## 2021-05-07 RX ADMIN — LORATADINE 10 MG: 10 TABLET ORAL at 06:14

## 2021-05-07 RX ADMIN — HEPARIN SODIUM 5000 UNITS: 5000 INJECTION, SOLUTION INTRAVENOUS; SUBCUTANEOUS at 21:59

## 2021-05-07 RX ADMIN — HEPARIN SODIUM 5000 UNITS: 5000 INJECTION, SOLUTION INTRAVENOUS; SUBCUTANEOUS at 06:18

## 2021-05-07 RX ADMIN — LOSARTAN POTASSIUM 50 MG: 25 TABLET, FILM COATED ORAL at 17:27

## 2021-05-07 RX ADMIN — CYCLOBENZAPRINE 5 MG: 10 TABLET, FILM COATED ORAL at 06:14

## 2021-05-07 RX ADMIN — POTASSIUM CHLORIDE: 2 INJECTION, SOLUTION, CONCENTRATE INTRAVENOUS at 19:45

## 2021-05-07 ASSESSMENT — ENCOUNTER SYMPTOMS
NERVOUS/ANXIOUS: 0
HEARTBURN: 0
BACK PAIN: 1
COUGH: 0
FALLS: 0
DOUBLE VISION: 0
SHORTNESS OF BREATH: 0
ABDOMINAL PAIN: 0
BLURRED VISION: 0
VOMITING: 0
CONSTIPATION: 1
FEVER: 0
DIZZINESS: 0
MYALGIAS: 0
CHILLS: 0
PALPITATIONS: 0
HEADACHES: 0
NAUSEA: 0

## 2021-05-07 ASSESSMENT — PATIENT HEALTH QUESTIONNAIRE - PHQ9
1. LITTLE INTEREST OR PLEASURE IN DOING THINGS: NOT AT ALL
SUM OF ALL RESPONSES TO PHQ9 QUESTIONS 1 AND 2: 0
2. FEELING DOWN, DEPRESSED, IRRITABLE, OR HOPELESS: NOT AT ALL

## 2021-05-07 ASSESSMENT — PAIN DESCRIPTION - PAIN TYPE
TYPE: ACUTE PAIN;SURGICAL PAIN
TYPE: ACUTE PAIN;CHRONIC PAIN;SURGICAL PAIN
TYPE: ACUTE PAIN

## 2021-05-07 NOTE — PROGRESS NOTES
[facility] Medicine Daily Progress Note    Date of Service  5/7/2021    Chief Complaint  65 y.o. female admitted 4/21/2021 with abdominal pain.    Hospital Course  4/22/2021-patient is a 65-year-old female comes in with abdominal pain that started last Friday.  After suffering with the pain the patient went to see urgent care where they did a CAT scan of her abdomen which came back with early appendicitis.  Patient was referred over to the emergency room for evaluation.  Patient was admitted for surgery and was found to have a large abscess along with the appendicitis.  Appendix was removed and the abscess has been drained.  She still having chills and fevers afterwards.  Continue with IV antibiotics as well as pain management and fluid resuscitation.  Await culture results.  4/23/2021-this morning patient is in quite a bit of distress.  Her pain has escalated in the abdomen and she is unable to urinate or defecate.  Upon further evaluation patient has a postoperative ileus in her bowel sounds have not returned.  She has not passed any gas or had a bowel movement.  Continue at this point with supportive fluid resuscitation to ensure the patient's bowel functions returned to normal.  Patient at this point is also complaining of urinary retention and the bladder scanner was only showing 70 to 100 mL of retained urine.  This was inconsistent with the amount of pain the patient was having thus I ordered a immediate straight catheterization which yielded over 800 mL of urine.  After the urine came out the patient's abdominal pain eased up and her condition improved.  We will continue to monitor her urine output and if the patient should have urinary retention we will straight catheter again.  For the time being because of her abdominal distention pain and ileus as well as urinary retention we are unable to discharge the patient at this point in time.  Continue at this point with aggressive management and  stabilization.  4/24/2021-patient is still having difficulty with abdominal pain.  She also has not had a bowel movement and her intestinal sounds are returning very slowly.  Concern is that there is still an abscess present.  Flatplate of the abdomen did not show anything her white blood cell count however is increasing at this point I am going to obtain a CT of the abdomen.  Discussed it with surgery.  Because of the bladder distention and inability urinate a Pabon catheter had to be placed.  Hopefully this will decompress the bladder.  4/25/2021-postoperative day #4 after laparoscopic appendectomy had to be converted to an open appendectomy and drainage of abscess cavity.  Cultures have not grown out any significant organism.  White blood cell count has gone up, repeat CT of the abdomen does not demonstrate worsening abscess formation and actually is consistent with routine healing.  Patient CT demonstrates an ileus.  Since her abdominal distention is getting worse and her nausea is persisting I am putting in an NG to low intermittent suctioning.  This was discussed with the bedside nurse and surgery.  4/26/2021-patient's abdominal distention persists.  Ileus persists status postoperative.  Yesterday the patient's Pabon catheter became obstructed.  It had to be replaced.  Yesterday he also had some technical issues with the hospital bed and at this point had to be replaced a few times.  Pain seems to be better controlled.  Her labs are improving.  The patient may need a repeat CT in 24 to 48 hours if her white blood cell count does not improve.  Today's count is down to 14.8.  Continue with IV Zosyn    Interval Problem Update  4/27: Patient seen at bedside this morning.  The patient still has not had a bowel movement, however she mentions that she thinks she is passing gas.  General surgery continues to recommend ambulation.  Patient currently n.p.o. with NG tube and the patient is on TPN.  As per nursing no  other overnight events were reported.    4/28: Patient seen at bedside this morning.  The patient still has not had a bowel movement.  She says she is passing gas.  General Surgery following the patient along.  We will continue TPN and n.p.o. for now.  No other overnight events were reported by nursing.    4/29: Patient seen at bedside this morning.  She mentions she is passing gas, however she has not had a bowel movement yet.  She was encouraged to ambulate.  We have consulted ID for further antibiotic coverage, we appreciate further recommendations.  As per nursing no other overnight events reported.    4/30: Patient seen at bedside this morning.  The patient still without a bowel movement.  ID recommends to continue IV antibiotics for now.  As per nursing no other overnight events reported.    5/1: Patient seen at bedside this morning.  She is lying comfortably in bed, however she still having bowel movements, but she says she is passing gas.  ID recommended to stop antibiotics after today and if WBC trend upwards to repeat CT scan.  As per nursing no other overnight events were reported.    5/2: WBC count slightly elevated today from yesterday.  The last dose of Zosyn she got last night.  Patient lying in bed comfortably, currently not complaining of other pain.  She is still has not had a bowel movement.  We will repeat WBC tomorrow and consider CT scan if it continues to trend up.  As per nursing no other overnight events reported.    5/3: WBC increased today. We have ordered CT scan abdomen and pelvis, which we are pending. If CT scan find abscess collection we will restart zosyn and surgery will be notified. Patient complaining of knee pain. Patient still without a BM, but mentions she is passing gas. As per nursing no other over night events reported.    5/4: WBC still up trending. CT A/P without any finding of residual abscess. We will hold off antibiotics for now and clinically monitor.  If it continues  to uptrend, will work-up other source for infection.  She still has complains of feeling bloated/constipated, passing gas but still without a bowel movement. We will plan to remove Pabon in a day or 2 and TOV.    5/5: WBC down to 15,000 this morning. Still no clear signs of infection noted at this time. Still passing gas but no bowel movements. Will remove Pabon today and perform a trial void. KUB ordered to evaluate bowels.     5/6: WBC downtrending. No obvious signs of infection noted at this time. Continues to pass gas but without any bowel movements. KUB performed yesterday was mostly unremarkable. Pabon catheter removed and patient has been able to void.  We will plan for NG tube clamp today and hopefully remove it in the next few hours.   working on LTAC placement.    5/7: She had a one-time fever overnight, but WBC remains stable. NG tube clamped and removed yesterday. [place] and LENI drain was removed by surgery yesterday.  We will try patient on sips of liquids today, otherwise pending placement once accepted to LTAC.    Consultants/Specialty  General Surgery  ID    Code Status  Full Code    Disposition  Pending    Review of Systems  Review of Systems   Constitutional: Negative for chills and fever.   HENT: Negative for hearing loss and nosebleeds.    Eyes: Negative for blurred vision and double vision.   Respiratory: Negative for cough and shortness of breath.    Cardiovascular: Negative for chest pain and palpitations.   Gastrointestinal: Positive for constipation. Negative for abdominal pain, heartburn, nausea and vomiting.   Genitourinary: Negative for dysuria and urgency.   Musculoskeletal: Positive for back pain and joint pain (knee pain). Negative for falls and myalgias.   Skin: Negative for itching and rash.   Neurological: Negative for dizziness and headaches.   Psychiatric/Behavioral: The patient is not nervous/anxious.    All other systems reviewed and are negative.       Physical  Exam  Temp:  [36.7 °C (98 °F)-38.1 °C (100.6 °F)] 37 °C (98.6 °F)  Pulse:  [] 98  Resp:  [17-20] 18  BP: (130-141)/(50-68) 135/50  SpO2:  [94 %-98 %] 95 %    Physical Exam  Constitutional:       Appearance: She is obese. She is ill-appearing.   HENT:      Head: Normocephalic and atraumatic.      Nose: No congestion or rhinorrhea.      Mouth/Throat:      Pharynx: No oropharyngeal exudate or posterior oropharyngeal erythema.   Eyes:      General:         Right eye: No discharge.         Left eye: No discharge.   Cardiovascular:      Rate and Rhythm: Normal rate and regular rhythm.      Pulses: Normal pulses.      Heart sounds: No murmur. No gallop.    Pulmonary:      Effort: Pulmonary effort is normal. No respiratory distress.      Breath sounds: No wheezing or rales.   Abdominal:      General: There is distension.      Tenderness: There is abdominal tenderness.      Comments: Bandages in place on surgical site, does not appear soaked or bleeding.   Musculoskeletal:         General: Normal range of motion.      Cervical back: Normal range of motion.   Skin:     General: Skin is warm and dry.   Neurological:      General: No focal deficit present.      Mental Status: She is alert and oriented to person, place, and time.      Cranial Nerves: No cranial nerve deficit.      Motor: No weakness.   Psychiatric:         Mood and Affect: Mood normal.         Behavior: Behavior normal.         Fluids    Intake/Output Summary (Last 24 hours) at 5/7/2021 0712  Last data filed at 5/6/2021 1600  Gross per 24 hour   Intake --   Output 1250 ml   Net -1250 ml       Laboratory  Recent Labs     05/05/21  0340 05/06/21  0557 05/07/21  0355   WBC 15.6* 14.1* 15.5*   RBC 3.45* 3.44* 3.37*   HEMOGLOBIN 10.1* 10.1* 9.6*   HEMATOCRIT 31.8* 31.5* 30.5*   MCV 92.2 91.6 90.5   MCH 29.3 29.4 28.5   MCHC 31.8* 32.1* 31.5*   RDW 48.4 48.1 47.6   PLATELETCT 299 341 416   MPV 11.5 11.3 10.6     Recent Labs     05/05/21  0340 05/06/21  0557  05/07/21  0355   SODIUM 137 137 138   POTASSIUM 4.2 4.3 4.0   CHLORIDE 104 102 105   CO2 24 25 22   GLUCOSE 118* 130* 148*   BUN 19 20 22   CREATININE 0.89 0.76 0.82   CALCIUM 9.0 8.9 8.9             Recent Labs     05/06/21  0557   TRIGLYCERIDE 155*       Imaging  [id number] VIEW   Final Result         Mildly dilated gas-filled loop of small bowel in the right lower quadrant could relate to mild focal ileus.      CT-ABDOMEN-PELVIS WITH   Final Result         1.  Improving fat stranding in the lower abdomen likely improving inflammation and postoperative changes. A surgical drain in the right lower quadrant is again noted. No significant free fluid or abscess.   2.  Mildly improved ileus.   3.  Hepatic steatosis.               DX-ABDOMEN FOR TUBE PLACEMENT   Final Result         1.  Nonspecific bowel gas pattern.   2.  Dobbhoff tube is coiled within the pylorus, the tip terminates overlying the expected location of the gastric pylorus or gastric antrum.   3.  Left basilar atelectasis versus early infiltrate.      IR-PICC LINE PLACEMENT W/ GUIDANCE > AGE 5   Final Result                  Ultrasound-guided PICC placement performed by qualified nursing staff as    above.          CT-ABDOMEN-PELVIS W/O   Final Result      Interval laparotomy with right lower quadrant surgical drain, similar fat stranding in the pericecal region that is likely postoperative in nature. No noncontrast evidence of abscess formation      Small free fluid and intraperitoneal air is not outside normal limits in the perioperative period      Contrast in the colon is from the CT 3 days ago, air-fluid levels and mild small bowel dilatation. In combination these findings are most compatible with ileus      Urinary bladder decompressed around the Pabon catheter. No hydroureteronephrosis      [id number] VIEW   Final Result      Dilated loops of small bowel most likely represent ileus. Partial obstruction is not excluded.      Residual enteric  contrast is seen from the recent CT.      Mild bibasilar atelectasis.         IR-US GUIDED PIV   Final Result    Ultrasound-guided PERIPHERAL IV INSERTION performed by    qualified nursing staff as above.           Assessment/Plan  * Acute appendicitis with localized peritonitis  Assessment & Plan  Status post appendectomy.  Initially attempted with laparoscopy but then had to be converted to open resection.  Pain management  NG tube to suctioning due to persistent postoperative ileus  Fluid resuscitation  Due to lack of nutrition will have PICC line placed and TPN started.    4/29: Continue TPN, we appreciate any further recommendations by general surgery. Continue Zosyn. We have consulted ID for further antibiotic coverage, we appreciate further recommendations.    4/30: We consulted ID, and they recommended to continue IV antibiotics for now.    5/1: ID recommends to stop antibiotics after today.     5/2: WBC today is 14.1, yesterday it was 13.5. We just stopped Zosyn last night, we will repeat CBC tomorrow and if it continues to trend upwards, we will consider repeat CT scan. If WBC trends upwards, we will consider repeat CT scan. Patient afebrile, without hypotension.    5/3: WBC today is 15.8. I spoke to ID and they recommend repeat CT scan. ID does not recommend initiating antibiotics at this time, pending CT scan. We have ordered abdominal/pelvic CT scan result.     CT A/P without any findings of free fluid or abscess.    Patient currently afebrile, and not complaining of worsening abdominal pain at this time.     Constipation  Assessment & Plan  Possibly post surgical etiology.  General surgery following patient, we appreciate further recommendations  NPO, currently on TPN.    Leukocytosis  Assessment & Plan  In the setting of ruptured appendicitis and abdominal abscess.  Continue zosyn    4/30: WBC today are 13.3. ID consulted, who recommend to continue IV antibiotics. We appreciate further  recommendations.    5/1: ID recommends to stop antibiotics after today. If WBC trends upwards, we will consider repeat CT scan.    5/2: Mild elevation today from 13.5 to 14.1. We will monitor and consider CT scan if it continues to trend up.    5/3: WBC 15.8 today. We have repeated CT scan as per ID's recommendations, pending result.    CT A/P with no findings of free fluid or abscess.  Will clinically monitor for now and keep trending CBC, if still uptrending, will look for OTHER source of infection.    Essential hypertension- (present on admission)  Assessment & Plan  Continue losartan  PRN labetalol    4/29: We have increased losartan to 50 mg BID.    5/2: We  Have started amlodipine 5 mg.    Osteoarthritis- (present on admission)  Assessment & Plan  Patient with a history of osteoarthritis.  The patient today complaining of knee pain.  The patient states that she usually takes ibuprofen at home.  We will order 1 dose of ibuprofen 400 mg.  However we will encourage Tylenol for arthritic pain at this time due to the patient's history of CKD.  The patient currently receiving IV fluids, we will monitor closely and make changes accordingly.    Anemia  Assessment & Plan  Hb today is 10.2, has remained stable.  This is most likely post-surgical  No signs of bleeding at this time  Monitor, repeat CBC, and make changes accordingly.    Hyperglycemia  Assessment & Plan  Patient's A1c is 6.5.  Patient currently on ISS and hypoglycemia protocol.  Patient would require close follow up as outpatient.    Hypothyroidism  Assessment & Plan  Continue home medication.    Stage 3 chronic kidney disease- (present on admission)  Assessment & Plan  As per chart review, apparently history of  Avoid nephrotoxic medications    5/3: Creatinine .84 today. Patient NPO on TPN.  Patient also currently on IVF, patient will get CT scan with contrast today, so we will monitor kidney function.    Stable.    BMI 50.0-59.9, adult (HCC)- (present on  admission)  Assessment & Plan  Body mass index is 57.29 kg/m².  Outpatient follow-ups with weight program      HENRI (obstructive sleep apnea)- (present on admission)  Assessment & Plan  CPAP nocturnal.         VTE prophylaxis: heparin

## 2021-05-07 NOTE — PROGRESS NOTES
Pharmacy TPN Day # 12       2021    Dosing Weight   76 kg TPN currently providing 100% of goal      TPN goal: 1628 kcal/day including 1.6 gm/kg/day Protein       TPN indication: NPO s/p appendectomy w/abscess present                                                              Pertinent PMH: Back pain, Chronic low back pain (2018), Elevated blood sugar, Hypertension, Kidney stone (), Obesity, HENRI (obstructive sleep apnea) (2017), Osteoporosis, Post-nasal drip, Seasonal allergies, Sleep apnea, and Thyroid disease..     Temp (24hrs), Av.1 °C (98.7 °F), Min:36.7 °C (98 °F), Max:38.1 °C (100.6 °F)  .  Recent Labs     21  0340 21  0557 21  0355   SODIUM 137 137 138   POTASSIUM 4.2 4.3 4.0   CHLORIDE 104 102 105   CO2 24 25 22   BUN 19 20 22   CREATININE 0.89 0.76 0.82   GLUCOSE 118* 130* 148*   CALCIUM 9.0 8.9 8.9   ASTSGOT 16 17 26   ALTSGPT 21 19 22   ALBUMIN 2.6* 2.7* 2.5*   TBILIRUBIN 0.3 0.4 0.4   PHOSPHORUS 3.8  --  3.6   MAGNESIUM 2.0 2.1 2.1     Accu-Checks  Recent Labs     21  1649 21  0042 21  0624   POCGLUCOSE 139* 132* 144*       Vitals:    21 1028 21 1600 21 2200 21 0503   BP: 141/63 130/65 141/68 135/50   Weight:       Height:           Intake/Output Summary (Last 24 hours) at 2021 0920  Last data filed at 2021 1600  Gross per 24 hour   Intake --   Output 1250 ml   Net -1250 ml       No orders of the defined types were placed in this encounter.        TPN for past 72 hours (Show up to 3 orders; newest on the left. Changes between the two most recent orders are indicated.)     Start date and time   2021      TPN Central Line Formulation [[id number]] TPN Central Line Formulation [[id number]] TPN Central Line Formulation [[id number]]    Order Status  Active Last Dose in Progress Completed    Last Admin   New Bag at 2021 by Esmer Corrigan R.N. New Bag at  05/05/2021 2142 by Edita Martinez R.N.       Base    Clinisol 15%  120 g 120 g 120 g    dextrose 70%  220 g 220 g 220 g    fat emulsions 20%  40 g 40 g 40 g       Additives    potassium phosphate  15 mmol 15 mmol 15 mmol    potassium chloride  70 mEq 60 mEq 60 mEq    sodium chloride  170 mEq 170 mEq 170 mEq    magnesium sulfate  8 mEq 8 mEq 8 mEq    calcium GLUConate  9.4 mEq 9.4 mEq 9.4 mEq    M.T.E.-4 Adult  1 mL 1 mL 1 mL    M.V.I. Adult  10 mL 10 mL 10 mL       QS Base    sterile water  562.01 mL 567.01 mL 567.01 mL       Energy Contribution    Proteins  -- -- --    Dextrose  -- -- --    Lipids  -- -- --    Total  -- -- --       Electrolyte Ion Calculated Amount    Sodium  170 mEq 170 mEq 170 mEq    Potassium  92 mEq 82 mEq 82 mEq    Calcium  9.4 mEq 9.4 mEq 9.4 mEq    Magnesium  8 mEq 8 mEq 8 mEq    Aluminum  -- -- --    Phosphate  15 mmol 15 mmol 15 mmol    Chloride  240 mEq 230 mEq 230 mEq    Acetate  101.6 mEq 101.6 mEq 101.6 mEq       Other    Total Protein  120 g 120 g 120 g    Total Protein/kg  0.81 g/kg 0.81 g/kg 0.81 g/kg    Total Amino Acid  -- -- --    Total Amino Acid/kg  -- -- --    Glucose Infusion Rate  1.03 mg/kg/min 1.03 mg/kg/min 1.03 mg/kg/min    Osmolarity (Estimated)  -- -- --    Volume  1,992 mL 1,992 mL 1,992 mL    Rate  83 mL/hr 83 mL/hr 83 mL/hr    Dosing Weight  148 kg 148 kg 148 kg    Infusion Site  [facility]            This formula provides:  % kcal as lipids = 25  Grams protein/kg = 1.6  Non-protein calories = 1148  Kcals/kg = 21.4  Total daily calories = 1628    Comments:  Per provider, patient advanced to sips of liquids, continue TPN today. Pharmacy to continue to f/u and monitor per TPN protocol.       Cornell Phelps PharmD.

## 2021-05-07 NOTE — PROGRESS NOTES
"  Trauma/Surgical Progress Note    Author: Jett Ruiz M.D. Date & Time created: 5/6/2021   6:39 PM   4/21 open appendectomy drain abscess     Interval Events:  Awake alert  Up chair  reports pain control good  Reports feeling improved   increased activity working with PT   flatus, 0 bm  Hemodynamics:  /65   Pulse (!) 103   Temp 36.7 °C (98 °F) (Oral)   Resp 20   Ht 1.6 m (5' 3\")   Wt (!) 148 kg (327 lb)   SpO2 98%      Respiratory:    Respiration: 20, Pulse Oximetry: 98 %     Work Of Breathing / Effort: Mild  RUL Breath Sounds: Clear, RML Breath Sounds: Diminished, RLL Breath Sounds: Diminished, CHRISTINA Breath Sounds: Clear, LLL Breath Sounds: Diminished  Fluids:    Intake/Output Summary (Last 24 hours) at 5/6/2021 1839  Last data filed at 5/6/2021 1600  Gross per 24 hour   Intake --   Output 3405 ml   Net -3405 ml     Admit Weight: (!) 147 kg (323 lb 6.6 oz)  Current     Physical Exam  Awake alert appropriate  Breathing with ease  Appropriate incisional tenderness  Incision clean staples in place  Medical Decision Making/Problem List:    Active Hospital Problems    Diagnosis    • Acute appendicitis with localized peritonitis [K35.30]      Priority: High   • Leukocytosis [D72.829]      Priority: Medium   • Constipation [K59.00]      Priority: Medium   • Essential hypertension [I10]      Priority: Medium   • Osteoarthritis [M19.90]      Priority: Medium   • Stage 3 chronic kidney disease [N18.30]      Priority: Low   • BMI 50.0-59.9, adult (HCC) [Z68.43]      Priority: Low   • HENRI (obstructive sleep apnea) [G47.33]      Priority: Low     AHI 55.7, minimum saturation 84%, on CPAP 13 cm.     • Anemia [D64.9]    • Hyperglycemia [R73.9]    • Hypothyroidism [E03.9]      Core Measures & Quality Metrics:  Core Measures & Quality Metrics  JULIOCESAR Score  Discussed patient condition with Hospitalist, Family, RN and Patient.    Assessment/Plan  [facility] care medicine service  Appreciate ID assistance  Imaging as " above  Trend Wbc   Plan remove drain, staples  Encourage ambulation, IS  Monitoring and support

## 2021-05-07 NOTE — PROGRESS NOTES
"  Trauma/Surgical Progress Note    Author: Jett Ruiz M.D. Date & Time created: 5/7/2021   10:51 AM   4/21 open appendectomy drain abscess     Interval Events:  Awake alert  Up chair  reports pain control good  Reports feeling improved   increased activity working with PT   flatus,   bm  Advancing diet  Hemodynamics:  /50   Pulse 98   Temp 37 °C (98.6 °F) (Oral)   Resp 18   Ht 1.6 m (5' 3\")   Wt (!) 148 kg (327 lb)   SpO2 95%      Respiratory:    Respiration: 18, Pulse Oximetry: 95 %        RUL Breath Sounds: Clear, RML Breath Sounds: Diminished, RLL Breath Sounds: Diminished, CHRISTINA Breath Sounds: Clear, LLL Breath Sounds: Diminished  Fluids:    Intake/Output Summary (Last 24 hours) at 5/7/2021 1051  Last data filed at 5/6/2021 1600  Gross per 24 hour   Intake --   Output 600 ml   Net -600 ml     Admit Weight: (!) 147 kg (323 lb 6.6 oz)  Current     Physical Exam  Awake alert appropriate  Breathing with ease  Appropriate incisional tenderness  Medical Decision Making/Problem List:    Active Hospital Problems    Diagnosis    • Acute appendicitis with localized peritonitis [K35.30]      Priority: High   • Leukocytosis [D72.829]      Priority: Medium   • Constipation [K59.00]      Priority: Medium   • Essential hypertension [I10]      Priority: Medium   • Osteoarthritis [M19.90]      Priority: Medium   • Stage 3 chronic kidney disease [N18.30]      Priority: Low   • BMI 50.0-59.9, adult (HCC) [Z68.43]      Priority: Low   • HENRI (obstructive sleep apnea) [G47.33]      Priority: Low     AHI 55.7, minimum saturation 84%, on CPAP 13 cm.     • Anemia [D64.9]    • Hyperglycemia [R73.9]    • Hypothyroidism [E03.9]      Core Measures & Quality Metrics:  Core Measures & Quality Metrics  JULIOCESAR Score  Discussed patient condition with Family and Patient.      Assessment/Plan  appreciate care medicine service  Appreciate ID assistance  Diet as tolerated  No strenuous activity no lifting greater than 20 pounds for " four months  May shower  Patient was seen and examined  Discussed findings including operation pathology  imaging and labs  Discussed wound care, no driving,  patient responsibilities in follow up plan, medications, limitations, and  importance of follow up  Contact information provided  Discussed signs of complications and the importance of seeking immediate care 911 to nearest hospital if any occur  All questions answered/discussed  Discharge planning   availabel to assist please contact if needed

## 2021-05-08 LAB
ALBUMIN SERPL BCP-MCNC: 2.6 G/DL (ref 3.2–4.9)
ALBUMIN/GLOB SERPL: 0.6 G/DL
ALP SERPL-CCNC: 108 U/L (ref 30–99)
ALT SERPL-CCNC: 21 U/L (ref 2–50)
ANION GAP SERPL CALC-SCNC: 10 MMOL/L (ref 7–16)
AST SERPL-CCNC: 21 U/L (ref 12–45)
BILIRUB SERPL-MCNC: 0.3 MG/DL (ref 0.1–1.5)
BUN SERPL-MCNC: 24 MG/DL (ref 8–22)
CALCIUM SERPL-MCNC: 9.1 MG/DL (ref 8.4–10.2)
CHLORIDE SERPL-SCNC: 108 MMOL/L (ref 96–112)
CO2 SERPL-SCNC: 21 MMOL/L (ref 20–33)
CREAT SERPL-MCNC: 0.77 MG/DL (ref 0.5–1.4)
ERYTHROCYTE [DISTWIDTH] IN BLOOD BY AUTOMATED COUNT: 48.8 FL (ref 35.9–50)
GLOBULIN SER CALC-MCNC: 4.5 G/DL (ref 1.9–3.5)
GLUCOSE BLD-MCNC: 127 MG/DL (ref 65–99)
GLUCOSE BLD-MCNC: 130 MG/DL (ref 65–99)
GLUCOSE BLD-MCNC: 137 MG/DL (ref 65–99)
GLUCOSE BLD-MCNC: 150 MG/DL (ref 65–99)
GLUCOSE SERPL-MCNC: 144 MG/DL (ref 65–99)
HCT VFR BLD AUTO: 30.1 % (ref 37–47)
HGB BLD-MCNC: 9.4 G/DL (ref 12–16)
MAGNESIUM SERPL-MCNC: 2 MG/DL (ref 1.5–2.5)
MCH RBC QN AUTO: 28.8 PG (ref 27–33)
MCHC RBC AUTO-ENTMCNC: 31.2 G/DL (ref 33.6–35)
MCV RBC AUTO: 92.3 FL (ref 81.4–97.8)
PLATELET # BLD AUTO: 460 K/UL (ref 164–446)
PMV BLD AUTO: 10.8 FL (ref 9–12.9)
POTASSIUM SERPL-SCNC: 4.2 MMOL/L (ref 3.6–5.5)
PROT SERPL-MCNC: 7.1 G/DL (ref 6–8.2)
RBC # BLD AUTO: 3.26 M/UL (ref 4.2–5.4)
SODIUM SERPL-SCNC: 139 MMOL/L (ref 135–145)
WBC # BLD AUTO: 13.7 K/UL (ref 4.8–10.8)

## 2021-05-08 PROCEDURE — 80053 COMPREHEN METABOLIC PANEL: CPT

## 2021-05-08 PROCEDURE — 700101 HCHG RX REV CODE 250: Performed by: STUDENT IN AN ORGANIZED HEALTH CARE EDUCATION/TRAINING PROGRAM

## 2021-05-08 PROCEDURE — A9270 NON-COVERED ITEM OR SERVICE: HCPCS | Performed by: INTERNAL MEDICINE

## 2021-05-08 PROCEDURE — A9270 NON-COVERED ITEM OR SERVICE: HCPCS | Performed by: STUDENT IN AN ORGANIZED HEALTH CARE EDUCATION/TRAINING PROGRAM

## 2021-05-08 PROCEDURE — 700105 HCHG RX REV CODE 258: Performed by: STUDENT IN AN ORGANIZED HEALTH CARE EDUCATION/TRAINING PROGRAM

## 2021-05-08 PROCEDURE — 82962 GLUCOSE BLOOD TEST: CPT | Mod: 91

## 2021-05-08 PROCEDURE — 700111 HCHG RX REV CODE 636 W/ 250 OVERRIDE (IP): Performed by: INTERNAL MEDICINE

## 2021-05-08 PROCEDURE — 700102 HCHG RX REV CODE 250 W/ 637 OVERRIDE(OP): Performed by: STUDENT IN AN ORGANIZED HEALTH CARE EDUCATION/TRAINING PROGRAM

## 2021-05-08 PROCEDURE — 85027 COMPLETE CBC AUTOMATED: CPT

## 2021-05-08 PROCEDURE — 94660 CPAP INITIATION&MGMT: CPT

## 2021-05-08 PROCEDURE — 36592 COLLECT BLOOD FROM PICC: CPT

## 2021-05-08 PROCEDURE — 770006 HCHG ROOM/CARE - MED/SURG/GYN SEMI*

## 2021-05-08 PROCEDURE — 700102 HCHG RX REV CODE 250 W/ 637 OVERRIDE(OP): Performed by: INTERNAL MEDICINE

## 2021-05-08 PROCEDURE — 83735 ASSAY OF MAGNESIUM: CPT

## 2021-05-08 PROCEDURE — 700111 HCHG RX REV CODE 636 W/ 250 OVERRIDE (IP): Performed by: STUDENT IN AN ORGANIZED HEALTH CARE EDUCATION/TRAINING PROGRAM

## 2021-05-08 PROCEDURE — 99232 SBSQ HOSP IP/OBS MODERATE 35: CPT | Performed by: STUDENT IN AN ORGANIZED HEALTH CARE EDUCATION/TRAINING PROGRAM

## 2021-05-08 PROCEDURE — 700101 HCHG RX REV CODE 250: Performed by: HOSPITALIST

## 2021-05-08 RX ORDER — LIDOCAINE 50 MG/G
2 PATCH TOPICAL ONCE
Status: COMPLETED | OUTPATIENT
Start: 2021-05-08 | End: 2021-05-08

## 2021-05-08 RX ORDER — AMLODIPINE BESYLATE 5 MG/1
10 TABLET ORAL
Status: DISCONTINUED | OUTPATIENT
Start: 2021-05-09 | End: 2021-05-14 | Stop reason: HOSPADM

## 2021-05-08 RX ADMIN — LOSARTAN POTASSIUM 50 MG: 25 TABLET, FILM COATED ORAL at 05:30

## 2021-05-08 RX ADMIN — LORATADINE 10 MG: 10 TABLET ORAL at 05:31

## 2021-05-08 RX ADMIN — LIDOCAINE 2 PATCH: 50 PATCH TOPICAL at 02:47

## 2021-05-08 RX ADMIN — ONDANSETRON 4 MG: 4 TABLET, ORALLY DISINTEGRATING ORAL at 21:30

## 2021-05-08 RX ADMIN — OXYCODONE HYDROCHLORIDE 5 MG: 5 TABLET ORAL at 06:17

## 2021-05-08 RX ADMIN — HEPARIN SODIUM 5000 UNITS: 5000 INJECTION, SOLUTION INTRAVENOUS; SUBCUTANEOUS at 13:18

## 2021-05-08 RX ADMIN — OXYCODONE HYDROCHLORIDE 5 MG: 5 TABLET ORAL at 15:04

## 2021-05-08 RX ADMIN — CYCLOBENZAPRINE 5 MG: 10 TABLET, FILM COATED ORAL at 05:31

## 2021-05-08 RX ADMIN — AMLODIPINE BESYLATE 10 MG: 5 TABLET ORAL at 05:31

## 2021-05-08 RX ADMIN — CYCLOBENZAPRINE 5 MG: 10 TABLET, FILM COATED ORAL at 17:55

## 2021-05-08 RX ADMIN — POTASSIUM CHLORIDE: 2 INJECTION, SOLUTION, CONCENTRATE INTRAVENOUS at 20:00

## 2021-05-08 RX ADMIN — OXYCODONE HYDROCHLORIDE 5 MG: 5 TABLET ORAL at 21:30

## 2021-05-08 RX ADMIN — SODIUM CHLORIDE, POTASSIUM CHLORIDE, SODIUM LACTATE AND CALCIUM CHLORIDE: 600; 310; 30; 20 INJECTION, SOLUTION INTRAVENOUS at 14:58

## 2021-05-08 RX ADMIN — ACETAMINOPHEN 650 MG: 325 TABLET, FILM COATED ORAL at 15:04

## 2021-05-08 RX ADMIN — LOSARTAN POTASSIUM 50 MG: 25 TABLET, FILM COATED ORAL at 17:55

## 2021-05-08 RX ADMIN — CYCLOBENZAPRINE 5 MG: 10 TABLET, FILM COATED ORAL at 13:13

## 2021-05-08 RX ADMIN — HEPARIN SODIUM 5000 UNITS: 5000 INJECTION, SOLUTION INTRAVENOUS; SUBCUTANEOUS at 21:29

## 2021-05-08 RX ADMIN — THYROID, PORCINE 90 MG: 30 TABLET ORAL at 05:30

## 2021-05-08 RX ADMIN — ONDANSETRON 4 MG: 4 TABLET, ORALLY DISINTEGRATING ORAL at 15:10

## 2021-05-08 RX ADMIN — HEPARIN SODIUM 5000 UNITS: 5000 INJECTION, SOLUTION INTRAVENOUS; SUBCUTANEOUS at 05:29

## 2021-05-08 ASSESSMENT — ENCOUNTER SYMPTOMS
BACK PAIN: 1
MYALGIAS: 0
SHORTNESS OF BREATH: 0
VOMITING: 0
FALLS: 0
PALPITATIONS: 0
ABDOMINAL PAIN: 0
CHILLS: 0
NERVOUS/ANXIOUS: 0
DOUBLE VISION: 0
BLURRED VISION: 0
CONSTIPATION: 1
DIZZINESS: 0
FEVER: 0
NAUSEA: 0
COUGH: 0
HEADACHES: 0
HEARTBURN: 0

## 2021-05-08 ASSESSMENT — PAIN DESCRIPTION - PAIN TYPE
TYPE: ACUTE PAIN
TYPE: ACUTE PAIN;CHRONIC PAIN

## 2021-05-08 NOTE — DIETARY
Nutrition Services: Update   Day 17 of admit.  Mady Yang is a 65 y.o. female with admitting DX of Appendicitis     Pt is currently on TPN (started 4/26). Tolerated clear liquids, advanced to full liquids this morning.    Malnutrition Risk: does not meet criteria at this time    Recommendations/Plan:  1. Advance diet as medically able   2. Wean TPN, discontinue TPN when pt able to consume >50% solid foods consistently  3. Document intake of all meals as % taken in ADL's to provide interdisciplinary communication across all shifts.   4. Monitor weight.  5. Nutrition rep will see patient for ongoing meal and snack preferences.  6. Obtain supplement order per RD as needed.    RD following

## 2021-05-08 NOTE — PROGRESS NOTES
Pharmacy TPN Day # 13       2021    Dosing Weight   76 kg TPN currently providing 100% of goal      TPN goal: 1628 kcal/day including 1.6 gm/kg/day Protein      TPN indication: NPO s/p appendectomy w/abscess present.       Pertinent PMH: Back pain, Chronic low back pain (2018), Elevated blood sugar, Hypertension, Kidney stone (), Obesity, HENRI (obstructive sleep apnea) (2017), Osteoporosis, Post-nasal drip, Seasonal allergies, Sleep apnea, and Thyroid disease..  Temp (24hrs), Av.8 °C (98.2 °F), Min:36.5 °C (97.7 °F), Max:37.1 °C (98.7 °F)  .  Recent Labs     21  0557 21  0355 21  0300   SODIUM 137 138 139   POTASSIUM 4.3 4.0 4.2   CHLORIDE 102 105 108   CO2 25 22 21   BUN 20 22 24*   CREATININE 0.76 0.82 0.77   GLUCOSE 130* 148* 144*   CALCIUM 8.9 8.9 9.1   ASTSGOT 17 26 21   ALTSGPT 19 22 21   ALBUMIN 2.7* 2.5* 2.6*   TBILIRUBIN 0.4 0.4 0.3   PHOSPHORUS  --  3.6  --    MAGNESIUM 2.1 2.1 2.0     Accu-Checks  Recent Labs     21  1732 21  0037 21  0521   POCGLUCOSE 116* 127* 137*       Vitals:    21 1107 21 1635 21 2300 21 0500   BP: 133/64 127/63 132/63 126/61   Weight:       Height:           Intake/Output Summary (Last 24 hours) at 2021 0658  Last data filed at 2021 0500  Gross per 24 hour   Intake 120 ml   Output 1500 ml   Net -1380 ml       Orders Placed This Encounter   Procedures   • Diet Order Diet: Clear Liquid     Standing Status:   Standing     Number of Occurrences:   1     Order Specific Question:   Diet:     Answer:   Clear Liquid [10]         TPN for past 72 hours (Show up to 3 orders; newest on the left. Changes between the two most recent orders are indicated.)     Start date and time   2021 0715 2021      TPN Central Line Formulation [[id number]] TPN Central Line Formulation [[id number]] TPN Central Line Formulation [[id number]]    Order Status  Active Last Dose in Progress  Completed    Last Admin   New Bag at 05/07/2021 1945 by Esmer Corrigan R.N. New Bag at 05/06/2021 2027 by Esmer Corrigan R.N.       Base    Clinisol 15%  120 g 120 g 120 g    dextrose 70%  220 g 220 g 220 g    fat emulsions 20%  40 g 40 g 40 g       Additives    potassium phosphate  15 mmol 15 mmol 15 mmol    potassium chloride  70 mEq 70 mEq 60 mEq    sodium chloride  170 mEq 170 mEq 170 mEq    magnesium sulfate  8 mEq 8 mEq 8 mEq    calcium GLUConate  9.4 mEq 9.4 mEq 9.4 mEq    M.T.E.-4 Adult  1 mL 1 mL 1 mL    M.V.I. Adult  10 mL 10 mL 10 mL       QS Base    sterile water  562.01 mL 562.01 mL 567.01 mL       Energy Contribution    Proteins  -- -- --    Dextrose  -- -- --    Lipids  -- -- --    Total  -- -- --       Electrolyte Ion Calculated Amount    Sodium  170 mEq 170 mEq 170 mEq    Potassium  92 mEq 92 mEq 82 mEq    Calcium  9.4 mEq 9.4 mEq 9.4 mEq    Magnesium  8 mEq 8 mEq 8 mEq    Aluminum  -- -- --    Phosphate  15 mmol 15 mmol 15 mmol    Chloride  240 mEq 240 mEq 230 mEq    Acetate  101.6 mEq 101.6 mEq 101.6 mEq       Other    Total Protein  120 g 120 g 120 g    Total Protein/kg  0.81 g/kg 0.81 g/kg 0.81 g/kg    Total Amino Acid  -- -- --    Total Amino Acid/kg  -- -- --    Glucose Infusion Rate  1.03 mg/kg/min 1.03 mg/kg/min 1.03 mg/kg/min    Osmolarity (Estimated)  -- -- --    Volume  1,992 mL 1,992 mL 1,992 mL    Rate  83 mL/hr 83 mL/hr 83 mL/hr    Dosing Weight  148 kg 148 kg 148 kg    Infusion Site  [facility]            This formula provides:  % kcal as lipids = 25  Grams protein/kg = 1.6  Non-protein calories = 1148  Kcals/kg = 21.4  Total daily calories = 1628    Comments:  Continue same formula and rate of TPN. Pt. Tolerating small amount of meals. Labs on Monday per protocol.      Dhaval Macedo [facility]

## 2021-05-08 NOTE — FLOWSHEET NOTE
05/07/21 1829   Non-Invasive Ventilation H[facility]   Nocturnal CPAP or BIPAP CPAP - [facility]   $ System Evaluation Yes   Settings (If Known) auto

## 2021-05-08 NOTE — CARE PLAN
Problem: Nutritional:  Goal: Achieve adequate nutritional intake  Description: Patient will consume 50% of meals  Outcome: NOT MET     TPN + full liquid diet (advanced from clear liquids this am). RD following.

## 2021-05-08 NOTE — CARE PLAN
POD17 open appy, perforation, s/p multiple drains  Removed staples from L side incision. All other staples previously removed.  Dressings CDI- no drainage.   Diet increased to full liquid, tolerating full liquid well. Then increased to GI soft-pt reporting abdominal pain and nausea after attempting to eat a sandwich.  VSS on RA.   Deepa perez is too small for pt, unable to ambulate. Encouraging exercises in bed.   Pain controlled w/ PRN oxy and lidocaine patches.   Plan to discharge to SNF.     Problem: Communication  Goal: The ability to communicate needs accurately and effectively will improve  Outcome: PROGRESSING AS EXPECTED     Problem: Infection  Goal: Will remain free from infection  Outcome: PROGRESSING AS EXPECTED     Problem: Venous Thromboembolism (VTW)/Deep Vein Thrombosis (DVT) Prevention:  Goal: Patient will participate in Venous Thrombosis (VTE)/Deep Vein Thrombosis (DVT)Prevention Measures  Outcome: PROGRESSING AS EXPECTED     Problem: Bowel/Gastric:  Goal: Normal bowel function is maintained or improved  Outcome: PROGRESSING AS EXPECTED     Problem: Knowledge Deficit  Goal: Knowledge of disease process/condition, treatment plan, diagnostic tests, and medications will improve  Outcome: PROGRESSING AS EXPECTED     Problem: Mobility  Goal: Risk for activity intolerance will decrease  Outcome: PROGRESSING SLOWER THAN EXPECTED

## 2021-05-08 NOTE — PROGRESS NOTES
[facility] Medicine Daily Progress Note    Date of Service  5/8/2021    Chief Complaint  65 y.o. female admitted 4/21/2021 with abdominal pain.    Hospital Course  4/22/2021-patient is a 65-year-old female comes in with abdominal pain that started last Friday.  After suffering with the pain the patient went to see urgent care where they did a CAT scan of her abdomen which came back with early appendicitis.  Patient was referred over to the emergency room for evaluation.  Patient was admitted for surgery and was found to have a large abscess along with the appendicitis.  Appendix was removed and the abscess has been drained.  She still having chills and fevers afterwards.  Continue with IV antibiotics as well as pain management and fluid resuscitation.  Await culture results.  4/23/2021-this morning patient is in quite a bit of distress.  Her pain has escalated in the abdomen and she is unable to urinate or defecate.  Upon further evaluation patient has a postoperative ileus in her bowel sounds have not returned.  She has not passed any gas or had a bowel movement.  Continue at this point with supportive fluid resuscitation to ensure the patient's bowel functions returned to normal.  Patient at this point is also complaining of urinary retention and the bladder scanner was only showing 70 to 100 mL of retained urine.  This was inconsistent with the amount of pain the patient was having thus I ordered a immediate straight catheterization which yielded over 800 mL of urine.  After the urine came out the patient's abdominal pain eased up and her condition improved.  We will continue to monitor her urine output and if the patient should have urinary retention we will straight catheter again.  For the time being because of her abdominal distention pain and ileus as well as urinary retention we are unable to discharge the patient at this point in time.  Continue at this point with aggressive management and  stabilization.  4/24/2021-patient is still having difficulty with abdominal pain.  She also has not had a bowel movement and her intestinal sounds are returning very slowly.  Concern is that there is still an abscess present.  Flatplate of the abdomen did not show anything her white blood cell count however is increasing at this point I am going to obtain a CT of the abdomen.  Discussed it with surgery.  Because of the bladder distention and inability urinate a Pabon catheter had to be placed.  Hopefully this will decompress the bladder.  4/25/2021-postoperative day #4 after laparoscopic appendectomy had to be converted to an open appendectomy and drainage of abscess cavity.  Cultures have not grown out any significant organism.  White blood cell count has gone up, repeat CT of the abdomen does not demonstrate worsening abscess formation and actually is consistent with routine healing.  Patient CT demonstrates an ileus.  Since her abdominal distention is getting worse and her nausea is persisting I am putting in an NG to low intermittent suctioning.  This was discussed with the bedside nurse and surgery.  4/26/2021-patient's abdominal distention persists.  Ileus persists status postoperative.  Yesterday the patient's Pabon catheter became obstructed.  It had to be replaced.  Yesterday he also had some technical issues with the hospital bed and at this point had to be replaced a few times.  Pain seems to be better controlled.  Her labs are improving.  The patient may need a repeat CT in 24 to 48 hours if her white blood cell count does not improve.  Today's count is down to 14.8.  Continue with IV Zosyn    Interval Problem Update  4/27: Patient seen at bedside this morning.  The patient still has not had a bowel movement, however she mentions that she thinks she is passing gas.  General surgery continues to recommend ambulation.  Patient currently n.p.o. with NG tube and the patient is on TPN.  As per nursing no  other overnight events were reported.    4/28: Patient seen at bedside this morning.  The patient still has not had a bowel movement.  She says she is passing gas.  General Surgery following the patient along.  We will continue TPN and n.p.o. for now.  No other overnight events were reported by nursing.    4/29: Patient seen at bedside this morning.  She mentions she is passing gas, however she has not had a bowel movement yet.  She was encouraged to ambulate.  We have consulted ID for further antibiotic coverage, we appreciate further recommendations.  As per nursing no other overnight events reported.    4/30: Patient seen at bedside this morning.  The patient still without a bowel movement.  ID recommends to continue IV antibiotics for now.  As per nursing no other overnight events reported.    5/1: Patient seen at bedside this morning.  She is lying comfortably in bed, however she still having bowel movements, but she says she is passing gas.  ID recommended to stop antibiotics after today and if WBC trend upwards to repeat CT scan.  As per nursing no other overnight events were reported.    5/2: WBC count slightly elevated today from yesterday.  The last dose of Zosyn she got last night.  Patient lying in bed comfortably, currently not complaining of other pain.  She is still has not had a bowel movement.  We will repeat WBC tomorrow and consider CT scan if it continues to trend up.  As per nursing no other overnight events reported.    5/3: WBC increased today. We have ordered CT scan abdomen and pelvis, which we are pending. If CT scan find abscess collection we will restart zosyn and surgery will be notified. Patient complaining of knee pain. Patient still without a BM, but mentions she is passing gas. As per nursing no other over night events reported.    5/4: WBC still up trending. CT A/P without any finding of residual abscess. We will hold off antibiotics for now and clinically monitor.  If it continues  to uptrend, will work-up other source for infection.  She still has complains of feeling bloated/constipated, passing gas but still without a bowel movement. We will plan to remove Pabon in a day or 2 and TOV.    5/5: WBC down to 15,000 this morning. Still no clear signs of infection noted at this time. Still passing gas but no bowel movements. Will remove Pabon today and perform a trial void. KUB ordered to evaluate bowels.     5/6: WBC downtrending. No obvious signs of infection noted at this time. Continues to pass gas but without any bowel movements. KUB performed yesterday was mostly unremarkable. Pabon catheter removed and patient has been able to void.  We will plan for NG tube clamp today and hopefully remove it in the next few hours.   working on LTAC placement.    5/7: She had a one-time fever overnight, but WBC remains stable. NG tube clamped and removed yesterday. [place] and LENI drain was removed by surgery yesterday.  We will try patient on sips of liquids today, otherwise pending placement once accepted to LTAC.    5/8: Patient started on clear liquid diet overnight. We will watch closely today and monitor for any signs of nausea, vomiting or abdominal distention. Hopefully can advance diet later today.    Consultants/Specialty  General Surgery  ID    Code Status  Full Code    Disposition  Pending    Review of Systems  Review of Systems   Constitutional: Negative for chills and fever.   HENT: Negative for hearing loss and nosebleeds.    Eyes: Negative for blurred vision and double vision.   Respiratory: Negative for cough and shortness of breath.    Cardiovascular: Negative for chest pain and palpitations.   Gastrointestinal: Positive for constipation. Negative for abdominal pain, heartburn, nausea and vomiting.   Genitourinary: Negative for dysuria and urgency.   Musculoskeletal: Positive for back pain and joint pain (knee pain). Negative for falls and myalgias.   Skin: Negative for  itching and rash.   Neurological: Negative for dizziness and headaches.   Psychiatric/Behavioral: The patient is not nervous/anxious.    All other systems reviewed and are negative.       Physical Exam  Temp:  [36.5 °C (97.7 °F)-37.1 °C (98.7 °F)] 36.9 °C (98.5 °F)  Pulse:  [] 100  Resp:  [16-18] 16  BP: (106-133)/(61-95) 126/61  SpO2:  [94 %-95 %] 95 %    Physical Exam  Constitutional:       Appearance: She is obese. She is ill-appearing.   HENT:      Head: Normocephalic and atraumatic.      Nose: No congestion or rhinorrhea.      Mouth/Throat:      Pharynx: No oropharyngeal exudate or posterior oropharyngeal erythema.   Eyes:      General:         Right eye: No discharge.         Left eye: No discharge.   Cardiovascular:      Rate and Rhythm: Normal rate and regular rhythm.      Pulses: Normal pulses.      Heart sounds: No murmur. No gallop.    Pulmonary:      Effort: Pulmonary effort is normal. No respiratory distress.      Breath sounds: No wheezing or rales.   Abdominal:      General: There is distension.      Tenderness: There is abdominal tenderness.      Comments: Bandages in place on surgical site, does not appear soaked or bleeding.   Musculoskeletal:         General: Normal range of motion.      Cervical back: Normal range of motion.   Skin:     General: Skin is warm and dry.   Neurological:      General: No focal deficit present.      Mental Status: She is alert and oriented to person, place, and time.      Cranial Nerves: No cranial nerve deficit.      Motor: No weakness.   Psychiatric:         Mood and Affect: Mood normal.         Behavior: Behavior normal.         Fluids    Intake/Output Summary (Last 24 hours) at 5/8/2021 0728  Last data filed at 5/8/2021 0500  Gross per 24 hour   Intake 120 ml   Output 1500 ml   Net -1380 ml       Laboratory  Recent Labs     05/06/21  0557 05/07/21  0355 05/08/21  0300   WBC 14.1* 15.5* 13.7*   RBC 3.44* 3.37* 3.26*   HEMOGLOBIN 10.1* 9.6* 9.4*   HEMATOCRIT  31.5* 30.5* 30.1*   MCV 91.6 90.5 92.3   MCH 29.4 28.5 28.8   MCHC 32.1* 31.5* 31.2*   RDW 48.1 47.6 48.8   PLATELETCT 341 416 460*   MPV 11.3 10.6 10.8     Recent Labs     05/06/21  0557 05/07/21  0355 05/08/21  0300   SODIUM 137 138 139   POTASSIUM 4.3 4.0 4.2   CHLORIDE 102 105 108   CO2 25 22 21   GLUCOSE 130* 148* 144*   BUN 20 22 24*   CREATININE 0.76 0.82 0.77   CALCIUM 8.9 8.9 9.1             Recent Labs     05/06/21  0557   TRIGLYCERIDE 155*       Imaging  [id number] VIEW   Final Result         Mildly dilated gas-filled loop of small bowel in the right lower quadrant could relate to mild focal ileus.      CT-ABDOMEN-PELVIS WITH   Final Result         1.  Improving fat stranding in the lower abdomen likely improving inflammation and postoperative changes. A surgical drain in the right lower quadrant is again noted. No significant free fluid or abscess.   2.  Mildly improved ileus.   3.  Hepatic steatosis.               DX-ABDOMEN FOR TUBE PLACEMENT   Final Result         1.  Nonspecific bowel gas pattern.   2.  Dobbhoff tube is coiled within the pylorus, the tip terminates overlying the expected location of the gastric pylorus or gastric antrum.   3.  Left basilar atelectasis versus early infiltrate.      IR-PICC LINE PLACEMENT W/ GUIDANCE > AGE 5   Final Result                  Ultrasound-guided PICC placement performed by qualified nursing staff as    above.          CT-ABDOMEN-PELVIS W/O   Final Result      Interval laparotomy with right lower quadrant surgical drain, similar fat stranding in the pericecal region that is likely postoperative in nature. No noncontrast evidence of abscess formation      Small free fluid and intraperitoneal air is not outside normal limits in the perioperative period      Contrast in the colon is from the CT 3 days ago, air-fluid levels and mild small bowel dilatation. In combination these findings are most compatible with ileus      Urinary bladder decompressed around the  Pabon catheter. No hydroureteronephrosis      [id number] VIEW   Final Result      Dilated loops of small bowel most likely represent ileus. Partial obstruction is not excluded.      Residual enteric contrast is seen from the recent CT.      Mild bibasilar atelectasis.         IR-US GUIDED PIV   Final Result    Ultrasound-guided PERIPHERAL IV INSERTION performed by    qualified nursing staff as above.           Assessment/Plan  * Acute appendicitis with localized peritonitis  Assessment & Plan  Status post appendectomy.  Initially attempted with laparoscopy but then had to be converted to open resection.  Pain management  NG tube to suctioning due to persistent postoperative ileus  Fluid resuscitation  Due to lack of nutrition will have PICC line placed and TPN started.    4/29: Continue TPN, we appreciate any further recommendations by general surgery. Continue Zosyn. We have consulted ID for further antibiotic coverage, we appreciate further recommendations.    4/30: We consulted ID, and they recommended to continue IV antibiotics for now.    5/1: ID recommends to stop antibiotics after today.     5/2: WBC today is 14.1, yesterday it was 13.5. We just stopped Zosyn last night, we will repeat CBC tomorrow and if it continues to trend upwards, we will consider repeat CT scan. If WBC trends upwards, we will consider repeat CT scan. Patient afebrile, without hypotension.    5/3: WBC today is 15.8. I spoke to ID and they recommend repeat CT scan. ID does not recommend initiating antibiotics at this time, pending CT scan. We have ordered abdominal/pelvic CT scan result.     CT A/P without any findings of free fluid or abscess.    Patient currently afebrile, and not complaining of worsening abdominal pain at this time.     Constipation  Assessment & Plan  Possibly post surgical etiology.  General surgery following patient, we appreciate further recommendations  CLD, currently on TPN.    Leukocytosis  Assessment &  Plan  In the setting of ruptured appendicitis and abdominal abscess.  Continue zosyn    4/30: WBC today are 13.3. ID consulted, who recommend to continue IV antibiotics. We appreciate further recommendations.    5/1: ID recommends to stop antibiotics after today. If WBC trends upwards, we will consider repeat CT scan.    5/2: Mild elevation today from 13.5 to 14.1. We will monitor and consider CT scan if it continues to trend up.    5/3: WBC 15.8 today. We have repeated CT scan as per ID's recommendations, pending result.    CT A/P with no findings of free fluid or abscess.  Will clinically monitor for now and keep trending CBC, if still uptrending, will look for OTHER source of infection.    Essential hypertension- (present on admission)  Assessment & Plan  Continue losartan  PRN labetalol    4/29: We have increased losartan to 50 mg BID.    5/2: We  Have started amlodipine 5 mg.    Osteoarthritis- (present on admission)  Assessment & Plan  Patient with a history of osteoarthritis.  The patient today complaining of knee pain.  The patient states that she usually takes ibuprofen at home.  We will order 1 dose of ibuprofen 400 mg.  However we will encourage Tylenol for arthritic pain at this time due to the patient's history of CKD.  The patient currently receiving IV fluids, we will monitor closely and make changes accordingly.    Anemia  Assessment & Plan  Hb today is 10.2, has remained stable.  This is most likely post-surgical  No signs of bleeding at this time  Monitor, repeat CBC, and make changes accordingly.    Hyperglycemia  Assessment & Plan  Patient's A1c is 6.5.  Patient currently on ISS and hypoglycemia protocol.  Patient would require close follow up as outpatient.    Hypothyroidism  Assessment & Plan  Continue home medication.    Stage 3 chronic kidney disease- (present on admission)  Assessment & Plan  As per chart review, apparently history of  Avoid nephrotoxic medications    5/3: Creatinine .84  today. Patient NPO on TPN.  Patient also currently on IVF, patient will get CT scan with contrast today, so we will monitor kidney function.    Stable.    BMI 50.0-59.9, adult (HCC)- (present on admission)  Assessment & Plan  Body mass index is 57.29 kg/m².  Outpatient follow-ups with weight program      HENRI (obstructive sleep apnea)- (present on admission)  Assessment & Plan  CPAP nocturnal.         VTE prophylaxis: heparin

## 2021-05-08 NOTE — PROGRESS NOTES
POD17 open appy, perforation, s/p multiple drains  Eating, on TPN still for poor caloric intake  No pain this am  Abdomen dressing c/d/i, nontender    Plan:  Appreciate medical care  DM management  OOB/ambulation as tolerated  Wean TPN  Encourage PO intake  dvt prophylaxis  DC to facility  F/u / Sara outpatient

## 2021-05-09 LAB
ALBUMIN SERPL BCP-MCNC: 2.2 G/DL (ref 3.2–4.9)
ALBUMIN SERPL BCP-MCNC: 2.9 G/DL (ref 3.2–4.9)
ALBUMIN/GLOB SERPL: 0.5 G/DL
ALBUMIN/GLOB SERPL: 0.6 G/DL
ALP SERPL-CCNC: 117 U/L (ref 30–99)
ALP SERPL-CCNC: 148 U/L (ref 30–99)
ALT SERPL-CCNC: 19 U/L (ref 2–50)
ALT SERPL-CCNC: 37 U/L (ref 2–50)
ANION GAP SERPL CALC-SCNC: 6 MMOL/L (ref 7–16)
ANION GAP SERPL CALC-SCNC: 9 MMOL/L (ref 7–16)
AST SERPL-CCNC: 23 U/L (ref 12–45)
AST SERPL-CCNC: 37 U/L (ref 12–45)
BILIRUB SERPL-MCNC: 0.2 MG/DL (ref 0.1–1.5)
BILIRUB SERPL-MCNC: <0.2 MG/DL (ref 0.1–1.5)
BUN SERPL-MCNC: 25 MG/DL (ref 8–22)
BUN SERPL-MCNC: 26 MG/DL (ref 8–22)
CALCIUM SERPL-MCNC: 9.1 MG/DL (ref 8.4–10.2)
CALCIUM SERPL-MCNC: 9.1 MG/DL (ref 8.4–10.2)
CHLORIDE SERPL-SCNC: 100 MMOL/L (ref 96–112)
CHLORIDE SERPL-SCNC: 104 MMOL/L (ref 96–112)
CO2 SERPL-SCNC: 20 MMOL/L (ref 20–33)
CO2 SERPL-SCNC: 24 MMOL/L (ref 20–33)
CREAT SERPL-MCNC: 0.68 MG/DL (ref 0.5–1.4)
CREAT SERPL-MCNC: 0.7 MG/DL (ref 0.5–1.4)
GLOBULIN SER CALC-MCNC: 4.2 G/DL (ref 1.9–3.5)
GLOBULIN SER CALC-MCNC: 4.5 G/DL (ref 1.9–3.5)
GLUCOSE BLD-MCNC: 136 MG/DL (ref 65–99)
GLUCOSE BLD-MCNC: 146 MG/DL (ref 65–99)
GLUCOSE BLD-MCNC: 149 MG/DL (ref 65–99)
GLUCOSE BLD-MCNC: 159 MG/DL (ref 65–99)
GLUCOSE BLD-MCNC: 160 MG/DL (ref 65–99)
GLUCOSE SERPL-MCNC: 1013 MG/DL (ref 65–99)
GLUCOSE SERPL-MCNC: 139 MG/DL (ref 65–99)
MAGNESIUM SERPL-MCNC: 2.4 MG/DL (ref 1.5–2.5)
PHOSPHATE SERPL-MCNC: 6.7 MG/DL (ref 2.5–4.5)
POTASSIUM SERPL-SCNC: 10 MMOL/L (ref 3.6–5.5)
POTASSIUM SERPL-SCNC: 4.7 MMOL/L (ref 3.6–5.5)
PROT SERPL-MCNC: 6.4 G/DL (ref 6–8.2)
PROT SERPL-MCNC: 7.4 G/DL (ref 6–8.2)
SODIUM SERPL-SCNC: 126 MMOL/L (ref 135–145)
SODIUM SERPL-SCNC: 137 MMOL/L (ref 135–145)
TRIGL SERPL-MCNC: 652 MG/DL (ref 0–149)

## 2021-05-09 PROCEDURE — 770006 HCHG ROOM/CARE - MED/SURG/GYN SEMI*

## 2021-05-09 PROCEDURE — A9270 NON-COVERED ITEM OR SERVICE: HCPCS | Performed by: STUDENT IN AN ORGANIZED HEALTH CARE EDUCATION/TRAINING PROGRAM

## 2021-05-09 PROCEDURE — 700102 HCHG RX REV CODE 250 W/ 637 OVERRIDE(OP): Performed by: INTERNAL MEDICINE

## 2021-05-09 PROCEDURE — 700101 HCHG RX REV CODE 250: Performed by: STUDENT IN AN ORGANIZED HEALTH CARE EDUCATION/TRAINING PROGRAM

## 2021-05-09 PROCEDURE — 82962 GLUCOSE BLOOD TEST: CPT | Mod: 91

## 2021-05-09 PROCEDURE — 80053 COMPREHEN METABOLIC PANEL: CPT

## 2021-05-09 PROCEDURE — 83735 ASSAY OF MAGNESIUM: CPT

## 2021-05-09 PROCEDURE — 700105 HCHG RX REV CODE 258: Performed by: STUDENT IN AN ORGANIZED HEALTH CARE EDUCATION/TRAINING PROGRAM

## 2021-05-09 PROCEDURE — 700111 HCHG RX REV CODE 636 W/ 250 OVERRIDE (IP): Performed by: INTERNAL MEDICINE

## 2021-05-09 PROCEDURE — 700102 HCHG RX REV CODE 250 W/ 637 OVERRIDE(OP): Performed by: STUDENT IN AN ORGANIZED HEALTH CARE EDUCATION/TRAINING PROGRAM

## 2021-05-09 PROCEDURE — 99232 SBSQ HOSP IP/OBS MODERATE 35: CPT | Performed by: STUDENT IN AN ORGANIZED HEALTH CARE EDUCATION/TRAINING PROGRAM

## 2021-05-09 PROCEDURE — A9270 NON-COVERED ITEM OR SERVICE: HCPCS | Performed by: INTERNAL MEDICINE

## 2021-05-09 PROCEDURE — 84100 ASSAY OF PHOSPHORUS: CPT

## 2021-05-09 PROCEDURE — 700111 HCHG RX REV CODE 636 W/ 250 OVERRIDE (IP): Performed by: STUDENT IN AN ORGANIZED HEALTH CARE EDUCATION/TRAINING PROGRAM

## 2021-05-09 PROCEDURE — 94660 CPAP INITIATION&MGMT: CPT

## 2021-05-09 PROCEDURE — 84478 ASSAY OF TRIGLYCERIDES: CPT

## 2021-05-09 RX ORDER — METOCLOPRAMIDE HYDROCHLORIDE 5 MG/ML
5 INJECTION INTRAMUSCULAR; INTRAVENOUS EVERY 6 HOURS
Status: COMPLETED | OUTPATIENT
Start: 2021-05-09 | End: 2021-05-10

## 2021-05-09 RX ADMIN — LOSARTAN POTASSIUM 50 MG: 25 TABLET, FILM COATED ORAL at 17:27

## 2021-05-09 RX ADMIN — SENNOSIDES AND DOCUSATE SODIUM 1 TABLET: 8.6; 5 TABLET ORAL at 10:35

## 2021-05-09 RX ADMIN — LOSARTAN POTASSIUM 50 MG: 25 TABLET, FILM COATED ORAL at 10:36

## 2021-05-09 RX ADMIN — PROCHLORPERAZINE EDISYLATE 10 MG: 5 INJECTION INTRAMUSCULAR; INTRAVENOUS at 05:04

## 2021-05-09 RX ADMIN — HEPARIN SODIUM 5000 UNITS: 5000 INJECTION, SOLUTION INTRAVENOUS; SUBCUTANEOUS at 21:34

## 2021-05-09 RX ADMIN — PROCHLORPERAZINE EDISYLATE 10 MG: 5 INJECTION INTRAMUSCULAR; INTRAVENOUS at 14:38

## 2021-05-09 RX ADMIN — PROCHLORPERAZINE EDISYLATE 10 MG: 5 INJECTION INTRAMUSCULAR; INTRAVENOUS at 00:15

## 2021-05-09 RX ADMIN — CYCLOBENZAPRINE 5 MG: 10 TABLET, FILM COATED ORAL at 14:37

## 2021-05-09 RX ADMIN — CYCLOBENZAPRINE 5 MG: 10 TABLET, FILM COATED ORAL at 10:36

## 2021-05-09 RX ADMIN — METOCLOPRAMIDE 5 MG: 5 INJECTION, SOLUTION INTRAMUSCULAR; INTRAVENOUS at 17:27

## 2021-05-09 RX ADMIN — CYCLOBENZAPRINE 5 MG: 10 TABLET, FILM COATED ORAL at 17:27

## 2021-05-09 RX ADMIN — ONDANSETRON 4 MG: 2 INJECTION INTRAMUSCULAR; INTRAVENOUS at 07:33

## 2021-05-09 RX ADMIN — HEPARIN SODIUM 5000 UNITS: 5000 INJECTION, SOLUTION INTRAVENOUS; SUBCUTANEOUS at 14:38

## 2021-05-09 RX ADMIN — METOCLOPRAMIDE 5 MG: 5 INJECTION, SOLUTION INTRAMUSCULAR; INTRAVENOUS at 12:29

## 2021-05-09 RX ADMIN — METOCLOPRAMIDE 5 MG: 5 INJECTION, SOLUTION INTRAMUSCULAR; INTRAVENOUS at 08:51

## 2021-05-09 RX ADMIN — CALCIUM GLUCONATE: 98 INJECTION, SOLUTION INTRAVENOUS at 20:16

## 2021-05-09 RX ADMIN — HEPARIN SODIUM 5000 UNITS: 5000 INJECTION, SOLUTION INTRAVENOUS; SUBCUTANEOUS at 06:18

## 2021-05-09 RX ADMIN — AMLODIPINE BESYLATE 10 MG: 5 TABLET ORAL at 10:35

## 2021-05-09 ASSESSMENT — ENCOUNTER SYMPTOMS
FALLS: 0
BLURRED VISION: 0
CONSTIPATION: 1
BACK PAIN: 1
SHORTNESS OF BREATH: 0
FEVER: 0
HEARTBURN: 0
MYALGIAS: 0
VOMITING: 0
CHILLS: 0
HEADACHES: 0
NAUSEA: 0
COUGH: 0
ABDOMINAL PAIN: 0
DIZZINESS: 0
NERVOUS/ANXIOUS: 0
PALPITATIONS: 0
DOUBLE VISION: 0

## 2021-05-09 ASSESSMENT — PAIN DESCRIPTION - PAIN TYPE: TYPE: ACUTE PAIN

## 2021-05-09 NOTE — CARE PLAN
POD18 open appy, perforation, s/p multiple drains  Dressings CDI- no drainage.   Poor PO intake, eating <25% of meals on full liquid diet. TPN running.  Nausea difficult to control. Emesis x1. Reglan added, also receiving compazine and zofran.  VSS on RA.   1 liquid BM today.  Deepa perez is too small for pt, unable to ambulate. Encouraging exercises in bed.   Plan to discharge to SNF.    Problem: Communication  Goal: The ability to communicate needs accurately and effectively will improve  Outcome: PROGRESSING AS EXPECTED     Problem: Safety  Goal: Will remain free from injury  Outcome: PROGRESSING AS EXPECTED     Problem: Infection  Goal: Will remain free from infection  Outcome: PROGRESSING AS EXPECTED     Problem: Mobility  Goal: Risk for activity intolerance will decrease  Outcome: PROGRESSING SLOWER THAN EXPECTED

## 2021-05-09 NOTE — PROGRESS NOTES
POD18 open appy, perforation, s/p multiple drains  Eating, on TPN still for poor caloric intake  Sleeping comfortably  Exam deferred     Plan:  Appreciate medical care  DM management  OOB/ambulation as tolerated  Encourage PO intake  dvt prophylaxis  DC to facility  F/u Dr. Ruiz outpatient

## 2021-05-09 NOTE — PROGRESS NOTES
[facility] Medicine Daily Progress Note    Date of Service  5/9/2021    Chief Complaint  65 y.o. female admitted 4/21/2021 with abdominal pain.    Hospital Course  4/22/2021-patient is a 65-year-old female comes in with abdominal pain that started last Friday.  After suffering with the pain the patient went to see urgent care where they did a CAT scan of her abdomen which came back with early appendicitis.  Patient was referred over to the emergency room for evaluation.  Patient was admitted for surgery and was found to have a large abscess along with the appendicitis.  Appendix was removed and the abscess has been drained.  She still having chills and fevers afterwards.  Continue with IV antibiotics as well as pain management and fluid resuscitation.  Await culture results.  4/23/2021-this morning patient is in quite a bit of distress.  Her pain has escalated in the abdomen and she is unable to urinate or defecate.  Upon further evaluation patient has a postoperative ileus in her bowel sounds have not returned.  She has not passed any gas or had a bowel movement.  Continue at this point with supportive fluid resuscitation to ensure the patient's bowel functions returned to normal.  Patient at this point is also complaining of urinary retention and the bladder scanner was only showing 70 to 100 mL of retained urine.  This was inconsistent with the amount of pain the patient was having thus I ordered a immediate straight catheterization which yielded over 800 mL of urine.  After the urine came out the patient's abdominal pain eased up and her condition improved.  We will continue to monitor her urine output and if the patient should have urinary retention we will straight catheter again.  For the time being because of her abdominal distention pain and ileus as well as urinary retention we are unable to discharge the patient at this point in time.  Continue at this point with aggressive management and  stabilization.  4/24/2021-patient is still having difficulty with abdominal pain.  She also has not had a bowel movement and her intestinal sounds are returning very slowly.  Concern is that there is still an abscess present.  Flatplate of the abdomen did not show anything her white blood cell count however is increasing at this point I am going to obtain a CT of the abdomen.  Discussed it with surgery.  Because of the bladder distention and inability urinate a Pabon catheter had to be placed.  Hopefully this will decompress the bladder.  4/25/2021-postoperative day #4 after laparoscopic appendectomy had to be converted to an open appendectomy and drainage of abscess cavity.  Cultures have not grown out any significant organism.  White blood cell count has gone up, repeat CT of the abdomen does not demonstrate worsening abscess formation and actually is consistent with routine healing.  Patient CT demonstrates an ileus.  Since her abdominal distention is getting worse and her nausea is persisting I am putting in an NG to low intermittent suctioning.  This was discussed with the bedside nurse and surgery.  4/26/2021-patient's abdominal distention persists.  Ileus persists status postoperative.  Yesterday the patient's Pabon catheter became obstructed.  It had to be replaced.  Yesterday he also had some technical issues with the hospital bed and at this point had to be replaced a few times.  Pain seems to be better controlled.  Her labs are improving.  The patient may need a repeat CT in 24 to 48 hours if her white blood cell count does not improve.  Today's count is down to 14.8.  Continue with IV Zosyn    Interval Problem Update  4/27: Patient seen at bedside this morning.  The patient still has not had a bowel movement, however she mentions that she thinks she is passing gas.  General surgery continues to recommend ambulation.  Patient currently n.p.o. with NG tube and the patient is on TPN.  As per nursing no  other overnight events were reported.    4/28: Patient seen at bedside this morning.  The patient still has not had a bowel movement.  She says she is passing gas.  General Surgery following the patient along.  We will continue TPN and n.p.o. for now.  No other overnight events were reported by nursing.    4/29: Patient seen at bedside this morning.  She mentions she is passing gas, however she has not had a bowel movement yet.  She was encouraged to ambulate.  We have consulted ID for further antibiotic coverage, we appreciate further recommendations.  As per nursing no other overnight events reported.    4/30: Patient seen at bedside this morning.  The patient still without a bowel movement.  ID recommends to continue IV antibiotics for now.  As per nursing no other overnight events reported.    5/1: Patient seen at bedside this morning.  She is lying comfortably in bed, however she still having bowel movements, but she says she is passing gas.  ID recommended to stop antibiotics after today and if WBC trend upwards to repeat CT scan.  As per nursing no other overnight events were reported.    5/2: WBC count slightly elevated today from yesterday.  The last dose of Zosyn she got last night.  Patient lying in bed comfortably, currently not complaining of other pain.  She is still has not had a bowel movement.  We will repeat WBC tomorrow and consider CT scan if it continues to trend up.  As per nursing no other overnight events reported.    5/3: WBC increased today. We have ordered CT scan abdomen and pelvis, which we are pending. If CT scan find abscess collection we will restart zosyn and surgery will be notified. Patient complaining of knee pain. Patient still without a BM, but mentions she is passing gas. As per nursing no other over night events reported.    5/4: WBC still up trending. CT A/P without any finding of residual abscess. We will hold off antibiotics for now and clinically monitor.  If it continues  to uptrend, will work-up other source for infection.  She still has complains of feeling bloated/constipated, passing gas but still without a bowel movement. We will plan to remove Pabon in a day or 2 and TOV.    5/5: WBC down to 15,000 this morning. Still no clear signs of infection noted at this time. Still passing gas but no bowel movements. Will remove Pabon today and perform a trial void. KUB ordered to evaluate bowels.     5/6: WBC downtrending. No obvious signs of infection noted at this time. Continues to pass gas but without any bowel movements. KUB performed yesterday was mostly unremarkable. Pabon catheter removed and patient has been able to void.  We will plan for NG tube clamp today and hopefully remove it in the next few hours.   working on LTAC placement.    5/7: She had a one-time fever overnight, but WBC remains stable. NG tube clamped and removed yesterday. [place]ague and LENI drain was removed by surgery yesterday.  We will try patient on sips of liquids today, otherwise pending placement once accepted to LTAC.    5/8: Patient started on clear liquid diet overnight. We will watch closely today and monitor for any signs of nausea, vomiting or abdominal distention. Hopefully can advance diet later today.    5/9: Patient now full liquid diet. Unable to tolerate GI soft yesterday.  We will continue to advance as she tolerates and hopefully wean off TPN.    Consultants/Specialty  General Surgery  ID    Code Status  Full Code    Disposition  Pending    Review of Systems  Review of Systems   Constitutional: Negative for chills and fever.   HENT: Negative for hearing loss and nosebleeds.    Eyes: Negative for blurred vision and double vision.   Respiratory: Negative for cough and shortness of breath.    Cardiovascular: Negative for chest pain and palpitations.   Gastrointestinal: Positive for constipation. Negative for abdominal pain, heartburn, nausea and vomiting.   Genitourinary: Negative for  dysuria and urgency.   Musculoskeletal: Positive for back pain and joint pain (knee pain). Negative for falls and myalgias.   Skin: Negative for itching and rash.   Neurological: Negative for dizziness and headaches.   Psychiatric/Behavioral: The patient is not nervous/anxious.    All other systems reviewed and are negative.       Physical Exam  Temp:  [36.6 °C (97.8 °F)-36.8 °C (98.3 °F)] 36.6 °C (97.8 °F)  Pulse:  [] 109  Resp:  [17-18] 18  BP: (117-138)/(51-67) 138/67  SpO2:  [92 %-93 %] 93 %    Physical Exam  Constitutional:       Appearance: She is obese. She is ill-appearing.   HENT:      Head: Normocephalic and atraumatic.      Nose: No congestion or rhinorrhea.      Mouth/Throat:      Pharynx: No oropharyngeal exudate or posterior oropharyngeal erythema.   Eyes:      General:         Right eye: No discharge.         Left eye: No discharge.   Cardiovascular:      Rate and Rhythm: Normal rate and regular rhythm.      Pulses: Normal pulses.      Heart sounds: No murmur. No gallop.    Pulmonary:      Effort: Pulmonary effort is normal. No respiratory distress.      Breath sounds: No wheezing or rales.   Abdominal:      General: There is distension.      Tenderness: There is abdominal tenderness.      Comments: Bandages in place on surgical site, does not appear soaked or bleeding.   Musculoskeletal:         General: Normal range of motion.      Cervical back: Normal range of motion.   Skin:     General: Skin is warm and dry.   Neurological:      General: No focal deficit present.      Mental Status: She is alert and oriented to person, place, and time.      Cranial Nerves: No cranial nerve deficit.      Motor: No weakness.   Psychiatric:         Mood and Affect: Mood normal.         Behavior: Behavior normal.         Fluids    Intake/Output Summary (Last 24 hours) at 5/9/2021 0712  Last data filed at 5/8/2021 1300  Gross per 24 hour   Intake 480 ml   Output 1100 ml   Net -620 ml       Laboratory  Recent  Labs     05/07/21  0355 05/08/21  0300   WBC 15.5* 13.7*   RBC 3.37* 3.26*   HEMOGLOBIN 9.6* 9.4*   HEMATOCRIT 30.5* 30.1*   MCV 90.5 92.3   MCH 28.5 28.8   MCHC 31.5* 31.2*   RDW 47.6 48.8   PLATELETCT 416 460*   MPV 10.6 10.8     Recent Labs     05/07/21  0355 05/08/21  0300   SODIUM 138 139   POTASSIUM 4.0 4.2   CHLORIDE 105 108   CO2 22 21   GLUCOSE 148* 144*   BUN 22 24*   CREATININE 0.82 0.77   CALCIUM 8.9 9.1                   Imaging  [id number] VIEW   Final Result         Mildly dilated gas-filled loop of small bowel in the right lower quadrant could relate to mild focal ileus.      CT-ABDOMEN-PELVIS WITH   Final Result         1.  Improving fat stranding in the lower abdomen likely improving inflammation and postoperative changes. A surgical drain in the right lower quadrant is again noted. No significant free fluid or abscess.   2.  Mildly improved ileus.   3.  Hepatic steatosis.               DX-ABDOMEN FOR TUBE PLACEMENT   Final Result         1.  Nonspecific bowel gas pattern.   2.  Dobbhoff tube is coiled within the pylorus, the tip terminates overlying the expected location of the gastric pylorus or gastric antrum.   3.  Left basilar atelectasis versus early infiltrate.      IR-PICC LINE PLACEMENT W/ GUIDANCE > AGE 5   Final Result                  Ultrasound-guided PICC placement performed by qualified nursing staff as    above.          CT-ABDOMEN-PELVIS W/O   Final Result      Interval laparotomy with right lower quadrant surgical drain, similar fat stranding in the pericecal region that is likely postoperative in nature. No noncontrast evidence of abscess formation      Small free fluid and intraperitoneal air is not outside normal limits in the perioperative period      Contrast in the colon is from the CT 3 days ago, air-fluid levels and mild small bowel dilatation. In combination these findings are most compatible with ileus      Urinary bladder decompressed around the Pabon catheter. No  hydroureteronephrosis      [id number] VIEW   Final Result      Dilated loops of small bowel most likely represent ileus. Partial obstruction is not excluded.      Residual enteric contrast is seen from the recent CT.      Mild bibasilar atelectasis.         IR-US GUIDED PIV   Final Result    Ultrasound-guided PERIPHERAL IV INSERTION performed by    qualified nursing staff as above.           Assessment/Plan  * Acute appendicitis with localized peritonitis  Assessment & Plan  Status post appendectomy.  Initially attempted with laparoscopy but then had to be converted to open resection.  Pain management  NG tube to suctioning due to persistent postoperative ileus  Fluid resuscitation  Due to lack of nutrition will have PICC line placed and TPN started.    4/29: Continue TPN, we appreciate any further recommendations by general surgery. Continue Zosyn. We have consulted ID for further antibiotic coverage, we appreciate further recommendations.    4/30: We consulted ID, and they recommended to continue IV antibiotics for now.    5/1: ID recommends to stop antibiotics after today.     5/2: WBC today is 14.1, yesterday it was 13.5. We just stopped Zosyn last night, we will repeat CBC tomorrow and if it continues to trend upwards, we will consider repeat CT scan. If WBC trends upwards, we will consider repeat CT scan. Patient afebrile, without hypotension.    5/3: WBC today is 15.8. I spoke to ID and they recommend repeat CT scan. ID does not recommend initiating antibiotics at this time, pending CT scan. We have ordered abdominal/pelvic CT scan result.     CT A/P without any findings of free fluid or abscess.    Patient currently afebrile, and not complaining of worsening abdominal pain at this time.     Constipation  Assessment & Plan  Possibly post surgical etiology.  General surgery following patient, we appreciate further recommendations  CLD, currently on TPN.    Leukocytosis  Assessment & Plan  In the setting of  ruptured appendicitis and abdominal abscess.  Continue zosyn    4/30: WBC today are 13.3. ID consulted, who recommend to continue IV antibiotics. We appreciate further recommendations.    5/1: ID recommends to stop antibiotics after today. If WBC trends upwards, we will consider repeat CT scan.    5/2: Mild elevation today from 13.5 to 14.1. We will monitor and consider CT scan if it continues to trend up.    5/3: WBC 15.8 today. We have repeated CT scan as per ID's recommendations, pending result.    CT A/P with no findings of free fluid or abscess.  Will clinically monitor for now and keep trending CBC, if still uptrending, will look for OTHER source of infection.    Essential hypertension- (present on admission)  Assessment & Plan  Continue losartan  PRN labetalol    4/29: We have increased losartan to 50 mg BID.    5/2: We  Have started amlodipine 5 mg.    Osteoarthritis- (present on admission)  Assessment & Plan  Patient with a history of osteoarthritis.  The patient today complaining of knee pain.  The patient states that she usually takes ibuprofen at home.  We will order 1 dose of ibuprofen 400 mg.  However we will encourage Tylenol for arthritic pain at this time due to the patient's history of CKD.  The patient currently receiving IV fluids, we will monitor closely and make changes accordingly.    Anemia  Assessment & Plan  Hb today is 10.2, has remained stable.  This is most likely post-surgical  No signs of bleeding at this time  Monitor, repeat CBC, and make changes accordingly.    Hyperglycemia  Assessment & Plan  Patient's A1c is 6.5.  Patient currently on ISS and hypoglycemia protocol.  Patient would require close follow up as outpatient.    Hypothyroidism  Assessment & Plan  Continue home medication.    Stage 3 chronic kidney disease- (present on admission)  Assessment & Plan  As per chart review, apparently history of  Avoid nephrotoxic medications    5/3: Creatinine .84 today. Patient NPO on TPN.   Patient also currently on IVF, patient will get CT scan with contrast today, so we will monitor kidney function.    Stable.    BMI 50.0-59.9, adult (HCC)- (present on admission)  Assessment & Plan  Body mass index is 57.29 kg/m².  Outpatient follow-ups with weight program      HENRI (obstructive sleep apnea)- (present on admission)  Assessment & Plan  CPAP nocturnal.         VTE prophylaxis: heparin

## 2021-05-09 NOTE — PROGRESS NOTES
Pharmacy TPN Day # 14       2021    Dosing Weight   76 kg TPN currently providing 100% of goal      TPN goal: 1628 kcal/day including 1.6 gm/kg/day Protein       TPN indication: NPO s/p appendectomy w/abscess present.                                                              Pertinent PMH: Back pain, Chronic low back pain (2018), Elevated blood sugar, Hypertension, Kidney stone (), Obesity, HENRI (obstructive sleep apnea) (2017), Osteoporosis, Post-nasal drip, Seasonal allergies, Sleep apnea, and Thyroid disease.  .  Temp (24hrs), Av.3 °C (97.4 °F), Min:36.1 °C (96.9 °F), Max:36.6 °C (97.8 °F)  .  Recent Labs     21  0355 21  0355 21  0300 21  0745 21  0930   SODIUM 138   < > 139 126* 137   POTASSIUM 4.0   < > 4.2 10.0* 4.7   CHLORIDE 105   < > 108 100 104   CO2 22   < > 21 20 24   BUN 22   < > 24* 25* 26*   CREATININE 0.82   < > 0.77 0.68 0.70   GLUCOSE 148*   < > 144* 1013* 139*   CALCIUM 8.9   < > 9.1 9.1 9.1   ASTSGOT 26   < > 21 23 37   ALTSGPT 22   < > 21 19 37   ALBUMIN 2.5*   < > 2.6* 2.2* 2.9*   TBILIRUBIN 0.4   < > 0.3 <0.2 0.2   PHOSPHORUS 3.6  --   --  6.7*  --    MAGNESIUM 2.1  --  2.0 2.4  --     < > = values in this interval not displayed.     Accu-Checks  Recent Labs     21  0014 21  0622 21  0854   POCGLUCOSE 159* 160* 136*       Vitals:    21 1001 21 1610 21 0509 21 0832   BP: 119/52 117/51 138/67 130/65   Weight:       Height:           Intake/Output Summary (Last 24 hours) at 2021 1028  Last data filed at 2021 1300  Gross per 24 hour   Intake 240 ml   Output 1100 ml   Net -860 ml       Orders Placed This Encounter   Procedures   • Diet Order Diet: Full Liquid     Standing Status:   Standing     Number of Occurrences:   1     Order Specific Question:   Diet:     Answer:   Full Liquid [11]         TPN for past 72 hours (Show up to 3 orders; newest on the left. Changes between the two most  recent orders are indicated.)     Start date and time   05/09/2021 2000 05/08/2021 2000 05/08/2021 0715      TPN Central Line Formulation [[id number]] TPN Central Line Formulation [[id number]] TPN Central Line Formulation [[id number]]    Order Status  Active Last Dose in Progress Discontinued    Last Admin   New Bag at 05/08/2021 2000 by Derrell Aburto R.N.        Base    Clinisol 15%  120 g 120 g 120 g    dextrose 70%  220 g 220 g 220 g    fat emulsions 20%  40 g 40 g 40 g       Additives    potassium phosphate  15 mmol 15 mmol 15 mmol    potassium chloride  60 mEq 70 mEq 70 mEq    sodium chloride  170 mEq 170 mEq 170 mEq    magnesium sulfate  8 mEq 8 mEq 8 mEq    calcium GLUConate  9.4 mEq 9.4 mEq 9.4 mEq    M.T.E.-4 Adult  1 mL 1 mL 1 mL    M.V.I. Adult  10 mL 10 mL 10 mL       QS Base    sterile water  567.01 mL 562.01 mL 562.01 mL       Energy Contribution    Proteins  -- -- --    Dextrose  -- -- --    Lipids  -- -- --    Total  -- -- --       Electrolyte Ion Calculated Amount    Sodium  170 mEq 170 mEq 170 mEq    Potassium  82 mEq 92 mEq 92 mEq    Calcium  9.4 mEq 9.4 mEq 9.4 mEq    Magnesium  8 mEq 8 mEq 8 mEq    Aluminum  -- -- --    Phosphate  15 mmol 15 mmol 15 mmol    Chloride  230 mEq 240 mEq 240 mEq    Acetate  101.6 mEq 101.6 mEq 101.6 mEq       Other    Total Protein  120 g 120 g 120 g    Total Protein/kg  0.81 g/kg 0.81 g/kg 0.81 g/kg    Total Amino Acid  -- -- --    Total Amino Acid/kg  -- -- --    Glucose Infusion Rate  1.03 mg/kg/min 1.03 mg/kg/min 1.03 mg/kg/min    Osmolarity (Estimated)  -- -- --    Volume  1,992 mL 1,992 mL 1,992 mL    Rate  83 mL/hr 83 mL/hr 83 mL/hr    Dosing Weight  148 kg 148 kg 148 kg    Infusion [facility]  [facility]            This formula provides:  % kcal as lipids =  25  Grams protein/kg = 1.6  Non-protein calories = 1148  Kcals/kg = 21.4  Total daily calories = 1628    Comments:  Decrease KCL to 60 meq. All other components remain the same. Pharmacy to  continue to f/u and monitor per TPN protocol. Of note, triglycerides ordered for tomorrow, please f/u (Tgly ordered today was a lab error).       Cornell SalmonD.

## 2021-05-09 NOTE — PROGRESS NOTES
Cristobal from Lab called with critical result of Potassium of 10 and Glucose 1013 at 0820. Critical lab result read back to Cristobal.   Dr. Benito notified of critical lab result at 0825.  Critical lab result read back by Dr. Benito.    RN and MD believe this specimen to be contaminated. RN will draw another blood sample off PICC and send it to lab.    New labs resulted, all values WNL.

## 2021-05-10 LAB
ALBUMIN SERPL BCP-MCNC: 2.7 G/DL (ref 3.2–4.9)
ALBUMIN/GLOB SERPL: 0.6 G/DL
ALP SERPL-CCNC: 130 U/L (ref 30–99)
ALT SERPL-CCNC: 30 U/L (ref 2–50)
ANION GAP SERPL CALC-SCNC: 9 MMOL/L (ref 7–16)
AST SERPL-CCNC: 25 U/L (ref 12–45)
BILIRUB SERPL-MCNC: 0.2 MG/DL (ref 0.1–1.5)
BUN SERPL-MCNC: 26 MG/DL (ref 8–22)
CALCIUM SERPL-MCNC: 9.1 MG/DL (ref 8.4–10.2)
CHLORIDE SERPL-SCNC: 105 MMOL/L (ref 96–112)
CO2 SERPL-SCNC: 23 MMOL/L (ref 20–33)
CREAT SERPL-MCNC: 0.71 MG/DL (ref 0.5–1.4)
CRP SERPL HS-MCNC: 10.03 MG/DL (ref 0–0.75)
ERYTHROCYTE [DISTWIDTH] IN BLOOD BY AUTOMATED COUNT: 48.2 FL (ref 35.9–50)
GLOBULIN SER CALC-MCNC: 4.4 G/DL (ref 1.9–3.5)
GLUCOSE BLD-MCNC: 127 MG/DL (ref 65–99)
GLUCOSE BLD-MCNC: 140 MG/DL (ref 65–99)
GLUCOSE BLD-MCNC: 147 MG/DL (ref 65–99)
GLUCOSE SERPL-MCNC: 138 MG/DL (ref 65–99)
HCT VFR BLD AUTO: 30.2 % (ref 37–47)
HGB BLD-MCNC: 9.5 G/DL (ref 12–16)
MAGNESIUM SERPL-MCNC: 1.9 MG/DL (ref 1.5–2.5)
MCH RBC QN AUTO: 29.2 PG (ref 27–33)
MCHC RBC AUTO-ENTMCNC: 31.5 G/DL (ref 33.6–35)
MCV RBC AUTO: 92.9 FL (ref 81.4–97.8)
PHOSPHATE SERPL-MCNC: 3.4 MG/DL (ref 2.5–4.5)
PLATELET # BLD AUTO: 464 K/UL (ref 164–446)
PMV BLD AUTO: 10.4 FL (ref 9–12.9)
POTASSIUM SERPL-SCNC: 4.3 MMOL/L (ref 3.6–5.5)
PREALB SERPL-MCNC: 14.3 MG/DL (ref 18–38)
PROT SERPL-MCNC: 7.1 G/DL (ref 6–8.2)
RBC # BLD AUTO: 3.25 M/UL (ref 4.2–5.4)
SODIUM SERPL-SCNC: 137 MMOL/L (ref 135–145)
TRIGL SERPL-MCNC: 168 MG/DL (ref 0–149)
WBC # BLD AUTO: 12 K/UL (ref 4.8–10.8)

## 2021-05-10 PROCEDURE — 700101 HCHG RX REV CODE 250: Performed by: STUDENT IN AN ORGANIZED HEALTH CARE EDUCATION/TRAINING PROGRAM

## 2021-05-10 PROCEDURE — 700105 HCHG RX REV CODE 258: Performed by: STUDENT IN AN ORGANIZED HEALTH CARE EDUCATION/TRAINING PROGRAM

## 2021-05-10 PROCEDURE — 700101 HCHG RX REV CODE 250: Performed by: INTERNAL MEDICINE

## 2021-05-10 PROCEDURE — 99232 SBSQ HOSP IP/OBS MODERATE 35: CPT | Performed by: STUDENT IN AN ORGANIZED HEALTH CARE EDUCATION/TRAINING PROGRAM

## 2021-05-10 PROCEDURE — 700111 HCHG RX REV CODE 636 W/ 250 OVERRIDE (IP): Performed by: INTERNAL MEDICINE

## 2021-05-10 PROCEDURE — 700111 HCHG RX REV CODE 636 W/ 250 OVERRIDE (IP): Performed by: STUDENT IN AN ORGANIZED HEALTH CARE EDUCATION/TRAINING PROGRAM

## 2021-05-10 PROCEDURE — 700102 HCHG RX REV CODE 250 W/ 637 OVERRIDE(OP): Performed by: INTERNAL MEDICINE

## 2021-05-10 PROCEDURE — 84100 ASSAY OF PHOSPHORUS: CPT

## 2021-05-10 PROCEDURE — 86140 C-REACTIVE PROTEIN: CPT

## 2021-05-10 PROCEDURE — A9270 NON-COVERED ITEM OR SERVICE: HCPCS | Performed by: INTERNAL MEDICINE

## 2021-05-10 PROCEDURE — 84478 ASSAY OF TRIGLYCERIDES: CPT

## 2021-05-10 PROCEDURE — 94660 CPAP INITIATION&MGMT: CPT

## 2021-05-10 PROCEDURE — A9270 NON-COVERED ITEM OR SERVICE: HCPCS | Performed by: STUDENT IN AN ORGANIZED HEALTH CARE EDUCATION/TRAINING PROGRAM

## 2021-05-10 PROCEDURE — 80053 COMPREHEN METABOLIC PANEL: CPT

## 2021-05-10 PROCEDURE — 97530 THERAPEUTIC ACTIVITIES: CPT

## 2021-05-10 PROCEDURE — 84134 ASSAY OF PREALBUMIN: CPT

## 2021-05-10 PROCEDURE — 97112 NEUROMUSCULAR REEDUCATION: CPT

## 2021-05-10 PROCEDURE — 83735 ASSAY OF MAGNESIUM: CPT

## 2021-05-10 PROCEDURE — 770006 HCHG ROOM/CARE - MED/SURG/GYN SEMI*

## 2021-05-10 PROCEDURE — 700102 HCHG RX REV CODE 250 W/ 637 OVERRIDE(OP): Performed by: STUDENT IN AN ORGANIZED HEALTH CARE EDUCATION/TRAINING PROGRAM

## 2021-05-10 PROCEDURE — 85027 COMPLETE CBC AUTOMATED: CPT

## 2021-05-10 PROCEDURE — 82962 GLUCOSE BLOOD TEST: CPT

## 2021-05-10 RX ADMIN — OXYCODONE HYDROCHLORIDE 10 MG: 10 TABLET ORAL at 07:34

## 2021-05-10 RX ADMIN — LOSARTAN POTASSIUM 50 MG: 25 TABLET, FILM COATED ORAL at 05:18

## 2021-05-10 RX ADMIN — METOCLOPRAMIDE 5 MG: 5 INJECTION, SOLUTION INTRAMUSCULAR; INTRAVENOUS at 00:00

## 2021-05-10 RX ADMIN — HEPARIN SODIUM 5000 UNITS: 5000 INJECTION, SOLUTION INTRAVENOUS; SUBCUTANEOUS at 05:17

## 2021-05-10 RX ADMIN — LORATADINE 10 MG: 10 TABLET ORAL at 05:17

## 2021-05-10 RX ADMIN — LOSARTAN POTASSIUM 50 MG: 25 TABLET, FILM COATED ORAL at 17:29

## 2021-05-10 RX ADMIN — THYROID, PORCINE 90 MG: 30 TABLET ORAL at 05:17

## 2021-05-10 RX ADMIN — OXYCODONE HYDROCHLORIDE 10 MG: 10 TABLET ORAL at 17:29

## 2021-05-10 RX ADMIN — CYCLOBENZAPRINE 5 MG: 10 TABLET, FILM COATED ORAL at 17:29

## 2021-05-10 RX ADMIN — AMLODIPINE BESYLATE 10 MG: 5 TABLET ORAL at 05:17

## 2021-05-10 RX ADMIN — CYCLOBENZAPRINE 5 MG: 10 TABLET, FILM COATED ORAL at 12:01

## 2021-05-10 RX ADMIN — LIDOCAINE 1 PATCH: 50 PATCH TOPICAL at 17:33

## 2021-05-10 RX ADMIN — CALCIUM GLUCONATE: 98 INJECTION, SOLUTION INTRAVENOUS at 20:38

## 2021-05-10 RX ADMIN — CYCLOBENZAPRINE 5 MG: 10 TABLET, FILM COATED ORAL at 05:17

## 2021-05-10 RX ADMIN — HEPARIN SODIUM 5000 UNITS: 5000 INJECTION, SOLUTION INTRAVENOUS; SUBCUTANEOUS at 15:01

## 2021-05-10 RX ADMIN — SODIUM CHLORIDE, POTASSIUM CHLORIDE, SODIUM LACTATE AND CALCIUM CHLORIDE: 600; 310; 30; 20 INJECTION, SOLUTION INTRAVENOUS at 07:29

## 2021-05-10 RX ADMIN — HEPARIN SODIUM 5000 UNITS: 5000 INJECTION, SOLUTION INTRAVENOUS; SUBCUTANEOUS at 20:37

## 2021-05-10 ASSESSMENT — ENCOUNTER SYMPTOMS
PALPITATIONS: 0
FEVER: 0
NERVOUS/ANXIOUS: 0
MYALGIAS: 0
COUGH: 0
HEADACHES: 0
DOUBLE VISION: 0
CONSTIPATION: 1
BACK PAIN: 1
HEARTBURN: 0
CHILLS: 0
SHORTNESS OF BREATH: 0
DIZZINESS: 0
ABDOMINAL PAIN: 0
FALLS: 0
VOMITING: 0
BLURRED VISION: 0
NAUSEA: 0

## 2021-05-10 ASSESSMENT — COGNITIVE AND FUNCTIONAL STATUS - GENERAL
HELP NEEDED FOR BATHING: A LOT
TOILETING: A LOT
CLIMB 3 TO 5 STEPS WITH RAILING: TOTAL
SUGGESTED CMS G CODE MODIFIER MOBILITY: CL
DAILY ACTIVITIY SCORE: 16
MOBILITY SCORE: 10
DRESSING REGULAR LOWER BODY CLOTHING: A LOT
PERSONAL GROOMING: A LITTLE
WALKING IN HOSPITAL ROOM: TOTAL
STANDING UP FROM CHAIR USING ARMS: A LOT
MOVING TO AND FROM BED TO CHAIR: UNABLE
DRESSING REGULAR UPPER BODY CLOTHING: A LITTLE
MOVING FROM LYING ON BACK TO SITTING ON SIDE OF FLAT BED: UNABLE
SUGGESTED CMS G CODE MODIFIER DAILY ACTIVITY: CK

## 2021-05-10 ASSESSMENT — PAIN DESCRIPTION - PAIN TYPE
TYPE: ACUTE PAIN

## 2021-05-10 ASSESSMENT — GAIT ASSESSMENTS: GAIT LEVEL OF ASSIST: UNABLE TO PARTICIPATE

## 2021-05-10 ASSESSMENT — FIBROSIS 4 INDEX: FIB4 SCORE: 0.62

## 2021-05-10 NOTE — THERAPY
Physical Therapy   Daily Treatment     Patient Name: Mady Yang  Age:  65 y.o., Sex:  female  Medical Record #: [id number]  Today's Date: 5/10/2021     Precautions: (P) Fall Risk    Assessment    Pt is progressing slower than expected with functional mobility. Pt is primarily limited by L knee pain and weakness. Pt was initially hesitant to participate with therapy, however, was agreeable after encouragement. Once sitting up at EOB pt required increased time for L knee pain to dec and demonstrated with frequent soft tissue massage at L knee to assist with pain management of L knee. Once ready pt was able to perform sit<>stand x2 w/ Min A. However, only able to stand for about 5-10 secs due to severe L knee pain. Pt was able to laterally scoot with SBA along EOB to get up higher towards HOB. Pt assisted with positioning in bed and adjustment of linens. Pt provided with heat pack for L knee to assist with pain management. Pt will continue to benefit from skilled PT while in house, with recommendation for post acute therapy prior to d/c home.     Plan    Continue current treatment plan.    DC Equipment Recommendations: (P) Unable to determine at this time  Discharge Recommendations: (P) Recommend post-acute placement for additional physical therapy services prior to discharge home    Objective       05/10/21 1035   Precautions   Precautions Fall Risk   Comments TPN; PICC line R forearm, abdominal incision    Pain 0 - 10 Group   Location Knee   Location Orientation Left   Description Aching   Therapist Pain Assessment Prior to Activity;During Activity;Post Activity;Nurse Notified  (c/o moderate pain with mobility; not rated)   Cognition    Cognition / Consciousness WDL   Level of Consciousness Alert   Comments pleasant/cooprative; required some encouragment to participate    Balance   Sitting Balance (Static) Fair +   Sitting Balance (Dynamic) Fair   Standing Balance (Static) Fair -   Standing Balance (Dynamic)  Poor   Weight Shift Sitting Fair   Weight Shift Standing Fair   Skilled Intervention Verbal Cuing;Postural Facilitation;Facilitation   Comments w/ rose mary FWW   Gait Analysis   Gait Level Of Assist Unable to Participate   Weight Bearing Status full   Bed Mobility    Supine to Sit Minimal Assist   Sit to Supine Moderate Assist   Scooting Supervised   Skilled Intervention Verbal Cuing;Compensatory Strategies;Facilitation   Comments increased time with mobility due to pain; cues for rolling and using railing for assist during supine to sit; required assist with BLE to get back into bed    Functional Mobility   Sit to Stand Minimal Assist   Bed, Chair, Wheelchair Transfer Unable to Participate   Toilet Transfers Unable to Participate   Mobility EOB, sit<>stand x 2, scooting laterally at EOB towards railing, back to supine    Skilled Intervention Verbal Cuing;Tactile Cuing   How much difficulty does the patient currently have...   Turning over in bed (including adjusting bedclothes, sheets and blankets)? 4   Sitting down on and standing up from a chair with arms (e.g., wheelchair, bedside commode, etc.) 1   Moving from lying on back to sitting on the side of the bed? 1   How much help from another person does the patient currently need...   Moving to and from a bed to a chair (including a wheelchair)? 2   Need to walk in a hospital room? 1   Climbing 3-5 steps with a railing? 1   6 clicks Mobility Score 10   Activity Tolerance   Sitting in Chair NT   Sitting Edge of Bed 20 mins   Standing 5 secs x 1 10 secs x 1    Comments limited due to L knee pain and weakness    Patient / Family Goals    Patient / Family Goal #1 to go home    Short Term Goals    Short Term Goal # 1 pt will go supine<>sit w/hob flat and rails down w/spv in 6tx for safe d/c home    Goal Outcome # 1 Progressing slower than expected   Short Term Goal # 2 pt will go sit<>stand w/fww w/spv in 6tx for safe d/c home    Goal Outcome # 2 Progressing slower than  expected   Short Term Goal # 3 pt will transfer bed<>chair w/fww w/spv in 6tx for safe d/c home    Goal Outcome # 3 Progressing slower than expected   Short Term Goal # 4 pt will ambulate for 150ft w/fww w/spv in 6tx for safe d/c home    Goal Outcome # 4 Progressing slower than expected   Short Term Goal # 5 pt will go up/down 2 steps w/spv in 6tx for safe d/c home    Goal Outcome # 5 Goal not met   Education Group   Role of Physical Therapist Patient Response Patient;Acceptance;Explanation;Demonstration;Action Demonstration;Verbal Demonstration   Anticipated Discharge Equipment and Recommendations   DC Equipment Recommendations Unable to determine at this time   Discharge Recommendations Recommend post-acute placement for additional physical therapy services prior to discharge home   Interdisciplinary Plan of Care Collaboration   IDT Collaboration with  Nursing;Family / Caregiver   Patient Position at End of Therapy In Bed;Call Light within Reach;Tray Table within Reach;Family / Friend in Room;Phone within Reach   Collaboration Comments aware of visit and recs    Session Information   Date / Session Number  5/10-5 (2/4, 5/11)

## 2021-05-10 NOTE — PROGRESS NOTES
Pt sitting at edge of bed and working on safe transfers with PT/OT.   Oxy 10 given for pain of 8/10 in left leg with good results.   Vitals are stable on room air.   TPN is running and maintenance fluids.   Diet is advanced to diabetic, pt tolerating some advanced foods.     1900: Pt transferring to chair & bed with 2-3 max assist, very slowly.

## 2021-05-10 NOTE — DISCHARGE PLANNING
Anticipated Discharge Disposition: LTAC- [facility]     Action: Pt discussed during morning rounds. Per Dr. Dang pt able to transfer to LTAC when pt has been accepted and bed is available.      LSW reached out to Moira with [employer] for updates with referral.     Barriers to Discharge: LTAC acceptance, LTAC bed availability       Plan: Await LTAC acceptance, LSW to continue to assist with d/c needs, LSW to assist as needed     Addendum 0810  LSW informed by Moira with [employer] that they are pending insurance auth.

## 2021-05-10 NOTE — CARE PLAN
Problem: Nutritional:  Goal: Achieve adequate nutritional intake  Description: Patient will consume 50% of meals  Outcome: PROGRESSING SLOWER THAN EXPECTED   See RD note.

## 2021-05-10 NOTE — PROGRESS NOTES
[facility] Medicine Daily Progress Note    Date of Service  5/10/2021    Chief Complaint  65 y.o. female admitted 4/21/2021 with abdominal pain.    Hospital Course  4/22/2021-patient is a 65-year-old female comes in with abdominal pain that started last Friday.  After suffering with the pain the patient went to see urgent care where they did a CAT scan of her abdomen which came back with early appendicitis.  Patient was referred over to the emergency room for evaluation.  Patient was admitted for surgery and was found to have a large abscess along with the appendicitis.  Appendix was removed and the abscess has been drained.  She still having chills and fevers afterwards.  Continue with IV antibiotics as well as pain management and fluid resuscitation.  Await culture results.  4/23/2021-this morning patient is in quite a bit of distress.  Her pain has escalated in the abdomen and she is unable to urinate or defecate.  Upon further evaluation patient has a postoperative ileus in her bowel sounds have not returned.  She has not passed any gas or had a bowel movement.  Continue at this point with supportive fluid resuscitation to ensure the patient's bowel functions returned to normal.  Patient at this point is also complaining of urinary retention and the bladder scanner was only showing 70 to 100 mL of retained urine.  This was inconsistent with the amount of pain the patient was having thus I ordered a immediate straight catheterization which yielded over 800 mL of urine.  After the urine came out the patient's abdominal pain eased up and her condition improved.  We will continue to monitor her urine output and if the patient should have urinary retention we will straight catheter again.  For the time being because of her abdominal distention pain and ileus as well as urinary retention we are unable to discharge the patient at this point in time.  Continue at this point with aggressive management and  stabilization.  4/24/2021-patient is still having difficulty with abdominal pain.  She also has not had a bowel movement and her intestinal sounds are returning very slowly.  Concern is that there is still an abscess present.  Flatplate of the abdomen did not show anything her white blood cell count however is increasing at this point I am going to obtain a CT of the abdomen.  Discussed it with surgery.  Because of the bladder distention and inability urinate a Pabon catheter had to be placed.  Hopefully this will decompress the bladder.  4/25/2021-postoperative day #4 after laparoscopic appendectomy had to be converted to an open appendectomy and drainage of abscess cavity.  Cultures have not grown out any significant organism.  White blood cell count has gone up, repeat CT of the abdomen does not demonstrate worsening abscess formation and actually is consistent with routine healing.  Patient CT demonstrates an ileus.  Since her abdominal distention is getting worse and her nausea is persisting I am putting in an NG to low intermittent suctioning.  This was discussed with the bedside nurse and surgery.  4/26/2021-patient's abdominal distention persists.  Ileus persists status postoperative.  Yesterday the patient's Pabon catheter became obstructed.  It had to be replaced.  Yesterday he also had some technical issues with the hospital bed and at this point had to be replaced a few times.  Pain seems to be better controlled.  Her labs are improving.  The patient may need a repeat CT in 24 to 48 hours if her white blood cell count does not improve.  Today's count is down to 14.8.  Continue with IV Zosyn    Interval Problem Update  4/27: Patient seen at bedside this morning.  The patient still has not had a bowel movement, however she mentions that she thinks she is passing gas.  General surgery continues to recommend ambulation.  Patient currently n.p.o. with NG tube and the patient is on TPN.  As per nursing no  other overnight events were reported.    4/28: Patient seen at bedside this morning.  The patient still has not had a bowel movement.  She says she is passing gas.  General Surgery following the patient along.  We will continue TPN and n.p.o. for now.  No other overnight events were reported by nursing.    4/29: Patient seen at bedside this morning.  She mentions she is passing gas, however she has not had a bowel movement yet.  She was encouraged to ambulate.  We have consulted ID for further antibiotic coverage, we appreciate further recommendations.  As per nursing no other overnight events reported.    4/30: Patient seen at bedside this morning.  The patient still without a bowel movement.  ID recommends to continue IV antibiotics for now.  As per nursing no other overnight events reported.    5/1: Patient seen at bedside this morning.  She is lying comfortably in bed, however she still having bowel movements, but she says she is passing gas.  ID recommended to stop antibiotics after today and if WBC trend upwards to repeat CT scan.  As per nursing no other overnight events were reported.    5/2: WBC count slightly elevated today from yesterday.  The last dose of Zosyn she got last night.  Patient lying in bed comfortably, currently not complaining of other pain.  She is still has not had a bowel movement.  We will repeat WBC tomorrow and consider CT scan if it continues to trend up.  As per nursing no other overnight events reported.    5/3: WBC increased today. We have ordered CT scan abdomen and pelvis, which we are pending. If CT scan find abscess collection we will restart zosyn and surgery will be notified. Patient complaining of knee pain. Patient still without a BM, but mentions she is passing gas. As per nursing no other over night events reported.    5/4: WBC still up trending. CT A/P without any finding of residual abscess. We will hold off antibiotics for now and clinically monitor.  If it continues  to uptrend, will work-up other source for infection.  She still has complains of feeling bloated/constipated, passing gas but still without a bowel movement. We will plan to remove Pabon in a day or 2 and TOV.    5/5: WBC down to 15,000 this morning. Still no clear signs of infection noted at this time. Still passing gas but no bowel movements. Will remove Pabon today and perform a trial void. KUB ordered to evaluate bowels.     5/6: WBC downtrending. No obvious signs of infection noted at this time. Continues to pass gas but without any bowel movements. KUB performed yesterday was mostly unremarkable. Pabon catheter removed and patient has been able to void.  We will plan for NG tube clamp today and hopefully remove it in the next few hours.   working on LTAC placement.    5/7: She had a one-time fever overnight, but WBC remains stable. NG tube clamped and removed yesterday. Bharathi and LENI drain was removed by surgery yesterday.  We will try patient on sips of liquids today, otherwise pending placement once accepted to LTAC.    5/8: Patient started on clear liquid diet overnight. We will watch closely today and monitor for any signs of nausea, vomiting or abdominal distention. Hopefully can advance diet later today.    5/9: Patient now full liquid diet. Unable to tolerate GI soft yesterday. We will continue to advance as she tolerates and hopefully wean off TPN.    5/10: Remains on full liquid diet at this time. Unable to advance diet due to concerns for increasing nausea, vomiting abdominal pain/distention. She remains on TPN.    Consultants/Specialty  General Surgery  ID    Code Status  Full Code    Disposition  Pending    Review of Systems  Review of Systems   Constitutional: Negative for chills and fever.   HENT: Negative for hearing loss and nosebleeds.    Eyes: Negative for blurred vision and double vision.   Respiratory: Negative for cough and shortness of breath.    Cardiovascular: Negative for  chest pain and palpitations.   Gastrointestinal: Positive for constipation. Negative for abdominal pain, heartburn, nausea and vomiting.   Genitourinary: Negative for dysuria and urgency.   Musculoskeletal: Positive for back pain and joint pain (knee pain). Negative for falls and myalgias.   Skin: Negative for itching and rash.   Neurological: Negative for dizziness and headaches.   Psychiatric/Behavioral: The patient is not nervous/anxious.    All other systems reviewed and are negative.       Physical Exam  Temp:  [36.1 °C (96.9 °F)-37.7 °C (99.9 °F)] 37.7 °C (99.9 °F)  Pulse:  [] 98  Resp:  [17-18] 18  BP: (128-136)/(65-84) 132/72  SpO2:  [91 %-96 %] 92 %    Physical Exam  Constitutional:       Appearance: She is obese. She is ill-appearing.   HENT:      Head: Normocephalic and atraumatic.      Nose: No congestion or rhinorrhea.      Mouth/Throat:      Pharynx: No oropharyngeal exudate or posterior oropharyngeal erythema.   Eyes:      General:         Right eye: No discharge.         Left eye: No discharge.   Cardiovascular:      Rate and Rhythm: Normal rate and regular rhythm.      Pulses: Normal pulses.      Heart sounds: No murmur. No gallop.    Pulmonary:      Effort: Pulmonary effort is normal. No respiratory distress.      Breath sounds: No wheezing or rales.   Abdominal:      General: There is distension.      Tenderness: There is abdominal tenderness.      Comments: Bandages in place on surgical site, does not appear soaked or bleeding.   Musculoskeletal:         General: Normal range of motion.      Cervical back: Normal range of motion.   Skin:     General: Skin is warm and dry.   Neurological:      General: No focal deficit present.      Mental Status: She is alert and oriented to person, place, and time.      Cranial Nerves: No cranial nerve deficit.      Motor: No weakness.   Psychiatric:         Mood and Affect: Mood normal.         Behavior: Behavior normal.         Fluids    Intake/Output  Summary (Last 24 hours) at 5/10/2021 0712  Last data filed at 5/9/2021 1500  Gross per 24 hour   Intake 200 ml   Output 1550 ml   Net -1350 ml       Laboratory  Recent Labs     05/08/21  0300 05/10/21  0425   WBC 13.7* 12.0*   RBC 3.26* 3.25*   HEMOGLOBIN 9.4* 9.5*   HEMATOCRIT 30.1* 30.2*   MCV 92.3 92.9   MCH 28.8 29.2   MCHC 31.2* 31.5*   RDW 48.8 48.2   PLATELETCT 460* 464*   MPV 10.8 10.4     Recent Labs     05/09/21  0745 05/09/21  0930 05/10/21  0425   SODIUM 126* 137 137   POTASSIUM 10.0* 4.7 4.3   CHLORIDE 100 104 105   CO2 20 24 23   GLUCOSE 1013* 139* 138*   BUN 25* 26* 26*   CREATININE 0.68 0.70 0.71   CALCIUM 9.1 9.1 9.1             Recent Labs     05/09/21  0745 05/10/21  0425   TRIGLYCERIDE 652* 168*       Imaging  [id number] VIEW   Final Result         Mildly dilated gas-filled loop of small bowel in the right lower quadrant could relate to mild focal ileus.      CT-ABDOMEN-PELVIS WITH   Final Result         1.  Improving fat stranding in the lower abdomen likely improving inflammation and postoperative changes. A surgical drain in the right lower quadrant is again noted. No significant free fluid or abscess.   2.  Mildly improved ileus.   3.  Hepatic steatosis.               DX-ABDOMEN FOR TUBE PLACEMENT   Final Result         1.  Nonspecific bowel gas pattern.   2.  Dobbhoff tube is coiled within the pylorus, the tip terminates overlying the expected location of the gastric pylorus or gastric antrum.   3.  Left basilar atelectasis versus early infiltrate.      IR-PICC LINE PLACEMENT W/ GUIDANCE > AGE 5   Final Result                  Ultrasound-guided PICC placement performed by qualified nursing staff as    above.          CT-ABDOMEN-PELVIS W/O   Final Result      Interval laparotomy with right lower quadrant surgical drain, similar fat stranding in the pericecal region that is likely postoperative in nature. No noncontrast evidence of abscess formation      Small free fluid and intraperitoneal  air is not outside normal limits in the perioperative period      Contrast in the colon is from the CT 3 days ago, air-fluid levels and mild small bowel dilatation. In combination these findings are most compatible with ileus      Urinary bladder decompressed around the Pabon catheter. No hydroureteronephrosis      [id number] VIEW   Final Result      Dilated loops of small bowel most likely represent ileus. Partial obstruction is not excluded.      Residual enteric contrast is seen from the recent CT.      Mild bibasilar atelectasis.         IR-US GUIDED PIV   Final Result    Ultrasound-guided PERIPHERAL IV INSERTION performed by    qualified nursing staff as above.           Assessment/Plan  * Acute appendicitis with localized peritonitis  Assessment & Plan  Status post appendectomy.  Initially attempted with laparoscopy but then had to be converted to open resection.  Pain management  NG tube to suctioning due to persistent postoperative ileus  Fluid resuscitation  Due to lack of nutrition will have PICC line placed and TPN started.    4/29: Continue TPN, we appreciate any further recommendations by general surgery. Continue Zosyn. We have consulted ID for further antibiotic coverage, we appreciate further recommendations.    4/30: We consulted ID, and they recommended to continue IV antibiotics for now.    5/1: ID recommends to stop antibiotics after today.     5/2: WBC today is 14.1, yesterday it was 13.5. We just stopped Zosyn last night, we will repeat CBC tomorrow and if it continues to trend upwards, we will consider repeat CT scan. If WBC trends upwards, we will consider repeat CT scan. Patient afebrile, without hypotension.    5/3: WBC today is 15.8. I spoke to ID and they recommend repeat CT scan. ID does not recommend initiating antibiotics at this time, pending CT scan. We have ordered abdominal/pelvic CT scan result.     CT A/P without any findings of free fluid or abscess.    Patient currently  afebrile, and not complaining of worsening abdominal pain at this time.     Constipation  Assessment & Plan  Possibly post surgical etiology.  General surgery following patient, we appreciate further recommendations  CLD, currently on TPN.    Leukocytosis  Assessment & Plan  In the setting of ruptured appendicitis and abdominal abscess.  Continue zosyn    4/30: WBC today are 13.3. ID consulted, who recommend to continue IV antibiotics. We appreciate further recommendations.    5/1: ID recommends to stop antibiotics after today. If WBC trends upwards, we will consider repeat CT scan.    5/2: Mild elevation today from 13.5 to 14.1. We will monitor and consider CT scan if it continues to trend up.    5/3: WBC 15.8 today. We have repeated CT scan as per ID's recommendations, pending result.    CT A/P with no findings of free fluid or abscess.  Will clinically monitor for now and keep trending CBC, if still uptrending, will look for OTHER source of infection.    Essential hypertension- (present on admission)  Assessment & Plan  Continue losartan  PRN labetalol    4/29: We have increased losartan to 50 mg BID.    5/2: We  Have started amlodipine 5 mg.    Osteoarthritis- (present on admission)  Assessment & Plan  Patient with a history of osteoarthritis.  The patient today complaining of knee pain.  The patient states that she usually takes ibuprofen at home.  We will order 1 dose of ibuprofen 400 mg.  However we will encourage Tylenol for arthritic pain at this time due to the patient's history of CKD.  The patient currently receiving IV fluids, we will monitor closely and make changes accordingly.    Anemia  Assessment & Plan  Hb today is 10.2, has remained stable.  This is most likely post-surgical  No signs of bleeding at this time  Monitor, repeat CBC, and make changes accordingly.    Hyperglycemia  Assessment & Plan  Patient's A1c is 6.5.  Patient currently on ISS and hypoglycemia protocol.  Patient would require  close follow up as outpatient.    Hypothyroidism  Assessment & Plan  Continue home medication.    Stage 3 chronic kidney disease- (present on admission)  Assessment & Plan  As per chart review, apparently history of  Avoid nephrotoxic medications    5/3: Creatinine .84 today. Patient NPO on TPN.  Patient also currently on IVF, patient will get CT scan with contrast today, so we will monitor kidney function.    Stable.    BMI 50.0-59.9, adult (HCC)- (present on admission)  Assessment & Plan  Body mass index is 57.29 kg/m².  Outpatient follow-ups with weight program      HENRI (obstructive sleep apnea)- (present on admission)  Assessment & Plan  CPAP nocturnal.         VTE prophylaxis: heparin

## 2021-05-10 NOTE — CARE PLAN
Problem: Safety  Goal: Will remain free from falls  Outcome: PROGRESSING AS EXPECTED  Note: Pt agreed to all recommended fall precautions     Problem: Discharge Barriers/Planning  Goal: Patient's continuum of care needs will be met  Outcome: PROGRESSING AS EXPECTED  Note: Discussed mobility as an important factor in recovery

## 2021-05-10 NOTE — CARE PLAN
Problem: Safety  Goal: Will remain free from injury  Outcome: PROGRESSING AS EXPECTED  Note: Pt working on safe transfers with PT/OT. States left side is stronger for transfers. Fall precautions are in place.     Problem: Bowel/Gastric:  Goal: Normal bowel function is maintained or improved  Outcome: PROGRESSING AS EXPECTED  Intervention: Educate patient and significant other/support system about diet, fluid intake, medications and activity to promote bowel function  Note: Diet advancing to diabetic. Pt is tolerating, however prefers to eat fruit and lighter foods. TPN is infusing. No complaints of nausea.      Problem: Discharge Barriers/Planning  Goal: Patient's continuum of care needs will be met  Outcome: PROGRESSING AS EXPECTED     Problem: Mobility  Goal: Risk for activity intolerance will decrease  Outcome: PROGRESSING AS EXPECTED

## 2021-05-10 NOTE — THERAPY
"Occupational Therapy  Daily Treatment     Patient Name: Mady Yang  Age:  65 y.o., Sex:  female  Medical Record #: [id number]  Today's Date: 5/10/2021     Precautions  Precautions: Fall Risk  Comments: TPN, HOB at 30deg    Assessment    Pt seen for OT tx, easily persuaded once purpose of therapy explained. She did mention off-hand that she could not bear weight on L knee d/t pain, but did responded really well to techniques and compensatory strategies. Pt was able to manage bed mob, STSx2, and OOB/EOB activities, with cueing for redirection, encouragement, and sequencing. She will continue to benefit from acute OT in house, with post acute recommendations prior to dc home.    Plan    Continue current treatment plan.    DC Equipment Recommendations: Unable to determine at this time  Discharge Recommendations: Recommend post-acute placement for additional occupational therapy services prior to discharge home    Subjective    \"My knee acts up from time to time.\"     Objective       05/10/21 0955   Pain   Pain Scales 0 to 10 Scale    Intervention Medication (see MAR);Emotional Support;Repositioned   Pain 0 - 10 Group   Location Knee   Location Orientation Left   Pain Rating Scale (NPRS) 6   Description Aching   Comfort Goal Comfort with Movement   Therapist Pain Assessment During Activity;Post Activity Pain Same as Prior to Activity;Nurse Notified   Non Verbal Descriptors   Non Verbal Scale  Calm;Sleeping   Cognition    Cognition / Consciousness WDL   Level of Consciousness Alert   Comments agreeable to therapy, but requires some encouragement as well   Other Treatments   Other Treatments Provided Pt seen for OT tx, easily persuaded once purpose of therapy explained. She did mention off-hand that she could not bear weight on L knee d/t pain, but did responded really well to techniques and compensatory strategies. Pt was able to manage bed mob, STSx2, and OOB/EOB activities, with cueing for redirection, " "encouragement, and sequencing.    Balance   Sitting Balance (Static) Fair +   Sitting Balance (Dynamic) Fair   Standing Balance (Static) Fair -   Standing Balance (Dynamic) Poor   Weight Shift Sitting Fair   Weight Shift Standing Fair   Skilled Intervention Verbal Cuing;Sequencing;Compensatory Strategies   Comments with bariatric FWW   Bed Mobility    Supine to Sit Minimal Assist   Sit to Supine Moderate Assist   Scooting Supervised  (SBA )   Skilled Intervention Verbal Cuing;Compensatory Strategies;Sequencing   Comments takes extra time to complete tasks; requires cues for sequencing and compensatory strategies   Activities of Daily Living   Upper Body Dressing Minimal Assist   Lower Body Dressing Moderate Assist  (mod/max shoes)   Skilled Intervention Compensatory Strategies   How much help from another person does the patient currently need...   Putting on and taking off regular lower body clothing? 2   Bathing (including washing, rinsing, and drying)? 2   Toileting, which includes using a toilet, bedpan, or urinal? 2   Putting on and taking off regular upper body clothing? 3   Taking care of personal grooming such as brushing teeth? 3   Eating meals? 4   6 Clicks Daily Activity Score 16   Functional Mobility   Sit to Stand Minimal Assist   Mobility supine to sit, EOB, and STS x2 only   Skilled Intervention Verbal Cuing;Compensatory Strategies   Activity Tolerance   Sitting in Chair NT   Sitting Edge of Bed 20 mins   Standing 5 secs x 1, 8 secs x 1   Comments limited by L knee pain and weakness   Short Term Goals   Short Term Goal # 1 pt will complete ADL txfs with LRAD at spv level   Goal Outcome # 1 Progressing slower than expected   Short Term Goal # 2 pt will complete FB dressing with spv   Goal Outcome # 2 Progressing slower than expected   Short Term Goal # 3 pt will complete and tolerate 10\" of standing G/H at sinkside with spv   Goal Outcome # 3 Progressing slower than expected   Education Group "   Transfers Patient Response Patient;Significant Other;Acceptance;Explanation;Verbal Demonstration;Reinforcement Needed   Anticipated Discharge Equipment and Recommendations   DC Equipment Recommendations Unable to determine at this time   Discharge Recommendations Recommend post-acute placement for additional occupational therapy services prior to discharge home   Interdisciplinary Plan of Care Collaboration   IDT Collaboration with  Nursing   Patient Position at End of Therapy In Bed;Call Light within Reach;Tray Table within Reach;Phone within Reach   Collaboration Comments RN aware of OT session pre and post;  arrived during seated activities   Session Information   Date / Session Number  5/10 #4 (2/3, 5/11) LF   Priority 2

## 2021-05-10 NOTE — PROGRESS NOTES
Pharmacy TPN Day # 15       5/10/2021    Dosing Weight   76 kg           TPN currently providing 100% of goal                                                    TPN goal:               Calories: 1617 - 1991 kcal/day (kcal/k - 13.5)              Protein: 104 - 130 (2-2.5 gProtein / kg IBW)     TPN indication: NPO s/p appendectomy w/ abscess present    Pertinent PMH: Back pain, Chronic low back pain (2018), Elevated blood sugar, Hypertension, Kidney stone (), Obesity, HENRI (obstructive sleep apnea) (2017), Osteoporosis, Post-nasal drip, Seasonal allergies, Sleep apnea, and Thyroid disease.    Temp (24hrs), Av.2 °C (98.9 °F), Min:36.6 °C (97.9 °F), Max:37.7 °C (99.9 °F)  .  Recent Labs     21  0300 21  0930 05/10/21  0425   SODIUM 139 137 137   POTASSIUM 4.2 4.7 4.3   CHLORIDE 108 104 105   CO2 21 24 23   BUN 24* 26* 26*   CREATININE 0.77 0.70 0.71   GLUCOSE 144* 139* 138*   CALCIUM 9.1 9.1 9.1   ASTSGOT 21 37 25   ALTSGPT 21 37 30   ALBUMIN 2.6* 2.9* 2.7*   TBILIRUBIN 0.3 0.2 0.2   PHOSPHORUS  --   --  3.4   MAGNESIUM 2.0  --  1.9   PREALBUMIN  --   --  14.3*    < > = values in this interval not displayed.     Accu-Checks  Recent Labs     21  1742 05/10/21  0003 05/10/21  1200   POCGLUCOSE 146* 140* 127*     Vitals:    21 1000 21 1602 05/10/21 0427 05/10/21 1125   BP: 128/70 136/84 132/72 112/61   Weight:       Height:         Intake/Output Summary (Last 24 hours) at 5/10/2021 1530  Last data filed at 5/10/2021 0745  Gross per 24 hour   Intake --   Output 1150 ml   Net -1150 ml     Orders Placed This Encounter   Procedures   • Diet Order Diet: Consistent CHO (Diabetic)     Standing Status:   Standing     Number of Occurrences:   1     Order Specific Question:   Diet:     Answer:   Consistent CHO (Diabetic) [4]     TPN for past 72 hours (Show up to 3 orders; newest on the left. Changes between the two most recent orders are indicated.)     Start date and time    05/10/2021 2000 05/09/2021 2000 05/08/2021 2000      TPN Central Line Formulation [[id number]] TPN Central Line Formulation [[id number]] TPN Central Line Formulation [[id number]]    Order Status  Active Last Dose in Progress Completed    Last Admin   New Bag at 05/09/2021 2016 by Derrell Aburto R.N. New Bag at 05/08/2021 2000 by Derrell Aburto R.N.       Base    Clinisol 15%  120 g 120 g 120 g    dextrose 70%  220 g 220 g 220 g    fat emulsions 20%  40 g 40 g 40 g       Additives    potassium phosphate  15 mmol 15 mmol 15 mmol    potassium chloride  60 mEq 60 mEq 70 mEq    sodium chloride  170 mEq 170 mEq 170 mEq    magnesium sulfate  8 mEq 8 mEq 8 mEq    calcium GLUConate  9.4 mEq 9.4 mEq 9.4 mEq    M.T.E.-4 Adult  1 mL 1 mL 1 mL    M.V.I. Adult  10 mL 10 mL 10 mL       QS Base    sterile water  567.01 mL 567.01 mL 562.01 mL       Energy Contribution    Proteins  -- -- --    Dextrose  -- -- --    Lipids  -- -- --    Total  -- -- --       Electrolyte Ion Calculated Amount    Sodium  170 mEq 170 mEq 170 mEq    Potassium  82 mEq 82 mEq 92 mEq    Calcium  9.4 mEq 9.4 mEq 9.4 mEq    Magnesium  8 mEq 8 mEq 8 mEq    Aluminum  -- -- --    Phosphate  15 mmol 15 mmol 15 mmol    Chloride  230 mEq 230 mEq 240 mEq    Acetate  101.6 mEq 101.6 mEq 101.6 mEq       Other    Total Protein  120 g 120 g 120 g    Total Protein/kg  0.81 g/kg 0.81 g/kg 0.81 g/kg    Total Amino Acid  -- -- --    Total Amino Acid/kg  -- -- --    Glucose Infusion Rate  1.03 mg/kg/min 1.03 mg/kg/min 1.03 mg/kg/min    Osmolarity (Estimated)  -- -- --    Volume  1,992 mL 1,992 mL 1,992 mL    Rate  83 mL/hr 83 mL/hr 83 mL/hr    Dosing Weight  148 kg 148 kg 148 kg    Infusion Site  [facility]        This formula provides:  % kcal as lipids = 25  Grams protein/kg = 1.6  Non-protein calories = 1148  Kcals/kg = 21.4  Total daily calories = 1628    Comments:  Diet advanced to Consistent CHO this am. Pt states she has only been able to tolerate  some fruit so far. Per MD note, pt has had ongoing nausea and vomiting. Noted emesis bag at bedside at time of visit.    Olive Arciniega, VikyD

## 2021-05-11 LAB
ALBUMIN SERPL BCP-MCNC: 2.8 G/DL (ref 3.2–4.9)
ALBUMIN/GLOB SERPL: 0.7 G/DL
ALP SERPL-CCNC: 270 U/L (ref 30–99)
ALT SERPL-CCNC: 44 U/L (ref 2–50)
ANION GAP SERPL CALC-SCNC: 10 MMOL/L (ref 7–16)
AST SERPL-CCNC: 47 U/L (ref 12–45)
BILIRUB SERPL-MCNC: 0.3 MG/DL (ref 0.1–1.5)
BUN SERPL-MCNC: 30 MG/DL (ref 8–22)
CALCIUM SERPL-MCNC: 9.2 MG/DL (ref 8.4–10.2)
CHLORIDE SERPL-SCNC: 106 MMOL/L (ref 96–112)
CO2 SERPL-SCNC: 23 MMOL/L (ref 20–33)
CREAT SERPL-MCNC: 0.71 MG/DL (ref 0.5–1.4)
GLOBULIN SER CALC-MCNC: 4.2 G/DL (ref 1.9–3.5)
GLUCOSE BLD-MCNC: 106 MG/DL (ref 65–99)
GLUCOSE BLD-MCNC: 130 MG/DL (ref 65–99)
GLUCOSE BLD-MCNC: 133 MG/DL (ref 65–99)
GLUCOSE BLD-MCNC: 136 MG/DL (ref 65–99)
GLUCOSE SERPL-MCNC: 134 MG/DL (ref 65–99)
MAGNESIUM SERPL-MCNC: 2.1 MG/DL (ref 1.5–2.5)
PHOSPHATE SERPL-MCNC: 4 MG/DL (ref 2.5–4.5)
POTASSIUM SERPL-SCNC: 4.6 MMOL/L (ref 3.6–5.5)
PROT SERPL-MCNC: 7 G/DL (ref 6–8.2)
SODIUM SERPL-SCNC: 139 MMOL/L (ref 135–145)

## 2021-05-11 PROCEDURE — 700101 HCHG RX REV CODE 250: Performed by: INTERNAL MEDICINE

## 2021-05-11 PROCEDURE — 700101 HCHG RX REV CODE 250: Performed by: STUDENT IN AN ORGANIZED HEALTH CARE EDUCATION/TRAINING PROGRAM

## 2021-05-11 PROCEDURE — 700111 HCHG RX REV CODE 636 W/ 250 OVERRIDE (IP): Performed by: INTERNAL MEDICINE

## 2021-05-11 PROCEDURE — 82962 GLUCOSE BLOOD TEST: CPT | Mod: 91

## 2021-05-11 PROCEDURE — 700102 HCHG RX REV CODE 250 W/ 637 OVERRIDE(OP): Performed by: INTERNAL MEDICINE

## 2021-05-11 PROCEDURE — A9270 NON-COVERED ITEM OR SERVICE: HCPCS | Performed by: INTERNAL MEDICINE

## 2021-05-11 PROCEDURE — 700111 HCHG RX REV CODE 636 W/ 250 OVERRIDE (IP): Performed by: STUDENT IN AN ORGANIZED HEALTH CARE EDUCATION/TRAINING PROGRAM

## 2021-05-11 PROCEDURE — 700105 HCHG RX REV CODE 258: Performed by: STUDENT IN AN ORGANIZED HEALTH CARE EDUCATION/TRAINING PROGRAM

## 2021-05-11 PROCEDURE — 700102 HCHG RX REV CODE 250 W/ 637 OVERRIDE(OP): Performed by: STUDENT IN AN ORGANIZED HEALTH CARE EDUCATION/TRAINING PROGRAM

## 2021-05-11 PROCEDURE — 99233 SBSQ HOSP IP/OBS HIGH 50: CPT | Performed by: INTERNAL MEDICINE

## 2021-05-11 PROCEDURE — 83735 ASSAY OF MAGNESIUM: CPT

## 2021-05-11 PROCEDURE — 770006 HCHG ROOM/CARE - MED/SURG/GYN SEMI*

## 2021-05-11 PROCEDURE — 84100 ASSAY OF PHOSPHORUS: CPT

## 2021-05-11 PROCEDURE — 80053 COMPREHEN METABOLIC PANEL: CPT

## 2021-05-11 PROCEDURE — 94660 CPAP INITIATION&MGMT: CPT

## 2021-05-11 PROCEDURE — A9270 NON-COVERED ITEM OR SERVICE: HCPCS | Performed by: STUDENT IN AN ORGANIZED HEALTH CARE EDUCATION/TRAINING PROGRAM

## 2021-05-11 RX ADMIN — CYCLOBENZAPRINE 5 MG: 10 TABLET, FILM COATED ORAL at 17:07

## 2021-05-11 RX ADMIN — CYCLOBENZAPRINE 5 MG: 10 TABLET, FILM COATED ORAL at 06:13

## 2021-05-11 RX ADMIN — AMLODIPINE BESYLATE 10 MG: 5 TABLET ORAL at 06:13

## 2021-05-11 RX ADMIN — SODIUM CHLORIDE, POTASSIUM CHLORIDE, SODIUM LACTATE AND CALCIUM CHLORIDE: 600; 310; 30; 20 INJECTION, SOLUTION INTRAVENOUS at 23:17

## 2021-05-11 RX ADMIN — HEPARIN SODIUM 5000 UNITS: 5000 INJECTION, SOLUTION INTRAVENOUS; SUBCUTANEOUS at 06:13

## 2021-05-11 RX ADMIN — LOSARTAN POTASSIUM 50 MG: 25 TABLET, FILM COATED ORAL at 17:07

## 2021-05-11 RX ADMIN — THYROID, PORCINE 90 MG: 30 TABLET ORAL at 06:12

## 2021-05-11 RX ADMIN — HEPARIN SODIUM 5000 UNITS: 5000 INJECTION, SOLUTION INTRAVENOUS; SUBCUTANEOUS at 22:03

## 2021-05-11 RX ADMIN — HEPARIN SODIUM 5000 UNITS: 5000 INJECTION, SOLUTION INTRAVENOUS; SUBCUTANEOUS at 14:28

## 2021-05-11 RX ADMIN — CALCIUM GLUCONATE: 98 INJECTION, SOLUTION INTRAVENOUS at 19:59

## 2021-05-11 RX ADMIN — OXYCODONE HYDROCHLORIDE 5 MG: 5 TABLET ORAL at 12:06

## 2021-05-11 RX ADMIN — OXYCODONE HYDROCHLORIDE 5 MG: 5 TABLET ORAL at 23:11

## 2021-05-11 RX ADMIN — SENNOSIDES AND DOCUSATE SODIUM 1 TABLET: 8.6; 5 TABLET ORAL at 06:13

## 2021-05-11 RX ADMIN — LOSARTAN POTASSIUM 50 MG: 25 TABLET, FILM COATED ORAL at 06:13

## 2021-05-11 RX ADMIN — LORATADINE 10 MG: 10 TABLET ORAL at 06:13

## 2021-05-11 RX ADMIN — LIDOCAINE 1 PATCH: 50 PATCH TOPICAL at 17:07

## 2021-05-11 RX ADMIN — CYCLOBENZAPRINE 5 MG: 10 TABLET, FILM COATED ORAL at 12:03

## 2021-05-11 ASSESSMENT — ENCOUNTER SYMPTOMS
CARDIOVASCULAR NEGATIVE: 1
MYALGIAS: 1
VOMITING: 0
ABDOMINAL PAIN: 1
CHILLS: 0
FEVER: 0
NAUSEA: 0
RESPIRATORY NEGATIVE: 1

## 2021-05-11 ASSESSMENT — PAIN DESCRIPTION - PAIN TYPE
TYPE: ACUTE PAIN
TYPE: ACUTE PAIN;CHRONIC PAIN
TYPE: ACUTE PAIN

## 2021-05-11 NOTE — HOSPITAL COURSE
"Per notes, \"65 y.o. female who presented 4/21/2021 with abdominal pain that started generalized on Friday and has localized to the RLQ.  She presented to urgent care earlier today and CT scan confirms the diagnosis of acute appendicitis.  She denies any fevers or chills.  She has a history of diabetes and uses diet to control this, she states her last A1C was around 6 and she doesn't check her sugars regularly.  She does take a supplement called Berberine complex that she states is similar to metformin.  I will resume her home medications for hypertension and thyroid and will also order a sliding scale insulin while hospitalized to make sure the stress of surgery does not drive her sugars too high.\"    Patient was diagnosed with appendicitis and intra-abdominal abscess, she was scheduled for laparoscopic appendectomy, converted to open appendectomy and drainage of abscess.  She required multiple drains while in the hospital and was continued on broad-spectrum antibiotics.  Additionally she also had urinary retention and required Pabon catheter.  It was determined that she would require LTAC placement,  have worked extensively with patient to arrange this.  She was cleared by general surgery, and will follow up with them in the outpatient setting.       "

## 2021-05-11 NOTE — PROGRESS NOTES
Pharmacy TPN Day # 16       2021    Dosing Weight   76 kg TPN currently providing 100% of goal       Protein  TPN goal:   Calories: 1617 - 1991 kcal/day (kcal/k - 13.5)  Protein: 104 - 130 (2-2.5 gProtein / kg IBW)   TPN indication: NPO s/p appendectomy w/ abscess present     Pertinent PMH: Back pain, Chronic low back pain (2018), Elevated blood sugar, Hypertension, Kidney stone (), Obesity, HENRI (obstructive sleep apnea) (2017), Osteoporosis, Post-nasal drip, Seasonal allergies, Sleep apnea, and Thyroid disease.  Temp (24hrs), Av.8 °C (98.3 °F), Min:36.6 °C (97.9 °F), Max:37.2 °C (98.9 °F)  .  Recent Labs     21  0745 21  0745 21  0930 05/10/21  0425 21  0420   SODIUM 126*   < > 137 137 139   POTASSIUM 10.0*   < > 4.7 4.3 4.6   CHLORIDE 100   < > 104 105 106   CO2 20   < > 24 23 23   BUN 25*   < > 26* 26* 30*   CREATININE 0.68   < > 0.70 0.71 0.71   GLUCOSE 1013*   < > 139* 138* 134*   CALCIUM 9.1   < > 9.1 9.1 9.2   ASTSGOT 23   < > 37 25 47*   ALTSGPT 19   < > 37 30 44   ALBUMIN 2.2*   < > 2.9* 2.7* 2.8*   TBILIRUBIN <0.2   < > 0.2 0.2 0.3   PHOSPHORUS 6.7*  --   --  3.4 4.0   MAGNESIUM 2.4  --   --  1.9 2.1   PREALBUMIN  --   --   --  14.3*  --     < > = values in this interval not displayed.     Accu-Checks  Recent Labs     05/10/21  1200 05/10/21  1726 21  0612   POCGLUCOSE 127* 147* 133*       Vitals:    05/10/21 1125 05/10/21 1800 05/10/21 2221 21 0439   BP: 112/61  114/67 134/70   Weight:  (!) 139 kg (306 lb)     Height:           Intake/Output Summary (Last 24 hours) at 2021 0842  Last data filed at 2021 0439  Gross per 24 hour   Intake 340 ml   Output 1800 ml   Net -1460 ml       Orders Placed This Encounter   Procedures   • Diet Order Diet: Consistent CHO (Diabetic)     Standing Status:   Standing     Number of Occurrences:   1     Order Specific Question:   Diet:     Answer:   Consistent CHO (Diabetic) [4]         TPN for past 72  hours (Show up to 3 orders; newest on the left. Changes between the two most recent orders are indicated.)     Start date and time   05/11/2021 2000 05/10/2021 2000 05/09/2021 2000      TPN Central Line Formulation [[id number]] TPN Central Line Formulation [[id number]] TPN Central Line Formulation [[id number]]    Order Status  Active Last Dose in Progress Completed    Last Admin   New Bag at 05/10/2021 2038 by Nataliya Randolph R.N. New Bag at 05/09/2021 2016 by Derrell Aburto R.N.       Base    Clinisol 15%  120 g 120 g 120 g    dextrose 70%  220 g 220 g 220 g    fat emulsions 20%  40 g 40 g 40 g       Additives    potassium phosphate  15 mmol 15 mmol 15 mmol    potassium chloride  60 mEq 60 mEq 60 mEq    sodium chloride  170 mEq 170 mEq 170 mEq    magnesium sulfate  8 mEq 8 mEq 8 mEq    calcium GLUConate  9.4 mEq 9.4 mEq 9.4 mEq    M.T.E.-4 Adult  1 mL 1 mL 1 mL    M.V.I. Adult  10 mL 10 mL 10 mL       QS Base    sterile water  567.01 mL 567.01 mL 567.01 mL       Energy Contribution    Proteins  -- -- --    Dextrose  -- -- --    Lipids  -- -- --    Total  -- -- --       Electrolyte Ion Calculated Amount    Sodium  170 mEq 170 mEq 170 mEq    Potassium  82 mEq 82 mEq 82 mEq    Calcium  9.4 mEq 9.4 mEq 9.4 mEq    Magnesium  8 mEq 8 mEq 8 mEq    Aluminum  -- -- --    Phosphate  15 mmol 15 mmol 15 mmol    Chloride  230 mEq 230 mEq 230 mEq    Acetate  101.6 mEq 101.6 mEq 101.6 mEq       Other    Total Protein  120 g 120 g 120 g    Total Protein/kg  0.86 g/kg 0.81 g/kg 0.81 g/kg    Total Amino Acid  -- -- --    Total Amino Acid/kg  -- -- --    Glucose Infusion Rate  1.1 mg/kg/min 1.03 mg/kg/min 1.03 mg/kg/min    Osmolarity (Estimated)  -- -- --    Volume  1,992 mL 1,992 mL 1,992 mL    Rate  83 mL/hr 83 mL/hr 83 mL/hr    Dosing Weight  139 kg 148 kg 148 kg    Infusion Site  [facility]            This formula provides:  % kcal as lipids = 25  Grams protein/kg = 1.6  Non-protein calories = 1148  Kcals/kg =  21.4  Total daily calories = 1628    Comments:  Patient remains on CHO diet with severe nausea noted/ low tolerance for PO nutritional intake noted from yesterday. Pharmacy to continue TPN at this time and will continue to f/u and monitor per TPN protocol.       Cornell SalmonD.

## 2021-05-11 NOTE — CARE PLAN
Problem: Communication  Goal: The ability to communicate needs accurately and effectively will improve  Outcome: PROGRESSING AS EXPECTED     Problem: Safety  Goal: Will remain free from injury  Outcome: PROGRESSING AS EXPECTED     Problem: Knowledge Deficit  Goal: Knowledge of disease process/condition, treatment plan, diagnostic tests, and medications will improve  Outcome: PROGRESSING AS EXPECTED     Problem: Pain Management  Goal: Pain level will decrease to patient's comfort goal  Outcome: PROGRESSING AS EXPECTED     Problem: Skin Integrity  Goal: Risk for impaired skin integrity will decrease  Outcome: PROGRESSING AS EXPECTED

## 2021-05-11 NOTE — CARE PLAN
Problem: Bowel/Gastric:  Goal: Normal bowel function is maintained or improved  Outcome: PROGRESSING AS EXPECTED  Note: Bowel regimen in place with bowel movements now noted.      Problem: Communication  Goal: The ability to communicate needs accurately and effectively will improve  Note: Patient noted to be able to effectively communicate with staff in relation to needs of pain, repositioning and advised to continue to call staff for needs as they arise

## 2021-05-11 NOTE — PROGRESS NOTES
"Hospital Medicine Daily Progress Note    Date of Service  5/11/2021    Chief Complaint  65 y.o. female admitted 4/21/2021 with abdominal pain    Hospital Course  Per notes, \"65 y.o. female who presented 4/21/2021 with abdominal pain that started generalized on Friday and has localized to the RLQ.  She presented to urgent care earlier today and CT scan confirms the diagnosis of acute appendicitis.  She denies any fevers or chills.  She has a history of diabetes and uses diet to control this, she states her last A1C was around 6 and she doesn't check her sugars regularly.  She does take a supplement called Berberine complex that she states is similar to metformin.  I will resume her home medications for hypertension and thyroid and will also order a sliding scale insulin while hospitalized to make sure the stress of surgery does not drive her sugars too high.\"    Patient was diagnosed with appendicitis and intra-abdominal abscess, she was scheduled for laparoscopic appendectomy, converted to open appendectomy and drainage of abscess.  She required multiple drains while in the hospital and was continued on broad-spectrum antibiotics.  Additionally she also had urinary retention and required Pabon catheter.  It was determined that she would require LTAC placement,  have worked extensively with patient to arrange this.  She was cleared by general surgery, and will follow up with them in the outpatient setting.           Interval Problem Update  Patient was seen and examined this morning at bedside, no complaints at time of examination.  Patient is still requiring TPN, will continue to wean off as she is able to tolerate diet.  Nursing had no complaints.    Consultants/Specialty  General surgery  Infectious disease    Code Status  Full Code    Disposition  Anticipated discharge to LTAC, pending placement    Review of Systems  Review of Systems   Constitutional: Positive for malaise/fatigue. Negative for " chills and fever.   Respiratory: Negative.    Cardiovascular: Negative.    Gastrointestinal: Positive for abdominal pain. Negative for nausea and vomiting.   Genitourinary: Negative.    Musculoskeletal: Positive for joint pain and myalgias.   All other systems reviewed and are negative.       Physical Exam  Temp:  [36.7 °C (98.1 °F)-37.2 °C (98.9 °F)] 37 °C (98.6 °F)  Pulse:  [] 103  Resp:  [16-20] 16  BP: (114-144)/(67-70) 144/69  SpO2:  [95 %-97 %] 97 %    Physical Exam  Vitals and nursing note reviewed.   Constitutional:       Appearance: Normal appearance. She is obese. She is not ill-appearing.   Cardiovascular:      Rate and Rhythm: Normal rate and regular rhythm.      Pulses: Normal pulses.      Heart sounds: Normal heart sounds.   Pulmonary:      Effort: Pulmonary effort is normal.      Breath sounds: Normal breath sounds.   Abdominal:      General: Abdomen is flat. Bowel sounds are normal.      Palpations: Abdomen is soft.   Musculoskeletal:      Right lower leg: No edema.      Left lower leg: No edema.   Neurological:      General: No focal deficit present.      Mental Status: She is alert and oriented to person, place, and time. Mental status is at baseline.         Fluids    Intake/Output Summary (Last 24 hours) at 5/11/2021 1500  Last data filed at 5/11/2021 1300  Gross per 24 hour   Intake 120 ml   Output 1800 ml   Net -1680 ml       Laboratory  Recent Labs     05/10/21  0425   WBC 12.0*   RBC 3.25*   HEMOGLOBIN 9.5*   HEMATOCRIT 30.2*   MCV 92.9   MCH 29.2   MCHC 31.5*   RDW 48.2   PLATELETCT 464*   MPV 10.4     Recent Labs     05/09/21  0930 05/10/21  0425 05/11/21  0420   SODIUM 137 137 139   POTASSIUM 4.7 4.3 4.6   CHLORIDE 104 105 106   CO2 24 23 23   GLUCOSE 139* 138* 134*   BUN 26* 26* 30*   CREATININE 0.70 0.71 0.71   CALCIUM 9.1 9.1 9.2             Recent Labs     05/09/21  0745 05/10/21  0425   TRIGLYCERIDE 652* 168*       Imaging  [id number] VIEW   Final Result         Mildly dilated  gas-filled loop of small bowel in the right lower quadrant could relate to mild focal ileus.      CT-ABDOMEN-PELVIS WITH   Final Result         1.  Improving fat stranding in the lower abdomen likely improving inflammation and postoperative changes. A surgical drain in the right lower quadrant is again noted. No significant free fluid or abscess.   2.  Mildly improved ileus.   3.  Hepatic steatosis.               DX-ABDOMEN FOR TUBE PLACEMENT   Final Result         1.  Nonspecific bowel gas pattern.   2.  Dobbhoff tube is coiled within the pylorus, the tip terminates overlying the expected location of the gastric pylorus or gastric antrum.   3.  Left basilar atelectasis versus early infiltrate.      IR-PICC LINE PLACEMENT W/ GUIDANCE > AGE 5   Final Result                  Ultrasound-guided PICC placement performed by qualified nursing staff as    above.          CT-ABDOMEN-PELVIS W/O   Final Result      Interval laparotomy with right lower quadrant surgical drain, similar fat stranding in the pericecal region that is likely postoperative in nature. No noncontrast evidence of abscess formation      Small free fluid and intraperitoneal air is not outside normal limits in the perioperative period      Contrast in the colon is from the CT 3 days ago, air-fluid levels and mild small bowel dilatation. In combination these findings are most compatible with ileus      Urinary bladder decompressed around the Pabon catheter. No hydroureteronephrosis      [id number] VIEW   Final Result      Dilated loops of small bowel most likely represent ileus. Partial obstruction is not excluded.      Residual enteric contrast is seen from the recent CT.      Mild bibasilar atelectasis.         IR-US GUIDED PIV   Final Result    Ultrasound-guided PERIPHERAL IV INSERTION performed by    qualified nursing staff as above.           Assessment/Plan  * Acute appendicitis with localized peritonitis  Assessment & Plan  Status post  appendectomy.  Initially attempted with laparoscopy but then had to be converted to open resection.  Continue with pain management  NG tube to suctioning due to persistent postoperative ileus  Continue to advance diet as tolerated and wean off TPN    4/30: We consulted ID, and they recommended to continue IV antibiotics for now.    5/1: Antibiotics completed, per ID    Constipation  Assessment & Plan  Possibly post surgical etiology.  Continue to advance diet as tolerated, wean off TPN    Leukocytosis  Assessment & Plan  In the setting of ruptured appendicitis and abdominal abscess.  Continue zosyn    4/30: WBC today are 13.3. ID consulted, who recommend to continue IV antibiotics. We appreciate further recommendations.    5/1: ID recommends to stop antibiotics after today. If WBC trends upwards, we will consider repeat CT scan.    5/2: Mild elevation today from 13.5 to 14.1. We will monitor and consider CT scan if it continues to trend up.    5/3: WBC 15.8 today. We have repeated CT scan as per ID's recommendations, pending result.    CT A/P with no findings of free fluid or abscess.  Will clinically monitor for now and keep trending CBC, if still uptrending, will look for OTHER source of infection.    Essential hypertension- (present on admission)  Assessment & Plan  PRN labetalol  Continue with losartan, increase to 50 mg daily, and amlodipine 5 mg, continue to monitor and adjust as needed    Osteoarthritis- (present on admission)  Assessment & Plan  Patient with a history of osteoarthritis.  The patient today complaining of knee pain.  The patient states that she usually takes ibuprofen at home.  We will order 1 dose of ibuprofen 400 mg.  However we will encourage Tylenol for arthritic pain at this time due to the patient's history of CKD.  The patient currently receiving IV fluids, we will monitor closely and make changes accordingly.    Anemia  Assessment & Plan  Hb today is 10.2, has remained stable.  This is  most likely post-surgical  No signs of bleeding at this time  Monitor, repeat CBC, and make changes accordingly.    Hyperglycemia  Assessment & Plan  Patient's A1c is 6.5.  Patient currently on ISS and hypoglycemia protocol.  Patient would require close follow up as outpatient.    Hypothyroidism  Assessment & Plan  Continue home medication.    Stage 3 chronic kidney disease- (present on admission)  Assessment & Plan  As per chart review, apparently history of  Avoid nephrotoxic medications    5/3: Creatinine .84 today. Patient NPO on TPN.  Patient also currently on IVF, patient will get CT scan with contrast today, so we will monitor kidney function.    Stable.    BMI 50.0-59.9, adult (HCC)- (present on admission)  Assessment & Plan  Body mass index is 57.29 kg/m².  Outpatient follow-ups with weight program      HENRI (obstructive sleep apnea)- (present on admission)  Assessment & Plan  CPAP nocturnal.         VTE prophylaxis: Heparin

## 2021-05-12 LAB
ALBUMIN SERPL BCP-MCNC: 3 G/DL (ref 3.2–4.9)
ALBUMIN/GLOB SERPL: 0.7 G/DL
ALP SERPL-CCNC: 211 U/L (ref 30–99)
ALT SERPL-CCNC: 36 U/L (ref 2–50)
ANION GAP SERPL CALC-SCNC: 12 MMOL/L (ref 7–16)
AST SERPL-CCNC: 32 U/L (ref 12–45)
BILIRUB SERPL-MCNC: 0.3 MG/DL (ref 0.1–1.5)
BUN SERPL-MCNC: 26 MG/DL (ref 8–22)
CALCIUM SERPL-MCNC: 9.5 MG/DL (ref 8.4–10.2)
CHLORIDE SERPL-SCNC: 102 MMOL/L (ref 96–112)
CO2 SERPL-SCNC: 21 MMOL/L (ref 20–33)
CREAT SERPL-MCNC: 0.75 MG/DL (ref 0.5–1.4)
GLOBULIN SER CALC-MCNC: 4.6 G/DL (ref 1.9–3.5)
GLUCOSE BLD-MCNC: 126 MG/DL (ref 65–99)
GLUCOSE BLD-MCNC: 128 MG/DL (ref 65–99)
GLUCOSE SERPL-MCNC: 122 MG/DL (ref 65–99)
MAGNESIUM SERPL-MCNC: 2 MG/DL (ref 1.5–2.5)
PHOSPHATE SERPL-MCNC: 4.4 MG/DL (ref 2.5–4.5)
POTASSIUM SERPL-SCNC: 4.7 MMOL/L (ref 3.6–5.5)
PROT SERPL-MCNC: 7.6 G/DL (ref 6–8.2)
SODIUM SERPL-SCNC: 135 MMOL/L (ref 135–145)
TRIGL SERPL-MCNC: 181 MG/DL (ref 0–149)

## 2021-05-12 PROCEDURE — 700102 HCHG RX REV CODE 250 W/ 637 OVERRIDE(OP): Performed by: INTERNAL MEDICINE

## 2021-05-12 PROCEDURE — 700105 HCHG RX REV CODE 258: Performed by: STUDENT IN AN ORGANIZED HEALTH CARE EDUCATION/TRAINING PROGRAM

## 2021-05-12 PROCEDURE — 700101 HCHG RX REV CODE 250: Performed by: INTERNAL MEDICINE

## 2021-05-12 PROCEDURE — A9270 NON-COVERED ITEM OR SERVICE: HCPCS | Performed by: STUDENT IN AN ORGANIZED HEALTH CARE EDUCATION/TRAINING PROGRAM

## 2021-05-12 PROCEDURE — 700111 HCHG RX REV CODE 636 W/ 250 OVERRIDE (IP): Performed by: INTERNAL MEDICINE

## 2021-05-12 PROCEDURE — 84478 ASSAY OF TRIGLYCERIDES: CPT

## 2021-05-12 PROCEDURE — A9270 NON-COVERED ITEM OR SERVICE: HCPCS | Performed by: INTERNAL MEDICINE

## 2021-05-12 PROCEDURE — 700105 HCHG RX REV CODE 258: Performed by: INTERNAL MEDICINE

## 2021-05-12 PROCEDURE — 97110 THERAPEUTIC EXERCISES: CPT

## 2021-05-12 PROCEDURE — 94660 CPAP INITIATION&MGMT: CPT

## 2021-05-12 PROCEDURE — 99232 SBSQ HOSP IP/OBS MODERATE 35: CPT | Performed by: INTERNAL MEDICINE

## 2021-05-12 PROCEDURE — 84100 ASSAY OF PHOSPHORUS: CPT

## 2021-05-12 PROCEDURE — 770006 HCHG ROOM/CARE - MED/SURG/GYN SEMI*

## 2021-05-12 PROCEDURE — 82962 GLUCOSE BLOOD TEST: CPT

## 2021-05-12 PROCEDURE — 83735 ASSAY OF MAGNESIUM: CPT

## 2021-05-12 PROCEDURE — 80053 COMPREHEN METABOLIC PANEL: CPT

## 2021-05-12 PROCEDURE — 97530 THERAPEUTIC ACTIVITIES: CPT

## 2021-05-12 PROCEDURE — 700102 HCHG RX REV CODE 250 W/ 637 OVERRIDE(OP): Performed by: STUDENT IN AN ORGANIZED HEALTH CARE EDUCATION/TRAINING PROGRAM

## 2021-05-12 RX ADMIN — CYCLOBENZAPRINE 5 MG: 10 TABLET, FILM COATED ORAL at 06:15

## 2021-05-12 RX ADMIN — HEPARIN SODIUM 5000 UNITS: 5000 INJECTION, SOLUTION INTRAVENOUS; SUBCUTANEOUS at 06:21

## 2021-05-12 RX ADMIN — CYCLOBENZAPRINE 5 MG: 10 TABLET, FILM COATED ORAL at 18:22

## 2021-05-12 RX ADMIN — CYCLOBENZAPRINE 5 MG: 10 TABLET, FILM COATED ORAL at 12:10

## 2021-05-12 RX ADMIN — HEPARIN SODIUM 5000 UNITS: 5000 INJECTION, SOLUTION INTRAVENOUS; SUBCUTANEOUS at 21:17

## 2021-05-12 RX ADMIN — LOSARTAN POTASSIUM 50 MG: 25 TABLET, FILM COATED ORAL at 06:17

## 2021-05-12 RX ADMIN — OXYCODONE HYDROCHLORIDE 5 MG: 5 TABLET ORAL at 10:17

## 2021-05-12 RX ADMIN — AMLODIPINE BESYLATE 10 MG: 5 TABLET ORAL at 06:17

## 2021-05-12 RX ADMIN — LIDOCAINE 1 PATCH: 50 PATCH TOPICAL at 18:22

## 2021-05-12 RX ADMIN — LOSARTAN POTASSIUM 50 MG: 25 TABLET, FILM COATED ORAL at 18:22

## 2021-05-12 RX ADMIN — CALCIUM GLUCONATE: 98 INJECTION, SOLUTION INTRAVENOUS at 20:01

## 2021-05-12 RX ADMIN — LORATADINE 10 MG: 10 TABLET ORAL at 06:17

## 2021-05-12 RX ADMIN — HEPARIN SODIUM 5000 UNITS: 5000 INJECTION, SOLUTION INTRAVENOUS; SUBCUTANEOUS at 15:23

## 2021-05-12 RX ADMIN — THYROID, PORCINE 90 MG: 30 TABLET ORAL at 06:15

## 2021-05-12 RX ADMIN — SENNOSIDES AND DOCUSATE SODIUM 1 TABLET: 8.6; 5 TABLET ORAL at 06:16

## 2021-05-12 RX ADMIN — SODIUM CHLORIDE, POTASSIUM CHLORIDE, SODIUM LACTATE AND CALCIUM CHLORIDE: 600; 310; 30; 20 INJECTION, SOLUTION INTRAVENOUS at 22:15

## 2021-05-12 RX ADMIN — ONDANSETRON 4 MG: 2 INJECTION INTRAMUSCULAR; INTRAVENOUS at 18:22

## 2021-05-12 ASSESSMENT — COGNITIVE AND FUNCTIONAL STATUS - GENERAL
MOBILITY SCORE: 9
TURNING FROM BACK TO SIDE WHILE IN FLAT BAD: A LOT
MOVING TO AND FROM BED TO CHAIR: A LOT
MOVING FROM LYING ON BACK TO SITTING ON SIDE OF FLAT BED: UNABLE
WALKING IN HOSPITAL ROOM: TOTAL
SUGGESTED CMS G CODE MODIFIER MOBILITY: CM
STANDING UP FROM CHAIR USING ARMS: A LOT
CLIMB 3 TO 5 STEPS WITH RAILING: TOTAL

## 2021-05-12 ASSESSMENT — ENCOUNTER SYMPTOMS
FEVER: 0
RESPIRATORY NEGATIVE: 1
CARDIOVASCULAR NEGATIVE: 1
ABDOMINAL PAIN: 1
VOMITING: 0
MYALGIAS: 1
CHILLS: 0
NAUSEA: 0

## 2021-05-12 ASSESSMENT — GAIT ASSESSMENTS: GAIT LEVEL OF ASSIST: REFUSED

## 2021-05-12 ASSESSMENT — PAIN DESCRIPTION - PAIN TYPE
TYPE: ACUTE PAIN;CHRONIC PAIN
TYPE: SURGICAL PAIN
TYPE: CHRONIC PAIN;ACUTE PAIN

## 2021-05-12 NOTE — THERAPY
Physical Therapy   Daily Treatment     Patient Name: Mady Yang  Age:  65 y.o., Sex:  female  Medical Record #: [id number]  Today's Date: 5/12/2021     Precautions: (P) Fall Risk    Assessment    Pt participatory with LE exercises in supine and sitting today. She requires increased time and effort with exercises, with frequent rests. Weaker with LLE compared to RLE, due in large part to L knee pain. Provided written HEP. Pt declined to work on transfers or gait today. Recommend further PT in the post-acute setting prior to D/C home.    Plan    Continue current treatment plan.    DC Equipment Recommendations: (P) Unable to determine at this time  Discharge Recommendations: (P) Recommend post-acute placement for additional physical therapy services prior to discharge home      Subjective    Pt reporting she feels she can't bear weight through LLE due to knee pain.     Objective       05/12/21 1035   Pain 0 - 10 Group   Location Knee   Location Orientation Left   Description Aching   Cognition    Cognition / Consciousness WDL   Level of Consciousness Alert   Supine Lower Body Exercise   Supine Lower Body Exercises Yes   Hip Abduction 2 sets of 10;Bilateral   Heel Slide 1 set of 10;Bilateral   Ankle Pumps 1 set of 10;Bilateral   Gluteal Isometrics 1 set of 10;Bilateral   Quadriceps Isometrics 1 set of 10;Bilateral   Comments pt moving very slowly, limited by pain/weakness LLE, requires frequent rests   Sitting Lower Body Exercises   Sitting Lower Body Exercises Yes   Long Arc Quad 1 set of 10;Bilateral   Marching 1 set of 10   Comments very slow moving   Home Exercise Program   Home Exercise Program   (provided written HEP)   Balance   Sitting Balance (Static) Fair +   Sitting Balance (Dynamic) Fair +   Skilled Intervention Tactile Cuing;Verbal Cuing;Compensatory Strategies   Comments declined to stand   Gait Analysis   Gait Level Of Assist Refused   Bed Mobility    Supine to Sit Minimal Assist   Scooting  Supervised   Rolling Minimum Assist to Lt.   Skilled Intervention Compensatory Strategies;Verbal Cuing;Sequencing   Comments cues for sequencing, use of bed rail   Functional Mobility   Sit to Stand Refused   How much difficulty does the patient currently have...   Turning over in bed (including adjusting bedclothes, sheets and blankets)? 2   Sitting down on and standing up from a chair with arms (e.g., wheelchair, bedside commode, etc.) 1   Moving from lying on back to sitting on the side of the bed? 2   How much help from another person does the patient currently need...   Moving to and from a bed to a chair (including a wheelchair)? 2   Need to walk in a hospital room? 1   Climbing 3-5 steps with a railing? 1   6 clicks Mobility Score 9   Activity Tolerance   Sitting in Chair NT   Sitting Edge of Bed left sitting EOB   Standing refused   Comments limited by left knee pain, fatigue   Patient / Family Goals    Patient / Family Goal #1 to go home    Goal #1 Outcome Goal not met   Short Term Goals    Short Term Goal # 1 pt will go supine<>sit w/hob flat and rails down w/spv in 6tx for safe d/c home    Goal Outcome # 1 Progressing slower than expected   Short Term Goal # 2 pt will go sit<>stand w/fww w/spv in 6tx for safe d/c home    Goal Outcome # 2 Goal not met   Short Term Goal # 3 pt will transfer bed<>chair w/fww w/spv in 6tx for safe d/c home    Goal Outcome # 3 Goal not met   Short Term Goal # 4 pt will ambulate for 150ft w/fww w/spv in 6tx for safe d/c home    Goal Outcome # 4 Goal not met   Short Term Goal # 5 pt will go up/down 2 steps w/spv in 6tx for safe d/c home    Goal Outcome # 5 Goal not met   Education Group   Education Provided Exercises - Supine;Exercises - Seated   Role of Physical Therapist Patient Response Patient;Acceptance;Explanation;Handout;Verbal Demonstration;Action Demonstration   Exercises - Supine Patient Response Patient;Acceptance;Explanation;Handout;Verbal Demonstration;Action  Demonstration   Anticipated Discharge Equipment and Recommendations   DC Equipment Recommendations Unable to determine at this time   Discharge Recommendations Recommend post-acute placement for additional physical therapy services prior to discharge home

## 2021-05-12 NOTE — PROGRESS NOTES
"Hospital Medicine Daily Progress Note    Date of Service  5/12/2021    Chief Complaint  65 y.o. female admitted 4/21/2021 with abdominal pain    Hospital Course  Per notes, \"65 y.o. female who presented 4/21/2021 with abdominal pain that started generalized on Friday and has localized to the RLQ.  She presented to urgent care earlier today and CT scan confirms the diagnosis of acute appendicitis.  She denies any fevers or chills.  She has a history of diabetes and uses diet to control this, she states her last A1C was around 6 and she doesn't check her sugars regularly.  She does take a supplement called Berberine complex that she states is similar to metformin.  I will resume her home medications for hypertension and thyroid and will also order a sliding scale insulin while hospitalized to make sure the stress of surgery does not drive her sugars too high.\"    Patient was diagnosed with appendicitis and intra-abdominal abscess, she was scheduled for laparoscopic appendectomy, converted to open appendectomy and drainage of abscess.  She required multiple drains while in the hospital and was continued on broad-spectrum antibiotics.  Additionally she also had urinary retention and required Pabon catheter.  It was determined that she would require LTAC placement,  have worked extensively with patient to arrange this.  She was cleared by general surgery, and will follow up with them in the outpatient setting.           Interval Problem Update  Patient was seen and examined this morning at bedside, no complaints at time of examination.  Patient is pending placement to LTAC, will likely happen tomorrow 5/13, will continue to follow-up with case management.     Consultants/Specialty  General surgery  Infectious disease    Code Status  Full Code    Disposition  Anticipated discharge to LTAC, pending placement    Review of Systems  Review of Systems   Constitutional: Positive for malaise/fatigue. Negative for " chills and fever.   Respiratory: Negative.    Cardiovascular: Negative.    Gastrointestinal: Positive for abdominal pain. Negative for nausea and vomiting.   Genitourinary: Negative.    Musculoskeletal: Positive for joint pain and myalgias.   All other systems reviewed and are negative.       Physical Exam  Temp:  [36.3 °C (97.3 °F)-37.2 °C (98.9 °F)] 36.3 °C (97.3 °F)  Pulse:  [101-105] 104  Resp:  [17-18] 17  BP: (115-134)/(56-68) 115/58  SpO2:  [92 %-98 %] 92 %    Physical Exam  Vitals and nursing note reviewed.   Constitutional:       Appearance: Normal appearance. She is obese. She is not ill-appearing.   Cardiovascular:      Rate and Rhythm: Normal rate and regular rhythm.      Pulses: Normal pulses.      Heart sounds: Normal heart sounds.   Pulmonary:      Effort: Pulmonary effort is normal.      Breath sounds: Normal breath sounds.   Abdominal:      General: Abdomen is flat. Bowel sounds are normal.      Palpations: Abdomen is soft.   Musculoskeletal:      Right lower leg: No edema.      Left lower leg: No edema.   Neurological:      General: No focal deficit present.      Mental Status: She is alert and oriented to person, place, and time. Mental status is at baseline.         Fluids    Intake/Output Summary (Last 24 hours) at 5/12/2021 1557  Last data filed at 5/12/2021 1000  Gross per 24 hour   Intake 760 ml   Output 1450 ml   Net -690 ml       Laboratory  Recent Labs     05/10/21  0425   WBC 12.0*   RBC 3.25*   HEMOGLOBIN 9.5*   HEMATOCRIT 30.2*   MCV 92.9   MCH 29.2   MCHC 31.5*   RDW 48.2   PLATELETCT 464*   MPV 10.4     Recent Labs     05/10/21  0425 05/11/21  0420 05/12/21  1013   SODIUM 137 139 135   POTASSIUM 4.3 4.6 4.7   CHLORIDE 105 106 102   CO2 23 23 21   GLUCOSE 138* 134* 122*   BUN 26* 30* 26*   CREATININE 0.71 0.71 0.75   CALCIUM 9.1 9.2 9.5             Recent Labs     05/10/21  0425 05/12/21  1013   TRIGLYCERIDE 168* 181*       Imaging  [id number] VIEW   Final Result         Mildly  dilated gas-filled loop of small bowel in the right lower quadrant could relate to mild focal ileus.      CT-ABDOMEN-PELVIS WITH   Final Result         1.  Improving fat stranding in the lower abdomen likely improving inflammation and postoperative changes. A surgical drain in the right lower quadrant is again noted. No significant free fluid or abscess.   2.  Mildly improved ileus.   3.  Hepatic steatosis.               DX-ABDOMEN FOR TUBE PLACEMENT   Final Result         1.  Nonspecific bowel gas pattern.   2.  Dobbhoff tube is coiled within the pylorus, the tip terminates overlying the expected location of the gastric pylorus or gastric antrum.   3.  Left basilar atelectasis versus early infiltrate.      IR-PICC LINE PLACEMENT W/ GUIDANCE > AGE 5   Final Result                  Ultrasound-guided PICC placement performed by qualified nursing staff as    above.          CT-ABDOMEN-PELVIS W/O   Final Result      Interval laparotomy with right lower quadrant surgical drain, similar fat stranding in the pericecal region that is likely postoperative in nature. No noncontrast evidence of abscess formation      Small free fluid and intraperitoneal air is not outside normal limits in the perioperative period      Contrast in the colon is from the CT 3 days ago, air-fluid levels and mild small bowel dilatation. In combination these findings are most compatible with ileus      Urinary bladder decompressed around the Pabon catheter. No hydroureteronephrosis      [id number] VIEW   Final Result      Dilated loops of small bowel most likely represent ileus. Partial obstruction is not excluded.      Residual enteric contrast is seen from the recent CT.      Mild bibasilar atelectasis.         IR-US GUIDED PIV   Final Result    Ultrasound-guided PERIPHERAL IV INSERTION performed by    qualified nursing staff as above.           Assessment/Plan  * Acute appendicitis with localized peritonitis  Assessment & Plan  Status post  appendectomy.  Initially attempted with laparoscopy but then had to be converted to open resection.  Continue with pain management  NG tube to suctioning due to persistent postoperative ileus  Continue to advance diet as tolerated and wean off TPN    4/30: We consulted ID, and they recommended to continue IV antibiotics for now.    5/1: Antibiotics completed, per ID    Anemia  Assessment & Plan  Hb today is 10.2, has remained stable.  This is most likely post-surgical  No signs of bleeding at this time  Monitor, repeat CBC, and make changes accordingly.    Hyperglycemia  Assessment & Plan  Patient's A1c is 6.5.  Patient currently on ISS and hypoglycemia protocol.  Patient would require close follow up as outpatient.    Constipation  Assessment & Plan  Possibly post surgical etiology.  Continue to advance diet as tolerated, wean off TPN    Leukocytosis  Assessment & Plan  In the setting of ruptured appendicitis and abdominal abscess.  Continue zosyn    4/30: WBC today are 13.3. ID consulted, who recommend to continue IV antibiotics. We appreciate further recommendations.    5/1: ID recommends to stop antibiotics after today. If WBC trends upwards, we will consider repeat CT scan.    5/2: Mild elevation today from 13.5 to 14.1. We will monitor and consider CT scan if it continues to trend up.    5/3: WBC 15.8 today. We have repeated CT scan as per ID's recommendations, pending result.    CT A/P with no findings of free fluid or abscess.  No signs of acute infection, patient stable    Hypothyroidism  Assessment & Plan  Continue home medication.    Stage 3 chronic kidney disease- (present on admission)  Assessment & Plan  As per chart review, apparently history of  Avoid nephrotoxic medications    5/3: Creatinine .84 today. Patient NPO on TPN.  Patient also currently on IVF, patient will get CT scan with contrast today, so we will monitor kidney function.    Stable.    Essential hypertension- (present on  admission)  Assessment & Plan  PRN labetalol  Continue with losartan, increase to 50 mg daily, and amlodipine 5 mg, continue to monitor and adjust as needed    Osteoarthritis- (present on admission)  Assessment & Plan  Patient with a history of osteoarthritis.  The patient today complaining of knee pain.  The patient states that she usually takes ibuprofen at home, continue with prn Tylenol  The patient currently receiving IV fluids, we will monitor closely and make changes accordingly    BMI 50.0-59.9, adult (HCC)- (present on admission)  Assessment & Plan  Body mass index is 57.29 kg/m².  Outpatient follow-ups with weight program      HENRI (obstructive sleep apnea)- (present on admission)  Assessment & Plan  CPAP nocturnal.         VTE prophylaxis: Heparin    Dictation #1  MRN:[id number]  [facility]:[id number]

## 2021-05-12 NOTE — CARE PLAN
Problem: Safety  Goal: Will remain free from injury  Outcome: PROGRESSING AS EXPECTED  Note: Pt educated to call for assistance prior to ambulating.      Problem: Bowel/Gastric:  Goal: Normal bowel function is maintained or improved  Outcome: PROGRESSING AS EXPECTED  Note: Pt educated to take stool softeners when needed.

## 2021-05-12 NOTE — DISCHARGE PLANNING
Anticipated Discharge Disposition:   [facility]- [facility]     Action:   Chart review complete.     Per MD, patient is cleared to discharge to LTAC. Moira with [facility] assessed the patient in person yesterday, 5/11, around 1030.     This patient has been accepted to [facility] but is pending insurance authorization.     1200: WILIAM ABRAHAM called Moira with [facility]. Moira states that insurance auth has gone through and that she is hoping the patient can be admitted tomorrow. MD notified.     Barriers to Discharge:   Pending LTAC bed availability     Plan:   Hospital care management will continue to assist with discharge planning needs.

## 2021-05-12 NOTE — PROGRESS NOTES
Pharmacy TPN Day # 17       2021    Dosing Weight   76 kg           TPN currently providing 100% of goal                                                    TPN goal:               Calories: 1617 - 1991 kcal/day (kcal/k - 13.5)               Protein:  104 - 130 (2-2.5 gProtein / kg IBW)      TPN indication: NPO s/p appendectomy w/ abscess present     Pertinent PMH: Back pain, Chronic low back pain (2018), Elevated blood sugar, Hypertension, Kidney stone (), Obesity, HENRI (obstructive sleep apnea) (2017), Osteoporosis, Post-nasal drip, Seasonal allergies, Sleep apnea, and Thyroid disease.    Temp (24hrs), Av.8 °C (98.3 °F), Min:36.3 °C (97.3 °F), Max:37.2 °C (98.9 °F)    Recent Labs     05/10/21  0425 21  0420 21  1013   SODIUM 137 139  --    POTASSIUM 4.3 4.6  --    CHLORIDE 105 106  --    CO2 23 23  --    BUN 26* 30*  --    CREATININE 0.71 0.71  --    GLUCOSE 138* 134*  --    CALCIUM 9.1 9.2  --    ASTSGOT 25 47*  --    ALTSGPT 30 44  --    ALBUMIN 2.7* 2.8*  --    TBILIRUBIN 0.2 0.3  --    PHOSPHORUS 3.4 4.0 4.4   MAGNESIUM 1.9 2.1 2.0   PREALBUMIN 14.3*  --   --    TRIGLYCERIDES   181     Recent Labs     21  1652 21  2207 21  0626   POCGLUCOSE 106* 130* 126*     Vitals:    21 1700 21 2230 21 0500 21 1100   BP: 134/56 131/68 129/65 115/58   Weight:       Height:         Last data filed at 2021 1000  Gross per 24 hour   Intake 880 ml   Output 2350 ml   Net -1470 ml     Orders Placed This Encounter   Procedures   • Diet Order Diet: Consistent CHO (Diabetic)     Standing Status:   Standing     Number of Occurrences:   1     Order Specific Question:   Diet:     Answer:   Consistent CHO (Diabetic) [4]     TPN for past 72 hours (Show up to 3 orders; newest on the left. Changes between the two most recent orders are indicated.)     Start date and time   2021 2000 2021 2000 05/10/2021 2000      TPN Central Line Formulation  [[id number]] TPN Central Line Formulation [[id number]] TPN Central Line Formulation [[id number]]    Order Status  Active Last Dose in Progress Completed    Last Admin   New Bag at 05/11/2021 1959 by Rochelle Mckinnon R.N. New Bag at 05/10/2021 2038 by Nataliya Randolph R.N.       Base    Clinisol 15%  120 g 120 g 120 g    dextrose 70%  220 g 220 g 220 g    fat emulsions 20%  40 g 40 g 40 g       Additives    potassium phosphate  15 mmol 15 mmol 15 mmol    potassium chloride  60 mEq 60 mEq 60 mEq    sodium chloride  170 mEq 170 mEq 170 mEq    magnesium sulfate  8 mEq 8 mEq 8 mEq    calcium GLUConate  9.4 mEq 9.4 mEq 9.4 mEq    M.T.E.-4 Adult  1 mL 1 mL 1 mL    M.V.I. Adult  10 mL 10 mL 10 mL       QS Base    sterile water  567.01 mL 567.01 mL 567.01 mL       Energy Contribution    Proteins  -- -- --    Dextrose  -- -- --    Lipids  -- -- --    Total  -- -- --       Electrolyte Ion Calculated Amount    Sodium  170 mEq 170 mEq 170 mEq    Potassium  82 mEq 82 mEq 82 mEq    Calcium  9.4 mEq 9.4 mEq 9.4 mEq    Magnesium  8 mEq 8 mEq 8 mEq    Aluminum  -- -- --    Phosphate  15 mmol 15 mmol 15 mmol    Chloride  230 mEq 230 mEq 230 mEq    Acetate  101.6 mEq 101.6 mEq 101.6 mEq       Other    Total Protein  120 g 120 g 120 g    Total Protein/kg  0.86 g/kg 0.86 g/kg 0.81 g/kg    Total Amino Acid  -- -- --    Total Amino Acid/kg  -- -- --    Glucose Infusion Rate  1.1 mg/kg/min 1.1 mg/kg/min 1.03 mg/kg/min    Osmolarity (Estimated)  -- -- --    Volume  1,992 mL 1,992 mL 1,992 mL    Rate  83 mL/hr 83 mL/hr 83 mL/hr    Dosing Weight  139 kg 139 kg 148 kg    Infusion [facility]  [facility]        This formula provides:  % kcal as lipids = 25  Grams protein/kg = 1.6  Non-protein calories = 1148  Kcals/kg = 21.4  Total daily calories = 1628     Comments: consider decreasing phos in 5/13 tpn    Olive Arciniega, Adriano

## 2021-05-13 LAB
ALBUMIN SERPL BCP-MCNC: 3.1 G/DL (ref 3.2–4.9)
ALBUMIN/GLOB SERPL: 0.7 G/DL
ALP SERPL-CCNC: 196 U/L (ref 30–99)
ALT SERPL-CCNC: 36 U/L (ref 2–50)
ANION GAP SERPL CALC-SCNC: 10 MMOL/L (ref 7–16)
AST SERPL-CCNC: 31 U/L (ref 12–45)
BILIRUB SERPL-MCNC: 0.3 MG/DL (ref 0.1–1.5)
BUN SERPL-MCNC: 26 MG/DL (ref 8–22)
CALCIUM SERPL-MCNC: 9.5 MG/DL (ref 8.4–10.2)
CHLORIDE SERPL-SCNC: 102 MMOL/L (ref 96–112)
CO2 SERPL-SCNC: 24 MMOL/L (ref 20–33)
CREAT SERPL-MCNC: 0.75 MG/DL (ref 0.5–1.4)
GLOBULIN SER CALC-MCNC: 4.4 G/DL (ref 1.9–3.5)
GLUCOSE BLD-MCNC: 121 MG/DL (ref 65–99)
GLUCOSE BLD-MCNC: 136 MG/DL (ref 65–99)
GLUCOSE BLD-MCNC: 152 MG/DL (ref 65–99)
GLUCOSE BLD-MCNC: 93 MG/DL (ref 65–99)
GLUCOSE BLD-MCNC: 99 MG/DL (ref 65–99)
GLUCOSE SERPL-MCNC: 135 MG/DL (ref 65–99)
MAGNESIUM SERPL-MCNC: 2 MG/DL (ref 1.5–2.5)
PHOSPHATE SERPL-MCNC: 4.4 MG/DL (ref 2.5–4.5)
POTASSIUM SERPL-SCNC: 4.8 MMOL/L (ref 3.6–5.5)
PROT SERPL-MCNC: 7.5 G/DL (ref 6–8.2)
SODIUM SERPL-SCNC: 136 MMOL/L (ref 135–145)

## 2021-05-13 PROCEDURE — 700102 HCHG RX REV CODE 250 W/ 637 OVERRIDE(OP): Performed by: INTERNAL MEDICINE

## 2021-05-13 PROCEDURE — 80053 COMPREHEN METABOLIC PANEL: CPT

## 2021-05-13 PROCEDURE — 97530 THERAPEUTIC ACTIVITIES: CPT

## 2021-05-13 PROCEDURE — 99232 SBSQ HOSP IP/OBS MODERATE 35: CPT | Performed by: INTERNAL MEDICINE

## 2021-05-13 PROCEDURE — 94660 CPAP INITIATION&MGMT: CPT

## 2021-05-13 PROCEDURE — A9270 NON-COVERED ITEM OR SERVICE: HCPCS | Performed by: INTERNAL MEDICINE

## 2021-05-13 PROCEDURE — 83735 ASSAY OF MAGNESIUM: CPT

## 2021-05-13 PROCEDURE — A9270 NON-COVERED ITEM OR SERVICE: HCPCS | Performed by: STUDENT IN AN ORGANIZED HEALTH CARE EDUCATION/TRAINING PROGRAM

## 2021-05-13 PROCEDURE — 97535 SELF CARE MNGMENT TRAINING: CPT

## 2021-05-13 PROCEDURE — 84100 ASSAY OF PHOSPHORUS: CPT

## 2021-05-13 PROCEDURE — 700111 HCHG RX REV CODE 636 W/ 250 OVERRIDE (IP): Performed by: INTERNAL MEDICINE

## 2021-05-13 PROCEDURE — 770006 HCHG ROOM/CARE - MED/SURG/GYN SEMI*

## 2021-05-13 PROCEDURE — 82962 GLUCOSE BLOOD TEST: CPT | Mod: 91

## 2021-05-13 PROCEDURE — 700102 HCHG RX REV CODE 250 W/ 637 OVERRIDE(OP): Performed by: STUDENT IN AN ORGANIZED HEALTH CARE EDUCATION/TRAINING PROGRAM

## 2021-05-13 RX ADMIN — HEPARIN SODIUM 5000 UNITS: 5000 INJECTION, SOLUTION INTRAVENOUS; SUBCUTANEOUS at 14:35

## 2021-05-13 RX ADMIN — OXYCODONE HYDROCHLORIDE 5 MG: 5 TABLET ORAL at 11:48

## 2021-05-13 RX ADMIN — LORATADINE 10 MG: 10 TABLET ORAL at 05:26

## 2021-05-13 RX ADMIN — LOSARTAN POTASSIUM 50 MG: 25 TABLET, FILM COATED ORAL at 05:26

## 2021-05-13 RX ADMIN — CYCLOBENZAPRINE 5 MG: 10 TABLET, FILM COATED ORAL at 11:48

## 2021-05-13 RX ADMIN — HEPARIN SODIUM 5000 UNITS: 5000 INJECTION, SOLUTION INTRAVENOUS; SUBCUTANEOUS at 05:25

## 2021-05-13 RX ADMIN — SENNOSIDES AND DOCUSATE SODIUM 1 TABLET: 8.6; 5 TABLET ORAL at 05:26

## 2021-05-13 RX ADMIN — LOSARTAN POTASSIUM 50 MG: 25 TABLET, FILM COATED ORAL at 18:38

## 2021-05-13 RX ADMIN — AMLODIPINE BESYLATE 10 MG: 5 TABLET ORAL at 05:26

## 2021-05-13 RX ADMIN — HEPARIN SODIUM 5000 UNITS: 5000 INJECTION, SOLUTION INTRAVENOUS; SUBCUTANEOUS at 20:30

## 2021-05-13 RX ADMIN — CYCLOBENZAPRINE 5 MG: 10 TABLET, FILM COATED ORAL at 18:38

## 2021-05-13 RX ADMIN — CYCLOBENZAPRINE 5 MG: 10 TABLET, FILM COATED ORAL at 05:25

## 2021-05-13 RX ADMIN — THYROID, PORCINE 90 MG: 30 TABLET ORAL at 05:25

## 2021-05-13 ASSESSMENT — ENCOUNTER SYMPTOMS
MYALGIAS: 1
WEAKNESS: 1
BACK PAIN: 1

## 2021-05-13 ASSESSMENT — COGNITIVE AND FUNCTIONAL STATUS - GENERAL
DAILY ACTIVITIY SCORE: 17
SUGGESTED CMS G CODE MODIFIER DAILY ACTIVITY: CK
DRESSING REGULAR LOWER BODY CLOTHING: A LITTLE
TOILETING: A LOT
DRESSING REGULAR UPPER BODY CLOTHING: A LITTLE
PERSONAL GROOMING: A LITTLE
HELP NEEDED FOR BATHING: A LOT

## 2021-05-13 ASSESSMENT — PAIN DESCRIPTION - PAIN TYPE: TYPE: SURGICAL PAIN

## 2021-05-13 NOTE — DISCHARGE PLANNING
Anticipated Discharge Disposition: LTAC    Action: Spoke with DOT liaison who states they don't have any available beds today.     Barriers to Discharge: Bed availability     Plan: F/U with LTAC

## 2021-05-13 NOTE — PROGRESS NOTES
Received report and assumed care. Pt AOx4. Denies pain at this time. Denies SOB. Denies nausea. Repositioned and given extra blankets for comfort. Pt reports passing gas and bowel sounds are normoactive.   Plan of care for the night reviewed with pt. No other needs at this time. Bed locked and in lowest position. Call light within reach. IVF and TPN infusing.

## 2021-05-13 NOTE — CARE PLAN
The patient is Watcher - Medium risk of patient condition declining or worsening         Summary of progress made towards problems/goals:  Patient up with staff assistance to and side of bed. Patient making progress to ambulate with staff (once during this shift)      Problem: Mobility  Goal: Risk for activity intolerance will decrease  Outcome: Not Progressing  Note: Plan to advance goal:  Patient to ambulate with staff and use of walker from bed to bedside chair 2 times per day

## 2021-05-13 NOTE — THERAPY
Occupational Therapy  Daily Treatment     Patient Name: Mady Yang  Age:  65 y.o., Sex:  female  Medical Record #: [id number]  Today's Date: 5/13/2021     Precautions  Precautions: (P) Fall Risk  Comments: TPN; PICC line R forearm, abdominal incision     Assessment    Pt agreeable to therapy session today. Pt and spouse demonstrated good teach back of techniques for LB dressing and G/H seated EOB using compensation and adaptive equipment. Would benefit from continued training on adaptive equipment (as this was their first introduction). Will continue to follow for OT for progression with self cares and functional transfers while in house.     Plan    Continue current treatment plan.    DC Equipment Recommendations: (P) Unable to determine at this time  Discharge Recommendations: (P) Recommend post-acute placement for additional occupational therapy services prior to discharge home       05/13/21 0856   Precautions   Precautions Fall Risk   Pain 0 - 10 Group   Location Knee   Location Orientation Left   Pain Rating Scale (NPRS) 5   Description Aching   Comfort Goal Comfort with Movement;Perform Activity   Therapist Pain Assessment During Activity;Post Activity;Nurse Notified   Other Treatments   Other Treatments Provided OT session focused on ADL participation EOB including LB dressing with AE and grooming and hygiene. Pt demonstrated fair teach back of techniques.    Balance   Sitting Balance (Static) Fair +   Sitting Balance (Dynamic) Fair +   Skilled Intervention Verbal Cuing;Tactile Cuing;Postural Facilitation   Comments challenged balance in seated postion without UE support during functional ADL tasks   Bed Mobility    Supine to Sit Minimal Assist   Sit to Supine Moderate Assist   Scooting Minimal Assist   Rolling Minimal Assist to Rt.;Minimum Assist to Lt.   Comments teach back of bed mobility techniques; pt verbalised techniques that have worked for her; progressed independence with bed mob via  "training on techniques without assist   Activities of Daily Living   Grooming Supervision;Seated   Lower Body Dressing Minimal Assist   Skilled Intervention Verbal Cuing;Tactile Cuing;Sequencing;Compensatory Strategies   Comments training on adaptive equipment and compensatory techniques for donning/doffing socks and shoes   How much help from another person does the patient currently need...   Putting on and taking off regular lower body clothing? 3   Bathing (including washing, rinsing, and drying)? 2   Toileting, which includes using a toilet, bedpan, or urinal? 2   Putting on and taking off regular upper body clothing? 3   Taking care of personal grooming such as brushing teeth? 3   Eating meals? 4   6 Clicks Daily Activity Score 17   Functional Mobility   Sit to Stand Refused   Activity Tolerance   Sitting Edge of Bed 35 min   Standing NT   Comments good tolerance for seated activity; standing activity limited by pain in L knee    Short Term Goals   Short Term Goal # 1 pt will complete ADL txfs with LRAD at spv level   Goal Outcome # 1 Progressing slower than expected   Short Term Goal # 2 pt will complete FB dressing with spv   Goal Outcome # 2 Progressing slower than expected   Short Term Goal # 3 pt will complete and tolerate 10\" of standing G/H at sinkside with spv   Goal Outcome # 3 Progressing slower than expected   Education Group   Role of Occupational Therapist Patient Response Patient;Family;Acceptance;Explanation;Verbal Demonstration   Home Safety Patient Response Patient;Significant Other;Acceptance;Explanation;Verbal Demonstration   Transfers Patient Response Patient;Significant Other;Acceptance;Explanation;Verbal Demonstration;Reinforcement Needed   ADL Patient Response Patient;Acceptance;Explanation;Verbal Demonstration   Pathology of Bedrest Patient Response Patient;Family;Acceptance;Explanation;Verbal Demonstration   Additional Comments spouse present for duration of session and assisting " throughout   Anticipated Discharge Equipment and Recommendations   DC Equipment Recommendations Unable to determine at this time   Discharge Recommendations Recommend post-acute placement for additional occupational therapy services prior to discharge home   Interdisciplinary Plan of Care Collaboration   IDT Collaboration with  Nursing;Family / Caregiver;Therapy Tech   Patient Position at End of Therapy In Bed;Family / Friend in Room;Phone within Reach;Tray Table within Reach;Call Light within Reach   Collaboration Comments OT tx and recs    Session Information   Date / Session Number  5/13, 5 (1/3, 5/19)   Priority 2

## 2021-05-13 NOTE — CARE PLAN
Problem: Nutritional:  Goal: Achieve adequate nutritional intake  Description: Patient will consume 50% of meals  Outcome: Progressing   Recorded PO intake has been <25% to 25-50% of meals. Pt did eat 50-75% of breakfast today. Pt continues to receive TPN due to inadequate PO intake. If PO intake continues to improve >50% of meals, recommend wean TPN. RD following.

## 2021-05-14 VITALS
HEIGHT: 63 IN | HEART RATE: 106 BPM | RESPIRATION RATE: 16 BRPM | WEIGHT: 293 LBS | OXYGEN SATURATION: 94 % | DIASTOLIC BLOOD PRESSURE: 66 MMHG | BODY MASS INDEX: 51.91 KG/M2 | SYSTOLIC BLOOD PRESSURE: 111 MMHG | TEMPERATURE: 98.2 F

## 2021-05-14 PROBLEM — D72.829 LEUKOCYTOSIS: Status: RESOLVED | Noted: 2021-04-27 | Resolved: 2021-05-14

## 2021-05-14 PROBLEM — R73.9 HYPERGLYCEMIA: Status: RESOLVED | Noted: 2021-04-28 | Resolved: 2021-05-14

## 2021-05-14 PROBLEM — K59.00 CONSTIPATION: Status: RESOLVED | Noted: 2021-04-27 | Resolved: 2021-05-14

## 2021-05-14 LAB
ALBUMIN SERPL BCP-MCNC: 3.1 G/DL (ref 3.2–4.9)
ALBUMIN/GLOB SERPL: 0.7 G/DL
ALP SERPL-CCNC: 178 U/L (ref 30–99)
ALT SERPL-CCNC: 28 U/L (ref 2–50)
ANION GAP SERPL CALC-SCNC: 13 MMOL/L (ref 7–16)
AST SERPL-CCNC: 27 U/L (ref 12–45)
BILIRUB SERPL-MCNC: 0.4 MG/DL (ref 0.1–1.5)
BUN SERPL-MCNC: 25 MG/DL (ref 8–22)
CALCIUM SERPL-MCNC: 9.8 MG/DL (ref 8.4–10.2)
CHLORIDE SERPL-SCNC: 103 MMOL/L (ref 96–112)
CO2 SERPL-SCNC: 23 MMOL/L (ref 20–33)
CREAT SERPL-MCNC: 0.8 MG/DL (ref 0.5–1.4)
ERYTHROCYTE [DISTWIDTH] IN BLOOD BY AUTOMATED COUNT: 47.4 FL (ref 35.9–50)
GLOBULIN SER CALC-MCNC: 4.6 G/DL (ref 1.9–3.5)
GLUCOSE BLD-MCNC: 102 MG/DL (ref 65–99)
GLUCOSE BLD-MCNC: 105 MG/DL (ref 65–99)
GLUCOSE BLD-MCNC: 110 MG/DL (ref 65–99)
GLUCOSE SERPL-MCNC: 109 MG/DL (ref 65–99)
HCT VFR BLD AUTO: 31.9 % (ref 37–47)
HGB BLD-MCNC: 10.2 G/DL (ref 12–16)
MAGNESIUM SERPL-MCNC: 2 MG/DL (ref 1.5–2.5)
MCH RBC QN AUTO: 29.1 PG (ref 27–33)
MCHC RBC AUTO-ENTMCNC: 32 G/DL (ref 33.6–35)
MCV RBC AUTO: 91.1 FL (ref 81.4–97.8)
PHOSPHATE SERPL-MCNC: 4.5 MG/DL (ref 2.5–4.5)
PLATELET # BLD AUTO: 391 K/UL (ref 164–446)
PMV BLD AUTO: 10.1 FL (ref 9–12.9)
POTASSIUM SERPL-SCNC: 4.5 MMOL/L (ref 3.6–5.5)
PROT SERPL-MCNC: 7.7 G/DL (ref 6–8.2)
RBC # BLD AUTO: 3.5 M/UL (ref 4.2–5.4)
SODIUM SERPL-SCNC: 139 MMOL/L (ref 135–145)
WBC # BLD AUTO: 11.1 K/UL (ref 4.8–10.8)

## 2021-05-14 PROCEDURE — 700102 HCHG RX REV CODE 250 W/ 637 OVERRIDE(OP): Performed by: INTERNAL MEDICINE

## 2021-05-14 PROCEDURE — 84100 ASSAY OF PHOSPHORUS: CPT

## 2021-05-14 PROCEDURE — 85027 COMPLETE CBC AUTOMATED: CPT

## 2021-05-14 PROCEDURE — 99239 HOSP IP/OBS DSCHRG MGMT >30: CPT | Performed by: INTERNAL MEDICINE

## 2021-05-14 PROCEDURE — A9270 NON-COVERED ITEM OR SERVICE: HCPCS | Performed by: INTERNAL MEDICINE

## 2021-05-14 PROCEDURE — 700111 HCHG RX REV CODE 636 W/ 250 OVERRIDE (IP): Performed by: INTERNAL MEDICINE

## 2021-05-14 PROCEDURE — A9270 NON-COVERED ITEM OR SERVICE: HCPCS | Performed by: STUDENT IN AN ORGANIZED HEALTH CARE EDUCATION/TRAINING PROGRAM

## 2021-05-14 PROCEDURE — 700102 HCHG RX REV CODE 250 W/ 637 OVERRIDE(OP): Performed by: STUDENT IN AN ORGANIZED HEALTH CARE EDUCATION/TRAINING PROGRAM

## 2021-05-14 PROCEDURE — 82962 GLUCOSE BLOOD TEST: CPT | Mod: 91

## 2021-05-14 PROCEDURE — 80053 COMPREHEN METABOLIC PANEL: CPT

## 2021-05-14 PROCEDURE — 83735 ASSAY OF MAGNESIUM: CPT

## 2021-05-14 PROCEDURE — 700101 HCHG RX REV CODE 250: Performed by: INTERNAL MEDICINE

## 2021-05-14 RX ORDER — OXYCODONE HYDROCHLORIDE 5 MG/1
5 TABLET ORAL EVERY 6 HOURS PRN
Qty: 12 TABLET | Refills: 0 | Status: SHIPPED | OUTPATIENT
Start: 2021-05-14 | End: 2021-05-17

## 2021-05-14 RX ORDER — CYCLOBENZAPRINE HCL 5 MG
5 TABLET ORAL 3 TIMES DAILY
Qty: 30 TABLET | Refills: 0 | Status: ON HOLD
Start: 2021-05-14 | End: 2021-06-04

## 2021-05-14 RX ORDER — LIDOCAINE 50 MG/G
1 PATCH TOPICAL EVERY 24 HOURS
Qty: 10 PATCH | Status: ON HOLD
Start: 2021-05-14 | End: 2021-06-04

## 2021-05-14 RX ORDER — AMLODIPINE BESYLATE 10 MG/1
10 TABLET ORAL DAILY
Qty: 30 TABLET | Status: ON HOLD
Start: 2021-05-15 | End: 2021-06-04 | Stop reason: SDUPTHER

## 2021-05-14 RX ADMIN — OXYCODONE HYDROCHLORIDE 5 MG: 5 TABLET ORAL at 19:16

## 2021-05-14 RX ADMIN — CYCLOBENZAPRINE 5 MG: 10 TABLET, FILM COATED ORAL at 05:14

## 2021-05-14 RX ADMIN — CYCLOBENZAPRINE 5 MG: 10 TABLET, FILM COATED ORAL at 12:02

## 2021-05-14 RX ADMIN — HEPARIN SODIUM 5000 UNITS: 5000 INJECTION, SOLUTION INTRAVENOUS; SUBCUTANEOUS at 14:28

## 2021-05-14 RX ADMIN — THYROID, PORCINE 90 MG: 30 TABLET ORAL at 05:14

## 2021-05-14 RX ADMIN — HEPARIN SODIUM 5000 UNITS: 5000 INJECTION, SOLUTION INTRAVENOUS; SUBCUTANEOUS at 05:14

## 2021-05-14 RX ADMIN — LORATADINE 10 MG: 10 TABLET ORAL at 05:14

## 2021-05-14 RX ADMIN — AMLODIPINE BESYLATE 10 MG: 5 TABLET ORAL at 05:15

## 2021-05-14 RX ADMIN — LOSARTAN POTASSIUM 50 MG: 25 TABLET, FILM COATED ORAL at 05:14

## 2021-05-14 RX ADMIN — LIDOCAINE 1 PATCH: 50 PATCH TOPICAL at 17:46

## 2021-05-14 RX ADMIN — OXYCODONE HYDROCHLORIDE 5 MG: 5 TABLET ORAL at 14:29

## 2021-05-14 RX ADMIN — LOSARTAN POTASSIUM 50 MG: 25 TABLET, FILM COATED ORAL at 17:45

## 2021-05-14 RX ADMIN — CYCLOBENZAPRINE 5 MG: 10 TABLET, FILM COATED ORAL at 17:45

## 2021-05-14 ASSESSMENT — PAIN DESCRIPTION - PAIN TYPE
TYPE: ACUTE PAIN

## 2021-05-14 NOTE — DISCHARGE PLANNING
Anticipated Discharge Disposition: LTAC vs SNF    Action: Pt's case discussed during IDT rounds. Pt is pending available bed at LTAC. Per MD, pt is no longer on TPN and may be appropriate for SNF.   Called [facility] and spoke with Willi and requested they review referral again- updated referral sent. Willi states he will review and call SW back.     Barriers to Discharge: Bed availability    Plan: F/U with SNF

## 2021-05-14 NOTE — DISCHARGE PLANNING
Anticipated Discharge Disposition: LTAC    Action: Spoke with DOT liaison who states they don't have a bed today. Will call if there are any changes.     Barriers to Discharge: Bed availability at LTAC    Plan: F/U with LTAC

## 2021-05-14 NOTE — PROGRESS NOTES
"Hospital Medicine Daily Progress Note    Date of Service  5/13/2021    Chief Complaint  65 y.o. female admitted 4/21/2021 with abdominal pain    Hospital Course  Per notes, \"65 y.o. female who presented 4/21/2021 with abdominal pain that started generalized on Friday and has localized to the RLQ.  She presented to urgent care earlier today and CT scan confirms the diagnosis of acute appendicitis.  She denies any fevers or chills.  She has a history of diabetes and uses diet to control this, she states her last A1C was around 6 and she doesn't check her sugars regularly.  She does take a supplement called Berberine complex that she states is similar to metformin.  I will resume her home medications for hypertension and thyroid and will also order a sliding scale insulin while hospitalized to make sure the stress of surgery does not drive her sugars too high.\"    Patient was diagnosed with appendicitis and intra-abdominal abscess, she was scheduled for laparoscopic appendectomy, converted to open appendectomy and drainage of abscess.  She required multiple drains while in the hospital and was continued on broad-spectrum antibiotics.  Additionally she also had urinary retention and required Pabon catheter.  It was determined that she would require LTAC placement,  have worked extensively with patient to arrange this.  She was cleared by general surgery, and will follow up with them in the outpatient setting.           Interval Problem Update  Patient was seen and examined this morning bedside, still having some myalgias and joint pain, which she states are chronic and at baseline.    Initially the patient be discharged today, but no bed was available, will continue to follow-up with case management for bed availability at LTAC.    Consultants/Specialty  General surgery    Code Status  Full Code    Disposition  LTAC, pending bed availability    Review of Systems  Review of Systems   Constitutional: Positive " for malaise/fatigue.   Musculoskeletal: Positive for back pain, joint pain and myalgias.   Neurological: Positive for weakness.   All other systems reviewed and are negative.       Physical Exam  Temp:  [36.9 °C (98.4 °F)-37.2 °C (98.9 °F)] 36.9 °C (98.4 °F)  Pulse:  [] 105  Resp:  [17-20] 17  BP: (117-136)/(59-65) 127/65  SpO2:  [94 %-95 %] 94 %    Physical Exam  Vitals and nursing note reviewed.   Constitutional:       General: She is not in acute distress.     Appearance: Normal appearance. She is obese.   Cardiovascular:      Rate and Rhythm: Normal rate and regular rhythm.      Pulses: Normal pulses.      Heart sounds: Normal heart sounds.   Pulmonary:      Effort: Pulmonary effort is normal.      Breath sounds: Normal breath sounds.   Musculoskeletal:      Right lower leg: No edema.      Left lower leg: No edema.   Skin:     General: Skin is warm and dry.   Neurological:      General: No focal deficit present.      Mental Status: She is alert and oriented to person, place, and time. Mental status is at baseline.         Fluids    Intake/Output Summary (Last 24 hours) at 5/13/2021 2030  Last data filed at 5/13/2021 1800  Gross per 24 hour   Intake 640 ml   Output 1900 ml   Net -1260 ml       Laboratory      Recent Labs     05/11/21  0420 05/12/21  1013 05/13/21  0038   SODIUM 139 135 136   POTASSIUM 4.6 4.7 4.8   CHLORIDE 106 102 102   CO2 23 21 24   GLUCOSE 134* 122* 135*   BUN 30* 26* 26*   CREATININE 0.71 0.75 0.75   CALCIUM 9.2 9.5 9.5             Recent Labs     05/12/21  1013   TRIGLYCERIDE 181*       Imaging  [id number] VIEW   Final Result         Mildly dilated gas-filled loop of small bowel in the right lower quadrant could relate to mild focal ileus.      CT-ABDOMEN-PELVIS WITH   Final Result         1.  Improving fat stranding in the lower abdomen likely improving inflammation and postoperative changes. A surgical drain in the right lower quadrant is again noted. No significant free fluid or  abscess.   2.  Mildly improved ileus.   3.  Hepatic steatosis.               DX-ABDOMEN FOR TUBE PLACEMENT   Final Result         1.  Nonspecific bowel gas pattern.   2.  Dobbhoff tube is coiled within the pylorus, the tip terminates overlying the expected location of the gastric pylorus or gastric antrum.   3.  Left basilar atelectasis versus early infiltrate.      IR-PICC LINE PLACEMENT W/ GUIDANCE > AGE 5   Final Result                  Ultrasound-guided PICC placement performed by qualified nursing staff as    above.          CT-ABDOMEN-PELVIS W/O   Final Result      Interval laparotomy with right lower quadrant surgical drain, similar fat stranding in the pericecal region that is likely postoperative in nature. No noncontrast evidence of abscess formation      Small free fluid and intraperitoneal air is not outside normal limits in the perioperative period      Contrast in the colon is from the CT 3 days ago, air-fluid levels and mild small bowel dilatation. In combination these findings are most compatible with ileus      Urinary bladder decompressed around the Pabon catheter. No hydroureteronephrosis      [id number] VIEW   Final Result      Dilated loops of small bowel most likely represent ileus. Partial obstruction is not excluded.      Residual enteric contrast is seen from the recent CT.      Mild bibasilar atelectasis.         IR-US GUIDED PIV   Final Result    Ultrasound-guided PERIPHERAL IV INSERTION performed by    qualified nursing staff as above.           Assessment/Plan  * Acute appendicitis with localized peritonitis  Assessment & Plan  Status post appendectomy.  Initially attempted with laparoscopy but then had to be converted to open resection.  Continue with pain management  NG tube to suctioning due to persistent postoperative ileus  Continue to advance diet as tolerated and wean off TPN    4/30: We consulted ID, and they recommended to continue IV antibiotics for now.    5/1: Antibiotics  completed, per ID    Anemia  Assessment & Plan  Hb today is 10.2, has remained stable.  This is most likely post-surgical  No signs of bleeding at this time  Monitor, repeat CBC, and make changes accordingly.    Hyperglycemia  Assessment & Plan  Patient's A1c is 6.5.  Patient currently on ISS and hypoglycemia protocol.  Patient would require close follow up as outpatient.    Constipation  Assessment & Plan  Possibly post surgical etiology.  Continue to advance diet as tolerated, wean off TPN, patient is now eating 50 to 75% of diet.    Leukocytosis  Assessment & Plan  In the setting of ruptured appendicitis and abdominal abscess.  Continue zosyn    4/30: WBC today are 13.3. ID consulted, who recommend to continue IV antibiotics. We appreciate further recommendations.    5/1: ID recommends to stop antibiotics after today. If WBC trends upwards, we will consider repeat CT scan.    5/2: Mild elevation today from 13.5 to 14.1. We will monitor and consider CT scan if it continues to trend up.    5/3: WBC 15.8 today. We have repeated CT scan as per ID's recommendations, pending result.    CT A/P with no findings of free fluid or abscess.  No signs of acute infection, patient stable    Hypothyroidism  Assessment & Plan  Continue home medication.    Stage 3 chronic kidney disease- (present on admission)  Assessment & Plan  As per chart review, apparently history of  Avoid nephrotoxic medications    5/3: Creatinine .84 today. Patient NPO on TPN.  Patient also currently on IVF, patient will get CT scan with contrast today, so we will monitor kidney function.    Stable.    Essential hypertension- (present on admission)  Assessment & Plan  PRN labetalol  Continue with losartan 50 mg daily, and amlodipine 5 mg    Osteoarthritis- (present on admission)  Assessment & Plan  Patient with a history of osteoarthritis.  Continues with daily arthralgias and myalgias, as needed Tylenol  Continue fluids    BMI 50.0-59.9, adult (HCC)-  (present on admission)  Assessment & Plan  Body mass index is 57.29 kg/m².  Outpatient follow-ups with weight program      HENRI (obstructive sleep apnea)- (present on admission)  Assessment & Plan  Continue with nocturnal CPAP       VTE prophylaxis: Lovenox

## 2021-05-14 NOTE — DISCHARGE PLANNING
Received Transport Form @ 5747  Spoke to Katiuska @ [facility]  Transport is scheduled for 5/14/21 @ 2000 going to \[facility]\"" via [employer].    Voalte message sent to RN and case management team

## 2021-05-14 NOTE — DISCHARGE PLANNING
Anticipated Discharge Disposition: LTAC    Action: Received call from DOT liaison stating they have a bed available for pt this afternoon. Accepting MD is Dr. Willis. She requested transport be arranged for 2000.     MD and RN notified    Called pt's , Alex and updated him on transfer time    REMSA form completed and faxed to [facility]    Barriers to Discharge: None    Plan: Transfer to [facility] completed and placed in pt's chart

## 2021-05-14 NOTE — DIETARY
NUTRITION SERVICES: UPDATE    TPN discontinued yesterday. Current diet: Diabetic. PO intake per chart review 0-75% (generally <50%).     RECOMMENDATIONS:  1. Encourage PO intake of meals   2. Nutrition Representative to see daily for meal preferences  3. Please record intake as percentage of meals consumed  4. Order nutrition supplement as needed if PO intake <50%     RD following

## 2021-05-14 NOTE — CARE PLAN
Problem: Venous Thromboembolism (VTW)/Deep Vein Thrombosis (DVT) Prevention:  Goal: Patient will participate in Venous Thrombosis (VTE)/Deep Vein Thrombosis (DVT)Prevention Measures  Outcome: Progressing     Problem: Pain Management  Goal: Pain level will decrease to patient's comfort goal  Outcome: Progressing   The patient is Watcher - Medium risk of patient condition declining or worsening    Shift Goals  Clinical Goals:   Pts pain will be at tolerable level for pt this shift.    Pt will remain free from s/s of DVT this shift.    Progress made toward(s) clinical / shift goals:     Pt denies pain at this time. Pt aware of available pain interventions, non-pharmacologic and pharmacologic. Pt also educated on side effects of pain medications and verbalizes understanding.    Pt educated on s/s of DVT. Pt educated to do dorsiflexion and plantar flexion while in bed. Pt educated on purpose of SCDs and has bilateral SCDs on.

## 2021-05-14 NOTE — CARE PLAN
The patient is Stable - Low risk of patient condition declining or worsening    Shift Goals  Clinical Goals: Patient will state pain tolerable for patient    Progress made toward(s) clinical / shift goals:  Patient pain    is mostly 4/10 which is a tolerable pain level for the patient.      Problem: Fall Risk  Goal: Patient will remain free from falls  Outcome: Progressing

## 2021-05-14 NOTE — PROGRESS NOTES
Received report and assumed care. Pt AOx4. Denies pain at this time. Denies SOB. Denies nausea. Reports passing gas. Bowel sounds hypoactive.  Plan of care for the night reviewed with pt. No other needs at this time. Bed locked and in lowest position. Call light within reach. SCDs on. IVF infusing.

## 2021-05-14 NOTE — DISCHARGE SUMMARY
"Discharge Summary    CHIEF COMPLAINT ON ADMISSION  Chief Complaint   Patient presents with   • RLQ Pain     RLQ pain and diagnosed with appy at urgent care  pain since thursday  Also has had some vomiting   • Complicated Appendicitis       Reason for Admission  Abdominal Pain     Admission Date  4/21/2021    CODE STATUS  Full Code    HPI & HOSPITAL COURSE  Per notes, \"65 y.o. female who presented 4/21/2021 with abdominal pain that started generalized on Friday and has localized to the RLQ.  She presented to urgent care earlier today and CT scan confirms the diagnosis of acute appendicitis.  She denies any fevers or chills.  She has a history of diabetes and uses diet to control this, she states her last A1C was around 6 and she doesn't check her sugars regularly.  She does take a supplement called Berberine complex that she states is similar to metformin.  I will resume her home medications for hypertension and thyroid and will also order a sliding scale insulin while hospitalized to make sure the stress of surgery does not drive her sugars too high.\"    Patient was diagnosed with appendicitis and intra-abdominal abscess, she was scheduled for laparoscopic appendectomy, converted to open appendectomy and drainage of abscess.  She required multiple drains while in the hospital and was continued on broad-spectrum antibiotics.  Additionally she also had urinary retention and required Pabon catheter.  It was determined that she would require LTAC placement,  have worked extensively with patient to arrange this.  She was cleared by general surgery, and will follow up with them in the outpatient setting.  Patient was slowly initiated on diet and weaned off of TPN.  On day of discharge she had significant improvement in overall symptoms status.  All diagnosis, studies and treatment plans were explained in detail to patient.      Therefore, she is discharged in good and stable condition to a long-term acute " [facility].    The patient met 2-midnight criteria for an inpatient stay at the time of discharge.    Discharge Date  5/14/2021    FOLLOW UP ITEMS POST DISCHARGE  Follow-up with PCP  Follow-up with surgery    DISCHARGE DIAGNOSES  Principal Problem:    Acute appendicitis with localized peritonitis POA: Unknown  Active Problems:    HENRI (obstructive sleep apnea) POA: Yes      Overview: AHI 55.7, minimum saturation 84%, on CPAP 13 cm.    BMI 50.0-59.9, adult (HCC) POA: Yes    Osteoarthritis POA: Yes    Essential hypertension POA: Yes    Stage 3 chronic kidney disease POA: Yes    Hypothyroidism POA: Unknown    Anemia POA: Unknown  Resolved Problems:    Leukocytosis POA: Unknown    Constipation POA: Unknown    Hypernatremia POA: Unknown    Hyperglycemia POA: Unknown    Elevated LFTs POA: Unknown      FOLLOW UP  No future appointments.  No follow-up provider specified.    MEDICATIONS ON DISCHARGE     Medication List      START taking these medications      Instructions   amLODIPine 10 MG Tabs  Start taking on: May 15, 2021  Commonly known as: NORVASC   Take 1 tablet by mouth every day.  Dose: 10 mg     cyclobenzaprine 5 mg tablet  Commonly known as: Flexeril   Take 1 tablet by mouth 3 times a day.  Dose: 5 mg     insulin regular 100 Unit/mL Soln  Commonly known as: HumuLIN R   Inject 2-9 Units under the skin every 6 hours.  Dose: 2-9 Units     lidocaine 5 % Ptch  Commonly known as: LIDODERM   Place 1 Patch on the skin every 24 hours.  Dose: 1 Patch     oxyCODONE immediate-release 5 MG Tabs  Commonly known as: ROXICODONE   Take 1 tablet by mouth every 6 hours as needed for up to 3 days.  Dose: 5 mg        CHANGE how you take these medications      Instructions   losartan 50 MG Tabs  What changed: Another medication with the same name was removed. Continue taking this medication, and follow the directions you see here.  Commonly known as: COZAAR         CONTINUE taking these medications      Instructions   [place] Thyroid  90 MG Tabs  Generic drug: thyroid   Take 90 mg by mouth every day.  Dose: 90 mg     BERBERINE COMPLEX PO   Take  by mouth.     BIOTIN PO   Take  by mouth.     ibuprofen 600 MG Tabs  Commonly known as: MOTRIN   Take 800 mg by mouth every 6 hours as needed.  Dose: 800 mg     Krill Oil 500 MG Caps   Take 1 Cap by mouth every day.  Dose: 1 capsule     Loratadine 10 MG Caps   Take 1 Cap by mouth every day.  Dose: 1 capsule     Magnesium 400 MG Caps   Take 1 Cap by mouth every day.  Dose: 1 capsule     Ocuvite Lutein 25 25-5 MG Caps  Generic drug: Lutein-Zeaxanthin   Take 1 Cap by mouth every day.  Dose: 1 capsule     VITAMIN B 12 PO   Take  by mouth.     vitamin D3 125 MCG (5000 UT) Caps   Take 1 Cap by mouth every day.  Dose: 1 capsule            Allergies  No Known Allergies    DIET  Orders Placed This Encounter   Procedures   • Diet Order Diet: Consistent CHO (Diabetic)     Standing Status:   Standing     Number of Occurrences:   1     Order Specific Question:   Diet:     Answer:   Consistent CHO (Diabetic) [4]       ACTIVITY  As tolerated.  Weight bearing as tolerated    CONSULTATIONS  General surgery    PROCEDURES  Appendectomy    LABORATORY  Lab Results   Component Value Date    SODIUM 139 05/14/2021    POTASSIUM 4.5 05/14/2021    CHLORIDE 103 05/14/2021    CO2 23 05/14/2021    GLUCOSE 109 (H) 05/14/2021    BUN 25 (H) 05/14/2021    CREATININE 0.80 05/14/2021        Lab Results   Component Value Date    WBC 11.1 (H) 05/14/2021    HEMOGLOBIN 10.2 (L) 05/14/2021    HEMATOCRIT 31.9 (L) 05/14/2021    PLATELETCT 391 05/14/2021        Total time of the discharge process exceeds 40 minutes.

## 2021-05-15 NOTE — DISCHARGE INSTRUCTIONS
Discharge Instructions    Discharged to other by medical transportation with escort. Discharged via wheelchair, hospital escort: Yes.  Special equipment needed: Not Applicable    Be sure to schedule a follow-up appointment with your primary care doctor or any specialists as instructed.     Discharge Plan:        I understand that a diet low in cholesterol, fat, and sodium is recommended for good health. Unless I have been given specific instructions below for another diet, I accept this instruction as my diet prescription.   Other diet: CHO    Special Instructions: None    · Is patient discharged on Warfarin / Coumadin?   No     Depression / Suicide Risk    As you are discharged from this [facility] facility, it is important to learn how to keep safe from harming yourself.    Recognize the warning signs:  · Abrupt changes in personality, positive or negative- including increase in energy   · Giving away possessions  · Change in eating patterns- significant weight changes-  positive or negative  · Change in sleeping patterns- unable to sleep or sleeping all the time   · Unwillingness or inability to communicate  · Depression  · Unusual sadness, discouragement and loneliness  · Talk of wanting to die  · Neglect of personal appearance   · Rebelliousness- reckless behavior  · Withdrawal from people/activities they love  · Confusion- inability to concentrate     If you or a loved one observes any of these behaviors or has concerns about self-harm, here's what you can do:  · Talk about it- your feelings and reasons for harming yourself  · Remove any means that you might use to hurt yourself (examples: pills, rope, extension cords, firearm)  · Get professional help from the community (Mental Health, Substance Abuse, psychological counseling)  · Do not be alone:Call your Safe Contact- someone whom you trust who will be there for you.  · Call your local CRISIS HOTLINE 174-5845 or 075-638-3848  · Call your local Children's  Mobile Crisis Response Team [place] (571) 675-8374 or www.[employer]  · Call the toll free National Suicide Prevention Hotlines   · National Suicide Prevention Lifeline 788-752-PLSJ (6207)  · National Hope Line Network 800-SUICIDE (706-4509)

## 2021-05-15 NOTE — CARE PLAN
Problem: Fall Risk  Goal: Patient will remain free from falls  Outcome: Met     Problem: Knowledge Deficit - Standard  Goal: Patient and family/care givers will demonstrate understanding of plan of care, disease process/condition, diagnostic tests and medications  Outcome: Met

## 2021-05-15 NOTE — PROGRESS NOTES
Received report and assumed care. Pt AOx4.  at bedside.    19:15 - Called [facility] to give report to [facility].     19:45 - Discharge instructions discussed with pt and spouse. Opiate consent form discussed and signed. Both are in pts chart.    20:15 - [facility] at bedside. Pt left via gurney. IVF stopped. PICC left in pts ALYSSA per LT[facility] request.

## 2021-05-25 PROCEDURE — 99358 PROLONG SERVICE W/O CONTACT: CPT | Performed by: PHYSICAL MEDICINE & REHABILITATION

## 2021-05-26 ENCOUNTER — HOSPITAL ENCOUNTER (INPATIENT)
Facility: REHABILITATION | Age: 66
LOS: 10 days | DRG: 092 | End: 2021-06-05
Attending: PHYSICAL MEDICINE & REHABILITATION | Admitting: PHYSICAL MEDICINE & REHABILITATION
Payer: OTHER GOVERNMENT

## 2021-05-26 DIAGNOSIS — M19.90 OSTEOARTHRITIS, UNSPECIFIED OSTEOARTHRITIS TYPE, UNSPECIFIED SITE: ICD-10-CM

## 2021-05-26 DIAGNOSIS — I10 ESSENTIAL HYPERTENSION: ICD-10-CM

## 2021-05-26 DIAGNOSIS — G47.33 OSA (OBSTRUCTIVE SLEEP APNEA): ICD-10-CM

## 2021-05-26 DIAGNOSIS — N18.30 STAGE 3 CHRONIC KIDNEY DISEASE: ICD-10-CM

## 2021-05-26 PROBLEM — G72.81 CRITICAL ILLNESS MYOPATHY: Status: ACTIVE | Noted: 2021-05-26

## 2021-05-26 PROCEDURE — A9270 NON-COVERED ITEM OR SERVICE: HCPCS | Performed by: PHYSICAL MEDICINE & REHABILITATION

## 2021-05-26 PROCEDURE — 99223 1ST HOSP IP/OBS HIGH 75: CPT | Performed by: PHYSICAL MEDICINE & REHABILITATION

## 2021-05-26 PROCEDURE — 94760 N-INVAS EAR/PLS OXIMETRY 1: CPT

## 2021-05-26 PROCEDURE — 770010 HCHG ROOM/CARE - REHAB SEMI PRIVAT*

## 2021-05-26 PROCEDURE — 94660 CPAP INITIATION&MGMT: CPT

## 2021-05-26 PROCEDURE — 700102 HCHG RX REV CODE 250 W/ 637 OVERRIDE(OP): Performed by: PHYSICAL MEDICINE & REHABILITATION

## 2021-05-26 RX ORDER — DEXTROSE MONOHYDRATE 25 G/50ML
50 INJECTION, SOLUTION INTRAVENOUS
Status: DISCONTINUED | OUTPATIENT
Start: 2021-05-26 | End: 2021-05-29

## 2021-05-26 RX ORDER — POLYETHYLENE GLYCOL 3350 17 G/17G
1 POWDER, FOR SOLUTION ORAL
Status: DISCONTINUED | OUTPATIENT
Start: 2021-05-26 | End: 2021-06-05 | Stop reason: HOSPADM

## 2021-05-26 RX ORDER — BISACODYL 10 MG
10 SUPPOSITORY, RECTAL RECTAL
Status: DISCONTINUED | OUTPATIENT
Start: 2021-05-26 | End: 2021-06-05 | Stop reason: HOSPADM

## 2021-05-26 RX ORDER — AMLODIPINE BESYLATE 5 MG/1
10 TABLET ORAL DAILY
Status: DISCONTINUED | OUTPATIENT
Start: 2021-05-27 | End: 2021-06-01

## 2021-05-26 RX ORDER — OXYCODONE HYDROCHLORIDE 5 MG/1
5 TABLET ORAL
Status: DISCONTINUED | OUTPATIENT
Start: 2021-05-26 | End: 2021-06-05 | Stop reason: HOSPADM

## 2021-05-26 RX ORDER — POLYVINYL ALCOHOL 14 MG/ML
1 SOLUTION/ DROPS OPHTHALMIC PRN
Status: DISCONTINUED | OUTPATIENT
Start: 2021-05-26 | End: 2021-06-05 | Stop reason: HOSPADM

## 2021-05-26 RX ORDER — ONDANSETRON 2 MG/ML
4 INJECTION INTRAMUSCULAR; INTRAVENOUS 4 TIMES DAILY PRN
Status: DISCONTINUED | OUTPATIENT
Start: 2021-05-26 | End: 2021-06-05 | Stop reason: HOSPADM

## 2021-05-26 RX ORDER — LIDOCAINE 50 MG/G
1 PATCH TOPICAL EVERY 24 HOURS
Status: DISCONTINUED | OUTPATIENT
Start: 2021-05-27 | End: 2021-06-05 | Stop reason: HOSPADM

## 2021-05-26 RX ORDER — AMOXICILLIN 250 MG
2 CAPSULE ORAL 2 TIMES DAILY
Status: DISCONTINUED | OUTPATIENT
Start: 2021-05-26 | End: 2021-06-05 | Stop reason: HOSPADM

## 2021-05-26 RX ORDER — OXYCODONE HYDROCHLORIDE 10 MG/1
10 TABLET ORAL
Status: DISCONTINUED | OUTPATIENT
Start: 2021-05-26 | End: 2021-06-05 | Stop reason: HOSPADM

## 2021-05-26 RX ORDER — ONDANSETRON 4 MG/1
4 TABLET, ORALLY DISINTEGRATING ORAL 4 TIMES DAILY PRN
Status: DISCONTINUED | OUTPATIENT
Start: 2021-05-26 | End: 2021-06-05 | Stop reason: HOSPADM

## 2021-05-26 RX ORDER — LOSARTAN POTASSIUM 25 MG/1
50 TABLET ORAL
Status: DISCONTINUED | OUTPATIENT
Start: 2021-05-27 | End: 2021-05-27

## 2021-05-26 RX ORDER — HYDRALAZINE HYDROCHLORIDE 25 MG/1
25 TABLET, FILM COATED ORAL EVERY 8 HOURS PRN
Status: DISCONTINUED | OUTPATIENT
Start: 2021-05-26 | End: 2021-06-05 | Stop reason: HOSPADM

## 2021-05-26 RX ORDER — ACETAMINOPHEN 325 MG/1
650 TABLET ORAL EVERY 4 HOURS PRN
Status: DISCONTINUED | OUTPATIENT
Start: 2021-05-26 | End: 2021-06-05 | Stop reason: HOSPADM

## 2021-05-26 RX ORDER — CYCLOBENZAPRINE HCL 10 MG
5 TABLET ORAL 3 TIMES DAILY
Status: DISCONTINUED | OUTPATIENT
Start: 2021-05-26 | End: 2021-05-27

## 2021-05-26 RX ORDER — IBUPROFEN 400 MG/1
400 TABLET ORAL EVERY 6 HOURS PRN
Status: DISCONTINUED | OUTPATIENT
Start: 2021-05-27 | End: 2021-06-05 | Stop reason: HOSPADM

## 2021-05-26 RX ORDER — ALUMINA, MAGNESIA, AND SIMETHICONE 2400; 2400; 240 MG/30ML; MG/30ML; MG/30ML
20 SUSPENSION ORAL
Status: DISCONTINUED | OUTPATIENT
Start: 2021-05-26 | End: 2021-06-05 | Stop reason: HOSPADM

## 2021-05-26 RX ORDER — TRAZODONE HYDROCHLORIDE 50 MG/1
50 TABLET ORAL
Status: DISCONTINUED | OUTPATIENT
Start: 2021-05-26 | End: 2021-06-05 | Stop reason: HOSPADM

## 2021-05-26 RX ORDER — THYROID 30 MG/1
90 TABLET ORAL DAILY
Status: DISCONTINUED | OUTPATIENT
Start: 2021-05-27 | End: 2021-06-05 | Stop reason: HOSPADM

## 2021-05-26 RX ORDER — ECHINACEA PURPUREA EXTRACT 125 MG
2 TABLET ORAL PRN
Status: DISCONTINUED | OUTPATIENT
Start: 2021-05-26 | End: 2021-06-05 | Stop reason: HOSPADM

## 2021-05-26 RX ADMIN — SENNOSIDES AND DOCUSATE SODIUM 2 TABLET: 8.6; 5 TABLET ORAL at 20:41

## 2021-05-26 RX ADMIN — IBUPROFEN 400 MG: 400 TABLET, FILM COATED ORAL at 20:46

## 2021-05-26 ASSESSMENT — LIFESTYLE VARIABLES
EVER FELT BAD OR GUILTY ABOUT YOUR DRINKING: NO
HAVE PEOPLE ANNOYED YOU BY CRITICIZING YOUR DRINKING: NO
ALCOHOL_USE: NO
EVER_SMOKED: YES
TOTAL SCORE: 0
EVER HAD A DRINK FIRST THING IN THE MORNING TO STEADY YOUR NERVES TO GET RID OF A HANGOVER: NO
HAVE YOU EVER FELT YOU SHOULD CUT DOWN ON YOUR DRINKING: NO
TOTAL SCORE: 0
TOTAL SCORE: 0
CONSUMPTION TOTAL: INCOMPLETE

## 2021-05-26 ASSESSMENT — PAIN DESCRIPTION - PAIN TYPE: TYPE: ACUTE PAIN

## 2021-05-26 ASSESSMENT — PATIENT HEALTH QUESTIONNAIRE - PHQ9
SUM OF ALL RESPONSES TO PHQ9 QUESTIONS 1 AND 2: 0
1. LITTLE INTEREST OR PLEASURE IN DOING THINGS: NOT AT ALL
2. FEELING DOWN, DEPRESSED, IRRITABLE, OR HOPELESS: NOT AT ALL

## 2021-05-26 ASSESSMENT — FIBROSIS 4 INDEX: FIB4 SCORE: 0.82

## 2021-05-27 LAB
ALBUMIN SERPL BCP-MCNC: 3.3 G/DL (ref 3.2–4.9)
ALBUMIN/GLOB SERPL: 0.8 G/DL
ALP SERPL-CCNC: 120 U/L (ref 30–99)
ALT SERPL-CCNC: 31 U/L (ref 2–50)
ANION GAP SERPL CALC-SCNC: 16 MMOL/L (ref 7–16)
AST SERPL-CCNC: 31 U/L (ref 12–45)
BASOPHILS # BLD AUTO: NORMAL % (ref 0–1.8)
BASOPHILS # BLD: NORMAL K/UL (ref 0–0.12)
BILIRUB SERPL-MCNC: 0.4 MG/DL (ref 0.1–1.5)
BUN SERPL-MCNC: 40 MG/DL (ref 8–22)
CALCIUM SERPL-MCNC: 9.6 MG/DL (ref 8.5–10.5)
CHLORIDE SERPL-SCNC: 107 MMOL/L (ref 96–112)
CO2 SERPL-SCNC: 20 MMOL/L (ref 20–33)
COMMENT 1642: NORMAL
CREAT SERPL-MCNC: 2.14 MG/DL (ref 0.5–1.4)
EOSINOPHIL # BLD AUTO: NORMAL K/UL (ref 0–0.51)
EOSINOPHIL NFR BLD: NORMAL % (ref 0–6.9)
ERYTHROCYTE [DISTWIDTH] IN BLOOD BY AUTOMATED COUNT: NORMAL FL (ref 35.9–50)
GLOBULIN SER CALC-MCNC: 3.9 G/DL (ref 1.9–3.5)
GLUCOSE BLD-MCNC: 100 MG/DL (ref 65–99)
GLUCOSE BLD-MCNC: 108 MG/DL (ref 65–99)
GLUCOSE BLD-MCNC: 122 MG/DL (ref 65–99)
GLUCOSE BLD-MCNC: 135 MG/DL (ref 65–99)
GLUCOSE SERPL-MCNC: 117 MG/DL (ref 65–99)
HCT VFR BLD AUTO: NORMAL % (ref 37–47)
HGB BLD-MCNC: NORMAL G/DL (ref 12–16)
IMM GRANULOCYTES # BLD AUTO: NORMAL K/UL (ref 0–0.11)
IMM GRANULOCYTES NFR BLD AUTO: NORMAL % (ref 0–0.9)
LYMPHOCYTES # BLD AUTO: NORMAL K/UL (ref 1–4.8)
LYMPHOCYTES NFR BLD: NORMAL % (ref 22–41)
MCH RBC QN AUTO: NORMAL PG (ref 27–33)
MCHC RBC AUTO-ENTMCNC: NORMAL G/DL (ref 33.6–35)
MCV RBC AUTO: NORMAL FL (ref 81.4–97.8)
MONOCYTES # BLD AUTO: NORMAL K/UL (ref 0–0.85)
MONOCYTES NFR BLD AUTO: NORMAL % (ref 0–13.4)
MORPHOLOGY BLD-IMP: NORMAL
NEUTROPHILS # BLD AUTO: NORMAL K/UL (ref 2–7.15)
NEUTROPHILS NFR BLD: NORMAL % (ref 44–72)
NRBC # BLD AUTO: NORMAL K/UL
NRBC BLD-RTO: NORMAL /100 WBC
PLATELET # BLD AUTO: NORMAL K/UL (ref 164–446)
PMV BLD AUTO: NORMAL FL (ref 9–12.9)
POTASSIUM SERPL-SCNC: 4.2 MMOL/L (ref 3.6–5.5)
PROT SERPL-MCNC: 7.2 G/DL (ref 6–8.2)
RBC # BLD AUTO: NORMAL M/UL (ref 4.2–5.4)
SARS-COV-2 RNA RESP QL NAA+PROBE: NOTDETECTED
SODIUM SERPL-SCNC: 143 MMOL/L (ref 135–145)
SPECIMEN SOURCE: NORMAL
TSH SERPL DL<=0.005 MIU/L-ACNC: 0.81 UIU/ML (ref 0.38–5.33)
WBC # BLD AUTO: NORMAL K/UL (ref 4.8–10.8)

## 2021-05-27 PROCEDURE — 97116 GAIT TRAINING THERAPY: CPT

## 2021-05-27 PROCEDURE — 99233 SBSQ HOSP IP/OBS HIGH 50: CPT | Performed by: PHYSICAL MEDICINE & REHABILITATION

## 2021-05-27 PROCEDURE — 700102 HCHG RX REV CODE 250 W/ 637 OVERRIDE(OP): Performed by: PHYSICAL MEDICINE & REHABILITATION

## 2021-05-27 PROCEDURE — 97530 THERAPEUTIC ACTIVITIES: CPT

## 2021-05-27 PROCEDURE — 700111 HCHG RX REV CODE 636 W/ 250 OVERRIDE (IP): Performed by: PHYSICAL MEDICINE & REHABILITATION

## 2021-05-27 PROCEDURE — A9270 NON-COVERED ITEM OR SERVICE: HCPCS | Performed by: PHYSICAL MEDICINE & REHABILITATION

## 2021-05-27 PROCEDURE — 97535 SELF CARE MNGMENT TRAINING: CPT

## 2021-05-27 PROCEDURE — 82962 GLUCOSE BLOOD TEST: CPT | Mod: 91

## 2021-05-27 PROCEDURE — 770010 HCHG ROOM/CARE - REHAB SEMI PRIVAT*

## 2021-05-27 PROCEDURE — U0005 INFEC AGEN DETEC AMPLI PROBE: HCPCS

## 2021-05-27 PROCEDURE — 97162 PT EVAL MOD COMPLEX 30 MIN: CPT

## 2021-05-27 PROCEDURE — 85025 COMPLETE CBC W/AUTO DIFF WBC: CPT

## 2021-05-27 PROCEDURE — 97110 THERAPEUTIC EXERCISES: CPT

## 2021-05-27 PROCEDURE — 80053 COMPREHEN METABOLIC PANEL: CPT

## 2021-05-27 PROCEDURE — 36415 COLL VENOUS BLD VENIPUNCTURE: CPT

## 2021-05-27 PROCEDURE — 94660 CPAP INITIATION&MGMT: CPT

## 2021-05-27 PROCEDURE — 700101 HCHG RX REV CODE 250: Performed by: PHYSICAL MEDICINE & REHABILITATION

## 2021-05-27 PROCEDURE — 94760 N-INVAS EAR/PLS OXIMETRY 1: CPT

## 2021-05-27 PROCEDURE — 84443 ASSAY THYROID STIM HORMONE: CPT

## 2021-05-27 PROCEDURE — 97167 OT EVAL HIGH COMPLEX 60 MIN: CPT

## 2021-05-27 PROCEDURE — U0003 INFECTIOUS AGENT DETECTION BY NUCLEIC ACID (DNA OR RNA); SEVERE ACUTE RESPIRATORY SYNDROME CORONAVIRUS 2 (SARS-COV-2) (CORONAVIRUS DISEASE [COVID-19]), AMPLIFIED PROBE TECHNIQUE, MAKING USE OF HIGH THROUGHPUT TECHNOLOGIES AS DESCRIBED BY CMS-2020-01-R: HCPCS

## 2021-05-27 RX ORDER — LOSARTAN POTASSIUM 25 MG/1
25 TABLET ORAL
Status: DISCONTINUED | OUTPATIENT
Start: 2021-05-28 | End: 2021-06-02

## 2021-05-27 RX ADMIN — IBUPROFEN 400 MG: 400 TABLET, FILM COATED ORAL at 20:00

## 2021-05-27 RX ADMIN — SENNOSIDES AND DOCUSATE SODIUM 2 TABLET: 8.6; 5 TABLET ORAL at 20:00

## 2021-05-27 RX ADMIN — IBUPROFEN 400 MG: 400 TABLET, FILM COATED ORAL at 08:15

## 2021-05-27 RX ADMIN — THYROID, PORCINE 90 MG: 30 TABLET ORAL at 08:05

## 2021-05-27 RX ADMIN — AMLODIPINE BESYLATE 10 MG: 5 TABLET ORAL at 08:06

## 2021-05-27 RX ADMIN — LIDOCAINE 1 PATCH: 50 PATCH TOPICAL at 08:06

## 2021-05-27 RX ADMIN — ENOXAPARIN SODIUM 40 MG: 40 INJECTION SUBCUTANEOUS at 08:07

## 2021-05-27 RX ADMIN — MAGNESIUM OXIDE TAB 400 MG (241.3 MG ELEMENTAL MG) 400 MG: 400 (241.3 MG) TAB at 08:06

## 2021-05-27 RX ADMIN — SENNOSIDES AND DOCUSATE SODIUM 2 TABLET: 8.6; 5 TABLET ORAL at 08:06

## 2021-05-27 RX ADMIN — LOSARTAN POTASSIUM 50 MG: 25 TABLET, FILM COATED ORAL at 05:07

## 2021-05-27 ASSESSMENT — GAIT ASSESSMENTS
DISTANCE (FEET): 42
ASSISTIVE DEVICE: FRONT WHEEL WALKER
ASSISTIVE DEVICE: FRONT WHEEL WALKER
GAIT LEVEL OF ASSIST: CONTACT GUARD ASSIST
GAIT LEVEL OF ASSIST: STAND BY ASSIST

## 2021-05-27 ASSESSMENT — ACTIVITIES OF DAILY LIVING (ADL)
TOILETING_LEVEL_OF_ASSIST_DESCRIPTION: GRAB BAR;INCREASED TIME;SUPERVISION FOR SAFETY;VERBAL CUEING
TUB_SHOWER_TRANSFER_DESCRIPTION: GRAB BAR;SHOWER BENCH;SET-UP OF EQUIPMENT;SUPERVISION FOR SAFETY;VERBAL CUEING
TOILET_TRANSFER_DESCRIPTION: GRAB BAR;INCREASED TIME;INITIAL PREPARATION FOR TASK;SET-UP OF EQUIPMENT;SUPERVISION FOR SAFETY;VERBAL CUEING
BED_CHAIR_WHEELCHAIR_TRANSFER_DESCRIPTION: INCREASED TIME;SET-UP OF EQUIPMENT;SUPERVISION FOR SAFETY
TOILETING_LEVEL_OF_ASSIST_DESCRIPTION: GRAB BAR;INCREASED TIME;INITIAL PREPARATION FOR TASK;SET-UP OF EQUIPMENT;SUPERVISION FOR SAFETY;VERBAL CUEING
TOILETING: INDEPENDENT
TOILET_TRANSFER_DESCRIPTION: GRAB BAR;INCREASED TIME;SET-UP OF EQUIPMENT;SUPERVISION FOR SAFETY

## 2021-05-27 ASSESSMENT — PAIN DESCRIPTION - PAIN TYPE: TYPE: ACUTE PAIN

## 2021-05-27 ASSESSMENT — BRIEF INTERVIEW FOR MENTAL STATUS (BIMS)
ASKED TO RECALL BLUE: YES, NO CUE REQUIRED
ASKED TO RECALL BED: YES, NO CUE REQUIRED
BIMS SUMMARY SCORE: 13
WHAT MONTH IS IT: ACCURATE WITHIN 5 DAYS
WHAT YEAR IS IT: CORRECT
INITIAL REPETITION OF BED BLUE SOCK - FIRST ATTEMPT: 3
ASKED TO RECALL SOCK: NO, COULD NOT RECALL
WHAT DAY OF THE WEEK IS IT: CORRECT

## 2021-05-27 NOTE — FLOWSHEET NOTE
05/26/21 1739   Events/Summary/Plan   Events/Summary/Plan RT assessment   Vital Signs   Pulse 100   Respiration 20   Pulse Oximetry 94 %   $ Pulse Oximetry (Spot Check) Yes   Respiratory Assessment   Level of Consciousness Alert   Chest Exam   Work Of Breathing / Effort Within Normal Limits   Breath Sounds   RLL Breath Sounds Diminished   LLL Breath Sounds Diminished   Oxygen   O2 Delivery Device None - Room Air   Non-Invasive Ventilation [facility]   Nocturnal CPAP or BIPAP CPAP - Home Unit   $ System Evaluation Yes   Settings (If Known) 11-15 cm H20   FiO2 or LPM 0   Smoking History   Have you ever smoked Yes   Have you smoked in the last 12 months No   Confirm Quit Date 05/26/17

## 2021-05-27 NOTE — THERAPY
"Occupational Therapy  Daily Treatment     Patient Name: Mady Yang  Age:  65 y.o., Sex:  female  Medical Record #: [id number]  Today's Date: 5/27/2021     Precautions  Precautions: Fall Risk  Comments: diabetic, admit isolation precautions, tachycardia         Subjective  \"My legs feel weak\"     Objective       05/27/21 0831   Precautions   Precautions Fall Risk;Other (See Comments)   Comments diabetic, admit isolation precautions, tachycardia   Vitals   O2 Delivery Device None - Room Air   Non Verbal Descriptors   Non Verbal Scale  Calm   Cognition    Level of Consciousness Alert   Sleep/Wake Cycle   Sleep & Rest Awake   Functional Level of Assist   Toileting Minimal Assist   Toileting Description Grab bar;Increased time;Supervision for safety;Verbal cueing   Toilet Transfers Minimal Assist  (wc/commode)   Toilet Transfer Description Grab bar;Increased time;Set-up of equipment;Supervision for safety   Sitting Upper Body Exercises   Chest Press 3 sets of 10  (3# babrbell )   Front Arm Raise 3 sets of 10  (3# barbell)   Bicep Curls 3 sets of 10  (5# barbell)   Interdisciplinary Plan of Care Collaboration   Patient Position at End of Therapy Seated;Self Releasing Lap Belt Applied;Call Light within Reach;Tray Table within Reach;Phone within Reach;Chair Alarm On   OT Total Time Spent   OT Individual Total Time Spent (Mins) 30   OT Charge Group   OT Self Care / ADL 1   OT Therapeutic Exercise  1       Assessment    Pt was alert and cooperative w/ tx.  Tx emphasis on ADL's and seated UB TherEx - see notes above for details.  Strengths: Effective communication skills, Independent prior level of function, Motivated for self care and independence, Pleasant and cooperative, Willingly participates in therapeutic activities, Supportive family  Barriers: Decreased endurance, Generalized weakness, Impaired activity tolerance, Impaired balance, Limited mobility    Plan    Cont'd OT for education (safety/environmental " awareness, NRG conservation, use of AE/DME), strengthening, endurance and balance for safe ADL's/IADL's and bathroom transfers.    Passport items to be completed:  Passport items to be completed:  Perform bathroom transfers, complete dressing, complete feeding, get ready for the day, prepare a simple meal, participate in household tasks, adapt home for safety needs, demonstrate home exercise program, complete caregiver training     Occupational Therapy Goals (Active)     Problem: Dressing     Dates: Start: 05/27/21       Goal: STG-Within one week, patient will dress LB     Dates: Start: 05/27/21       Goal Note filed on 05/27/21 0812 by Salvador Ponce MS,OTR/L     1) Individualized Goal: Min assist for Lower Body Clothing Management via AE/DME PRN  2) Interventions:  OT Self Care/ADL, OT Neuro Re-Ed/Balance, OT Therapeutic Activity, OT Evaluation, and OT Therapeutic Exercise               Problem: Functional Transfers     Dates: Start: 05/27/21       Goal: STG-Within one week, patient will transfer to toilet     Dates: Start: 05/27/21       Goal Note filed on 05/27/21 0812 by Salvador Ponce MS,OTR/L     1) Individualized Goal:  Sup/SBA for commode transfers via DME PRN  2) Interventions:  OT Self Care/ADL, OT Neuro Re-Ed/Balance, OT Therapeutic Activity, OT Evaluation, and OT Therapeutic Exercise               Problem: OT Long Term Goals     Dates: Start: 05/27/21       Goal: LTG-By discharge, patient will complete basic self care tasks     Dates: Start: 05/27/21       Goal Note filed on 05/27/21 0812 by Salvador Ponce MS,OTR/L     1) Individualized Goal:  Mod I for BADL's via AE/DME PRN  2) Interventions:  OT Self Care/ADL, OT Neuro Re-Ed/Balance, OT Therapeutic Activity, OT Evaluation, and OT Therapeutic Exercise            Goal: LTG-By discharge, patient will perform bathroom transfers     Dates: Start: 05/27/21       Goal Note filed on 05/27/21 0812 by Salvador Ponce MS,OTR/L     1) Individualized Goal:  Mod I for  bathroom transfers via DME PRN  2) Interventions: OT Self Care/ADL, OT Neuro Re-Ed/Balance, OT Therapeutic Activity, OT Evaluation, and OT Therapeutic Exercise               Problem: Toileting     Dates: Start: 05/27/21       Goal: STG-Within one week, patient will complete toileting tasks     Dates: Start: 05/27/21       Goal Note filed on 05/27/21 0812 by Salvador Ponce MS,OTR/L     1) Individualized Goal:  Sup/SBA for toileting task (BM) via AE/DME PRN  2) Interventions: OT Self Care/ADL, OT Neuro Re-Ed/Balance, OT Therapeutic Activity, OT Evaluation, and OT Therapeutic Exercise

## 2021-05-27 NOTE — FLOWSHEET NOTE
05/26/21 1738   Patient History   Pulmonary Diagnosis HENRI   Procedures Relevant to Respiratory Status none   Home O2 No   Nocturnal CPAP Yes   Nocturnal CPAP Pressures 11-15 cm H20   Nocturnal CPAP Oxygen liter flow 0   Home Treatments/Frequency No   Sleep Apnea Screening   Have you had a sleep study? Yes   Have you been diagnosed with sleep apnea? Yes   Do you use a CPAP/BIPAP/AUTOPAP? Yes

## 2021-05-27 NOTE — DIETARY
NUTRITION SERVICES: BMI - Pt with BMI >40 (=Body mass index is 52.52 kg/m².), Class III obesity. Weight loss counseling not appropriate in acute care setting. RECOMMEND - If appropriate at DC please refer to outpatient nutrition services for weight management.

## 2021-05-27 NOTE — H&P
REHABILITATION HISTORY AND PHYSICAL/POST ADMISSION PHYSICAL EVALUATION    Date of Admission: 5/26/2021  Mady Yang  RH25/02    [facility] Code / Diagnosis to Support: 0003.8 - Neurologic Conditions: Neuromuscular Disorders  Etiologic Diagnosis: Critical illness myopathy    CC: Decreased mobility, weakness    HPI:  Patient is a 65 y.o. female with a PMH of HTN, obesity, DM, and hypothyroidism who presented on 4/21/21 to [facility] ([place] with severe localized RLQ pain. She had reportedly gone to urgent care who ordered CT scan and confirmed diagnosis of appendicitis.  Patient also had signs concerning for intra-abdominal abscess. She underwent laparoscopic appendectomy on 4/21/21 with Dr. Ruiz but was converted to open appendectomy due to rupture of appendix. Patient had prolonged hospitalization with multiple rounds of antibiotics. Her stay was also complicated by ileus. She was placed on TPN.  She also had urinary retention and yusuf was placed.  Patient was transferred to LT ([facility]) on 5/14/21 for rehabilitation and medical management.  Patient has since been weaned off of TPN. She has had yusuf removed and is voiding.  Patient was very difficult to ambulate concerning team at [facility] for critical illness myopathy as well as limited by her > 50 BMI.      Patient was last evaluated by PT on 5/24/21 and was min-modA for mobility. Patient was last evaluated by OT on 5/23/21 and was modA for ADLs. Patient was previously living in a 1 story home with 2 steps to enter; living with .    Patient tolerated transfer to [facility]. She reports she is motivated to get home. She reports her abdominal incisions have healed and she has not had any recent fevers or chills. She reports she is limited by diffuse weakness in LEs as well as her baseline OA which affects her left knee and ankle. She denies NVD. Denies SOB. Denies chest pain.     REVIEW OF SYSTEMS:     Comprehensive 14 point ROS was reviewed and all were negative except as noted  "elsewhere in this document.     PMH:  Past Medical History:   Diagnosis Date   • Back pain    • Chronic low back pain 09/25/2018   • Elevated blood sugar     States she was told not yet \"pre diabetic\" gets HbA1C   • Hypertension    • Kidney stone 1980's   • Obesity    • HENRI (obstructive sleep apnea) 1/20/2017    AHI 55.7, minimum saturation 84%, on CPAP 13 cm.   • Osteoporosis    • Post-nasal drip    • Seasonal allergies    • Sleep apnea    • Thyroid disease        PSH:  Past Surgical History:   Procedure Laterality Date   • PB LAP,APPENDECTOMY N/A 4/21/2021    Procedure: APPENDECTOMY, LAPAROSCOPIC; converted to open appendectomy;  Surgeon: Jett Ruiz M.D.;  Location: SURGERY [facility];  Service: General   • COLONOSCOPY  9/27/2018    Procedure: COLONOSCOPY;  Surgeon: Milan Lipscomb M.D.;  Location: [facility];  Service: EUS   • COLONOSCOPY FLEXIBLE W/BIOPSY  9/27/2018    Procedure: COLONOSCOPY FLEXIBLE W/BIOPSY - POSSIBLE, DILATION, POLYPECTOMY, CONTROL OF HEMORRHAGE;  Surgeon: Milan Lipscomb M.D.;  Location: SURGERY [facility];  Service: EUS   • COLONOSCOPY  2010   • COLONOSCOPY  2007   • CLOSED MANIPULATION ELBOW  1967    For fracture-no hardware   • CHOLECYSTECTOMY  1980's   • DENTAL EXTRACTION(S)  1980's    wisdom teeth       FAMILY HISTORY:  Family History   Problem Relation Age of Onset   • Heart Failure Father    • Other Brother         leukemia   • Sleep Apnea Neg Hx          MEDICATIONS:  Current Facility-Administered Medications   Medication Dose   • Respiratory Therapy Consult     • oxyCODONE immediate-release (ROXICODONE) tablet 5 mg  5 mg    Or   • oxyCODONE immediate release (ROXICODONE) tablet 10 mg  10 mg   • hydrALAZINE (APRESOLINE) tablet 25 mg  25 mg   • acetaminophen (Tylenol) tablet 650 mg  650 mg   • senna-docusate (PERICOLACE or SENOKOT S) 8.6-50 MG per tablet 2 tablet  2 tablet    And   • polyethylene glycol/lytes (MIRALAX) PACKET 1 Packet  1 Packet    And   • " "magnesium hydroxide (MILK OF MAGNESIA) suspension 30 mL  30 mL    And   • bisacodyl (DULCOLAX) suppository 10 mg  10 mg   • artificial tears ophthalmic solution 1 Drop  1 Drop   • benzocaine-menthol (CEPACOL) lozenge 1 Lozenge  1 Lozenge   • mag hydrox-al hydrox-simeth (MAALOX PLUS ES or MYLANTA DS) suspension 20 mL  20 mL   • ondansetron (ZOFRAN ODT) dispertab 4 mg  4 mg    Or   • ondansetron (ZOFRAN) syringe/vial injection 4 mg  4 mg   • traZODone (DESYREL) tablet 50 mg  50 mg   • sodium chloride (OCEAN) 0.65 % nasal spray 2 Spray  2 Spray   • [START ON 5/27/2021] amLODIPine (NORVASC) tablet 10 mg  10 mg   • cyclobenzaprine (Flexeril) tablet 5 mg  5 mg   • [START ON 5/27/2021] lidocaine (LIDODERM) 5 % 1 Patch  1 Patch   • [START ON 5/27/2021] losartan (COZAAR) tablet 50 mg  50 mg   • [START ON 5/27/2021] thyroid (ARMOUR THYROID) tablet 90 mg  90 mg   • [START ON 5/27/2021] ibuprofen (MOTRIN) tablet 400 mg  400 mg   • [START ON 5/27/2021] magnesium oxide (MAG-OX) tablet 400 mg  400 mg       ALLERGIES:  Patient has no known allergies.    PSYCHOSOCIAL HISTORY:  Living Site:  Home  Living With:  spouse  Caregiver's availability:  Not Applicable  Number of stairs:  2  Substance use history:  Denies tobacco or illicit substances    LEVEL OF FUNCTION PRIOR TO DISABILTY:  Independent with AD    LEVEL OF FUNCTION PRIOR TO ADMISSION to [facility]:  Min-modA for mobility  modA for ADLs    CURRENT LEVEL OF FUNCTION:   Same as level of function prior to admission to [facility]    PHYSICAL EXAM:     VITAL SIGNS:   height is 1.6 m (5' 3\") and weight is 134 kg (296 lb 8 oz) (abnormal). Her temporal temperature is 36.9 °C (98.5 °F). Her blood pressure is 120/71 and her pulse is 106 (abnormal). Her respiration is 18 and oxygen saturation is 94%.     GENERAL: No apparent distress  HEENT: Normocephalic/atraumatic, EOMI and PERRL  CARDIAC: Tachycardic, regular rhythm, normal S1, S2   LUNGS: " Clear to auscultation   ABDOMINAL: bowel sounds present, soft, nontender and nondistended protuberant. Surgical sites well healing, dried flaky skin   EXTREMITIES: no contractures, spasticity, or edema.  Pain on palpation around knee and ankle on left. Limited ROM at knee  NEURO:  Mental status:  A&Ox4 (person, place, date, situation) answers questions appropriately  Speech: fluent, no aphasia or dysarthria  Motor:  4+/5 BUE  4/5 R HF 4-/5 L HF  4/5 R KE 3/5 L KE (pain limited)  Otherwise 4/5 ADF 5/5 APF  Sensory: intact to light touch through out  DTRs: unable to obtain patellar tendons    RADIOLOGY:                                   Results for orders placed during the hospital encounter of 04/21/21    CT-ABDOMEN-PELVIS W/O    Impression  Interval laparotomy with right lower quadrant surgical drain, similar fat stranding in the pericecal region that is likely postoperative in nature. No noncontrast evidence of abscess formation    Small free fluid and intraperitoneal air is not outside normal limits in the perioperative period    Contrast in the colon is from the CT 3 days ago, air-fluid levels and mild small bowel dilatation. In combination these findings are most compatible with ileus    Urinary bladder decompressed around the [place] catheter. No hydroureteronephrosis   Results for orders placed during the hospital encounter of 04/21/21    CT-ABDOMEN-PELVIS WITH    Impression  1.  Improving fat stranding in the lower abdomen likely improving inflammation and postoperative changes. A surgical drain in the right lower quadrant is again noted. No significant free fluid or abscess.  2.  Mildly improved ileus.  3.  Hepatic steatosis.    LABS:         Pending admission labs          PRIMARY REHAB DIAGNOSIS:    This patient is a 65 y.o. female admitted for acute inpatient rehabilitation with Critical illness myopathy.    IMPAIRMENTS:   ADLs/IADLs  Mobility    SECONDARY DIAGNOSIS/MEDICAL CO-MORBIDITIES AFFECTING  FUNCTION:  DM  HTN  Tachycardia  Hypothyroidism  Anemia  Morbid obesity    RELEVANT CHANGES SINCE PREADMISSION EVALUATION:    Status unchanged    The patient's rehabilitation potential is Good  The patient's medical prognosis is good    PLAN:   Discussion and Recommendations:   1. The patient requires an acute inpatient rehabilitation program with a coordinated program of care at an intensity and frequency not available at a lower level of care. This recommendation is substantiated by the patient's medical physicians who recommend that the patient's intervention and assessment of medical issues needs to be done at an acute level of care for patient's safety and maximum outcome.   2. A coordinated program of care will be supplied by an interdisciplinary team of physical therapy, occupational therapy, rehab physician, rehab nursing, and, if needed, speech therapy and rehab psychology. Rehab team presents a patient-specific rehabilitation and education program concentrating on prevention of future problems related to accessibility, mobility, skin, bowel, bladder, sexuality, and psychosocial and medical/surgical problems.   3. Need for Rehabilitation Physician: The rehab physician will be evaluating the patient on a multi-weekly basis to help coordinate the program of care. The rehab physician communicates between medical physicians, therapists, and nurses to maximize the patient's potential outcome. Specific areas in which the rehab physician will be providing daily assessment include the following:   A. Assessing the patient's heart rate and blood pressure response (vitals monitoring) to activity and making adjustments in medications or conservative measures as needed.   B. The rehab physician will be assessing the frequency at which the program can be increased to allow the patient to reach optimal functional outcome.   C. The rehab physician will also provide assessments in daily skin care, especially in light of  patient's impairments in mobility.   D. The rehab physician will provide special expertise in understanding how to work with functional impairment and recommend appropriate interventions, compensatory techniques, and education that will facilitate the patient's outcome.   4. Rehab R.N.   The rehab RN will be working with patient to carry over in room mobility and activities of daily living when the patient is not in 3 hours of skilled therapy. Rehab nursing will be working in conjunction with rehab physician to address all the medical issues above and continue to assess laboratory work and discuss abnormalities with the treating physicians, assess vitals, and response to activity, and discuss and report abnormalities with the rehab physician. Rehab RN will also continue daily skin care, supervise bladder/bowel program, instruct in medication administration, and ensure patient safety.   5. Rehab Therapy: Therapies to treat at intensity and frequency of (may change after completion of evaluation by all therapeutic disciplines):       PT:  Physical therapy to address mobility, transfer, gait training and evaluation for adaptive equipment needs 1 and 1/2 hour/day at least 5 days/week for the duration of the ELOS (see below)       OT:  Occupational therapy to address ADLs, self-care, home management training, functional mobility/transfers and assistive device evaluation, and community re-integration 1 and 1/2 hour/day at least 5 days/week for the duration of the ELOS (see below).     Medical management / Rehabilitation Issues/ Adverse Potential as part of rehabilitation plan     REHABILITATION ISSUES/ADVERSE POTENTIAL:  1.  Critical Illness myopathy - Patient with prolonged hospitalization after burst appendix complicated by respiratory failure, ileus, and poor intake requiring TPN. Patient transferred to [facility] and found to have significant weakness concerning for CIM. Patient demonstrates functional deficits in strength,  balance, coordination, and ADL's. Patient is admitted to [facility] for comprehensive rehabilitation therapy as described below.   Rehabilitation nursing monitors bowel and bladder control, educates on medication administration, co-morbidities and monitors patient safety.    2.  Neurostimulants: None at this time but continue to assess daily for need to initiate should status change.    3.  DVT prophylaxis:  Patient is on Lovenox for anticoagulation upon transfer. Encourage OOB. Monitor daily for signs and symptoms of DVT including but not limited to swelling and pain to prevent the development of DVT that may interfere with therapies.    4.  GI prophylaxis:  On prilosec to prevent gastritis/dyspepsia which may interfere with therapies.    5.  Pain: No issues with pain currently / Controlled with APAP/Tramadol/Ibuprofen    6.  Nutrition/Dysphagia: Dietician monitors nutrient intake, recommend supplements prn and provide nutrition education to pt/family to promote optimal nutrition for wound healing/recovery.     7.  Bladder/bowel:  Start bowel and bladder program as described below, to prevent constipation, urinary retention (which may lead to UTI), and urinary incontinence (which will impact upon pt's functional independence).   - Post void bladder scans, I&O cath for PVRs >400  - up to commode after meal     8.  Skin/dermal ulcer prophylaxis: Monitor for new skin conditions with q.2 h. turns as required to prevent the development of skin breakdown.     9.  Cognition/Behavior: As needed psychologist provides adjustment counseling to illness and psychosocial barriers that may be potential barriers to rehabilitation.     10. Respiratory therapy: RT performs O2 management prn, breathing retraining, pulmonary hygiene and bronchospasm management prn to optimize participation in therapies.     MEDICAL CO-MORBIDITIES/ADVERSE POTENTIAL AFFECTING FUNCTION:  Critical Illness myopathy - Patient with  prolonged hospitalization after burst appendix complicated by respiratory failure, ileus, and poor intake requiring TPN. Patient transferred to [facility] and found to have significant weakness concerning for CIM.  -PT and OT for mobility and ADLs    DM - Patient takes home supplement. Will start SSI, previously well controlled. Will check A1c    HTN - Patient on Losartan 50 mg and Amlodipine 10 mg on transfer.     Tachycardia - On admission. Will monitor.     Hypothyroidism - Patient on [place] Thyroid 90 mg daily. Check TSH    Anemia - Check AM CBC    Morbid obesity due to excess calories - Patient with BMI of 52.52 on admission meeting medical criteria.     DVT ppx - Patient to start Lovenox until ambulating sufficiently.     I personally performed a complete drug regimen review and no potential clinically significant medication issues were identified.     Pt was seen today for 72 min, and entire time spent in face-to-face contact was >50% in counseling and coordination of care as detailed in A/P above.        GOALS/EXPECTED LEVEL OF FUNCTION BASED ON CURRENT MEDICAL AND FUNCTIONAL STATUS (may change based on patient's medical status and rate of impairment recovery):  Transfers:   Modified Independent  Mobility/Gait:   Modified Independent  ADL's:   Modified Independent    DISPOSITION: Discharge to pre-morbid independent living setting with the supportive care of patient's spouse.    ELOS: 10-14 days

## 2021-05-27 NOTE — THERAPY
"Physical Therapy   Daily Treatment     Patient Name: Mady Yang  Age:  65 y.o., Sex:  female  Medical Record #: [id number]  Today's Date: 5/27/2021     Precautions  Precautions: Fall Risk  Comments: diabetic, admit isolation precautions, tachycardia    Subjective    Patient received supine with elevated HOB; agreeable to PT.     Objective       05/27/21 1531   Vitals   O2 (LPM) 0   O2 Delivery Device None - Room Air   Gait Functional Level of Assist    Gait Level Of Assist Stand by Assist   Assistive Device Front Wheel Walker  (bariatric)   Distance (Feet)   (70 ft and 38 ft)   # of Times Distance was Traveled   (per above)   Deviation Trendelenberg;Increased Base Of Support;Bradykinetic;Decreased Toe Off   Wheelchair Functional Level of Assist   Wheelchair Assist Stand by Assist   Distance Wheelchair (Feet or Distance) 65   Wheelchair Description Extra time;Leg rest management;Limited by fatigue;Supervision for safety;Verbal cueing   Transfer Functional Level of Assist   Bed, Chair, Wheelchair Transfer Contact Guard Assist   Bed Chair Wheelchair Transfer Description Increased time;Set-up of equipment;Supervision for safety  (elevated HOB, bariatric FWW, bed rail)   Supine Lower Body Exercise   Comments Demonstrated and discussed ankle pumps in supine and sitting positions; pt reports that she will do them later   Bed Mobility    Supine to Sit Supervised   Sit to Supine Supervised   Sit to Stand Stand by Assist  (FWW, setup, 19-20\"h. 3 reps.)   Scooting Stand by Assist   Rolling Supervised   Interdisciplinary Plan of Care Collaboration   IDT Collaboration with  Physician;Other (See Comments)   Patient Position at End of Therapy In Bed;Edge of Bed;Call Light within Reach;Tray Table within Reach;Phone within Reach   Collaboration Comments coordination of care and POC with MD and therapy scheduling; also, pt still on admit isolation precautions. x10 additional min unbillable   PT Total Time Spent   PT Individual " Total Time Spent (Mins) 30   PT Charge Group   PT Gait Training 1   PT Therapeutic Activities 1     Updated applicable mobility IRF-[place].  Education and treatment goals discussed, including intro to w/c skills and safety.    Assessment    Patient has improving endurance and activity tolerance; no LOB with use of BUE when standing; good cognition; discussed pt's report of recent hx of very mild swallow difficulties, MD likely to order swallow SLP eval in next few days.    Strengths: Able to follow instructions, Alert and oriented, Effective communication skills, Good carryover of learning, Good insight into deficits/needs, Independent prior level of function, Making steady progress towards goals, Manages pain appropriately, Motivated for self care and independence, Pleasant and cooperative, Willingly participates in therapeutic activities  Barriers: Decreased endurance, Fatigue, Generalized weakness, Home accessibility, Hypotension, Impaired activity tolerance, Impaired balance, Limited mobility    Plan    FWW ambulation, Ther ex for BLE strengthening and endurance, Lumbar stab/core strengthening, orient pt to rehab facility, Education, bed mobility with less elevated HOB.    Passport items to be completed:  Passport items to be completed:  Get in/out of bed safely, in/out of a vehicle, safely use mobility device, walk or wheel around home/community, navigate up and down stairs, show how to get up/down from the ground, ensure home is accessible, demonstrate HEP, complete caregiver training    Physical Therapy Problems (Active)     Problem: Mobility     Dates: Start: 05/27/21       Goal: STG-Within one week, patient will ambulate household distance of 150 feet with FWW at SBA level.     Dates: Start: 05/27/21          Goal: STG-Within one week, patient will ascend and descend four to six stairs with B railings at Min A level.     Dates: Start: 05/27/21             Problem: Mobility Transfers     Dates: Start: 05/27/21        Goal: STG-Within one week, patient will perform bed mobility with bed rail at CGA level.     Dates: Start: 05/27/21          Goal: STG-Within one week, patient will transfer bed to chair with FWW at CGA level.     Dates: Start: 05/27/21          Goal: STG-Within one week, patient will transfer in/out of car with FWW at Min A level.     Dates: Start: 05/27/21             Problem: PT-Long Term Goals     Dates: Start: 05/27/21       Goal: LTG-By discharge, patient will ambulate 300 feet at SBA level with a FWW or SPC.     Dates: Start: 05/27/21          Goal: LTG-By discharge, patient will transfer one surface to another at SPV level with a FWW or SPC.     Dates: Start: 05/27/21          Goal: LTG-By discharge, patient will ambulate up/down 4-6 stairs at SBA level with 1-2 railings.     Dates: Start: 05/27/21          Goal: LTG-By discharge, patient will transfer in/out of a car at SBA level with a FWW or SPC.     Dates: Start: 05/27/21

## 2021-05-27 NOTE — THERAPY
"Occupational Therapy   Initial Evaluation     Patient Name: Mady Yang  Age:  65 y.o., Sex:  female  Medical Record #: [id number]  Today's Date: 5/27/2021     Subjective    \"Good morning\"  Pt was alert and cooperative w/ OT evaluation     Objective       05/27/21 0701   Prior Living Situation   Prior Services None;Home-Independent   Housing / Facility 1 Story House   Steps Into Home 2   Steps In Home 0   Rail None   Elevator No   Bathroom Set up Walk In Shower;Shower Curtain;Grab Bars   Equipment Owned Quad Cane;Grab Bar(s) In Tub / Shower;Other (Comments)  (CPAP)   Lives with - Patient's Self Care Capacity Spouse   Prior Level of ADL Function   Self Feeding Independent   Bathing Independent   Dressing Independent   Toileting Independent   Prior Level of IADL Function   Medication Management Independent   Laundry Independent   Kitchen Mobility Independent   Finances Independent   Home Management Independent   Shopping Independent   Prior Level Of Mobility Independent Without Device in Home;Independent With Device in Home  (intermittent use of quad cane)   Driving / Transportation Driving Independent   Occupation (Pre-Hospital Vocational) Retired Due To Age   Leisure Interests   (Fishing)   Prior Functioning: Everyday Activities   Self Care Independent   Indoor Mobility (Ambulation) Independent   Stairs Independent   Functional Cognition Independent   Prior Device Use None of the given options  (Intermittent use of quad cane)   Vitals   O2 Delivery Device None - Room Air   Non Verbal Descriptors   Non Verbal Scale  Calm   Cognition    Level of Consciousness Alert   ABS (Agitated Behavior Scale)   Agitated Behavior Scale Performed No   Cognitive Pattern Assessment   Cognitive Pattern Assessment Used BIMS   Brief Interview for Mental Status (BIMS)   Repetition of Three Words (First Attempt) 3   Temporal Orientation: Year Correct   Temporal Orientation: Month Accurate within 5 days   Temporal Orientation: Day " "Correct   Recall: \"Sock\" No, could not recall   Recall: \"Blue\" Yes, no cue required   Recall: \"Bed\" Yes, no cue required   BIMS Summary Score 13   Vision Screen   Vision Not tested   Passive ROM Upper Body   Passive ROM Upper Body WDL   Active ROM Upper Body   Active ROM Upper Body  WDL   Dominant Hand Right   Strength Upper Body   Upper Body Strength  X   Comments Generalized weakness bilaterally   Sensation Upper Body   Upper Extremity Sensation  WDL   Upper Body Muscle Tone   Upper Body Muscle Tone  WDL   Bed Mobility    Supine to Sit Supervised   Sit to Stand Contact Guard Assist   Coordination Upper Body   Coordination WDL   Eating   Assistance Needed Independent   Physical Assistance Level No physical assistance   CARE Score 6   Eating Discharge Goal   Discharge Goal 6   Oral Hygiene   Assistance Needed Set-up / clean-up;Supervision   Physical Assistance Level No physical assistance   CARE Score 4   Oral Hygiene Discharge Goal   Discharge Goal 6   Shower/Bathe Self   Assistance Needed Physical assistance;Verbal cues;Supervision;Set-up / clean-up;Adaptive equipment   Physical Assistance Level 26%-50%   CARE Score 3   Shower/Bathe Self Discharge Goal   Discharge Goal 6   Upper Body Dressing   Assistance Needed Set-up / clean-up;Supervision   Physical Assistance Level No physical assistance   CARE Score 4   Upper Body Dressing Discharge Goal   Discharge Goal 6   Lower Body Dressing   Assistance Needed Physical assistance;Verbal cues;Supervision;Set-up / clean-up   Physical Assistance Level 26%-50%   CARE Score 3   Lower Body Dressing Discharge Goal   Discharge Goal 6   Putting On/Taking Off Footwear   Assistance Needed Set-up / clean-up;Supervision;Verbal cues;Physical assistance   Physical Assistance Level 26%-50%   CARE Score 3   Putting On/Taking Off Footwear Discharge Goal   Discharge Goal 6   Toileting Hygiene   Assistance Needed Physical assistance;Verbal cues;Supervision;Set-up / clean-up   Physical " Assistance Level 25% or less   CARE Score 3   Toileting Hygiene Discharge Goal   Discharge Goal 6   Toilet Transfer   Assistance Needed Set-up / clean-up;Adaptive equipment;Supervision;Verbal cues;Physical assistance   Physical Assistance Level 25% or less   CARE Score 3   Toilet Transfer Discharge Goal   Discharge Goal 6   Hearing, Speech, and Vision   Expression of Ideas and Wants Without difficulty   Understanding Verbal and Non-Verbal Content Understands   Functional Level of Assist   Eating Modified Independent   Eating Description Increased time   Grooming Supervision;Seated   Grooming Description Seated in wheelchair at sink;Set-up of equipment;Supervision for safety   Bathing Moderate Assist   Bathing Description Grab bar;Hand held shower;Tub bench;Assit with back;Assit wtih lower extremities;Set-up of equipment;Supervision for safety;Verbal cueing   Upper Body Dressing Supervision   Upper Body Dressing Description Set-up of equipment;Supervision for safety   Lower Body Dressing Moderate Assist   Lower Body Dressing Description Increased time;Initial preparation for task;Set-up of equipment;Supervision for safety;Verbal cueing   Toileting Minimal Assist   Toileting Description Grab bar;Increased time;Initial preparation for task;Set-up of equipment;Supervision for safety;Verbal cueing   Toilet Transfers Minimal Assist  (wc/commode)   Toilet Transfer Description Grab bar;Increased time;Initial preparation for task;Set-up of equipment;Supervision for safety;Verbal cueing   Tub / Shower Transfers Moderate Assist  (wc/shower bench)   Tub Shower Transfer Description Grab bar;Shower bench;Set-up of equipment;Supervision for safety;Verbal cueing   Comprehension Modified Independent   Comprehension Description Glasses   Expression Independent   Problem Solving Modified Independent   Problem Solving Description Therapy schedule   Memory Modified Independent   Memory Description Therapy schedule   Problem List    Problem List Decreased Active Daily Living Skills;Decreased Homemaking Skills;Decreased Upper Extremity Strength Right;Decreased Upper Extremity Strength Left;Decreased Functional Mobility;Decreased Activity Tolerance;Safety Awareness Deficits / Cognition;Impaired Postural Control / Balance   Precautions   Precautions Fall Risk   Current Discharge Plan   Current Discharge Plan Return to Prior Living Situation   Benefit    Therapy Benefit Patient Would Benefit from Inpatient Rehab Occupational Therapy to Maximize [place] with ADLs, IADLs and Functional Mobility.   Interdisciplinary Plan of Care Collaboration   IDT Collaboration with  Certified Nursing Assistant;Nursing   Patient Position at End of Therapy Seated;Self Releasing Lap Belt Applied;Call Light within Reach;Tray Table within Reach;Phone within Reach   Collaboration Comments CLOF, Transition of care to nursing   Strengths & Barriers   Strengths Effective communication skills;Independent prior level of function;Motivated for self care and independence;Pleasant and cooperative;Willingly participates in therapeutic activities;Supportive family   Barriers Decreased endurance;Generalized weakness;Impaired activity tolerance;Impaired balance;Limited mobility   OT Total Time Spent   OT Individual Total Time Spent (Mins) 60   OT Charge Group   OT Self Care / ADL 1   OT Evaluation OT Evaluation High       Assessment  This patient is a 65 y.o. female admitted for acute inpatient rehabilitation with Critical illness myopathy.  Additional factors influencing patient status / progress (ie: cognitive factors, co-morbidities, social support, etc): DMM, HTN, Tachycardia, Hypothyroidism, Anemia, Morbid Obesity.  Barriers include BLE weakness, impaired balance, poor endurance.  At time of occupational therapy evaluation patient presented below baseline for ADL's/IADL's, functional mobility and transfers.    Plan  Recommend Occupational Therapy 30-60 minutes per day 5-7  days per week for 2 weeks for the following treatments:  OT Self Care/ADL, OT Neuro Re-Ed/Balance, OT Therapeutic Activity, OT Evaluation and OT Therapeutic Exercise.    Passport items to be completed:  Passport items to be completed:  Perform bathroom transfers, complete dressing, complete feeding, get ready for the day, prepare a simple meal, participate in household tasks, adapt home for safety needs, demonstrate home exercise program, complete caregiver training     Goals:  Long term and short term goals have been discussed with patient and they are in agreement.    Occupational Therapy Goals (Active)     Problem: Dressing     Dates: Start: 05/27/21       Goal: STG-Within one week, patient will dress LB     Dates: Start: 05/27/21       Goal Note filed on 05/27/21 0812 by Salvador Ponce MS,OTR/L     1) Individualized Goal: Min assist for Lower Body Clothing Management via AE/DME PRN  2) Interventions:  OT Self Care/ADL, OT Neuro Re-Ed/Balance, OT Therapeutic Activity, OT Evaluation, and OT Therapeutic Exercise               Problem: Functional Transfers     Dates: Start: 05/27/21       Goal: STG-Within one week, patient will transfer to toilet     Dates: Start: 05/27/21       Goal Note filed on 05/27/21 0812 by Salvador Ponce MS,OTR/L     1) Individualized Goal:  Sup/SBA for commode transfers via DME PRN  2) Interventions:  OT Self Care/ADL, OT Neuro Re-Ed/Balance, OT Therapeutic Activity, OT Evaluation, and OT Therapeutic Exercise               Problem: OT Long Term Goals     Dates: Start: 05/27/21       Goal: LTG-By discharge, patient will complete basic self care tasks     Dates: Start: 05/27/21       Goal Note filed on 05/27/21 0812 by Salvador Ponce MS,OTR/L     1) Individualized Goal:  Mod I for BADL's via AE/DME PRN  2) Interventions:  OT Self Care/ADL, OT Neuro Re-Ed/Balance, OT Therapeutic Activity, OT Evaluation, and OT Therapeutic Exercise            Goal: LTG-By discharge, patient will perform bathroom  transfers     Dates: Start: 05/27/21       Goal Note filed on 05/27/21 0812 by Salvador Ponce MS,OTR/L     1) Individualized Goal:  Mod I for bathroom transfers via DME PRN  2) Interventions: OT Self Care/ADL, OT Neuro Re-Ed/Balance, OT Therapeutic Activity, OT Evaluation, and OT Therapeutic Exercise               Problem: Toileting     Dates: Start: 05/27/21       Goal: STG-Within one week, patient will complete toileting tasks     Dates: Start: 05/27/21       Goal Note filed on 05/27/21 0812 by Salvador Ponce MS,OTR/L     1) Individualized Goal:  Sup/SBA for toileting task (BM) via AE/DME PRN  2) Interventions: OT Self Care/ADL, OT Neuro Re-Ed/Balance, OT Therapeutic Activity, OT Evaluation, and OT Therapeutic Exercise

## 2021-05-27 NOTE — CARE PLAN
The patient is Stable - Low risk of patient condition declining or worsening         Progress made toward(s) clinical / shift goals:        Problem: VTE Prevention  Goal: Patient will remain free from venous thromboembolism (VTE)  Outcome: Progressing  Note: Pt receives Lovenox SC injections for DVT prophylaxis

## 2021-05-27 NOTE — PROGRESS NOTES
-----------Non-Face-to-Face------------  Prolonged non-face-to-face time was spent on 5/25/21 from 1600 to 1640 by this reviewer.  This time included medical chart review, medication review, and decision making for the appropriateness of transfer to inpatient rehabilitation.  Please see PM&R Chart Review Consult below by this provider for detailed summation of the medical chart and the reasoning for current recommendations. In addition to the above, time was spent coordinating care with case management as well as transitional care coordination team in the acceptance and transfer of the patient to the inpatient rehabilitation facility setting. Reviewed documentation from [facility] and [facility]. Per [facility]  Concern for critical illness myopathy. Meets medical criteria. Transfer when bed available.     PM&R Chart Review Consult:  Patient is a 65 y.o. female with a PMH of HTN, obesity, DM, and hypothyroidism who presented on 4/21/21 to [facility] with severe localized RLQ pain. She had reportedly gone to urgent care who ordered CT scan and confirmed diagnosis of appendicitis.  Patient also had signs concerning for intra-abdominal abscess. She underwent laparoscopic appendectomy on 4/21/21 with Dr. Ruiz but was converted to open appendectomy due to rupture of appendix. Patient had prolonged hospitalization with multiple rounds of antibiotics. Her stay was also complicated by ileus. She was placed on TPN.  She also had urinary retention and yusuf was placed.  Patient was transferred to LTAC ([facility]) on 5/14/21 for rehabilitation and medical management.  Patient has since been weaned off of TPN. She has had yusuf removed and is voiding.  Patient was very difficult to ambulate concerning team at [facility] for critical illness myopathy as well as limited by her > 50 BMI.     Patient was last evaluated by PT on 5/24/21 and was min-modA for mobility. Patient was last evaluated by OT on 5/23/21 and was modA for ADLs. Patient was previously living in a   story home with 2 steps to enter; living with .

## 2021-05-27 NOTE — DISCHARGE PLANNING
Attempted to reach patient on her cell and on the room phone for introduction to Case Management and initial assessment.  Unable to reach patient on either number.  CM to attempt to complete admission tomorrow when patient is off isolation.

## 2021-05-27 NOTE — PROGRESS NOTES
"Rehab Progress Note     Encounter Date: 5/27/2021    CC: decreased mobility, right knee pain    Interval Events (Subjective)  Patient sitting up in room. She reports she had a shower and tolerated it. She reports her left knee pain is currently controlled. Discussed about Ibuprofen PRN but need to monitor kidney function. Discussed that elevated compared to baseline so will use lower dose. Encouraged fluids. She reports she has already drank a whole bottle this morning. SBP is fine but mildly tachycardic. Discussed would decrease Losartan. She does not want muscle relaxant anymore. Reviewed the rest of the labs and her blood sugars are well controlled. Discussed if continued today could discontinue checks. Denies SOB.     Objective:  VITAL SIGNS: /54 Comment: MD and RN notified  Pulse (!) 104 Comment: MD and RN notified  Temp 37 °C (98.6 °F)   Resp 18   Ht 1.6 m (5' 3\")   Wt (!) 134 kg (296 lb 8 oz)   SpO2 99%   BMI 52.52 kg/m²   Gen: NAD  Psych: Mood and affect appropriate  CV: mild tachycardia, no edema  Resp: CTAB, no upper airway sounds  Abd: NTND  Neuro: AOx4, following commands    Recent Results (from the past 72 hour(s))   SARS-CoV-2 PCR (24 hour In-House): Collect NP swab in [facility]    Collection Time: 05/27/21  5:10 AM    Specimen: Nasopharyngeal; Respirate   Result Value Ref Range    SARS-CoV-2 Source NP Swab    CBC with Differential    Collection Time: 05/27/21  5:57 AM   Result Value Ref Range    WBC RR 4.8 - 10.8 K/uL    RBC RR 4.20 - 5.40 M/uL    Hemoglobin RR 12.0 - 16.0 g/dL    Hematocrit RR 37.0 - 47.0 %    MCV RR 81.4 - 97.8 fL    MCH RR 27.0 - 33.0 pg    MCHC RR 33.6 - 35.0 g/dL    RDW RR 35.9 - 50.0 fL    Platelet Count  - 446 K/uL    MPV RR 9.0 - 12.9 fL    Neutrophils-Polys RR 44.00 - 72.00 %    Lymphocytes RR 22.00 - 41.00 %    Monocytes RR 0.00 - 13.40 %    Eosinophils RR 0.00 - 6.90 %    Basophils RR 0.00 - 1.80 %    Immature Granulocytes RR 0.00 - 0.90 %    Nucleated RBC RR " /100 WBC    Neutrophils (Absolute) RR 2.00 - 7.15 K/uL    Lymphs (Absolute) RR 1.00 - 4.80 K/uL    Monos (Absolute) RR 0.00 - 0.85 K/uL    Eos (Absolute) RR 0.00 - 0.51 K/uL    Baso (Absolute) RR 0.00 - 0.12 K/uL    Immature Granulocytes (abs) RR 0.00 - 0.11 K/uL    NRBC (Absolute) RR K/uL   Comp Metabolic Panel (CMP)    Collection Time: 05/27/21  5:57 AM   Result Value Ref Range    Sodium 143 135 - 145 mmol/L    Potassium 4.2 3.6 - 5.5 mmol/L    Chloride 107 96 - 112 mmol/L    Co2 20 20 - 33 mmol/L    Anion Gap 16.0 7.0 - 16.0    Glucose 117 (H) 65 - 99 mg/dL    Bun 40 (H) 8 - 22 mg/dL    Creatinine 2.14 (H) 0.50 - 1.40 mg/dL    Calcium 9.6 8.5 - 10.5 mg/dL    AST(SGOT) 31 12 - 45 U/L    ALT(SGPT) 31 2 - 50 U/L    Alkaline Phosphatase 120 (H) 30 - 99 U/L    Total Bilirubin 0.4 0.1 - 1.5 mg/dL    Albumin 3.3 3.2 - 4.9 g/dL    Total Protein 7.2 6.0 - 8.2 g/dL    Globulin 3.9 (H) 1.9 - 3.5 g/dL    A-G Ratio 0.8 g/dL   TSH with Reflex to FT4    Collection Time: 05/27/21  5:57 AM   Result Value Ref Range    TSH 0.810 0.380 - 5.330 uIU/mL   ESTIMATED GFR    Collection Time: 05/27/21  5:57 AM   Result Value Ref Range    GFR If  28 (A) >60 mL/min/1.73 m 2    GFR If Non African American 23 (A) >60 mL/min/1.73 m 2   PERIPHERAL SMEAR REVIEW    Collection Time: 05/27/21  5:57 AM   Result Value Ref Range    Peripheral Smear Review see below    DIFFERENTIAL COMMENT    Collection Time: 05/27/21  5:57 AM   Result Value Ref Range    Comments-Diff see below    POCT glucose device results    Collection Time: 05/27/21  7:57 AM   Result Value Ref Range    Glucose - Accu-Ck 135 (H) 65 - 99 mg/dL       Current Facility-Administered Medications   Medication Frequency   • Respiratory Therapy Consult Continuous RT   • oxyCODONE immediate-release (ROXICODONE) tablet 5 mg Q3HRS PRN    Or   • oxyCODONE immediate release (ROXICODONE) tablet 10 mg Q3HRS PRN   • hydrALAZINE (APRESOLINE) tablet 25 mg Q8HRS PRN   • acetaminophen  (Tylenol) tablet 650 mg Q4HRS PRN   • senna-docusate (PERICOLACE or SENOKOT S) 8.6-50 MG per tablet 2 tablet BID    And   • polyethylene glycol/lytes (MIRALAX) PACKET 1 Packet QDAY PRN    And   • magnesium hydroxide (MILK OF MAGNESIA) suspension 30 mL QDAY PRN    And   • bisacodyl (DULCOLAX) suppository 10 mg QDAY PRN   • artificial tears ophthalmic solution 1 Drop PRN   • benzocaine-menthol (CEPACOL) lozenge 1 Lozenge Q2HRS PRN   • mag hydrox-al hydrox-simeth (MAALOX PLUS ES or MYLANTA DS) suspension 20 mL Q2HRS PRN   • ondansetron (ZOFRAN ODT) dispertab 4 mg 4X/DAY PRN    Or   • ondansetron (ZOFRAN) syringe/vial injection 4 mg 4X/DAY PRN   • traZODone (DESYREL) tablet 50 mg QHS PRN   • sodium chloride (OCEAN) 0.65 % nasal spray 2 Spray PRN   • amLODIPine (NORVASC) tablet 10 mg DAILY   • cyclobenzaprine (Flexeril) tablet 5 mg TID   • lidocaine (LIDODERM) 5 % 1 Patch Q24HRS   • losartan (COZAAR) tablet 50 mg Q DAY   • thyroid (ARMOUR THYROID) tablet 90 mg DAILY   • ibuprofen (MOTRIN) tablet 400 mg Q6HRS PRN   • magnesium oxide (MAG-OX) tablet 400 mg DAILY   • insulin regular (HumuLIN R,NovoLIN R) injection 4X/DAY ACHS    And   • glucose 4 g chewable tablet 16 g Q15 MIN PRN    And   • dextrose 50% (D50W) injection 50 mL Q15 MIN PRN   • enoxaparin (LOVENOX) inj 40 mg DAILY       Orders Placed This Encounter   Procedures   • Diet Order Diet: Consistent CHO (Diabetic)     Standing Status:   Standing     Number of Occurrences:   1     Order Specific Question:   Diet:     Answer:   Consistent CHO (Diabetic) [4]       Assessment:  Active Hospital Problems    Diagnosis    • *Critical illness myopathy    • Anemia    • Hyperglycemia    • Hypothyroidism    • Acute appendicitis with localized peritonitis    • Stage 3 chronic kidney disease (HCC)    • Essential hypertension    • Osteoarthritis    • BMI 50.0-59.9, adult (HCC)    • HENRI (obstructive sleep apnea)        Medical Decision Making and Plan:  Critical Illness myopathy -  Patient with prolonged hospitalization after burst appendix complicated by respiratory failure, ileus, and poor intake requiring TPN. Patient transferred to [facility] and found to have significant weakness concerning for CIM.  -PT and OT for mobility and ADLs     DM - Patient takes home supplement. Will start SSI, previously well controlled. Will check A1c  -Blood sugars < 150. If low another day discontinue      HTN - Patient on Losartan 50 mg and Amlodipine 10 mg on transfer.   -Decrease ARB with elevated Cr     Tachycardia - On admission. Ongoing low grade     Hypothyroidism - Patient on [place] Thyroid 90 mg daily. Check TSH - pending     Anemia - Check AM CBC - clotted. Will wait a few days as she is difficult stick     JOSHUA on CKD - Patient with elevated Cr on admission. Has been trending up at OSH. Patient is drinking plenty of fluids. Will monitor     Morbid obesity due to excess calories - Patient with BMI of 52.52 on admission meeting medical criteria.      DVT ppx - Patient to start Lovenox until ambulating sufficiently.     Total time:  36 minutes.  I spent greater than 50% of the time for patient care and coordination on this date, including unit/floor time, and face-to-face time with the patient as per assessment and plan above.  Discussion included admission labs, elevated Cr, reduce ARB, ongoing tachycardia, and improved blood sugars.     Ana aLura Cho M.D.

## 2021-05-27 NOTE — PROGRESS NOTES
Pt. running hypotensive and tachycardic.   0810 BP 83/55, . Pt. Asymptomatic. Dr. Cho notified via Voalte.   1000 BP 82/50  (Right upper arm)  1005 BP 98/56  (Left upper arm)

## 2021-05-27 NOTE — PROGRESS NOTES
Patient admitted to facility at 1710 via WC; accompanied by hospital transport.  Patient assisted to room and positioned in bed for comfort and safety; call light within reach.  Patient assisted with stowing belongings and oriented to room and facility.  Admission assessment performed and documented in computer.  Admission paperwork completed; signed copies placed in chart.  Will continue to monitor.    2 RN skin check done with admitting RN and Radha NARVAEZ. Face photo and skin photos documented in media. Appropriate LDAs opened.   Pt with Jj score of 19, RN wound protocol not indicated at this time, orders current. Prevention measures in place including, pt education to change positions frequently in bed to relieve pressure.

## 2021-05-27 NOTE — THERAPY
"Physical Therapy   Initial Evaluation     Patient Name: Mady Yang  Age:  65 y.o., Sex:  female  Medical Record #: [id number]  Today's Date: 5/27/2021     Subjective    Patient received sitting in w/c at bedside with RN, charge RN, and nursing student present; ultrasound in progress; patient agreeable to PT afterwards; pt reports first name is spelled, Rosette'et.     Objective       05/27/21 1031   Prior Living Situation   Prior Services None   Housing / Facility 1 Story House   Steps Into Home 2   Steps In Home 0   Rail   (Pt reports one grab bar but likely will install a second bar)   Elevator No   Bathroom Set up Walk In Shower;Shower Curtain;Grab Bars  (approx. 4\" lip)   Equipment Owned Single Point Cane;Grab Bar(s) In Tub / Shower;Other (Comments);Hand Held Shower  (CPAP)   Lives with - Patient's Self Care Capacity Spouse   Comments Pt reports one grab bar but likely will install a second bar; also planning additional hand hel shower head; pt reports may need shower seat/bench; pt reports occasionally ambulating with use of 2 canes instead of 1 depending on B ankles; pt reports IND PLOF.   Prior Level of Functional Mobility   Bed Mobility Independent   Transfer Status Independent   Ambulation Independent   Distance Ambulation (Feet)   (community distances)   Assistive Devices Used Single Point Cane   Stairs Independent   Comments pt reports occasionally ambulating with use of 2 canes instead of 1 depending on B ankles; pt reports IND PLOF.   Prior Functioning: Everyday Activities   Self Care Independent   Indoor Mobility (Ambulation) Independent   Stairs Independent   Functional Cognition Independent   Prior Device Use None of the given options   Vitals   Pulse (!) 104  (MD and RN notified)   Patient BP Position Sitting  (RUE)   Blood Pressure 118/54  (MD and RN notified)   Pulse Oximetry 99 %   O2 (LPM) 0   O2 Delivery Device None - Room Air   Vitals Comments Pt uses her CPAP at night   Non Verbal " Descriptors   Non Verbal Scale  Calm   Cognition    Cognition / Consciousness WDL   Comments Wears glasses   Passive ROM Lower Body   Passive ROM Lower Body WDL   Active ROM Lower Body    Active ROM Lower Body  X   Comments WNL except for B hip flexion mildly limited L more than R.   Strength Lower Body   Lower Body Strength  X   Rt Hip Extension Strength   (not formally tested)   Rt Hip Flexion Strength 3+ (F+)   Rt Hip Abduction Strength 4 (G)   Rt Hip Adduction Strength 4 (G)   Rt Knee Flexion Strength 3+ (F+)   Rt Knee Extension Strength 4 (G)   Rt Ankle Dorsiflexion Strength 4 (G)   Rt Ankle Plantar Flexion Strength 2+ (P+)   Lt Hip Extension Strength   (not formally tested)   Lt Hip Flexion Strength 3 (F)   Lt Hip Abduction Strength 4 (G)   Lt Hip Adduction Strength 4 (G)   Lt Knee Flexion Strength 3 (F)   Lt Knee Extension Strength 3+ (F+)   Lt Ankle Dorsiflexion Strength 4 (G)   Lt Ankle Plantar Flexion Strength 2+ (P+)   Comments LLE slightly weaker than RLE; tested in sitting today   Sensation Lower Body   Comments Pt denies BLE neuro signs   Lower Body Muscle Tone   Comments Pt denies BLE neuro signs   Bed Mobility    Supine to Sit   (limited today due to ultrasound; will assess in p.m.)   Sit to Supine   (limited today due to ultrasound; will assess in p.m.)   Sit to Stand Contact Guard Assist  (bariatric FWW, setup)   Scooting Stand by Assist   Rolling   (limited today due to ultrasound; will assess in p.m.)   Neurological Concerns   Comments Pt denies BLE neuro signs   Coordination Lower Body    Coordination Lower Body  WDL   Roll Left and Right   Reason if not Attempted Medical concerns   CARE Score 88   Roll Left and Right Discharge Goal   Discharge Goal 6   Sit to Lying   Reason if not Attempted Medical concerns   CARE Score 88   Sit to Lying Discharge Goal   Discharge Goal 6   Lying to Sitting on Side of Bed   Reason if not Attempted Medical concerns   CARE Score 88   Lying to Sitting on Side of  Bed Discharge Goal   Discharge Goal 6   Sit to Stand   Assistance Needed Incidental touching   Physical Assistance Level No physical assistance   CARE Score 4   Sit to Stand Discharge Goal   Discharge Goal 4   Chair/Bed-to-Chair Transfer   Reason if not Attempted Medical concerns   CARE Score 88   Chair/Bed-to-Chair Transfer Discharge Goal   Discharge Goal 4   Car Transfer   Reason if not Attempted Environmental limitations   CARE Score 10   Car Transfer Discharge Goal   Discharge Goal 4   Walk 10 Feet   Assistance Needed Incidental touching   Physical Assistance Level No physical assistance   CARE Score 4   Walk 10 Feet Discharge Goal   Discharge Goal 4   Walk 50 Feet with Two Turns   Reason if not Attempted Safety concerns   CARE Score 88   Walk 50 Feet with Two Turns Discharge Goal   Discharge Goal 4   Walk 150 Feet   Reason if not Attempted Safety concerns   CARE Score 88   Walk 150 Feet Discharge Goal   Discharge Goal 4   Walking 10 Feet on Uneven Surfaces   Reason if not Attempted Safety concerns   CARE Score 88   Walking 10 Feet on Uneven Surfaces Discharge Goal   Discharge Goal 4   1 Step (Curb)   Reason if not Attempted Environmental limitations   CARE Score 10   1 Step (Curb) Discharge Goal   Discharge Goal 4   4 Steps   Reason if not Attempted Environmental limitations   CARE Score 10   4 Steps Discharge Goal   Discharge Goal 4   12 Steps   Reason if not Attempted Activity not applicable   CARE Score 9   12 Steps Discharge Goal   Discharge Goal 9   Picking Up Object   Reason if not Attempted Safety concerns   CARE Score 88   Picking Up Object Discharge Goal   Discharge Goal 4   Wheel 50 Feet with Two Turns   Reason if not Attempted Medical concerns   CARE Score 88   Type of Wheelchair/Scooter Manual   Wheel 50 Feet with Two Turns Discharge Goal   Discharge Goal 4   Wheel 150 Feet   Reason if not Attempted Medical concerns   CARE Score 88   Type of Wheelchair/Scooter Manual   Wheel 150 Feet Discharge Goal    Discharge Goal 3   Gait Functional Level of Assist    Gait Level Of Assist Contact Guard Assist   Assistive Device Front Wheel Walker  (bariatric)   Distance (Feet) 42   # of Times Distance was Traveled 2   Deviation Trendelenberg;Increased Base Of Support;Bradykinetic;Decreased Toe Off   Wheelchair Functional Level of Assist   Wheelchair Assist   (limited today due to ultrasound; will assess in p.m.)   Distance Wheelchair (Feet or Distance)   (limited today due to ultrasound; will assess in p.m.)   Wheelchair Description   (limited today due to ultrasound; will assess in p.m.)   Stairs Functional Level of Assist   Level of Assist with Stairs Unable to Participate  (due to admit isolation precautions)   # of Stairs Climbed 0   Stairs Description   (Unable to assess due to admit isolation precautions)   Transfer Functional Level of Assist   Bed, Chair, Wheelchair Transfer   (limited today due to ultrasound; will assess in p.m.)   Bed Chair Wheelchair Transfer Description   (limited today due to ultrasound; will assess in p.m.)   Problem List    Problems Impaired Bed Mobility;Impaired Transfers;Impaired Ambulation;Functional Strength Deficit;Impaired Balance;Decreased Activity Tolerance   Precautions   Precautions Fall Risk   Comments diabetic, admit isolation precautions, tachycardia   Current Discharge Plan   Current Discharge Plan Return to Prior Living Situation   Interdisciplinary Plan of Care Collaboration   IDT Collaboration with  Nursing;Other (See Comments);Physician;Occupational Therapist;Certified Nursing Assistant  (charge RN, nursing student)   Patient Position at End of Therapy Seated;Chair Alarm On;Self Releasing Lap Belt Applied;Tray Table within Reach;Call Light within Reach;Phone within Reach   Collaboration Comments ultrasound present at beginning of session attempting IV placement due to hypotension; pt report increased fluid intake this a.m.; BP acceptable during PT eval but tachycardia present;  RN and MD aware; also discussed CLOF with OT prior to eval; assisted CNA with lunch food tray placement   Benefit   Therapy Benefit Patient Would Benefit from Inpatient Rehabilitation Physical Therapy to Maximize Functional [place] with ADLs, IADLs and Mobility.   Strengths & Barriers   Strengths Able to follow instructions;Alert and oriented;Effective communication skills;Good carryover of learning;Good insight into deficits/needs;Independent prior level of function;Making steady progress towards goals;Manages pain appropriately;Motivated for self care and independence;Pleasant and cooperative;Willingly participates in therapeutic activities   Barriers Decreased endurance;Fatigue;Generalized weakness;Home accessibility;Hypotension;Impaired activity tolerance;Impaired balance;Limited mobility   Therapy Missed   Missed Therapy (Minutes) 15   Reason For Missed Therapy Medical - Patient  in Procedure;Medical - Patient with Nursing  (US, hypotension; madeup therapy time at 1130.)   PT Total Time Spent   PT Individual Total Time Spent (Mins) 60   PT Charge Group   Charges Yes   PT Gait Training 1   PT Evaluation PT Evaluation Mod       Assessment  Patient is 65 y.o. female with a diagnosis of Critical illness myopathy; appendicitis; concerning for intra-abdominal abscess; ileus.  Additional factors influencing patient status / progress (ie: cognitive factors, co-morbidities, social support, etc): was placed on TPN and [facility] on 5/14/21 for rehabilitation and medical management; DM, HTN, Tachycardia, Morbid obesity, Hypothyroidism, and Anemia; pt reports IND PLOF with 1-2 SPCs; pt reports Hx of OA in B ankles; pt reports good social support; precautions per above; hypotension prior to this session today; good cognition, coordination, and motivation; impaired balance, endurance, strength, transfers, and mobility.    Plan  Recommend Physical Therapy  minutes per day 5-7 days per week for 10  days for the following treatments:  PT Group Therapy, PT Gait Training, PT Therapeutic Exercises, PT Neuro Re-Ed/Balance, PT Therapeutic Activity and PT Manual Therapy.    Passport items to be completed:  Passport items to be completed:  Get in/out of bed safely, in/out of a vehicle, safely use mobility device, walk or wheel around home/community, navigate up and down stairs, show how to get up/down from the ground, ensure home is accessible, demonstrate HEP, complete caregiver training    Goals:  Long term and short term goals have been discussed with patient and they are in agreement.    Physical Therapy Problems (Active)     Problem: Mobility     Dates: Start: 05/27/21       Goal: STG-Within one week, patient will ambulate household distance of 150 feet with FWW at SBA level.     Dates: Start: 05/27/21          Goal: STG-Within one week, patient will ascend and descend four to six stairs with B railings at Min A level.     Dates: Start: 05/27/21             Problem: Mobility Transfers     Dates: Start: 05/27/21       Goal: STG-Within one week, patient will perform bed mobility with bed rail at CGA level.     Dates: Start: 05/27/21          Goal: STG-Within one week, patient will transfer bed to chair with FWW at CGA level.     Dates: Start: 05/27/21          Goal: STG-Within one week, patient will transfer in/out of car with FWW at Min A level.     Dates: Start: 05/27/21             Problem: PT-Long Term Goals     Dates: Start: 05/27/21       Goal: LTG-By discharge, patient will ambulate 300 feet at SBA level with a FWW or SPC.     Dates: Start: 05/27/21          Goal: LTG-By discharge, patient will transfer one surface to another at SPV level with a FWW or SPC.     Dates: Start: 05/27/21          Goal: LTG-By discharge, patient will ambulate up/down 4-6 stairs at SBA level with 1-2 railings.     Dates: Start: 05/27/21          Goal: LTG-By discharge, patient will transfer in/out of a car at SBA level with a FWW  or SPC.     Dates: Start: 05/27/21

## 2021-05-28 LAB
GLUCOSE BLD-MCNC: 111 MG/DL (ref 65–99)
GLUCOSE BLD-MCNC: 125 MG/DL (ref 65–99)
GLUCOSE BLD-MCNC: 126 MG/DL (ref 65–99)
GLUCOSE BLD-MCNC: 94 MG/DL (ref 65–99)

## 2021-05-28 PROCEDURE — 97530 THERAPEUTIC ACTIVITIES: CPT

## 2021-05-28 PROCEDURE — 770010 HCHG ROOM/CARE - REHAB SEMI PRIVAT*

## 2021-05-28 PROCEDURE — 700111 HCHG RX REV CODE 636 W/ 250 OVERRIDE (IP): Performed by: PHYSICAL MEDICINE & REHABILITATION

## 2021-05-28 PROCEDURE — 97110 THERAPEUTIC EXERCISES: CPT

## 2021-05-28 PROCEDURE — A9270 NON-COVERED ITEM OR SERVICE: HCPCS | Performed by: PHYSICAL MEDICINE & REHABILITATION

## 2021-05-28 PROCEDURE — 92610 EVALUATE SWALLOWING FUNCTION: CPT | Performed by: SPEECH-LANGUAGE PATHOLOGIST

## 2021-05-28 PROCEDURE — 82962 GLUCOSE BLOOD TEST: CPT | Mod: 91

## 2021-05-28 PROCEDURE — 700101 HCHG RX REV CODE 250: Performed by: PHYSICAL MEDICINE & REHABILITATION

## 2021-05-28 PROCEDURE — 99232 SBSQ HOSP IP/OBS MODERATE 35: CPT | Performed by: PHYSICAL MEDICINE & REHABILITATION

## 2021-05-28 PROCEDURE — 700102 HCHG RX REV CODE 250 W/ 637 OVERRIDE(OP): Performed by: PHYSICAL MEDICINE & REHABILITATION

## 2021-05-28 PROCEDURE — 97116 GAIT TRAINING THERAPY: CPT

## 2021-05-28 RX ORDER — IBUPROFEN 400 MG/1
400 TABLET ORAL 2 TIMES DAILY
Status: DISCONTINUED | OUTPATIENT
Start: 2021-05-28 | End: 2021-06-05 | Stop reason: HOSPADM

## 2021-05-28 RX ADMIN — IBUPROFEN 400 MG: 400 TABLET, FILM COATED ORAL at 08:14

## 2021-05-28 RX ADMIN — MAGNESIUM OXIDE TAB 400 MG (241.3 MG ELEMENTAL MG) 400 MG: 400 (241.3 MG) TAB at 08:13

## 2021-05-28 RX ADMIN — IBUPROFEN 400 MG: 400 TABLET, FILM COATED ORAL at 20:22

## 2021-05-28 RX ADMIN — LIDOCAINE 1 PATCH: 50 PATCH TOPICAL at 08:16

## 2021-05-28 RX ADMIN — LOSARTAN POTASSIUM 25 MG: 25 TABLET, FILM COATED ORAL at 06:16

## 2021-05-28 RX ADMIN — THYROID, PORCINE 90 MG: 30 TABLET ORAL at 08:14

## 2021-05-28 RX ADMIN — IBUPROFEN 400 MG: 400 TABLET ORAL at 12:21

## 2021-05-28 RX ADMIN — SENNOSIDES AND DOCUSATE SODIUM 2 TABLET: 8.6; 5 TABLET ORAL at 20:22

## 2021-05-28 RX ADMIN — SENNOSIDES AND DOCUSATE SODIUM 2 TABLET: 8.6; 5 TABLET ORAL at 08:14

## 2021-05-28 RX ADMIN — ENOXAPARIN SODIUM 40 MG: 40 INJECTION SUBCUTANEOUS at 08:15

## 2021-05-28 RX ADMIN — AMLODIPINE BESYLATE 10 MG: 5 TABLET ORAL at 08:13

## 2021-05-28 ASSESSMENT — PAIN DESCRIPTION - PAIN TYPE
TYPE: CHRONIC PAIN
TYPE: CHRONIC PAIN

## 2021-05-28 ASSESSMENT — GAIT ASSESSMENTS
GAIT LEVEL OF ASSIST: SUPERVISED
ASSISTIVE DEVICE: FRONT WHEEL WALKER

## 2021-05-28 NOTE — CARE PLAN
Problem: Pain - Standard  Goal: Alleviation of pain or a reduction in pain to the patient’s comfort goal  Outcome: Progressing  Note: Pt is now on scheduled Motrin bid for chronic low back pain. Reports relief with medication; will continue to monitor.     Problem: Skin Integrity  Goal: Skin integrity is maintained or improved  Outcome: Progressing  Note: Patient's skin remains intact and free from new or accidental injury this shift.  Will continue to monitor.

## 2021-05-28 NOTE — THERAPY
"Occupational Therapy  Daily Treatment     Patient Name: Mady Yang  Age:  65 y.o., Sex:  female  Medical Record #: [id number]  Today's Date: 5/28/2021     Precautions  Precautions: Fall Risk, Other (See Comments)  Comments: diabetic, tachycardia         Subjective    \"I need to sit down and rest a little\"  See standing UB TherEx     Objective       05/28/21 1031   Precautions   Precautions Fall Risk;Other (See Comments)   Comments diabetic, tachycardia   Vitals   O2 Delivery Device None - Room Air   Non Verbal Descriptors   Non Verbal Scale  Calm   Cognition    Level of Consciousness Alert   Sleep/Wake Cycle   Sleep & Rest Awake   Standing Upper Body Exercises   Other Exercises Standing 5' from rebounder, use of unweigted medium green ball, 8x25 w/ seated rest breaks in between sets., CGA via gait belt, no LOB, limited endurance and low back discomfort.   Interdisciplinary Plan of Care Collaboration   IDT Collaboration with  Physical Therapist;Nursing   Patient Position at End of Therapy Seated;Self Releasing Lap Belt Applied;Chair Alarm On   OT Total Time Spent   OT Individual Total Time Spent (Mins) 30   OT Charge Group   OT Therapeutic Exercise  2       Assessment    Pt was alert and cooperative w/ tx.  Tx emphasis on Standing UB TherEx - see notes above for details.  Limited by impaired endurance, standing balance and low back discomfort.  Strengths: Effective communication skills, Independent prior level of function, Motivated for self care and independence, Pleasant and cooperative, Willingly participates in therapeutic activities, Supportive family  Barriers: Decreased endurance, Generalized weakness, Impaired activity tolerance, Impaired balance, Limited mobility    Plan    Cont'd OT for education (safety/environmental awareness, NRG conservation, use of AE/DME), strengthening, endurance and balance for safe ADL's/IADL's and bathroom transfers.    Passport items to be completed:  Passport items to be " completed:  Perform bathroom transfers, complete dressing, complete feeding, get ready for the day, prepare a simple meal, participate in household tasks, adapt home for safety needs, demonstrate home exercise program, complete caregiver training     Occupational Therapy Goals (Active)     Problem: Dressing     Dates: Start: 05/27/21       Goal: STG-Within one week, patient will dress LB     Dates: Start: 05/27/21       Goal Note filed on 05/27/21 0812 by Salvador Ponce MS,OTR/L     1) Individualized Goal: Min assist for Lower Body Clothing Management via AE/DME PRN  2) Interventions:  OT Self Care/ADL, OT Neuro Re-Ed/Balance, OT Therapeutic Activity, OT Evaluation, and OT Therapeutic Exercise               Problem: Functional Transfers     Dates: Start: 05/27/21       Goal: STG-Within one week, patient will transfer to toilet     Dates: Start: 05/27/21       Goal Note filed on 05/27/21 0812 by Salvador Ponce MS,OTR/L     1) Individualized Goal:  Sup/SBA for commode transfers via DME PRN  2) Interventions:  OT Self Care/ADL, OT Neuro Re-Ed/Balance, OT Therapeutic Activity, OT Evaluation, and OT Therapeutic Exercise               Problem: OT Long Term Goals     Dates: Start: 05/27/21       Goal: LTG-By discharge, patient will complete basic self care tasks     Dates: Start: 05/27/21       Goal Note filed on 05/27/21 0812 by Salvador Ponce MS,OTR/L     1) Individualized Goal:  Mod I for BADL's via AE/DME PRN  2) Interventions:  OT Self Care/ADL, OT Neuro Re-Ed/Balance, OT Therapeutic Activity, OT Evaluation, and OT Therapeutic Exercise            Goal: LTG-By discharge, patient will perform bathroom transfers     Dates: Start: 05/27/21       Goal Note filed on 05/27/21 0812 by Salvador Ponce MS,OTR/L     1) Individualized Goal:  Mod I for bathroom transfers via DME PRN  2) Interventions: OT Self Care/ADL, OT Neuro Re-Ed/Balance, OT Therapeutic Activity, OT Evaluation, and OT Therapeutic Exercise               Problem:  Toileting     Dates: Start: 05/27/21       Goal: STG-Within one week, patient will complete toileting tasks     Dates: Start: 05/27/21       Goal Note filed on 05/27/21 0812 by Salvador Ponce MS,OTR/L     1) Individualized Goal:  Sup/SBA for toileting task (BM) via AE/DME PRN  2) Interventions: OT Self Care/ADL, OT Neuro Re-Ed/Balance, OT Therapeutic Activity, OT Evaluation, and OT Therapeutic Exercise

## 2021-05-28 NOTE — THERAPY
Occupational Therapy  Daily Treatment     Patient Name: Mady Yang  Age:  65 y.o., Sex:  female  Medical Record #: [id number]  Today's Date: 5/28/2021     Precautions  Precautions: Fall Risk, Other (See Comments)  Comments: diabetic, tachycardia         Subjective       Objective       05/28/21 1301   Precautions   Precautions Fall Risk;Other (See Comments)   Comments diabetic, tachycardia   Vitals   O2 Delivery Device None - Room Air   Non Verbal Descriptors   Non Verbal Scale  Calm   Cognition    Level of Consciousness Alert   Sleep/Wake Cycle   Sleep & Rest Awake   Sitting Upper Body Exercises   Upper Extremity Bike Level 5 Resistance  ([employer], 15 mins, rest break x 3, 2.16 miles.)   Standing Upper Body Exercises   Comments Standing in Parallel Bars (PB's) for Ladder Ball (LB) activity - Pt standing at one end of PB's w/ ladder for LB 5' behind pt.  10 at opposite end of PB's ladder balls placed.  Pt instructed to navigate to opposite end of PB's (10') to retrieve one LB at a time and return 10' to starting position to toss LB at ladder.  1 LB = 10'x2 = 20'.  CGA via gait belt, seated rest break x 4, pt completed 12 = 20'x12 = 240', CGA, no LOB, multiple seated rest breaks.   Interdisciplinary Plan of Care Collaboration   IDT Collaboration with  Family / Caregiver   Patient Position at End of Therapy Seated;Chair Alarm On;Self Releasing Lap Belt Applied;Call Light within Reach;Tray Table within Reach;Phone within Reach;Family / Friend in Room   OT Total Time Spent   OT Individual Total Time Spent (Mins) 60   OT Charge Group   OT Therapeutic Exercise  4       Assessment    Pt was alert and cooperative w/ tx.  Tx emphasis on TherEx and standing/ambulation - refer to notes above for details.  Noted limited endurance, required multiple seated rest breaks.  Strengths: Effective communication skills, Independent prior level of function, Motivated for self care and independence, Pleasant and cooperative,  Willingly participates in therapeutic activities, Supportive family  Barriers: Decreased endurance, Generalized weakness, Impaired activity tolerance, Impaired balance, Limited mobility    Plan    Cont'd OT for education (safety/environmental awareness, NRG conservation, use of AE/DME), strengthening, endurance and balance for safe ADL's/IADL's and bathroom transfers.    Passport items to be completed:  Passport items to be completed:  Perform bathroom transfers, complete dressing, complete feeding, get ready for the day, prepare a simple meal, participate in household tasks, adapt home for safety needs, demonstrate home exercise program, complete caregiver training     Occupational Therapy Goals (Active)     Problem: Dressing     Dates: Start: 05/27/21       Goal: STG-Within one week, patient will dress LB     Dates: Start: 05/27/21       Goal Note filed on 05/27/21 0812 by Salvador Ponce MS,OTR/L     1) Individualized Goal: Min assist for Lower Body Clothing Management via AE/DME PRN  2) Interventions:  OT Self Care/ADL, OT Neuro Re-Ed/Balance, OT Therapeutic Activity, OT Evaluation, and OT Therapeutic Exercise               Problem: Functional Transfers     Dates: Start: 05/27/21       Goal: STG-Within one week, patient will transfer to toilet     Dates: Start: 05/27/21       Goal Note filed on 05/27/21 0812 by Salvador Ponce MS,OTR/L     1) Individualized Goal:  Sup/SBA for commode transfers via DME PRN  2) Interventions:  OT Self Care/ADL, OT Neuro Re-Ed/Balance, OT Therapeutic Activity, OT Evaluation, and OT Therapeutic Exercise               Problem: OT Long Term Goals     Dates: Start: 05/27/21       Goal: LTG-By discharge, patient will complete basic self care tasks     Dates: Start: 05/27/21       Goal Note filed on 05/27/21 0812 by Salvador Ponce MS,OTR/L     1) Individualized Goal:  Mod I for BADL's via AE/DME PRN  2) Interventions:  OT Self Care/ADL, OT Neuro Re-Ed/Balance, OT Therapeutic Activity, OT  Evaluation, and OT Therapeutic Exercise            Goal: LTG-By discharge, patient will perform bathroom transfers     Dates: Start: 05/27/21       Goal Note filed on 05/27/21 0812 by Salvador Ponce MS,OTR/L     1) Individualized Goal:  Mod I for bathroom transfers via DME PRN  2) Interventions: OT Self Care/ADL, OT Neuro Re-Ed/Balance, OT Therapeutic Activity, OT Evaluation, and OT Therapeutic Exercise               Problem: Toileting     Dates: Start: 05/27/21       Goal: STG-Within one week, patient will complete toileting tasks     Dates: Start: 05/27/21       Goal Note filed on 05/27/21 0812 by Salvador Pocne MS,OTR/L     1) Individualized Goal:  Sup/SBA for toileting task (BM) via AE/DME PRN  2) Interventions: OT Self Care/ADL, OT Neuro Re-Ed/Balance, OT Therapeutic Activity, OT Evaluation, and OT Therapeutic Exercise

## 2021-05-28 NOTE — CARE PLAN
Problem: Knowledge Deficit - Standard  Goal: Patient and family/care givers will demonstrate understanding of plan of care, disease process/condition, diagnostic tests and medications  Note: Pt. is diabetic and we are currently checking blood sugar before meals and at bed time (Sliding scale). Pt. states that even if her blood sugar gets >150 she is not willing to take insulin. Dr. Cho is aware and advise to watch blood sugar levels one more day and he would possible discontinue sugar checks.      Problem: Pain - Standard  Goal: Alleviation of pain or a reduction in pain to the patient’s comfort goal  Note: Pt. denies pain during this shift.

## 2021-05-28 NOTE — THERAPY
"Physical Therapy   Daily Treatment     Patient Name: Mady Yang  Age:  65 y.o., Sex:  female  Medical Record #: [id number]  Today's Date: 5/28/2021     Precautions  Precautions: Fall Risk, Other (See Comments)  Comments: diabetic, tachycardia    Subjective    Patient agreeable to PT; reports hip soreness from sidelying when sleeping, discussed possible gel or low air loss mattress options (discussed with PT, pt, and MD during 1100 session).     Objective       05/28/21 0831   Precautions   Precautions Fall Risk;Other (See Comments)   Comments diabetic, tachycardia   Vitals   O2 (LPM) 0   O2 Delivery Device None - Room Air   Gait Functional Level of Assist    Gait Level Of Assist Supervised   Assistive Device Front Wheel Walker   Distance (Feet)   (2x 53 feet, 1x 44 feet, and 1x 36 feet)   # of Times Distance was Traveled 1   Deviation Trendelenberg;Increased Base Of Support;Bradykinetic;Decreased Toe Off  (improving wt shift & transition from heel strike to toe off)   Stairs Functional Level of Assist   Level of Assist with Stairs Unable to Participate   # of Stairs Climbed 0   Stairs Description Safety concerns  (due to B ankle soreness and fatigue at L knee)   Standing Lower Body Exercises   Standing Lower Body Exercises Yes  (// bars, SBA, seated rests after every set)   Hip Abduction   (1 set of 5 reps)   Marching   (2 sets of 8 reps)   Heel Rise   (attempted but unable)   Toe Rise   (attempted but unable)   Bed Mobility    Sit to Stand Supervised  (FWW, setup, 20\"h, 8 reps.)   Interdisciplinary Plan of Care Collaboration   Patient Position at End of Therapy Seated;Self Releasing Lap Belt Applied;Chair Alarm On;Call Light within Reach;Tray Table within Reach;Phone within Reach   PT Total Time Spent   PT Individual Total Time Spent (Mins) 60   PT Charge Group   PT Gait Training 2   PT Therapeutic Exercise 1   PT Therapeutic Activities 1     Discussed treatment goals, POC, and pt's goals.  Added pt to " aquatic therapy list.  Oriented pt to rehab.    Assessment    Patient has improving ambulation endurance and activity tolerance; impaired BLE strength as fatigue increased towards end of session; good motivation; mild SOB at end of ambulation reps.    Strengths: Able to follow instructions, Alert and oriented, Effective communication skills, Good carryover of learning, Good insight into deficits/needs, Independent prior level of function, Making steady progress towards goals, Manages pain appropriately, Motivated for self care and independence, Pleasant and cooperative, Willingly participates in therapeutic activities  Barriers: Decreased endurance, Fatigue, Generalized weakness, Home accessibility, Hypotension, Impaired activity tolerance, Impaired balance, Limited mobility    Plan    FWW ambulation, Ther ex for BLE strengthening and endurance, Lumbar stab/core strengthening, Education, bed mobility with less elevated HOB, aquatic therapy.    Passport items to be completed:  Passport items to be completed:  Get in/out of bed safely, in/out of a vehicle, safely use mobility device, walk or wheel around home/community, navigate up and down stairs, show how to get up/down from the ground, ensure home is accessible, demonstrate HEP, complete caregiver training    Physical Therapy Problems (Active)     Problem: Mobility     Dates: Start: 05/27/21       Goal: STG-Within one week, patient will ambulate household distance of 150 feet with FWW at SBA level.     Dates: Start: 05/27/21          Goal: STG-Within one week, patient will ascend and descend four to six stairs with B railings at Min A level.     Dates: Start: 05/27/21             Problem: Mobility Transfers     Dates: Start: 05/27/21       Goal: STG-Within one week, patient will perform bed mobility with bed rail at CGA level.     Dates: Start: 05/27/21          Goal: STG-Within one week, patient will transfer bed to chair with FWW at CGA level.     Dates: Start:  05/27/21          Goal: STG-Within one week, patient will transfer in/out of car with FWW at Min A level.     Dates: Start: 05/27/21             Problem: PT-Long Term Goals     Dates: Start: 05/27/21       Goal: LTG-By discharge, patient will ambulate 300 feet at SBA level with a FWW or SPC.     Dates: Start: 05/27/21          Goal: LTG-By discharge, patient will transfer one surface to another at SPV level with a FWW or SPC.     Dates: Start: 05/27/21          Goal: LTG-By discharge, patient will ambulate up/down 4-6 stairs at SBA level with 1-2 railings.     Dates: Start: 05/27/21          Goal: LTG-By discharge, patient will transfer in/out of a car at SBA level with a FWW or SPC.     Dates: Start: 05/27/21

## 2021-05-28 NOTE — THERAPY
Physical Therapy   Daily Treatment     Patient Name: Mady Yang  Age:  65 y.o., Sex:  female  Medical Record #: [id number]  Today's Date: 5/28/2021     Precautions  Precautions: Fall Risk, Other (See Comments)  Comments: diabetic, tachycardia    Subjective    Patient agreeable to PT.     Objective       05/28/21 1101   Vitals   O2 (LPM) 0   O2 Delivery Device None - Room Air   Sitting Lower Body Exercises   Sitting Lower Body Exercises   (2 lbs BLE; purple/difficult exercise band)   Ankle Pumps 2 sets of 15   Hip Abduction 3 sets of 10   Long Arc Quad 2 sets of 10   Marching 2 sets of 10   Interdisciplinary Plan of Care Collaboration   IDT Collaboration with  Nursing;Speech Therapist   Patient Position at End of Therapy Seated;Chair Alarm On;Self Releasing Lap Belt Applied;Call Light within Reach;Tray Table within Reach;Phone within Reach;Other (Comments)  (RN present; SLP arriving also)   Collaboration Comments At end of session, RN present and SLP arriving also   PT Total Time Spent   PT Individual Total Time Spent (Mins) 30   PT Charge Group   PT Therapeutic Exercise 2     Discussed treatment goals and plan today.    Assessment    Patient has improving endurance and activity tolerance; less aggressive strength training without increased fatigue or pain this session; good understanding of education.    Strengths: Able to follow instructions, Alert and oriented, Effective communication skills, Good carryover of learning, Good insight into deficits/needs, Independent prior level of function, Making steady progress towards goals, Manages pain appropriately, Motivated for self care and independence, Pleasant and cooperative, Willingly participates in therapeutic activities  Barriers: Decreased endurance, Fatigue, Generalized weakness, Home accessibility, Hypotension, Impaired activity tolerance, Impaired balance, Limited mobility    Plan    FWW ambulation, Ther ex for BLE strengthening and endurance, Lumbar  stab/core strengthening, Education, bed mobility with less elevated HOB, aquatic therapy.    Passport items to be completed:  Passport items to be completed:  Get in/out of bed safely, in/out of a vehicle, safely use mobility device, walk or wheel around home/community, navigate up and down stairs, show how to get up/down from the ground, ensure home is accessible, demonstrate HEP, complete caregiver training    Physical Therapy Problems (Active)     Problem: Mobility     Dates: Start: 05/27/21       Goal: STG-Within one week, patient will ambulate household distance of 150 feet with FWW at SBA level.     Dates: Start: 05/27/21          Goal: STG-Within one week, patient will ascend and descend four to six stairs with B railings at Min A level.     Dates: Start: 05/27/21             Problem: Mobility Transfers     Dates: Start: 05/27/21       Goal: STG-Within one week, patient will perform bed mobility with bed rail at CGA level.     Dates: Start: 05/27/21          Goal: STG-Within one week, patient will transfer bed to chair with FWW at CGA level.     Dates: Start: 05/27/21          Goal: STG-Within one week, patient will transfer in/out of car with FWW at Min A level.     Dates: Start: 05/27/21             Problem: PT-Long Term Goals     Dates: Start: 05/27/21       Goal: LTG-By discharge, patient will ambulate 300 feet at SBA level with a FWW or SPC.     Dates: Start: 05/27/21          Goal: LTG-By discharge, patient will transfer one surface to another at SPV level with a FWW or SPC.     Dates: Start: 05/27/21          Goal: LTG-By discharge, patient will ambulate up/down 4-6 stairs at SBA level with 1-2 railings.     Dates: Start: 05/27/21          Goal: LTG-By discharge, patient will transfer in/out of a car at SBA level with a FWW or SPC.     Dates: Start: 05/27/21

## 2021-05-28 NOTE — THERAPY
Speech Language Pathology   Initial Assessment     Patient Name: Mady Yang  AGE:  65 y.o., SEX:  female  Medical Record #: [id number]  Today's Date: 5/28/2021     Subjective    Patient was seen for dysphagia eval, seated in w/c in her room during lunch. Patient was noted a good historian for current hospital course.      Objective       05/28/21 1131   Precautions   Precautions Fall Risk;Other (See Comments)   Comments diabetic, tachycardia   Prior Living Situation   Prior Services None   Prior Level Of Function   Swallow Within Functional Limits   Dentition Intact   Hearing Within Functional Limits for Evaluation   Vision Within Functional Limits for Evaluation   Patient's Primary Language English   Tracheostomy   Tracheostomy No   Speech Mechanisms / Voice Production   Speech Mechanisms / Voice Production (WDL) WDL   Labial Function   Labial Function (WDL) WDL   Lingual Function   Lingual Function (WDL) WDL   Jaw Function   Jaw Function (WDL) WDL   Velar Function   Velar Function (WDL) WDL   Laryngeal Function   Voice Quality Within Functional Limits   Excursion Upon Swallow Complete   Swallowing   Thin Liquid Within Functional Limits   Masticated Foods Within Functional Limits   Dysphagia Strategies / Recommendations   Diet / Liquid Recommendation Regular (7);Thin (0)   Medication Administration  Whole with Liquid Wash   Therapy Interventions None;No Swallowing Intervention Required   Barriers To Oral Feeding   Barriers To Oral Feeding None   Swallowing/Nutritional Status   Swallowing/Nutritional Status Regular food   Eating   Assistance Needed Independent   CARE Score 6   Functional Level of Assist   Eating Independent   Current Discharge Plan   Current Discharge Plan Return to Prior Living Situation   Benefit   Therapy Benefit Patient would not benefit from Inpatient Rehab Speech-Language Pathology.  No further Speech Therapy recommended at this time.   Interdisciplinary Plan of Care Collaboration   IDT  "Collaboration with  Nursing   Patient Position at End of Therapy Seated;Call Light within Reach;Tray Table within Reach;Phone within Reach   Collaboration Comments dysphagia concerns   SLP Total Time Spent   SLP Individual Total Time Spent (Mins) 30   Evaluation Charges   Charges Yes   SLP Oral Pharyngeal Evaluation Oral Pharyngeal Evaluation       Assessment    Patient is 65 y.o. female with a diagnosis of critical illness myopathy.  Additional factors influencing patient status/progress (ie: cognitive factors, co-morbidities, social support, etc): independent PLOF, no previous dysphagia concerns, pmhx of GERD.    H&P: \"Patient is a 65 y.o. female with a PMH of HTN, obesity, DM, and hypothyroidism who presented on 4/21/21 to [facility] with severe localized RLQ pain. She had reportedly gone to urgent care who ordered CT scan and confirmed diagnosis of appendicitis.  Patient also had signs concerning for intra-abdominal abscess. She underwent laparoscopic appendectomy on 4/21/21 with Dr. Ruiz but was converted to open appendectomy due to rupture of appendix. Patient had prolonged hospitalization with multiple rounds of antibiotics. Her stay was also complicated by ileus. She was placed on TPN.  She also had urinary retention and yusuf was placed.  Patient was transferred to [facility] ([facility]) on 5/14/21 for rehabilitation and medical management.  Patient has since been weaned off of TPN. She has had yusuf removed and is voiding.  Patient was very difficult to ambulate concerning team at [facility] for critical illness myopathy as well as limited by her > 50 BMI.\"      Patient reported that she experiences a globus sensation (food/liquid is stuck) intermittently with all textures and consistencies. SLP provided PO trials of current diet (regular texture and thin liquids) with lunch. Patient presents with intact and symmetrical oral structures and WNL oral motor movements for OME. Patient also presents with intact oral phase of the " swallow, as indicated by adequate labial seal and no anterior loss of bolus, timely ISATU and trace oral cavity residue with all PO trials. Patient also presents with intact pharyngeal phase of the swallow, as indicted by timely swallow and complete hyolaryngeal elevation/excursion upon palpation of the swallow and no overt s/sx of asp for serial sips of thin via straw as well as thin liquids when alternating with regular textures. Patient consumed approximately 75% of the meal. Patient reported that she was not currently experiencing globus sensation with this meal.     Patient also reported that she has a previous dx of GERD but does not manage with medication. SLP discussed GERD precautions (seated upright for meals, remain upright after meals for 30 minutes, small bites, alternate solids/liquids) to reduce globus sensation and risk of asp on retrograde flow. Patient verbalized understanding.       Plan    -Evaluation only - Swallow function is WNL and patient is tolerating least restrictive diet  -Continue regular textures and thin liquids (straws ok)  -Consider f/u with GI if globus sensation worsens    Goals:  Long term and short term goals have been discussed with patient and they are in agreement.    Speech Therapy Problems (Active)      There are no active problems.

## 2021-05-28 NOTE — FLOWSHEET NOTE
05/27/21 1732   Events/Summary/Plan   Events/Summary/Plan Spo2 check   Vital Signs   Pulse (!) 112   Respiration 18   Pulse Oximetry 96 %   $ Pulse Oximetry (Spot Check) Yes   Oxygen   O2 Delivery Device None - Room Air   Non-Invasive Ventilation [facility]   Nocturnal CPAP or BIPAP CPAP - Home Unit   $ System Evaluation Yes   Settings (If Known) Auto 11-15

## 2021-05-28 NOTE — PROGRESS NOTES
"Rehab Progress Note     Encounter Date: 5/28/2021    CC: decreased mobility, right knee pain    Interval Events (Subjective)  Patient sitting up in therapy gym. She reports having difficulty with the hospital bed as she sleeps on her side. Discussed airloss mattress she is in agreement. Otherwise discussed scheduling Ibuprofen in the AM and at lunch. She is in agreement. Discussed will need to check labs next week to monitor kidney function.     Objective:  VITAL SIGNS: BP (!) 99/55   Pulse (!) 109   Temp 36.9 °C (98.5 °F) (Temporal)   Resp 20   Ht 1.6 m (5' 3\")   Wt (!) 134 kg (296 lb 8 oz)   SpO2 96%   BMI 52.52 kg/m²   Gen: NAD  Psych: Mood and affect appropriate  CV: mild tachycardia, no edema  Resp: CTAB, no upper airway sounds  Abd: NTND  Neuro: AOx4, following commands  Unchanged from 5/27/21    Recent Results (from the past 72 hour(s))   SARS-CoV-2 PCR (24 hour In-House): Collect NP swab in VTM    Collection Time: 05/27/21  5:10 AM    Specimen: Nasopharyngeal; Respirate   Result Value Ref Range    SARS-CoV-2 Source NP Swab     SARS-CoV-2 by PCR NotDetected    CBC with Differential    Collection Time: 05/27/21  5:57 AM   Result Value Ref Range    WBC RR 4.8 - 10.8 K/uL    RBC RR 4.20 - 5.40 M/uL    Hemoglobin RR 12.0 - 16.0 g/dL    Hematocrit RR 37.0 - 47.0 %    MCV RR 81.4 - 97.8 fL    MCH RR 27.0 - 33.0 pg    MCHC RR 33.6 - 35.0 g/dL    RDW RR 35.9 - 50.0 fL    Platelet Count  - 446 K/uL    MPV RR 9.0 - 12.9 fL    Neutrophils-Polys RR 44.00 - 72.00 %    Lymphocytes RR 22.00 - 41.00 %    Monocytes RR 0.00 - 13.40 %    Eosinophils RR 0.00 - 6.90 %    Basophils RR 0.00 - 1.80 %    Immature Granulocytes RR 0.00 - 0.90 %    Nucleated RBC RR /100 WBC    Neutrophils (Absolute) RR 2.00 - 7.15 K/uL    Lymphs (Absolute) RR 1.00 - 4.80 K/uL    Monos (Absolute) RR 0.00 - 0.85 K/uL    Eos (Absolute) RR 0.00 - 0.51 K/uL    Baso (Absolute) RR 0.00 - 0.12 K/uL    Immature Granulocytes (abs) RR 0.00 - 0.11 K/uL "    NRBC (Absolute) RR K/uL   Comp Metabolic Panel (CMP)    Collection Time: 05/27/21  5:57 AM   Result Value Ref Range    Sodium 143 135 - 145 mmol/L    Potassium 4.2 3.6 - 5.5 mmol/L    Chloride 107 96 - 112 mmol/L    Co2 20 20 - 33 mmol/L    Anion Gap 16.0 7.0 - 16.0    Glucose 117 (H) 65 - 99 mg/dL    Bun 40 (H) 8 - 22 mg/dL    Creatinine 2.14 (H) 0.50 - 1.40 mg/dL    Calcium 9.6 8.5 - 10.5 mg/dL    AST(SGOT) 31 12 - 45 U/L    ALT(SGPT) 31 2 - 50 U/L    Alkaline Phosphatase 120 (H) 30 - 99 U/L    Total Bilirubin 0.4 0.1 - 1.5 mg/dL    Albumin 3.3 3.2 - 4.9 g/dL    Total Protein 7.2 6.0 - 8.2 g/dL    Globulin 3.9 (H) 1.9 - 3.5 g/dL    A-G Ratio 0.8 g/dL   TSH with Reflex to FT4    Collection Time: 05/27/21  5:57 AM   Result Value Ref Range    TSH 0.810 0.380 - 5.330 uIU/mL   ESTIMATED GFR    Collection Time: 05/27/21  5:57 AM   Result Value Ref Range    GFR If  28 (A) >60 mL/min/1.73 m 2    GFR If Non African American 23 (A) >60 mL/min/1.73 m 2   PERIPHERAL SMEAR REVIEW    Collection Time: 05/27/21  5:57 AM   Result Value Ref Range    Peripheral Smear Review see below    DIFFERENTIAL COMMENT    Collection Time: 05/27/21  5:57 AM   Result Value Ref Range    Comments-Diff see below    POCT glucose device results    Collection Time: 05/27/21  7:57 AM   Result Value Ref Range    Glucose - Accu-Ck 135 (H) 65 - 99 mg/dL   POCT glucose device results    Collection Time: 05/27/21 11:17 AM   Result Value Ref Range    Glucose - Accu-Ck 108 (H) 65 - 99 mg/dL   POCT glucose device results    Collection Time: 05/27/21  5:27 PM   Result Value Ref Range    Glucose - Accu-Ck 100 (H) 65 - 99 mg/dL   POCT glucose device results    Collection Time: 05/27/21  7:50 PM   Result Value Ref Range    Glucose - Accu-Ck 122 (H) 65 - 99 mg/dL   POCT glucose device results    Collection Time: 05/28/21  7:48 AM   Result Value Ref Range    Glucose - Accu-Ck 125 (H) 65 - 99 mg/dL   POCT glucose device results    Collection Time:  05/28/21 11:34 AM   Result Value Ref Range    Glucose - Accu-Ck 111 (H) 65 - 99 mg/dL       Current Facility-Administered Medications   Medication Frequency   • ibuprofen (MOTRIN) tablet 400 mg BID   • losartan (COZAAR) tablet 25 mg Q DAY   • Respiratory Therapy Consult Continuous RT   • oxyCODONE immediate-release (ROXICODONE) tablet 5 mg Q3HRS PRN    Or   • oxyCODONE immediate release (ROXICODONE) tablet 10 mg Q3HRS PRN   • hydrALAZINE (APRESOLINE) tablet 25 mg Q8HRS PRN   • acetaminophen (Tylenol) tablet 650 mg Q4HRS PRN   • senna-docusate (PERICOLACE or SENOKOT S) 8.6-50 MG per tablet 2 tablet BID    And   • polyethylene glycol/lytes (MIRALAX) PACKET 1 Packet QDAY PRN    And   • magnesium hydroxide (MILK OF MAGNESIA) suspension 30 mL QDAY PRN    And   • bisacodyl (DULCOLAX) suppository 10 mg QDAY PRN   • artificial tears ophthalmic solution 1 Drop PRN   • benzocaine-menthol (CEPACOL) lozenge 1 Lozenge Q2HRS PRN   • mag hydrox-al hydrox-simeth (MAALOX PLUS ES or MYLANTA DS) suspension 20 mL Q2HRS PRN   • ondansetron (ZOFRAN ODT) dispertab 4 mg 4X/DAY PRN    Or   • ondansetron (ZOFRAN) syringe/vial injection 4 mg 4X/DAY PRN   • traZODone (DESYREL) tablet 50 mg QHS PRN   • sodium chloride (OCEAN) 0.65 % nasal spray 2 Spray PRN   • amLODIPine (NORVASC) tablet 10 mg DAILY   • lidocaine (LIDODERM) 5 % 1 Patch Q24HRS   • thyroid (ARMOUR THYROID) tablet 90 mg DAILY   • ibuprofen (MOTRIN) tablet 400 mg Q6HRS PRN   • magnesium oxide (MAG-OX) tablet 400 mg DAILY   • insulin regular (HumuLIN R,NovoLIN R) injection 4X/DAY ACHS    And   • glucose 4 g chewable tablet 16 g Q15 MIN PRN    And   • dextrose 50% (D50W) injection 50 mL Q15 MIN PRN   • enoxaparin (LOVENOX) inj 40 mg DAILY       Orders Placed This Encounter   Procedures   • Diet Order Diet: Consistent CHO (Diabetic)     Standing Status:   Standing     Number of Occurrences:   1     Order Specific Question:   Diet:     Answer:   Consistent CHO (Diabetic) [4]        Assessment:  Active Hospital Problems    Diagnosis    • *Critical illness myopathy    • Anemia    • Hyperglycemia    • Hypothyroidism    • Acute appendicitis with localized peritonitis    • Stage 3 chronic kidney disease (HCC)    • Essential hypertension    • Osteoarthritis    • BMI 50.0-59.9, adult (HCC)    • HENIR (obstructive sleep apnea)        Medical Decision Making and Plan:  Critical Illness myopathy - Patient with prolonged hospitalization after burst appendix complicated by respiratory failure, ileus, and poor intake requiring TPN. Patient transferred to [facility] and found to have significant weakness concerning for CIM.  -PT and OT for mobility and ADLs     DM - Patient takes home supplement. Will start SSI, previously well controlled. Will check A1c  -Blood sugars < 150. If low another day discontinue      HTN - Patient on Losartan 50 mg and Amlodipine 10 mg on transfer.   -Decrease ARB with elevated Cr. SBP borderline low will monitor     Tachycardia - On admission. Ongoing low grade     Hypothyroidism - Patient on [place] Thyroid 90 mg daily. Check TSH - 0.810     Anemia - Check AM CBC - clotted. Will wait a few days as she is difficult stick     Back pain/Left leg OA - Patient primarily limited by left knee and ankle OA. On Ibuprofen scheduled. Will monitor Cr.     JOSHUA on CKD - Patient with elevated Cr on admission. Has been trending up at OSH. Patient is drinking plenty of fluids. Will monitor     Morbid obesity due to excess calories - Patient with BMI of 52.52 on admission meeting medical criteria.      DVT ppx - Patient to start Lovenox until ambulating sufficiently.     Total time:  26 minutes.  I spent greater than 50% of the time for patient care, counseling, and coordination on this date, including unit/floor time, and face-to-face time with the patient as per interval events and assessment and plan above. Topics discussed included ongoing tachycardia, low SBP on reduced ARB, schedule Ibuprofen,  and clyde Cho M.D.

## 2021-05-28 NOTE — CARE PLAN
The patient is Stable - Low risk of patient condition declining or worsening         Problem: Pain - Standard  Goal: Alleviation of pain or a reduction in pain to the patient’s comfort goal  Outcome: Progressing  Flowsheets  Taken 5/27/2021 1031 by Derrell Rutledge PT  Non Verbal Scale: Calm  Taken 5/27/2021 0816 by Quyen Scruggs R.N.  Pain Rating Scale (NPRS): 4     Problem: Infection  Goal: Patient will remain free from infection  Outcome: Progressing  Note:   Patient remains free from s/s infection; afebrile.  Will continue to monitor.

## 2021-05-29 LAB
GLUCOSE BLD-MCNC: 118 MG/DL (ref 65–99)
GLUCOSE BLD-MCNC: 127 MG/DL (ref 65–99)

## 2021-05-29 PROCEDURE — 82962 GLUCOSE BLOOD TEST: CPT

## 2021-05-29 PROCEDURE — 770010 HCHG ROOM/CARE - REHAB SEMI PRIVAT*

## 2021-05-29 PROCEDURE — 700111 HCHG RX REV CODE 636 W/ 250 OVERRIDE (IP): Performed by: PHYSICAL MEDICINE & REHABILITATION

## 2021-05-29 PROCEDURE — A9270 NON-COVERED ITEM OR SERVICE: HCPCS | Performed by: PHYSICAL MEDICINE & REHABILITATION

## 2021-05-29 PROCEDURE — 94660 CPAP INITIATION&MGMT: CPT

## 2021-05-29 PROCEDURE — 700102 HCHG RX REV CODE 250 W/ 637 OVERRIDE(OP): Performed by: PHYSICAL MEDICINE & REHABILITATION

## 2021-05-29 PROCEDURE — 94760 N-INVAS EAR/PLS OXIMETRY 1: CPT

## 2021-05-29 RX ADMIN — IBUPROFEN 400 MG: 400 TABLET ORAL at 08:39

## 2021-05-29 RX ADMIN — IBUPROFEN 400 MG: 400 TABLET ORAL at 14:32

## 2021-05-29 RX ADMIN — ENOXAPARIN SODIUM 40 MG: 40 INJECTION SUBCUTANEOUS at 08:43

## 2021-05-29 RX ADMIN — LOSARTAN POTASSIUM 25 MG: 25 TABLET, FILM COATED ORAL at 05:24

## 2021-05-29 RX ADMIN — SENNOSIDES AND DOCUSATE SODIUM 2 TABLET: 8.6; 5 TABLET ORAL at 21:08

## 2021-05-29 RX ADMIN — MAGNESIUM OXIDE TAB 400 MG (241.3 MG ELEMENTAL MG) 400 MG: 400 (241.3 MG) TAB at 08:39

## 2021-05-29 RX ADMIN — THYROID, PORCINE 90 MG: 30 TABLET ORAL at 08:39

## 2021-05-29 RX ADMIN — AMLODIPINE BESYLATE 10 MG: 5 TABLET ORAL at 08:41

## 2021-05-29 ASSESSMENT — PAIN DESCRIPTION - PAIN TYPE: TYPE: CHRONIC PAIN

## 2021-05-29 NOTE — CARE PLAN
The patient is Stable - Low risk of patient condition declining or worsening    Problem: Infection  Goal: Patient will remain free from infection  Outcome: Progressing  Note: Patient's noted vital signs shows hypotension and tachycardia however overall condition shows no sign of any distress and asymptomatic. MD is aware and noted blood draw dated 5/27/2021 was clotted and plan of re drawing in a few days. Currently patient verbalized she don't feel anything unusual.      Problem: Skin Integrity  Goal: Skin integrity is maintained or improved  Outcome: Progressing  Note:   Patient's skin remains intact and free from new or accidental injury this shift; no s/s of infection. Will continue to monitor.

## 2021-05-30 PROCEDURE — 94760 N-INVAS EAR/PLS OXIMETRY 1: CPT

## 2021-05-30 PROCEDURE — 97116 GAIT TRAINING THERAPY: CPT

## 2021-05-30 PROCEDURE — A9270 NON-COVERED ITEM OR SERVICE: HCPCS | Performed by: PHYSICAL MEDICINE & REHABILITATION

## 2021-05-30 PROCEDURE — 770010 HCHG ROOM/CARE - REHAB SEMI PRIVAT*

## 2021-05-30 PROCEDURE — 97110 THERAPEUTIC EXERCISES: CPT

## 2021-05-30 PROCEDURE — 700101 HCHG RX REV CODE 250: Performed by: PHYSICAL MEDICINE & REHABILITATION

## 2021-05-30 PROCEDURE — 700111 HCHG RX REV CODE 636 W/ 250 OVERRIDE (IP): Performed by: PHYSICAL MEDICINE & REHABILITATION

## 2021-05-30 PROCEDURE — 97530 THERAPEUTIC ACTIVITIES: CPT

## 2021-05-30 PROCEDURE — 700102 HCHG RX REV CODE 250 W/ 637 OVERRIDE(OP): Performed by: PHYSICAL MEDICINE & REHABILITATION

## 2021-05-30 RX ADMIN — IBUPROFEN 400 MG: 400 TABLET ORAL at 14:23

## 2021-05-30 RX ADMIN — THYROID, PORCINE 90 MG: 30 TABLET ORAL at 08:33

## 2021-05-30 RX ADMIN — IBUPROFEN 400 MG: 400 TABLET ORAL at 08:34

## 2021-05-30 RX ADMIN — AMLODIPINE BESYLATE 10 MG: 5 TABLET ORAL at 08:34

## 2021-05-30 RX ADMIN — LOSARTAN POTASSIUM 25 MG: 25 TABLET, FILM COATED ORAL at 05:18

## 2021-05-30 RX ADMIN — ENOXAPARIN SODIUM 40 MG: 40 INJECTION SUBCUTANEOUS at 08:34

## 2021-05-30 RX ADMIN — MAGNESIUM OXIDE TAB 400 MG (241.3 MG ELEMENTAL MG) 400 MG: 400 (241.3 MG) TAB at 08:34

## 2021-05-30 RX ADMIN — SENNOSIDES AND DOCUSATE SODIUM 2 TABLET: 8.6; 5 TABLET ORAL at 08:34

## 2021-05-30 RX ADMIN — LIDOCAINE 1 PATCH: 50 PATCH TOPICAL at 08:34

## 2021-05-30 ASSESSMENT — GAIT ASSESSMENTS
GAIT LEVEL OF ASSIST: CONTACT GUARD ASSIST
ASSISTIVE DEVICE: FRONT WHEEL WALKER

## 2021-05-30 ASSESSMENT — ACTIVITIES OF DAILY LIVING (ADL)
BED_CHAIR_WHEELCHAIR_TRANSFER_DESCRIPTION: ADAPTIVE EQUIPMENT;INCREASED TIME;SET-UP OF EQUIPMENT;SUPERVISION FOR SAFETY;VERBAL CUEING
BED_CHAIR_WHEELCHAIR_TRANSFER_DESCRIPTION: INCREASED TIME;INITIAL PREPARATION FOR TASK

## 2021-05-30 NOTE — FLOWSHEET NOTE
05/30/21 0935   Events/Summary/Plan   Events/Summary/Plan 02 spot check, CPAP filled with sterile water   Vital Signs   Pulse (!) 102   Respiration 18   Pulse Oximetry 97 %   $ Pulse Oximetry (Spot Check) Yes   Respiratory Assessment   Level of Consciousness Alert   Chest Exam   Work Of Breathing / Effort Within Normal Limits   Oxygen   O2 Delivery Device None - Room Air   Non-Invasive Ventilation [facility]   Nocturnal CPAP or BIPAP CPAP - Home Unit   Settings (If Known) 11-15   FiO2 or LPM 0

## 2021-05-30 NOTE — THERAPY
Physical Therapy   Daily Treatment     Patient Name: Mady Yang  Age:  65 y.o., Sex:  female  Medical Record #: [id number]  Today's Date: 5/30/2021     Precautions  Precautions: (P) Fall Risk, Other (See Comments)  Comments: (P) diabetic, tachycardia    Subjective    Patient tired, but willing to participate in therapy session     Objective       05/30/21 1331   Precautions   Precautions Fall Risk;Other (See Comments)   Comments diabetic, tachycardia   Transfer Functional Level of Assist   Bed, Chair, Wheelchair Transfer Stand by Assist  (w/c to/from Nu-Step)   Bed Chair Wheelchair Transfer Description Increased time;Initial preparation for task   Sitting Lower Body Exercises   Nustep Resistance Level 1  (7.5min for endurance)   Interdisciplinary Plan of Care Collaboration   IDT Collaboration with  Physical Therapist;Family / Caregiver   Collaboration Comments treatment planning; observed session   PT Total Time Spent   PT Individual Total Time Spent (Mins) 30   PT Charge Group   PT Therapeutic Exercise 1   PT Therapeutic Activities 1       Assessment    Patient self-limited time on Nu-Step based on general body fatigue    Strengths: Able to follow instructions, Alert and oriented, Effective communication skills, Good carryover of learning, Good insight into deficits/needs, Independent prior level of function, Making steady progress towards goals, Manages pain appropriately, Motivated for self care and independence, Pleasant and cooperative, Willingly participates in therapeutic activities  Barriers: Decreased endurance, Fatigue, Generalized weakness, Home accessibility, Hypotension, Impaired activity tolerance, Impaired balance, Limited mobility    Plan    Gait with FWW, assess transfers for mod I status; endurance work, LE strengthening      Physical Therapy Problems (Active)     Problem: Mobility     Dates: Start: 05/27/21       Goal: STG-Within one week, patient will ambulate household distance of 150  feet with FWW at SBA level.     Dates: Start: 05/27/21          Goal: STG-Within one week, patient will ascend and descend four to six stairs with B railings at Min A level.     Dates: Start: 05/27/21             Problem: Mobility Transfers     Dates: Start: 05/27/21       Goal: STG-Within one week, patient will perform bed mobility with bed rail at CGA level.     Dates: Start: 05/27/21          Goal: STG-Within one week, patient will transfer bed to chair with FWW at CGA level.     Dates: Start: 05/27/21          Goal: STG-Within one week, patient will transfer in/out of car with FWW at Min A level.     Dates: Start: 05/27/21             Problem: PT-Long Term Goals     Dates: Start: 05/27/21       Goal: LTG-By discharge, patient will ambulate 300 feet at SBA level with a FWW or SPC.     Dates: Start: 05/27/21          Goal: LTG-By discharge, patient will transfer one surface to another at SPV level with a FWW or SPC.     Dates: Start: 05/27/21          Goal: LTG-By discharge, patient will ambulate up/down 4-6 stairs at SBA level with 1-2 railings.     Dates: Start: 05/27/21          Goal: LTG-By discharge, patient will transfer in/out of a car at SBA level with a FWW or SPC.     Dates: Start: 05/27/21

## 2021-05-30 NOTE — CARE PLAN
Problem: Bladder / Voiding  Goal: Patient will establish and maintain bladder regimen  Outcome: Progressing  Note: Patient continent and voiding adequate amounts of clear yellow urine.  Denies flank pain or dysuria; afebrile.  Will continue to monitor.      Problem: Diabetes Management  Goal: Patient's ability to maintain appropriate glucose levels will be maintained or improve  Outcome: Progressing  Note: Pt has been taken off sliding scale insulin per Dr Cho. Has not required insulin all admission; diabetes is diet controlled. Will continue to monitor.

## 2021-05-30 NOTE — CARE PLAN
The patient is Watcher - Medium risk of patient condition declining or worsening    Patient's BP is 90/60. Patient is asymptomatic and shows no signs of distress at this time.     Problem: Pain - Standard  Goal: Alleviation of pain or a reduction in pain to the patient’s comfort goal  Outcome: Progressing  Flowsheets (Taken 5/29/2021 1929)  Pain Rating Scale (NPRS): 0  Note: Patient states no pain at this time.     Problem: Bladder / Voiding  Goal: Patient will establish and maintain regular urinary output  Outcome: Progressing     Problem: Skin Integrity  Goal: Skin integrity is maintained or improved  Outcome: Progressing

## 2021-05-30 NOTE — DISCHARGE PLANNING
CASE MANAGEMENT INITIAL ASSESSMENT    Admit Date:  5/26/2021     I spoke with patient to discuss role of case management / discharge planning / team conference. She was alert and able to provide information.  Patient is a  65 y.o. female transferred from [facility] ([facility]) 05/14-05/26.    Admitting physician: Dr. Cho  PCP: Dr. MUNROE  Surgeon: Dr. Ruiz    Diagnosis: Critical illness myopathy [G72.81]    Co-morbidities:   Patient Active Problem List    Diagnosis Date Noted   • Critical illness myopathy 05/26/2021   • Anemia 05/03/2021   • Hyperglycemia 04/28/2021   • Hypothyroidism 04/27/2021   • Acute appendicitis with localized peritonitis 04/21/2021   • Osteoarthritis 04/24/2018   • Essential hypertension 04/24/2018   • Stage 3 chronic kidney disease (HCC) 04/24/2018   • BMI 50.0-59.9, adult ([facility]) 03/08/2018   • HENRI (obstructive sleep apnea) 01/20/2017     Prior Living Situation:  Housing / Facility: [address]  Lives with - Patient's Self Care Capacity: Spouse    Prior Level of Function:  Medication Management: Independent  Finances: Independent  Home Management: Independent  Shopping: Independent  Prior Level Of Mobility: Independent Without Device in Home, Independent With Device in Home (intermittent use of quad cane)  Driving / Transportation: Driving Independent    Support Systems:  Primary : Alex Tilley (spouse) [phone], Mary (friend) [phone]    Previous Services Utilized:   Equipment Owned: Quad Cane  Prior Services: None    Other Information:  Occupation (Pre-Hospital Vocational): Retired Due To Age  Primary Payor Source: [facility]  Primary Care Practitioner : Dr. MUNROE On license of [facility]     Patient / Family Goal:  Patient / Family Goal: patient lives in a single level home with her . She has 2 steps to enter home. Patients has a quad cane and grab bars[place] Her goal is to be discharged home and case management goal is to set up resourses.      Additional Case Management Questions:  Have you ever received case management services for yourself or a family member? No    Do you feel you have and an understanding of what services  provide? Yes    Do you have any additional questions regarding case management?    No      CASE MANAGEMENT PLAN OF CARE   Individualized Goals:   1. Be discharged home   2. Set up available resources     Barriers:   1. [place] living area   2. Functional deficit     Plan:  1. Continue to follow patient through hospitalization and provide discharge planning in collaboration with patient, family, physicians and ancillary services.     2. Utilize community resources to ensure a safe discharge.

## 2021-05-30 NOTE — FLOWSHEET NOTE
05/29/21 1929   Events/Summary/Plan   Events/Summary/Plan SpO2 check, Pt using cpap every night and during naps   Vital Signs   Pulse 98   Respiration 18   Pulse Oximetry 96 %   $ Pulse Oximetry (Spot Check) Yes   Oxygen   O2 Delivery Device None - Room Air   Non-Invasive Ventilation HENRI Group   Nocturnal CPAP or BIPAP CPAP - Home Unit   $ System Evaluation Yes   Settings (If Known) Auto 11-15

## 2021-05-30 NOTE — THERAPY
"Occupational Therapy  Daily Treatment     Patient Name: Mady Yang  Age:  65 y.o., Sex:  female  Medical Record #: [id number]  Today's Date: 5/30/2021     Precautions  Precautions: Fall Risk, Other (See Comments)  Comments: diabetic, tachycardia         Subjective    \"I don't know why I get so tired so easily\"     Objective       05/30/21 0931   Supine Upper Body Exercises   Comments supine UB therex with 4# dummbell: tricep extension, scapular protraction, int/external rotation 2 sets x 10   Sitting Upper Body Exercises   Shoulder Extension 2 sets of 10;Bilateral;Weight (See Comments for lbs)  (4# dumbbell)   Internal Shoulder Rotation 2 sets of 10;Bilateral;Weight (See Comments for lbs)  (4# dumbbell)   Bicep Curls 2 sets of 10;Bilateral;Weight (See Comments for lbs)  (4# dumbbell)   Other Exercise seated EOM light med ball throw overhead x 1.5 min   Sitting Lower Body Exercises   Sit to Stand 1 set of 10  (EOM <>FWW)   Standing Lower Body Exercises   Comments foot taps at FWW to 4\" step x20 with supervision   Interdisciplinary Plan of Care Collaboration   Patient Position at End of Therapy Seated;Call Light within Reach;Tray Table within Reach   OT Total Time Spent   OT Individual Total Time Spent (Mins) 60   OT Charge Group   OT Therapeutic Exercise  4       Assessment    Pt completed various UB/LB therex to the best of her abilities with intermittent rest breaks and some complaints of L knee pain but it did not impact standing and short ambulation distance with FWW    Strengths: Effective communication skills, Independent prior level of function, Motivated for self care and independence, Pleasant and cooperative, Willingly participates in therapeutic activities, Supportive family  Barriers: Decreased endurance, Generalized weakness, Impaired activity tolerance, Impaired balance, Limited mobility    Plan    Refer to Primary OT POC/goals    Occupational Therapy Goals (Active)     Problem: Dressing     " Dates: Start: 05/27/21       Goal: STG-Within one week, patient will dress LB     Dates: Start: 05/27/21       Goal Note filed on 05/27/21 0812 by Salvador Ponce MS,OTR/L     1) Individualized Goal: Min assist for Lower Body Clothing Management via AE/DME PRN  2) Interventions:  OT Self Care/ADL, OT Neuro Re-Ed/Balance, OT Therapeutic Activity, OT Evaluation, and OT Therapeutic Exercise               Problem: Functional Transfers     Dates: Start: 05/27/21       Goal: STG-Within one week, patient will transfer to toilet     Dates: Start: 05/27/21       Goal Note filed on 05/27/21 0812 by Salvador Ponce MS,OTR/L     1) Individualized Goal:  Sup/SBA for commode transfers via DME PRN  2) Interventions:  OT Self Care/ADL, OT Neuro Re-Ed/Balance, OT Therapeutic Activity, OT Evaluation, and OT Therapeutic Exercise               Problem: OT Long Term Goals     Dates: Start: 05/27/21       Goal: LTG-By discharge, patient will complete basic self care tasks     Dates: Start: 05/27/21       Goal Note filed on 05/27/21 0812 by Salvador Ponce MS,OTR/L     1) Individualized Goal:  Mod I for BADL's via AE/DME PRN  2) Interventions:  OT Self Care/ADL, OT Neuro Re-Ed/Balance, OT Therapeutic Activity, OT Evaluation, and OT Therapeutic Exercise            Goal: LTG-By discharge, patient will perform bathroom transfers     Dates: Start: 05/27/21       Goal Note filed on 05/27/21 0812 by Salvador Ponce MS,OTR/L     1) Individualized Goal:  Mod I for bathroom transfers via DME PRN  2) Interventions: OT Self Care/ADL, OT Neuro Re-Ed/Balance, OT Therapeutic Activity, OT Evaluation, and OT Therapeutic Exercise               Problem: Toileting     Dates: Start: 05/27/21       Goal: STG-Within one week, patient will complete toileting tasks     Dates: Start: 05/27/21       Goal Note filed on 05/27/21 0812 by Salvador Ponce MS,OTR/L     1) Individualized Goal:  Sup/SBA for toileting task (BM) via AE/DME PRN  2) Interventions: OT Self Care/ADL, OT Neuro  Re-Ed/Balance, OT Therapeutic Activity, OT Evaluation, and OT Therapeutic Exercise

## 2021-05-30 NOTE — CARE PLAN
Problem: Bowel Elimination  Goal: Patient will participate in bowel management program  Note: Patient having regular bowel movements; last BM 5/29/21.  Denies s/s constipation; bowel meds available if needed.  Will continue to monitor.    The patient is Stable - Low risk of patient condition declining or worsening         Progress made toward(s) clinical / shift goals:  wnl    Patient is not progressing towards the following goals:

## 2021-05-30 NOTE — THERAPY
Physical Therapy   Daily Treatment     Patient Name: Mady Yang  Age:  65 y.o., Sex:  female  Medical Record #: [id number]  Today's Date: 5/30/2021     Precautions  Precautions: Fall Risk, Other (See Comments)  Comments: diabetic, tachycardia    Subjective    Patient agreeable to PT.     Objective       05/30/21 1431   Vitals   Pulse 96   Patient BP Position Sitting   Blood Pressure 112/50   O2 (LPM) 0   O2 Delivery Device None - Room Air   Transfer Functional Level of Assist   Bed, Chair, Wheelchair Transfer Minimal Assist  (Min A provided by SO; gait belt, HHA, bed rail)   Bed Chair Wheelchair Transfer Description Adaptive equipment;Increased time;Set-up of equipment;Supervision for safety;Verbal cueing   Standing Lower Body Exercises   Standing Lower Body Exercises Yes  (// bars, SBA, setup, cueing, rests after every set)   Hamstring Curl 3 sets of 10   Hip Abduction 3 sets of 10   Marching 3 sets of 10   Heel Rise 3 sets of 10   Toe Rise 3 sets of 10   Mini Squat   (1 set of 5 reps)   Bed Mobility    Supine to Sit Supervised   Sit to Supine Supervised   Sit to Stand Stand by Assist   Scooting Stand by Assist   Rolling Supervised   Interdisciplinary Plan of Care Collaboration   IDT Collaboration with  Family / Caregiver   Patient Position at End of Therapy Seated;In Bed;Call Light within Reach;Tray Table within Reach;Phone within Reach;Family / Friend in Room   Collaboration Comments SO present; agreeable to education and hands on training with bed transfer today   PT Total Time Spent   PT Individual Total Time Spent (Mins) 60   PT Charge Group   PT Therapeutic Exercise 3   PT Therapeutic Activities 1       Assessment    Patient has improving endurance and strength; limited with squats and unable to perform standing ankle DF/PF at this time; no BP issues this session and pt reports similar hydration throughout day.    Strengths: Able to follow instructions, Alert and oriented, Effective communication  skills, Good carryover of learning, Good insight into deficits/needs, Independent prior level of function, Making steady progress towards goals, Manages pain appropriately, Motivated for self care and independence, Pleasant and cooperative, Willingly participates in therapeutic activities  Barriers: Decreased endurance, Fatigue, Generalized weakness, Home accessibility, Hypotension, Impaired activity tolerance, Impaired balance, Limited mobility    Plan    FWW ambulation, Ther ex for BLE strengthening and endurance, Lumbar stab/core strengthening, Education, bed mobility with less elevated HOB, aquatic therapy    Passport items to be completed:  Passport items to be completed:  Get in/out of bed safely, in/out of a vehicle, safely use mobility device, walk or wheel around home/community, navigate up and down stairs, show how to get up/down from the ground, ensure home is accessible, demonstrate HEP, complete caregiver training    Physical Therapy Problems (Active)     Problem: Mobility     Dates: Start: 05/27/21       Goal: STG-Within one week, patient will ambulate household distance of 150 feet with FWW at SBA level.     Dates: Start: 05/27/21          Goal: STG-Within one week, patient will ascend and descend four to six stairs with B railings at Min A level.     Dates: Start: 05/27/21             Problem: Mobility Transfers     Dates: Start: 05/27/21       Goal: STG-Within one week, patient will perform bed mobility with bed rail at CGA level.     Dates: Start: 05/27/21          Goal: STG-Within one week, patient will transfer bed to chair with FWW at CGA level.     Dates: Start: 05/27/21          Goal: STG-Within one week, patient will transfer in/out of car with FWW at Min A level.     Dates: Start: 05/27/21             Problem: PT-Long Term Goals     Dates: Start: 05/27/21       Goal: LTG-By discharge, patient will ambulate 300 feet at SBA level with a FWW or SPC.     Dates: Start: 05/27/21          Goal:  LTG-By discharge, patient will transfer one surface to another at SPV level with a FWW or SPC.     Dates: Start: 05/27/21          Goal: LTG-By discharge, patient will ambulate up/down 4-6 stairs at SBA level with 1-2 railings.     Dates: Start: 05/27/21          Goal: LTG-By discharge, patient will transfer in/out of a car at SBA level with a FWW or SPC.     Dates: Start: 05/27/21

## 2021-05-31 LAB
ANION GAP SERPL CALC-SCNC: 12 MMOL/L (ref 7–16)
BUN SERPL-MCNC: 31 MG/DL (ref 8–22)
CALCIUM SERPL-MCNC: 9.9 MG/DL (ref 8.5–10.5)
CHLORIDE SERPL-SCNC: 106 MMOL/L (ref 96–112)
CO2 SERPL-SCNC: 22 MMOL/L (ref 20–33)
CREAT SERPL-MCNC: 1.64 MG/DL (ref 0.5–1.4)
GLUCOSE SERPL-MCNC: 118 MG/DL (ref 65–99)
POTASSIUM SERPL-SCNC: 4 MMOL/L (ref 3.6–5.5)
SODIUM SERPL-SCNC: 140 MMOL/L (ref 135–145)

## 2021-05-31 PROCEDURE — 94760 N-INVAS EAR/PLS OXIMETRY 1: CPT

## 2021-05-31 PROCEDURE — 36415 COLL VENOUS BLD VENIPUNCTURE: CPT

## 2021-05-31 PROCEDURE — A9270 NON-COVERED ITEM OR SERVICE: HCPCS | Performed by: PHYSICAL MEDICINE & REHABILITATION

## 2021-05-31 PROCEDURE — 700111 HCHG RX REV CODE 636 W/ 250 OVERRIDE (IP): Performed by: PHYSICAL MEDICINE & REHABILITATION

## 2021-05-31 PROCEDURE — 80048 BASIC METABOLIC PNL TOTAL CA: CPT

## 2021-05-31 PROCEDURE — 97110 THERAPEUTIC EXERCISES: CPT

## 2021-05-31 PROCEDURE — 97535 SELF CARE MNGMENT TRAINING: CPT

## 2021-05-31 PROCEDURE — 99232 SBSQ HOSP IP/OBS MODERATE 35: CPT | Performed by: PHYSICAL MEDICINE & REHABILITATION

## 2021-05-31 PROCEDURE — 700101 HCHG RX REV CODE 250: Performed by: PHYSICAL MEDICINE & REHABILITATION

## 2021-05-31 PROCEDURE — 700102 HCHG RX REV CODE 250 W/ 637 OVERRIDE(OP): Performed by: PHYSICAL MEDICINE & REHABILITATION

## 2021-05-31 PROCEDURE — 770010 HCHG ROOM/CARE - REHAB SEMI PRIVAT*

## 2021-05-31 PROCEDURE — 97116 GAIT TRAINING THERAPY: CPT

## 2021-05-31 RX ADMIN — ENOXAPARIN SODIUM 40 MG: 40 INJECTION SUBCUTANEOUS at 08:17

## 2021-05-31 RX ADMIN — IBUPROFEN 400 MG: 400 TABLET, FILM COATED ORAL at 20:01

## 2021-05-31 RX ADMIN — THYROID, PORCINE 90 MG: 30 TABLET ORAL at 08:16

## 2021-05-31 RX ADMIN — SENNOSIDES AND DOCUSATE SODIUM 2 TABLET: 8.6; 5 TABLET ORAL at 20:01

## 2021-05-31 RX ADMIN — IBUPROFEN 400 MG: 400 TABLET ORAL at 14:16

## 2021-05-31 RX ADMIN — LIDOCAINE 1 PATCH: 50 PATCH TOPICAL at 08:17

## 2021-05-31 RX ADMIN — MAGNESIUM OXIDE TAB 400 MG (241.3 MG ELEMENTAL MG) 400 MG: 400 (241.3 MG) TAB at 08:17

## 2021-05-31 RX ADMIN — IBUPROFEN 400 MG: 400 TABLET ORAL at 08:16

## 2021-05-31 RX ADMIN — SENNOSIDES AND DOCUSATE SODIUM 2 TABLET: 8.6; 5 TABLET ORAL at 08:16

## 2021-05-31 RX ADMIN — AMLODIPINE BESYLATE 10 MG: 5 TABLET ORAL at 08:16

## 2021-05-31 ASSESSMENT — ACTIVITIES OF DAILY LIVING (ADL)
TOILETING_LEVEL_OF_ASSIST_DESCRIPTION: GRAB BAR;INCREASED TIME;SET-UP OF EQUIPMENT;SUPERVISION FOR SAFETY
TOILET_TRANSFER_DESCRIPTION: GRAB BAR;INCREASED TIME;SUPERVISION FOR SAFETY
TOILETING_LEVEL_OF_ASSIST_DESCRIPTION: GRAB BAR;INCREASED TIME;SUPERVISION FOR SAFETY
TOILET_TRANSFER_DESCRIPTION: GRAB BAR;SET-UP OF EQUIPMENT;SUPERVISION FOR SAFETY;VERBAL CUEING

## 2021-05-31 ASSESSMENT — GAIT ASSESSMENTS
GAIT LEVEL OF ASSIST: STAND BY ASSIST
ASSISTIVE DEVICE: FRONT WHEEL WALKER

## 2021-05-31 NOTE — THERAPY
Physical Therapy   Daily Treatment     Patient Name: Mady Yang  Age:  65 y.o., Sex:  female  Medical Record #: [id number]  Today's Date: 5/31/2021     Precautions  Precautions: Fall Risk, Other (See Comments)  Comments: diabetic, tachycardia    Subjective    Patient agreeable to PT.     Objective       05/31/21 0931   Vitals   O2 (LPM) 0   O2 Delivery Device None - Room Air   Gait Functional Level of Assist    Gait Level Of Assist Stand by Assist   Assistive Device Front Wheel Walker   Distance (Feet)   (40-85 feet indoors)   # of Times Distance was Traveled 9   Deviation Trendelenberg;Increased Base Of Support;Bradykinetic;Decreased Toe Off   Bed Mobility    Sit to Stand Stand by Assist  (SPV-SBA, FWW, setup)   Interdisciplinary Plan of Care Collaboration   IDT Collaboration with  Family / Caregiver   Collaboration Comments Pt's SO present observing and providing SBA guarding during ambulation; pt's SO may ambulate patient to bathroom as pt's fatigue safely allows   PT Total Time Spent   PT Individual Total Time Spent (Mins) 60   PT Charge Group   PT Gait Training 4       Assessment    Patient has improving activity tolerance with slightly decreased distances today; good social support and guarding during ambulation from SO; good activity pacing with only occasional cueing after initial education discussion.    Strengths: Able to follow instructions, Alert and oriented, Effective communication skills, Good carryover of learning, Good insight into deficits/needs, Independent prior level of function, Making steady progress towards goals, Manages pain appropriately, Motivated for self care and independence, Pleasant and cooperative, Willingly participates in therapeutic activities  Barriers: Decreased endurance, Fatigue, Generalized weakness, Home accessibility, Hypotension, Impaired activity tolerance, Impaired balance, Limited mobility    Plan    FWW ambulation, Ther ex for BLE strengthening and endurance,  Lumbar stab/core strengthening, Education, bed mobility with less elevated HOB, aquatic therapy    Passport items to be completed:  Passport items to be completed:  Get in/out of bed safely, in/out of a vehicle, safely use mobility device, walk or wheel around home/community, navigate up and down stairs, show how to get up/down from the ground, ensure home is accessible, demonstrate HEP, complete caregiver training    Physical Therapy Problems (Active)     Problem: Mobility     Dates: Start: 05/27/21       Goal: STG-Within one week, patient will ambulate household distance of 150 feet with FWW at SBA level.     Dates: Start: 05/27/21          Goal: STG-Within one week, patient will ascend and descend four to six stairs with B railings at Min A level.     Dates: Start: 05/27/21             Problem: Mobility Transfers     Dates: Start: 05/27/21       Goal: STG-Within one week, patient will perform bed mobility with bed rail at CGA level.     Dates: Start: 05/27/21          Goal: STG-Within one week, patient will transfer bed to chair with FWW at CGA level.     Dates: Start: 05/27/21          Goal: STG-Within one week, patient will transfer in/out of car with FWW at Min A level.     Dates: Start: 05/27/21             Problem: PT-Long Term Goals     Dates: Start: 05/27/21       Goal: LTG-By discharge, patient will ambulate 300 feet at SBA level with a FWW or SPC.     Dates: Start: 05/27/21          Goal: LTG-By discharge, patient will transfer one surface to another at SPV level with a FWW or SPC.     Dates: Start: 05/27/21          Goal: LTG-By discharge, patient will ambulate up/down 4-6 stairs at SBA level with 1-2 railings.     Dates: Start: 05/27/21          Goal: LTG-By discharge, patient will transfer in/out of a car at SBA level with a FWW or SPC.     Dates: Start: 05/27/21

## 2021-05-31 NOTE — THERAPY
Occupational Therapy  Daily Treatment     Patient Name: Mady Yang  Age:  65 y.o., Sex:  female  Medical Record #: [id number]  Today's Date: 5/31/2021     Precautions  Precautions: Fall Risk, Other (See Comments)  Comments: diabetic, tachycardia         Subjective       Objective       05/31/21 1301   Precautions   Precautions Fall Risk;Other (See Comments)   Comments diabetic, tachycardia   Vitals   O2 Delivery Device None - Room Air   Pain   Intervention Declines   Pain 0 - 10 Group   Therapist Pain Assessment 0   Non Verbal Descriptors   Non Verbal Scale  Calm   Cognition    Level of Consciousness Alert   Sleep/Wake Cycle   Sleep & Rest Awake   Functional Level of Assist   Toileting Supervision   Toileting Description Grab bar;Increased time;Supervision for safety   Toilet Transfers Stand by Assist   Toilet Transfer Description Grab bar;Increased time;Supervision for safety   Sitting Lower Body Exercises   Nustep Resistance Level 1  (15 mins, .28 miles)   Interdisciplinary Plan of Care Collaboration   IDT Collaboration with  Certified Nursing Assistant   Patient Position at End of Therapy Seated;Call Light within Reach;Tray Table within Reach;Phone within Reach;Self Releasing Lap Belt Applied   OT Total Time Spent   OT Individual Total Time Spent (Mins) 30   OT Charge Group   OT Self Care / ADL 1   OT Therapeutic Exercise  1       Assessment    Pt was alert and cooperative w/ tx.  tx emphasis on ADL's and TherEx - refer to notes above for details.  Strengths: Effective communication skills, Independent prior level of function, Motivated for self care and independence, Pleasant and cooperative, Willingly participates in therapeutic activities, Supportive family  Barriers: Decreased endurance, Generalized weakness, Impaired activity tolerance, Impaired balance, Limited mobility    Plan    Cont'd OT for education (safety/environmental awareness, NRG conservation, use of AE/DME), strengthening, endurance and  balance for safe ADL's/IADL's and bathroom transfers.    Passport items to be completed:  Passport items to be completed:  Perform bathroom transfers, complete dressing, complete feeding, get ready for the day, prepare a simple meal, participate in household tasks, adapt home for safety needs, demonstrate home exercise program, complete caregiver training     Occupational Therapy Goals (Active)     Problem: Dressing     Dates: Start: 05/27/21       Goal: STG-Within one week, patient will dress LB     Dates: Start: 05/27/21       Goal Note filed on 05/27/21 0812 by Salvador Ponce MS,OTR/L     1) Individualized Goal: Min assist for Lower Body Clothing Management via AE/DME PRN  2) Interventions:  OT Self Care/ADL, OT Neuro Re-Ed/Balance, OT Therapeutic Activity, OT Evaluation, and OT Therapeutic Exercise               Problem: Functional Transfers     Dates: Start: 05/27/21       Goal: STG-Within one week, patient will transfer to toilet     Dates: Start: 05/27/21       Goal Note filed on 05/27/21 0812 by Salvador Ponce MS,OTR/L     1) Individualized Goal:  Sup/SBA for commode transfers via DME PRN  2) Interventions:  OT Self Care/ADL, OT Neuro Re-Ed/Balance, OT Therapeutic Activity, OT Evaluation, and OT Therapeutic Exercise               Problem: OT Long Term Goals     Dates: Start: 05/27/21       Goal: LTG-By discharge, patient will complete basic self care tasks     Dates: Start: 05/27/21       Goal Note filed on 05/27/21 0812 by Salvador Ponce MS,OTR/L     1) Individualized Goal:  Mod I for BADL's via AE/DME PRN  2) Interventions:  OT Self Care/ADL, OT Neuro Re-Ed/Balance, OT Therapeutic Activity, OT Evaluation, and OT Therapeutic Exercise            Goal: LTG-By discharge, patient will perform bathroom transfers     Dates: Start: 05/27/21       Goal Note filed on 05/27/21 0812 by Salvador Ponce MS,OTR/L     1) Individualized Goal:  Mod I for bathroom transfers via DME PRN  2) Interventions: OT Self Care/ADL, OT Neuro  Re-Ed/Balance, OT Therapeutic Activity, OT Evaluation, and OT Therapeutic Exercise               Problem: Toileting     Dates: Start: 05/27/21       Goal: STG-Within one week, patient will complete toileting tasks     Dates: Start: 05/27/21       Goal Note filed on 05/27/21 0812 by Salvador Ponce MS,OTR/L     1) Individualized Goal:  Sup/SBA for toileting task (BM) via AE/DME PRN  2) Interventions: OT Self Care/ADL, OT Neuro Re-Ed/Balance, OT Therapeutic Activity, OT Evaluation, and OT Therapeutic Exercise

## 2021-05-31 NOTE — CARE PLAN
Problem: Knowledge Deficit - Standard  Goal: Patient and family/care givers will demonstrate understanding of plan of care, disease process/condition, diagnostic tests and medications  Outcome: Progressing     Problem: Pain - Standard  Goal: Alleviation of pain or a reduction in pain to the patient’s comfort goal  Outcome: Progressing     Problem: Bladder / Voiding  Goal: Patient will establish and maintain regular urinary output  Outcome: Progressing  Goal: Patient will establish and maintain bladder regimen  Outcome: Progressing     Problem: Bowel Elimination  Goal: Patient will participate in bowel management program  Outcome: Progressing     Problem: Infection  Goal: Patient will remain free from infection  Outcome: Progressing     Problem: VTE Prevention  Goal: Patient will remain free from venous thromboembolism (VTE)  Outcome: Progressing     Problem: Skin Integrity  Goal: Skin integrity is maintained or improved  Outcome: Progressing     Problem: Diabetes Management  Goal: Patient's ability to maintain appropriate glucose levels will be maintained or improve  Outcome: Progressing   The patient is Stable - Low risk of patient condition declining or worsening         Progress made toward(s) clinical / shift goals:  Pt uses call light appropriately and was administered meds as prescribed.

## 2021-05-31 NOTE — PROGRESS NOTES
"Rehab Progress Note     Encounter Date: 5/31/2021    CC: decreased mobility, right knee pain    Interval Events (Subjective)  Patient sitting up in room. She reports her left knee is doing well today. Reviewed labs with her and her  including improved Cr. Discussed about fluid intake. Her blood sugars have been well controlled, discontinue checks and SSI. No reported changes in bowel or bladder.     Objective:  VITAL SIGNS: /72   Pulse 90   Temp 36.7 °C (98.1 °F) (Temporal)   Resp 18   Ht 1.6 m (5' 3\")   Wt (!) 134 kg (296 lb 8 oz)   SpO2 95%   BMI 52.52 kg/m²   Gen: NAD  Psych: Mood and affect appropriate  CV: RRR, no edema  Resp: CTAB, no upper airway sounds  Abd: NTND  Neuro: AOx4, following commands    Recent Results (from the past 72 hour(s))   POCT glucose device results    Collection Time: 05/28/21  5:15 PM   Result Value Ref Range    Glucose - Accu-Ck 94 65 - 99 mg/dL   POCT glucose device results    Collection Time: 05/28/21  8:25 PM   Result Value Ref Range    Glucose - Accu-Ck 126 (H) 65 - 99 mg/dL   POCT glucose device results    Collection Time: 05/29/21  7:30 AM   Result Value Ref Range    Glucose - Accu-Ck 127 (H) 65 - 99 mg/dL   POCT glucose device results    Collection Time: 05/29/21 11:20 AM   Result Value Ref Range    Glucose - Accu-Ck 118 (H) 65 - 99 mg/dL   Basic Metabolic Panel    Collection Time: 05/31/21  6:10 AM   Result Value Ref Range    Sodium 140 135 - 145 mmol/L    Potassium 4.0 3.6 - 5.5 mmol/L    Chloride 106 96 - 112 mmol/L    Co2 22 20 - 33 mmol/L    Glucose 118 (H) 65 - 99 mg/dL    Bun 31 (H) 8 - 22 mg/dL    Creatinine 1.64 (H) 0.50 - 1.40 mg/dL    Calcium 9.9 8.5 - 10.5 mg/dL    Anion Gap 12.0 7.0 - 16.0   ESTIMATED GFR    Collection Time: 05/31/21  6:10 AM   Result Value Ref Range    GFR If  38 (A) >60 mL/min/1.73 m 2    GFR If Non  32 (A) >60 mL/min/1.73 m 2       Current Facility-Administered Medications   Medication Frequency "   • ibuprofen (MOTRIN) tablet 400 mg BID   • losartan (COZAAR) tablet 25 mg Q DAY   • Respiratory Therapy Consult Continuous RT   • oxyCODONE immediate-release (ROXICODONE) tablet 5 mg Q3HRS PRN    Or   • oxyCODONE immediate release (ROXICODONE) tablet 10 mg Q3HRS PRN   • hydrALAZINE (APRESOLINE) tablet 25 mg Q8HRS PRN   • acetaminophen (Tylenol) tablet 650 mg Q4HRS PRN   • senna-docusate (PERICOLACE or SENOKOT S) 8.6-50 MG per tablet 2 tablet BID    And   • polyethylene glycol/lytes (MIRALAX) PACKET 1 Packet QDAY PRN    And   • magnesium hydroxide (MILK OF MAGNESIA) suspension 30 mL QDAY PRN    And   • bisacodyl (DULCOLAX) suppository 10 mg QDAY PRN   • artificial tears ophthalmic solution 1 Drop PRN   • benzocaine-menthol (CEPACOL) lozenge 1 Lozenge Q2HRS PRN   • mag hydrox-al hydrox-simeth (MAALOX PLUS ES or MYLANTA DS) suspension 20 mL Q2HRS PRN   • ondansetron (ZOFRAN ODT) dispertab 4 mg 4X/DAY PRN    Or   • ondansetron (ZOFRAN) syringe/vial injection 4 mg 4X/DAY PRN   • traZODone (DESYREL) tablet 50 mg QHS PRN   • sodium chloride (OCEAN) 0.65 % nasal spray 2 Spray PRN   • amLODIPine (NORVASC) tablet 10 mg DAILY   • lidocaine (LIDODERM) 5 % 1 Patch Q24HRS   • thyroid (ARMOUR THYROID) tablet 90 mg DAILY   • ibuprofen (MOTRIN) tablet 400 mg Q6HRS PRN   • magnesium oxide (MAG-OX) tablet 400 mg DAILY   • enoxaparin (LOVENOX) inj 40 mg DAILY       Orders Placed This Encounter   Procedures   • Diet Order Diet: Consistent CHO (Diabetic)     Standing Status:   Standing     Number of Occurrences:   1     Order Specific Question:   Diet:     Answer:   Consistent CHO (Diabetic) [4]       Assessment:  Active Hospital Problems    Diagnosis    • *Critical illness myopathy    • Anemia    • Hyperglycemia    • Hypothyroidism    • Acute appendicitis with localized peritonitis    • Stage 3 chronic kidney disease (HCC)    • Essential hypertension    • Osteoarthritis    • BMI 50.0-59.9, adult (HCC)    • HENRI (obstructive sleep  apnea)        Medical Decision Making and Plan:  Critical Illness myopathy - Patient with prolonged hospitalization after burst appendix complicated by respiratory failure, ileus, and poor intake requiring TPN. Patient transferred to [facility] and found to have significant weakness concerning for CIM.  -PT and OT for mobility and ADLs     DM - Patient takes home supplement. Will start SSI, previously well controlled. Will check A1c  -Blood sugars < 150. Discontinue SSI as < 130    HTN - Patient on Losartan 50 mg and Amlodipine 10 mg on transfer.   -Decrease ARB with elevated Cr. SBP remains low-normal on decreased Losartan     Tachycardia - On admission. Ongoing low grade, improved.      Hypothyroidism - Patient on [place] Thyroid 90 mg daily. Check TSH - 0.810     Anemia - Difficult stick due, follow-up PCP for repeat CBC     Back pain/Left leg OA - Patient primarily limited by left knee and ankle OA. On Ibuprofen scheduled. Will monitor Cr.     JOSHUA on CKD - Patient with elevated Cr on admission. Has been trending up at OSH. Patient is drinking plenty of fluids. Will monitor  -Cr 1.64, improved from 2.2     Morbid obesity due to excess calories - Patient with BMI of 52.52 on admission meeting medical criteria.      DVT ppx - Patient to start Lovenox until ambulating sufficiently. Ambulating > 250 feet, discontinue    Total time:  26 minutes.  I spent greater than 50% of the time for patient care, counseling, and coordination on this date, including unit/floor time, and face-to-face time with the patient as per interval events and assessment and plan above. Topics discussed included improved Cr, encouraging fluids, increased mobility and discontinue Lovenox.     Ana Laura Cho M.D.

## 2021-05-31 NOTE — FLOWSHEET NOTE
05/31/21 1547   Events/Summary/Plan   Events/Summary/Plan 02 spot check   Vital Signs   Pulse 92   Respiration 18   Pulse Oximetry 96 %   $ Pulse Oximetry (Spot Check) Yes   Respiratory Assessment   Level of Consciousness Alert   Chest Exam   Work Of Breathing / Effort Within Normal Limits   Oxygen   O2 Delivery Device None - Room Air   Non-Invasive Ventilation HENRI Group   Nocturnal CPAP or BIPAP CPAP - Home Unit   Settings (If Known) 11-15   FiO2 or LPM 0

## 2021-05-31 NOTE — THERAPY
Physical Therapy   Daily Treatment     Patient Name: Mady Yang  Age:  65 y.o., Sex:  female  Medical Record #: [id number]  Today's Date: 5/31/2021     Precautions  Precautions: Fall Risk, Other (See Comments)  Comments: diabetic, tachycardia    Subjective    Patient agreeable to PT.     Objective       05/31/21 1431   Vitals   O2 (LPM) 0   O2 Delivery Device None - Room Air   Sitting Lower Body Exercises   Sitting Lower Body Exercises   (4 lbs BLE; purple/difficult exercise band)   Ankle Pumps 3 sets of 10   Hip Abduction 3 sets of 10   Long Arc Quad 3 sets of 10   Marching 3 sets of 10   Hamstring Curl 3 sets of 10   Interdisciplinary Plan of Care Collaboration   Patient Position at End of Therapy Seated;Call Light within Reach;Tray Table within Reach;Phone within Reach   PT Total Time Spent   PT Individual Total Time Spent (Mins) 30   PT Charge Group   PT Therapeutic Exercise 2       Assessment    Patient has improving strength with BLE; no increase in mild LBP; good motivation and participation throughout.    Strengths: Able to follow instructions, Alert and oriented, Effective communication skills, Good carryover of learning, Good insight into deficits/needs, Independent prior level of function, Making steady progress towards goals, Manages pain appropriately, Motivated for self care and independence, Pleasant and cooperative, Willingly participates in therapeutic activities  Barriers: Decreased endurance, Fatigue, Generalized weakness, Home accessibility, Hypotension, Impaired activity tolerance, Impaired balance, Limited mobility    Plan    FWW ambulation, Ther ex for BLE strengthening and endurance, Lumbar stab/core strengthening, Education, bed mobility with less elevated HOB, aquatic therapy    Passport items to be completed:  Passport items to be completed:  Get in/out of bed safely, in/out of a vehicle, safely use mobility device, walk or wheel around home/community, navigate up and down stairs,  show how to get up/down from the ground, ensure home is accessible, demonstrate HEP, complete caregiver training    Physical Therapy Problems (Active)     Problem: Mobility     Dates: Start: 05/27/21       Goal: STG-Within one week, patient will ambulate household distance of 150 feet with FWW at SBA level.     Dates: Start: 05/27/21          Goal: STG-Within one week, patient will ascend and descend four to six stairs with B railings at Min A level.     Dates: Start: 05/27/21             Problem: Mobility Transfers     Dates: Start: 05/27/21       Goal: STG-Within one week, patient will perform bed mobility with bed rail at CGA level.     Dates: Start: 05/27/21          Goal: STG-Within one week, patient will transfer bed to chair with FWW at CGA level.     Dates: Start: 05/27/21          Goal: STG-Within one week, patient will transfer in/out of car with FWW at Min A level.     Dates: Start: 05/27/21             Problem: PT-Long Term Goals     Dates: Start: 05/27/21       Goal: LTG-By discharge, patient will ambulate 300 feet at SBA level with a FWW or SPC.     Dates: Start: 05/27/21          Goal: LTG-By discharge, patient will transfer one surface to another at SPV level with a FWW or SPC.     Dates: Start: 05/27/21          Goal: LTG-By discharge, patient will ambulate up/down 4-6 stairs at SBA level with 1-2 railings.     Dates: Start: 05/27/21          Goal: LTG-By discharge, patient will transfer in/out of a car at SBA level with a FWW or SPC.     Dates: Start: 05/27/21

## 2021-05-31 NOTE — CARE PLAN
Problem: Knowledge Deficit - Standard  Goal: Patient and family/care givers will demonstrate understanding of plan of care, disease process/condition, diagnostic tests and medications  Outcome: Progressing  Note: Pt participating in therapy, cooperating with staff, and staying engaged in the process. Will continue to monitor.   The patient is Stable - Low risk of patient condition declining or worsening         Progress made toward(s) clinical / shift goals:  wnl    Patient is not progressing towards the following goals:

## 2021-05-31 NOTE — THERAPY
"Occupational Therapy  Daily Treatment     Patient Name: Mady Yang  Age:  65 y.o., Sex:  female  Medical Record #: [id number]  Today's Date: 5/31/2021     Precautions  Precautions: Fall Risk, Other (See Comments)  Comments: diabetic, tachycardia         Subjective    \"Good morning!  A shower tomorrow would be better since I'll be doing therapy in the pool\"  Therapist arranged am shower tomorrow prior to pool activity.     Objective       05/31/21 0701   Precautions   Precautions Fall Risk;Other (See Comments)   Comments diabetic, tachycardia   Vitals   O2 Delivery Device None - Room Air   Pain   Intervention Declines   Pain 0 - 10 Group   Therapist Pain Assessment 0   Non Verbal Descriptors   Non Verbal Scale  Calm   Cognition    Level of Consciousness Alert   Sleep/Wake Cycle   Sleep & Rest Awake   Functional Level of Assist   Grooming Modified Independent;Seated   Grooming Description Seated in wheelchair at sink;Increased time   Upper Body Dressing Modified Independent   Upper Body Dressing Description Increased time   Lower Body Dressing Supervision   Lower Body Dressing Description Increased time;Set-up of equipment;Supervision for safety;Grab bar   Toileting Stand by Assist   Toileting Description Grab bar;Increased time;Set-up of equipment;Supervision for safety   Toilet Transfers Contact Guard Assist  (wc/commode)   Toilet Transfer Description Grab bar;Set-up of equipment;Supervision for safety;Verbal cueing   Sitting Upper Body Exercises   Tricep Press 3 sets of 10  (Rickshaw:Forward 1x10@25#, 1x10@30#, 1x10@35#:[id number]@35)   Sitting Lower Body Exercises   Nustep Resistance Level 1  (10 mins, no rest break, .18 miles)   Interdisciplinary Plan of Care Collaboration   IDT Collaboration with  Certified Nursing Assistant   Patient Position at End of Therapy Seated;Self Releasing Lap Belt Applied;Chair Alarm On;Call Light within Reach;Tray Table within Reach;Phone within Reach   OT Total Time Spent "   OT Individual Total Time Spent (Mins) 60   OT Charge Group   OT Self Care / ADL 2   OT Therapeutic Exercise  2       Assessment    Pt was alert and cooperative w/ tx.  tx emphasis on ADL's, bathroom transfer at wc level and TherEx - see notes above for details.  Strengths: Effective communication skills, Independent prior level of function, Motivated for self care and independence, Pleasant and cooperative, Willingly participates in therapeutic activities, Supportive family  Barriers: Decreased endurance, Generalized weakness, Impaired activity tolerance, Impaired balance, Limited mobility    Plan    Cont'd OT for education (safety/environmental awareness, NRG conservation, use of AE/DME), strengthening, endurance and balance for safe ADL's/IADL's and bathroom transfers.    Passport items to be completed:  Passport items to be completed:  Perform bathroom transfers, complete dressing, complete feeding, get ready for the day, prepare a simple meal, participate in household tasks, adapt home for safety needs, demonstrate home exercise program, complete caregiver training     Occupational Therapy Goals (Active)     Problem: Dressing     Dates: Start: 05/27/21       Goal: STG-Within one week, patient will dress LB     Dates: Start: 05/27/21       Goal Note filed on 05/27/21 0812 by Salvador Ponce MS,OTR/L     1) Individualized Goal: Min assist for Lower Body Clothing Management via AE/DME PRN  2) Interventions:  OT Self Care/ADL, OT Neuro Re-Ed/Balance, OT Therapeutic Activity, OT Evaluation, and OT Therapeutic Exercise               Problem: Functional Transfers     Dates: Start: 05/27/21       Goal: STG-Within one week, patient will transfer to toilet     Dates: Start: 05/27/21       Goal Note filed on 05/27/21 0812 by Salvador Ponce MS,OTR/L     1) Individualized Goal:  Sup/SBA for commode transfers via DME PRN  2) Interventions:  OT Self Care/ADL, OT Neuro Re-Ed/Balance, OT Therapeutic Activity, OT Evaluation, and OT  Therapeutic Exercise               Problem: OT Long Term Goals     Dates: Start: 05/27/21       Goal: LTG-By discharge, patient will complete basic self care tasks     Dates: Start: 05/27/21       Goal Note filed on 05/27/21 0812 by Salvador Ponce MS,OTR/L     1) Individualized Goal:  Mod I for BADL's via AE/DME PRN  2) Interventions:  OT Self Care/ADL, OT Neuro Re-Ed/Balance, OT Therapeutic Activity, OT Evaluation, and OT Therapeutic Exercise            Goal: LTG-By discharge, patient will perform bathroom transfers     Dates: Start: 05/27/21       Goal Note filed on 05/27/21 0812 by Salvador Ponce MS,OTR/L     1) Individualized Goal:  Mod I for bathroom transfers via DME PRN  2) Interventions: OT Self Care/ADL, OT Neuro Re-Ed/Balance, OT Therapeutic Activity, OT Evaluation, and OT Therapeutic Exercise               Problem: Toileting     Dates: Start: 05/27/21       Goal: STG-Within one week, patient will complete toileting tasks     Dates: Start: 05/27/21       Goal Note filed on 05/27/21 0812 by Salvador Ponce MS,OTR/L     1) Individualized Goal:  Sup/SBA for toileting task (BM) via AE/DME PRN  2) Interventions: OT Self Care/ADL, OT Neuro Re-Ed/Balance, OT Therapeutic Activity, OT Evaluation, and OT Therapeutic Exercise

## 2021-06-01 PROCEDURE — 99233 SBSQ HOSP IP/OBS HIGH 50: CPT | Performed by: PHYSICAL MEDICINE & REHABILITATION

## 2021-06-01 PROCEDURE — 97116 GAIT TRAINING THERAPY: CPT

## 2021-06-01 PROCEDURE — 97110 THERAPEUTIC EXERCISES: CPT

## 2021-06-01 PROCEDURE — 97535 SELF CARE MNGMENT TRAINING: CPT

## 2021-06-01 PROCEDURE — 97530 THERAPEUTIC ACTIVITIES: CPT

## 2021-06-01 PROCEDURE — 700101 HCHG RX REV CODE 250: Performed by: PHYSICAL MEDICINE & REHABILITATION

## 2021-06-01 PROCEDURE — 770010 HCHG ROOM/CARE - REHAB SEMI PRIVAT*

## 2021-06-01 PROCEDURE — A9270 NON-COVERED ITEM OR SERVICE: HCPCS | Performed by: PHYSICAL MEDICINE & REHABILITATION

## 2021-06-01 PROCEDURE — 700102 HCHG RX REV CODE 250 W/ 637 OVERRIDE(OP): Performed by: PHYSICAL MEDICINE & REHABILITATION

## 2021-06-01 RX ORDER — AMLODIPINE BESYLATE 5 MG/1
5 TABLET ORAL DAILY
Status: DISCONTINUED | OUTPATIENT
Start: 2021-06-02 | End: 2021-06-05 | Stop reason: HOSPADM

## 2021-06-01 RX ADMIN — LIDOCAINE 1 PATCH: 50 PATCH TOPICAL at 09:23

## 2021-06-01 RX ADMIN — IBUPROFEN 400 MG: 400 TABLET ORAL at 09:23

## 2021-06-01 RX ADMIN — IBUPROFEN 400 MG: 400 TABLET, FILM COATED ORAL at 19:13

## 2021-06-01 RX ADMIN — AMLODIPINE BESYLATE 10 MG: 5 TABLET ORAL at 09:24

## 2021-06-01 RX ADMIN — SENNOSIDES AND DOCUSATE SODIUM 2 TABLET: 8.6; 5 TABLET ORAL at 19:13

## 2021-06-01 RX ADMIN — MAGNESIUM OXIDE TAB 400 MG (241.3 MG ELEMENTAL MG) 400 MG: 400 (241.3 MG) TAB at 09:23

## 2021-06-01 RX ADMIN — IBUPROFEN 400 MG: 400 TABLET ORAL at 13:39

## 2021-06-01 RX ADMIN — THYROID, PORCINE 90 MG: 30 TABLET ORAL at 09:24

## 2021-06-01 ASSESSMENT — GAIT ASSESSMENTS
DEVIATION: TRENDELENBERG;INCREASED BASE OF SUPPORT;BRADYKINETIC
GAIT LEVEL OF ASSIST: SUPERVISED
GAIT LEVEL OF ASSIST: STAND BY ASSIST
ASSISTIVE DEVICE: 4 WHEEL WALKER
ASSISTIVE DEVICE: FRONT WHEEL WALKER
DEVIATION: TRENDELENBERG;INCREASED BASE OF SUPPORT;BRADYKINETIC

## 2021-06-01 ASSESSMENT — PAIN DESCRIPTION - PAIN TYPE: TYPE: CHRONIC PAIN

## 2021-06-01 NOTE — CARE PLAN
Problem: Knowledge Deficit - Standard  Goal: Patient and family/care givers will demonstrate understanding of plan of care, disease process/condition, diagnostic tests and medications  Outcome: Progressing   Discussed plan of care with pt, pt shows understanding, will continue to reinforce education.  Problem: Pain - Standard  Goal: Alleviation of pain or a reduction in pain to the patient’s comfort goal  Outcome: Progressing  Current pain plan of care discussed with pt, pt shows good understanding, denies any uncontrolled pain at this time, will continue to reinforce education and continue to monitor.

## 2021-06-01 NOTE — FLOWSHEET NOTE
06/01/21 1541   Events/Summary/Plan   Events/Summary/Plan Pt sleeping with CPAP in use (not disturbed)

## 2021-06-01 NOTE — CARE PLAN
Problem: Mobility  Goal: STG-Within one week, patient will ambulate household distance of 150 feet with FWW at SBA level.  Outcome: Not Met  Note: Partially Met: Patient ambulates with FWW at SBA level from 40-85 feet.  Goal: STG-Within one week, patient will ascend and descend four to six stairs with B railings at Min A level.  Outcome: Not Met  Note: Performance yet to be assessed; will monitor progress this week.     Problem: Mobility Transfers  Goal: STG-Within one week, patient will perform bed mobility with bed rail at CGA level.  Outcome: Met  Goal: STG-Within one week, patient will transfer bed to chair with FWW at CGA level.  Outcome: Not Met  Note: Partially Met: CGA with FWW or Min A without FWW.  Goal: STG-Within one week, patient will transfer in/out of car with FWW at Min A level.  Outcome: Not Met  Note: Performance yet to be assessed; will monitor progress this week.

## 2021-06-01 NOTE — CARE PLAN
Problem: Dressing  Goal: STG-Within one week, patient will dress LB  Outcome: Met     Problem: Toileting  Goal: STG-Within one week, patient will complete toileting tasks  Outcome: Met     Problem: Functional Transfers  Goal: STG-Within one week, patient will transfer to toilet  Outcome: Met

## 2021-06-01 NOTE — PROGRESS NOTES
"Rehab Progress Note     Encounter Date: 6/1/2021    CC: decreased mobility, right knee pain    Interval Events (Subjective)  Patient sitting up in therapy gym. She reports she is doing well. She reports some increased back pain today but seems to be doing well with therapy. Discussed would have IDT later today to discuss discharge planning. She reports she is making progress every day.  Denies SOB. Continues to be tachycardic.     IDT Team Meeting 6/1/2021    IAna Laura M.D., was present and led the interdisciplinary team conference on 6/1/2021.  I led the IDT conference and agree with the IDT conference documentation and plan of care as noted below.     RN:  Diet Regular   % Meal     Pain Ibuprofen   Sleep    Bowel Continent   Bladder Continent   In's & Out's    Calls appropriately    PT:  Bed Mobility    Transfers    Mobility SBA   Stairs    Balance and endurance limited  Very motivated; good understanding and problem solving  FWW - wide, bariatric   Aquatic therapy    OT:  Eating    Grooming    Bathing supervs   UB Dressing    LB Dressing supervs   Toileting supervs   Shower & Transfer SBA   Step in shower at home  Tachycardic     CM:  Continues to work on disposition and DME needs.      DC/Disposition:  6/5/21      Objective:  VITAL SIGNS: BP (!) 97/57   Pulse (!) 130 Comment: following functional mobility bathroom > room w/ FWW   Temp 36.3 °C (97.4 °F) (Oral)   Resp 18   Ht 1.6 m (5' 3\")   Wt (!) 134 kg (296 lb 8 oz)   SpO2 96%   BMI 52.52 kg/m²   Gen: NAD  Psych: Mood and affect appropriate  CV: RRR, no edema  Resp: CTAB, no upper airway sounds  Abd: NTND  Neuro: AOx4, following commands  Unchanged from 5/31/21 except regular rhythm, tachycardic     Recent Results (from the past 72 hour(s))   Basic Metabolic Panel    Collection Time: 05/31/21  6:10 AM   Result Value Ref Range    Sodium 140 135 - 145 mmol/L    Potassium 4.0 3.6 - 5.5 mmol/L    Chloride 106 96 - 112 mmol/L    Co2 22 20 - " 33 mmol/L    Glucose 118 (H) 65 - 99 mg/dL    Bun 31 (H) 8 - 22 mg/dL    Creatinine 1.64 (H) 0.50 - 1.40 mg/dL    Calcium 9.9 8.5 - 10.5 mg/dL    Anion Gap 12.0 7.0 - 16.0   ESTIMATED GFR    Collection Time: 05/31/21  6:10 AM   Result Value Ref Range    GFR If  38 (A) >60 mL/min/1.73 m 2    GFR If Non  32 (A) >60 mL/min/1.73 m 2       Current Facility-Administered Medications   Medication Frequency   • ibuprofen (MOTRIN) tablet 400 mg BID   • losartan (COZAAR) tablet 25 mg Q DAY   • Respiratory Therapy Consult Continuous RT   • oxyCODONE immediate-release (ROXICODONE) tablet 5 mg Q3HRS PRN    Or   • oxyCODONE immediate release (ROXICODONE) tablet 10 mg Q3HRS PRN   • hydrALAZINE (APRESOLINE) tablet 25 mg Q8HRS PRN   • acetaminophen (Tylenol) tablet 650 mg Q4HRS PRN   • senna-docusate (PERICOLACE or SENOKOT S) 8.6-50 MG per tablet 2 tablet BID    And   • polyethylene glycol/lytes (MIRALAX) PACKET 1 Packet QDAY PRN    And   • magnesium hydroxide (MILK OF MAGNESIA) suspension 30 mL QDAY PRN    And   • bisacodyl (DULCOLAX) suppository 10 mg QDAY PRN   • artificial tears ophthalmic solution 1 Drop PRN   • benzocaine-menthol (CEPACOL) lozenge 1 Lozenge Q2HRS PRN   • mag hydrox-al hydrox-simeth (MAALOX PLUS ES or MYLANTA DS) suspension 20 mL Q2HRS PRN   • ondansetron (ZOFRAN ODT) dispertab 4 mg 4X/DAY PRN    Or   • ondansetron (ZOFRAN) syringe/vial injection 4 mg 4X/DAY PRN   • traZODone (DESYREL) tablet 50 mg QHS PRN   • sodium chloride (OCEAN) 0.65 % nasal spray 2 Spray PRN   • amLODIPine (NORVASC) tablet 10 mg DAILY   • lidocaine (LIDODERM) 5 % 1 Patch Q24HRS   • thyroid (ARMOUR THYROID) tablet 90 mg DAILY   • ibuprofen (MOTRIN) tablet 400 mg Q6HRS PRN   • magnesium oxide (MAG-OX) tablet 400 mg DAILY       Orders Placed This Encounter   Procedures   • Diet Order Diet: Consistent CHO (Diabetic)     Standing Status:   Standing     Number of Occurrences:   1     Order Specific Question:    Diet:     Answer:   Consistent CHO (Diabetic) [4]       Assessment:  Active Hospital Problems    Diagnosis    • *Critical illness myopathy    • Anemia    • Hyperglycemia    • Hypothyroidism    • Acute appendicitis with localized peritonitis    • Stage 3 chronic kidney disease (HCC)    • Essential hypertension    • Osteoarthritis    • BMI 50.0-59.9, adult (HCC)    • HENRI (obstructive sleep apnea)        Medical Decision Making and Plan:  Critical Illness myopathy - Patient with prolonged hospitalization after burst appendix complicated by respiratory failure, ileus, and poor intake requiring TPN. Patient transferred to [facility] and found to have significant weakness concerning for CIM.  -PT and OT for mobility and ADLs     DM - Patient takes home supplement. Will start SSI, previously well controlled. Will check A1c  -Blood sugars < 150. Discontinue SSI as < 130    HTN - Patient on Losartan 50 mg and Amlodipine 10 mg on transfer.   -Decrease ARB with elevated Cr. SBP remains low-normal on decreased Losartan. Decrease Amlodipine to 5 mg    Tachycardia - On admission. Ongoing occasionally, will monitor      Hypothyroidism - Patient on [place] Thyroid 90 mg daily. Check TSH - 0.810     Anemia - Difficult stick due, follow-up PCP for repeat CBC     Back pain/Left leg OA - Patient primarily limited by left knee and ankle OA. On Ibuprofen scheduled. Will monitor Cr.     JOSHUA on CKD - Patient with elevated Cr on admission. Has been trending up at OSH. Patient is drinking plenty of fluids. Will monitor  -Cr 1.64, improved from 2.2     Morbid obesity due to excess calories - Patient with BMI of 52.52 on admission meeting medical criteria.      DVT ppx - Patient to start Lovenox until ambulating sufficiently. Ambulating > 250 feet, discontinue    Total time:  36 minutes.  I spent greater than 50% of the time for patient care, counseling, and coordination on this date, including unit/floor time, and face-to-face time with the patient  as per interval events and assessment and plan above. Topics discussed included discharge planning, low SBP, tachycardia, reduce Amlodipine, and ongoing back pain. Patient was discussed separately in IDT today; please see details above.    Ana Laura Cho M.D.

## 2021-06-01 NOTE — DISCHARGE PLANNING
Met with patient to update after IDT conference and provide DC date of 6/5/21.  Discussed FWW to be ordered and possible outpatient PT availability in [place] which patient agrees to.  CM to attempt to make follow-up visit with surgeon for Monday if possible as they would stay in [place] until Monday if she has a follow-up.

## 2021-06-01 NOTE — THERAPY
Occupational Therapy  Daily Treatment     Patient Name: Mady Yang  Age:  65 y.o., Sex:  female  Medical Record #: [id number]  Today's Date: 6/1/2021     Precautions  Precautions: Fall Risk  Comments: diabetic, tachycardia    Safety   Comments: See functional levels of assist below for ADL performance details     Subjective    Patient agreeable to participate in OT.      Objective     06/01/21 0831   Precautions   Precautions Fall Risk;Other (See Comments)   Comments diabetic, tachycardia   Safety    Comments See functional levels of assist below for ADL performance details    Vitals   Pulse (!) 130  (following functional mobility bathroom > room w/ FWW )   Non Verbal Descriptors   Non Verbal Scale  Calm   Cognition    Level of Consciousness Alert   Functional Level of Assist   Grooming Supervision  (in standing )   Bathing Supervision  (SBA while incorporating sitting on fold down bench &standing)   Upper Body Dressing Modified Independent   Lower Body Dressing Supervision  (for standing balance/safety)   Toileting Supervision   Toilet Transfers Supervised  (SBA to supervision w/ FWW )   Tub / Shower Transfers Stand by Assist  (SBA to supervision for functional mobility room <>bathroom )   Tub Shower Transfer Description   (FWW level)   Interdisciplinary Plan of Care Collaboration   IDT Collaboration with  Family / Caregiver   Patient Position at End of Therapy Seated   Collaboration Comments spouse present for second half of OT session    OT Total Time Spent   OT Individual Total Time Spent (Mins) 60   OT Charge Group   OT Self Care / ADL 4     Heart rate checked during OT session and readings were as follows: 120 bpm following shower, 130 bpm following functional mobility shower > room w/ FWW. Patient recovered to 110 bpm within several minutes of rest.     SBA - supervision for standard bed txfr in ADL bedroom and step in shower in ADL bathroom.     Assessment    Patient tolerated OT session well with  focus on ADLs and functional mobility (short household distances) @ FWW level. Continues to present with tachycardia (see readings above) however recovered quickly to ~110 bpm with seated rest break.   Strengths: Effective communication skills, Independent prior level of function, Motivated for self care and independence, Pleasant and cooperative, Willingly participates in therapeutic activities, Supportive family  Barriers: Decreased endurance, Generalized weakness, Impaired activity tolerance, Impaired balance, Limited mobility    Plan    Cont'd OT for education (safety/environmental awareness, energy conservation, use of AE/DME), strengthening, endurance and balance for safe ADL's/IADL's and bathroom transfers.    Passport items to be completed:  Perform bathroom transfers, complete dressing, complete feeding, get ready for the day, prepare a simple meal, participate in household tasks, adapt home for safety needs, demonstrate home exercise program, complete caregiver training     Occupational Therapy Goals (Active)     Problem: OT Long Term Goals     Dates: Start: 05/27/21       Goal: LTG-By discharge, patient will complete basic self care tasks     Dates: Start: 05/27/21       Goal Note filed on 05/27/21 0812 by Salvador Ponce MS,OTR/L     1) Individualized Goal:  Mod I for BADL's via AE/DME PRN  2) Interventions:  OT Self Care/ADL, OT Neuro Re-Ed/Balance, OT Therapeutic Activity, OT Evaluation, and OT Therapeutic Exercise            Goal: LTG-By discharge, patient will perform bathroom transfers     Dates: Start: 05/27/21       Goal Note filed on 05/27/21 0812 by Salvador Ponce MS,OTR/L     1) Individualized Goal:  Mod I for bathroom transfers via DME PRN  2) Interventions: OT Self Care/ADL, OT Neuro Re-Ed/Balance, OT Therapeutic Activity, OT Evaluation, and OT Therapeutic Exercise

## 2021-06-01 NOTE — THERAPY
Physical Therapy   Daily Treatment     Patient Name: Mady Yang  Age:  65 y.o., Sex:  female  Medical Record #: [id number]  Today's Date: 6/1/2021     Precautions  Precautions: Fall Risk  Comments: diabetic, tachycardia    Subjective    Pt seated in room, agreeable to PT.     Objective       06/01/21 1001   Precautions   Precautions Fall Risk   Comments diabetic, tachycardia   Gait Functional Level of Assist    Gait Level Of Assist Stand by Assist   Assistive Device Front Wheel Walker   Distance (Feet)   (35 ft, 60 ft )   Deviation Trendelenberg;Increased Base Of Support;Bradykinetic   Sitting Lower Body Exercises   Nustep Resistance Level 2  (10 min, 0.25 miles)   Interdisciplinary Plan of Care Collaboration   IDT Collaboration with  Physical Therapist   Patient Position at End of Therapy Seated  (in therapy gym)   Collaboration Comments pt care passed to primary PT    PT Total Time Spent   PT Individual Total Time Spent (Mins) 30   PT Charge Group   PT Gait Training 1   PT Therapeutic Exercise 1       Assessment    Pt motivated for participation. Good stability with ambulation.     Strengths: Able to follow instructions, Alert and oriented, Effective communication skills, Good carryover of learning, Good insight into deficits/needs, Independent prior level of function, Making steady progress towards goals, Manages pain appropriately, Motivated for self care and independence, Pleasant and cooperative, Willingly participates in therapeutic activities  Barriers: Decreased endurance, Fatigue, Generalized weakness, Home accessibility, Hypotension, Impaired activity tolerance, Impaired balance, Limited mobility    Plan    FWW ambulation, Ther ex for BLE strengthening and endurance, Lumbar stab/core strengthening, Education, bed mobility with less elevated HOB, aquatic therapy     Physical Therapy Problems (Active)     Problem: Mobility     Dates: Start: 05/27/21       Goal: STG-Within one week, patient will  ambulate household distance of 150 feet with FWW at SBA level.     Dates: Start: 05/27/21       Goal Note filed on 06/01/21 0819 by Derrell Rutledge, PT     Partially Met: Patient ambulates with FWW at SBA level from 40-85 feet.            Goal: STG-Within one week, patient will ascend and descend four to six stairs with B railings at Min A level.     Dates: Start: 05/27/21       Goal Note filed on 06/01/21 0819 by Derrell Rutledge, PT     Performance yet to be assessed; will monitor progress this week.               Problem: Mobility Transfers     Dates: Start: 05/27/21       Goal: STG-Within one week, patient will transfer bed to chair with FWW at CGA level.     Dates: Start: 05/27/21       Goal Note filed on 06/01/21 0819 by Derrell Rutledge, PT     Partially Met: CGA with FWW or Min A without FWW.            Goal: STG-Within one week, patient will transfer in/out of car with FWW at Min A level.     Dates: Start: 05/27/21       Goal Note filed on 06/01/21 0819 by Derrell Rutledge, PT     Performance yet to be assessed; will monitor progress this week.               Problem: PT-Long Term Goals     Dates: Start: 05/27/21       Goal: LTG-By discharge, patient will ambulate 300 feet at SBA level with a FWW or SPC.     Dates: Start: 05/27/21          Goal: LTG-By discharge, patient will transfer one surface to another at SPV level with a FWW or SPC.     Dates: Start: 05/27/21          Goal: LTG-By discharge, patient will ambulate up/down 4-6 stairs at SBA level with 1-2 railings.     Dates: Start: 05/27/21          Goal: LTG-By discharge, patient will transfer in/out of a car at SBA level with a FWW or SPC.     Dates: Start: 05/27/21

## 2021-06-01 NOTE — CARE PLAN
The patient is Stable - Low risk of patient condition declining or worsening         Progress made toward(s) clinical / shift goals:        Problem: Infection  Goal: Patient will remain free from infection  Outcome: Progressing  Note: No s/s of infection present

## 2021-06-01 NOTE — THERAPY
"Occupational Therapy  Daily Treatment     Patient Name: Mady Yang  Age:  65 y.o., Sex:  female  Medical Record #: [id number]  Today's Date: 6/1/2021     Precautions  Precautions: Fall Risk  Comments: diabetic, tachycardia    Safety   Comments: See functional levels of assist below for ADL performance details     Subjective    \"I have 3 lb weights at home.\"     Objective       06/01/21 1301   Sitting Upper Body Exercises   Comments Issued BUE HEP: See handout below. Pt completed 1 set of 10 and 1 set of 2 (for teachback only) with 3 lb dumbells. Recommending 2 sets of 10 2x a day at home.   Interdisciplinary Plan of Care Collaboration   IDT Collaboration with  Family / Caregiver   Patient Position at End of Therapy Seated;Self Releasing Lap Belt Applied;Call Light within Reach;Tray Table within Reach;Phone within Reach   Collaboration Comments re: home exercises   OT Total Time Spent   OT Individual Total Time Spent (Mins) 30   OT Charge Group   OT Therapy Activity 1   OT Therapeutic Exercise  1       Technical Difficulties occurred: HEP included bicep curls, shoulder extension, shoulder flexion, bilateral row, chest press, wrist ext, shoulder abduction, supination/pronation, shoulder press    OTR educated pt on the Passport to [place] program. OTR explained each discipline briefly and then explained occupational therapy's role in more detail. OTR answered questions pt had about pt's stay and educated pt on expectations at rehab.     Assessment    Pt with calm affect. Pt reported the only thing she hasn't started from the OT passport list was HEP. Pt able to tolerate HEP and teach back without difficulty.    Strengths: Effective communication skills, Independent prior level of function, Motivated for self care and independence, Pleasant and cooperative, Willingly participates in therapeutic activities, Supportive family  Barriers: Decreased endurance, Generalized weakness, Impaired activity tolerance, " Impaired balance, Limited mobility    Plan    Cont'd OT for education (safety/environmental awareness, energy conservation, use of AE/DME), strengthening, endurance and balance for safe ADL's/IADL's and bathroom transfers.     Passport items to be completed:  Perform bathroom transfers, complete dressing, prepare a simple meal, participate in household tasks, adapt home for safety needs, demonstrate home exercise program, complete caregiver training     Occupational Therapy Goals (Active)     Problem: OT Long Term Goals     Dates: Start: 05/27/21       Goal: LTG-By discharge, patient will complete basic self care tasks     Dates: Start: 05/27/21       Goal Note filed on 05/27/21 0812 by Salvador Ponce MS,OTR/L     1) Individualized Goal:  Mod I for BADL's via AE/DME PRN  2) Interventions:  OT Self Care/ADL, OT Neuro Re-Ed/Balance, OT Therapeutic Activity, OT Evaluation, and OT Therapeutic Exercise            Goal: LTG-By discharge, patient will perform bathroom transfers     Dates: Start: 05/27/21       Goal Note filed on 05/27/21 0812 by Salvador Ponce MS,OTR/L     1) Individualized Goal:  Mod I for bathroom transfers via DME PRN  2) Interventions: OT Self Care/ADL, OT Neuro Re-Ed/Balance, OT Therapeutic Activity, OT Evaluation, and OT Therapeutic Exercise

## 2021-06-02 PROCEDURE — 97530 THERAPEUTIC ACTIVITIES: CPT | Mod: CQ

## 2021-06-02 PROCEDURE — 94760 N-INVAS EAR/PLS OXIMETRY 1: CPT

## 2021-06-02 PROCEDURE — 97116 GAIT TRAINING THERAPY: CPT

## 2021-06-02 PROCEDURE — 770010 HCHG ROOM/CARE - REHAB SEMI PRIVAT*

## 2021-06-02 PROCEDURE — 700101 HCHG RX REV CODE 250: Performed by: PHYSICAL MEDICINE & REHABILITATION

## 2021-06-02 PROCEDURE — 97113 AQUATIC THERAPY/EXERCISES: CPT | Mod: CQ

## 2021-06-02 PROCEDURE — 700102 HCHG RX REV CODE 250 W/ 637 OVERRIDE(OP): Performed by: PHYSICAL MEDICINE & REHABILITATION

## 2021-06-02 PROCEDURE — 97535 SELF CARE MNGMENT TRAINING: CPT

## 2021-06-02 PROCEDURE — 99232 SBSQ HOSP IP/OBS MODERATE 35: CPT | Performed by: PHYSICAL MEDICINE & REHABILITATION

## 2021-06-02 PROCEDURE — 97110 THERAPEUTIC EXERCISES: CPT

## 2021-06-02 PROCEDURE — A9270 NON-COVERED ITEM OR SERVICE: HCPCS | Performed by: PHYSICAL MEDICINE & REHABILITATION

## 2021-06-02 RX ADMIN — SENNOSIDES AND DOCUSATE SODIUM 2 TABLET: 8.6; 5 TABLET ORAL at 20:00

## 2021-06-02 RX ADMIN — IBUPROFEN 400 MG: 400 TABLET ORAL at 09:06

## 2021-06-02 RX ADMIN — THYROID, PORCINE 90 MG: 30 TABLET ORAL at 09:06

## 2021-06-02 RX ADMIN — IBUPROFEN 400 MG: 400 TABLET ORAL at 13:34

## 2021-06-02 RX ADMIN — SENNOSIDES AND DOCUSATE SODIUM 2 TABLET: 8.6; 5 TABLET ORAL at 09:05

## 2021-06-02 RX ADMIN — AMLODIPINE BESYLATE 5 MG: 5 TABLET ORAL at 09:05

## 2021-06-02 RX ADMIN — LIDOCAINE 1 PATCH: 50 PATCH TOPICAL at 09:06

## 2021-06-02 RX ADMIN — MAGNESIUM OXIDE TAB 400 MG (241.3 MG ELEMENTAL MG) 400 MG: 400 (241.3 MG) TAB at 09:06

## 2021-06-02 RX ADMIN — IBUPROFEN 400 MG: 400 TABLET, FILM COATED ORAL at 19:51

## 2021-06-02 ASSESSMENT — GAIT ASSESSMENTS
GAIT LEVEL OF ASSIST: STAND BY ASSIST
ASSISTIVE DEVICE: FRONT WHEEL WALKER
DISTANCE (FEET): 80
DEVIATION: BRADYKINETIC;SHUFFLED GAIT
ASSISTIVE DEVICE: FRONT WHEEL WALKER
DEVIATION: BRADYKINETIC;ANTALGIC
DISTANCE (FEET): 80
GAIT LEVEL OF ASSIST: STAND BY ASSIST

## 2021-06-02 ASSESSMENT — ACTIVITIES OF DAILY LIVING (ADL)
TUB_SHOWER_TRANSFER_DESCRIPTION: GRAB BAR;SHOWER BENCH;SUPERVISION FOR SAFETY
TOILET_TRANSFER_DESCRIPTION: GRAB BAR;SUPERVISION FOR SAFETY
TOILETING_LEVEL_OF_ASSIST_DESCRIPTION: GRAB BAR;INCREASED TIME

## 2021-06-02 ASSESSMENT — PAIN DESCRIPTION - PAIN TYPE
TYPE: CHRONIC PAIN
TYPE: CHRONIC PAIN

## 2021-06-02 NOTE — DISCHARGE PLANNING
DME referral sent to [facility].  Outpatient therapy referral sent to [facility] per choice form.  Awaiting responses.

## 2021-06-02 NOTE — THERAPY
06/02/21 1129   Precautions   Precautions Fall Risk   Comments Diabetic, tachycardia   Pain 0 - 10 Group   Therapist Pain Assessment 0   Cognition    Level of Consciousness Alert   Gait Functional Level of Assist    Gait Level Of Assist Stand by Assist   Assistive Device Front Wheel Walker   Distance (Feet) 80   # of Times Distance was Traveled 3   Deviation Bradykinetic;Shuffled Gait   Sitting Lower Body Exercises   Ankle Pumps 1 set of 15;Bilateral   Hip Flexion 2 sets of 15;Bilateral   Hip Abduction 2 sets of 15;Bilateral   Hip Adduction 2 sets of 15;Bilateral   Long Arc Quad 3 sets of 15;Bilateral   Hamstring Curl 2 sets of 15;Bilateral   PT Total Time Spent   PT Individual Total Time Spent (Mins) 60   PT Charge Group   PT Gait Training 2   PT Therapeutic Exercise 2   Physical Therapy   Daily Treatment     Patient Name: Mady Yang  Age:  65 y.o., Sex:  female  Medical Record #: [id number]  Today's Date: 6/2/2021     Precautions  Precautions: Fall Risk  Comments: Diabetic, tachycardia    Subjective    The patient was up and ready for PT.     Objective    The patient participated in gait training with a fww.  She tolerated 80 FT x3 SBA.  She also participated in seated bilateral lower extremity therapeutic exercise 2 and 3 sets of 15 as indicated above.    Assessment    The patient reported that she feels more progress on a consistent basis.  She is tolerating activities that are improving her strength and endurance.  She is conscientious about safety.  Strengths: Able to follow instructions, Alert and oriented, Effective communication skills, Good carryover of learning, Good insight into deficits/needs, Independent prior level of function, Making steady progress towards goals, Manages pain appropriately, Motivated for self care and independence, Pleasant and cooperative, Willingly participates in therapeutic activities  Barriers: Decreased endurance, Fatigue, Generalized weakness, Home accessibility,  Hypotension, Impaired activity tolerance, Impaired balance, Limited mobility    Plan    Safety education, bed mobility and transfers, gait training as tolerated, therapeutic exercise    Passport items to be completed:  Passport items to be completed:  Get in/out of bed safely, in/out of a vehicle, safely use mobility device, walk or wheel around home/community, navigate up and down stairs, show how to get up/down from the ground, ensure home is accessible, demonstrate HEP, complete caregiver training    Physical Therapy Problems (Active)     Problem: Mobility     Dates: Start: 05/27/21       Goal: STG-Within one week, patient will ambulate household distance of 150 feet with FWW at SBA level.     Dates: Start: 05/27/21       Goal Note filed on 06/01/21 0819 by Derrell Rutledge, PT     Partially Met: Patient ambulates with FWW at SBA level from 40-85 feet.            Goal: STG-Within one week, patient will ascend and descend four to six stairs with B railings at Min A level.     Dates: Start: 05/27/21       Goal Note filed on 06/01/21 0819 by Derrell Rutledge, PT     Performance yet to be assessed; will monitor progress this week.               Problem: Mobility Transfers     Dates: Start: 05/27/21       Goal: STG-Within one week, patient will transfer bed to chair with FWW at CGA level.     Dates: Start: 05/27/21       Goal Note filed on 06/01/21 0819 by Derrell Rutledge, PT     Partially Met: CGA with FWW or Min A without FWW.            Goal: STG-Within one week, patient will transfer in/out of car with FWW at Min A level.     Dates: Start: 05/27/21       Goal Note filed on 06/01/21 0819 by Derrell Rutledge, PT     Performance yet to be assessed; will monitor progress this week.               Problem: PT-Long Term Goals     Dates: Start: 05/27/21       Goal: LTG-By discharge, patient will ambulate 300 feet at SBA level with a FWW or SPC.     Dates: Start: 05/27/21          Goal: LTG-By  discharge, patient will transfer one surface to another at SPV level with a FWW or SPC.     Dates: Start: 05/27/21          Goal: LTG-By discharge, patient will ambulate up/down 4-6 stairs at SBA level with 1-2 railings.     Dates: Start: 05/27/21          Goal: LTG-By discharge, patient will transfer in/out of a car at SBA level with a FWW or SPC.     Dates: Start: 05/27/21

## 2021-06-02 NOTE — DISCHARGE PLANNING
Per Jessie at [facility], referral declined as they are not contracted with patient's insurance.  DME referral sent to [facility].  Awaiting response.

## 2021-06-02 NOTE — DISCHARGE PLANNING
Per Paula at [facility], they are out of stock for the bariatric FWW.  DME referral sent to [email or web address].  Awaiting response.

## 2021-06-02 NOTE — DISCHARGE PLANNING
Clinicals sent to Blaire at [facility] for auth of additional days beyond 6/1/21 (Team Conference Note 6/1/21, PT Notes 6/1/21, OT Notes 6/1/21).

## 2021-06-02 NOTE — THERAPY
Occupational Therapy  Daily Treatment     Patient Name: Mady Yang  Age:  65 y.o., Sex:  female  Medical Record #: [id number]  Today's Date: 6/2/2021     Precautions  Precautions: Fall Risk  Comments: Diabetic, tachycardia    Safety   Comments: See functional levels of assist below for ADL performance details     Subjective       Objective       06/02/21 1401   Precautions   Precautions Fall Risk   Comments Diabetic, tachycardia   Vitals   O2 Delivery Device None - Room Air   Pain   Intervention Declines   Non Verbal Descriptors   Non Verbal Scale  Calm   Cognition    Level of Consciousness Alert   Sleep/Wake Cycle   Sleep & Rest Awake   Standing Upper Body Exercises   Comments Standing w/FWW, ladder for Ladder Ball (LB) 5' behind patient.  12' opposite end of room LB's placed.  Pt instructed to navigate FWW 12' to LB's, retrieve one at a time and return 12' to starting position to toss LB at ladder.  1 LB = 12'x2 = 24'.  CGA via gait belt, no LOB, seated rest break x 4.  Pt completed 12 = 24'x12 = 288'   Interdisciplinary Plan of Care Collaboration   IDT Collaboration with  Family / Caregiver   Patient Position at End of Therapy Seated;Call Light within Reach;Tray Table within Reach;Phone within Reach;Family / Friend in Room   OT Total Time Spent   OT Individual Total Time Spent (Mins) 30       Assessment    Pt was alert and cooperative w/ tx.  tx emphasis on TherEx - refer to notes above for details.  Barriers are limited standing/functional mobility endurance required multiple seated rest breaks.  Strengths: Effective communication skills, Independent prior level of function, Motivated for self care and independence, Pleasant and cooperative, Willingly participates in therapeutic activities, Supportive family  Barriers: Decreased endurance, Generalized weakness, Impaired activity tolerance, Impaired balance, Limited mobility    Plan       Cont'd OT for education (safety/environmental  awareness, energy conservation, use of AE/DME), strengthening, endurance and balance for safe ADL's/IADL's and bathroom transfers.    Passport items to be completed:  Passport items to be completed:  Perform bathroom transfers, complete dressing, complete feeding, get ready for the day, prepare a simple meal, participate in household tasks, adapt home for safety needs, demonstrate home exercise program, complete caregiver training     Occupational Therapy Goals (Active)     Problem: OT Long Term Goals     Dates: Start: 05/27/21       Goal: LTG-By discharge, patient will complete basic self care tasks     Dates: Start: 05/27/21       Goal Note filed on 05/27/21 0812 by Salvador Ponce MS,OTR/L     1) Individualized Goal:  Mod I for BADL's via AE/DME PRN  2) Interventions:  OT Self Care/ADL, OT Neuro Re-Ed/Balance, OT Therapeutic Activity, OT Evaluation, and OT Therapeutic Exercise            Goal: LTG-By discharge, patient will perform bathroom transfers     Dates: Start: 05/27/21       Goal Note filed on 05/27/21 0812 by Salvador Ponce MS,OTR/L     1) Individualized Goal:  Mod I for bathroom transfers via DME PRN  2) Interventions: OT Self Care/ADL, OT Neuro Re-Ed/Balance, OT Therapeutic Activity, OT Evaluation, and OT Therapeutic Exercise

## 2021-06-02 NOTE — FLOWSHEET NOTE
06/02/21 1055   Events/Summary/Plan   Events/Summary/Plan 02 spot check.  CPAP filled with sterile water   Vital Signs   Pulse 90   Respiration 20   Pulse Oximetry 95 %   $ Pulse Oximetry (Spot Check) Yes   Respiratory Assessment   Level of Consciousness Alert   Chest Exam   Work Of Breathing / Effort Within Normal Limits   Oxygen   O2 Delivery Device None - Room Air   Non-Invasive Ventilation HENRI Group   Nocturnal CPAP or BIPAP CPAP - Home Unit   Settings (If Known) 11-15   FiO2 or LPM 0

## 2021-06-02 NOTE — CARE PLAN
The patient is Stable - Low risk of patient condition declining or worsening         Progress made toward(s) clinical / shift goals:        Problem: Bladder / Voiding  Goal: Patient will establish and maintain regular urinary output  Outcome: Progressing  Note: Pt has been continent of bladder

## 2021-06-02 NOTE — THERAPY
"Physical Therapy   Daily Treatment     Patient Name: Mady Yang  Age:  65 y.o., Sex:  female  Medical Record #: [id number]  Today's Date: 6/2/2021     Precautions  Precautions: (P) Fall Risk  Comments: (P) diabetic, tachycardia    Subjective    Pt is agreeable to PT session     Objective       06/02/21 0931   Precautions   Precautions Fall Risk   Comments diabetic, tachycardia   Gait Functional Level of Assist    Gait Level Of Assist Stand by Assist   Assistive Device Front Wheel Walker   Distance (Feet) 80   # of Times Distance was Traveled 2   Deviation Bradykinetic;Antalgic   Stairs Functional Level of Assist   Level of Assist with Stairs Contact Guard Assist   # of Stairs Climbed 1  (4 reps varying height)   Stairs Description Extra time;Hand rails;Supervision for safety;Verbal cueing  (in parallel bars)   Interdisciplinary Plan of Care Collaboration   IDT Collaboration with  Family / Caregiver   Patient Position at End of Therapy Seated;Self Releasing Lap Belt Applied;Family / Friend in Room   Collaboration Comments spouse present for therapy session   PT Total Time Spent   PT Individual Total Time Spent (Mins) 30   PT Charge Group   PT Gait Training 1   PT Therapeutic Activities 1   Supervising Physical Therapist Robert Rutledge      Initiated step training in // B UE support and CGA/SBA, vc for sequencing    4\" step x 2 reps  5\" step x 1 reps   8\" step x 1 rep    Assessment    The patient tolerated the session well with focus on step training. Pt is able to complete variable height stairs inside the // with generally SBA and B UE support. Pt will need to ascend an 8\" step to get into the vehicle upon dc.  Strengths: Able to follow instructions, Alert and oriented, Effective communication skills, Good carryover of learning, Good insight into deficits/needs, Independent prior level of function, Making steady progress towards goals, Manages pain appropriately, Motivated for self care and independence, " Pleasant and cooperative, Willingly participates in therapeutic activities  Barriers: Decreased endurance, Fatigue, Generalized weakness, Home accessibility, Hypotension, Impaired activity tolerance, Impaired balance, Limited mobility    Plan    FWW ambulation, Ther ex for BLE strengthening and endurance, Lumbar stab/core strengthening, Education, bed mobility with less elevated HOB, aquatic therapy.  D/C on 6/5/21.     Passport items to be completed:  Passport items to be completed:  Get in/out of bed safely, in/out of a vehicle, safely use mobility device, walk or wheel around home/community, navigate up and down stairs, show how to get up/down from the ground, ensure home is accessible, demonstrate HEP, complete caregiver training    Physical Therapy Problems (Active)     Problem: Mobility     Dates: Start: 05/27/21       Goal: STG-Within one week, patient will ambulate household distance of 150 feet with FWW at SBA level.     Dates: Start: 05/27/21       Goal Note filed on 06/01/21 0819 by Derrell Rutledge, PT     Partially Met: Patient ambulates with FWW at SBA level from 40-85 feet.            Goal: STG-Within one week, patient will ascend and descend four to six stairs with B railings at Min A level.     Dates: Start: 05/27/21       Goal Note filed on 06/01/21 0819 by Derrell Rutledeg, PT     Performance yet to be assessed; will monitor progress this week.               Problem: Mobility Transfers     Dates: Start: 05/27/21       Goal: STG-Within one week, patient will transfer bed to chair with FWW at CGA level.     Dates: Start: 05/27/21       Goal Note filed on 06/01/21 0819 by Derrell Rutledge, PT     Partially Met: CGA with FWW or Min A without FWW.            Goal: STG-Within one week, patient will transfer in/out of car with FWW at Min A level.     Dates: Start: 05/27/21       Goal Note filed on 06/01/21 0819 by Derrell Rutledge, PT     Performance yet to be assessed; will monitor  progress this week.               Problem: PT-Long Term Goals     Dates: Start: 05/27/21       Goal: LTG-By discharge, patient will ambulate 300 feet at SBA level with a FWW or SPC.     Dates: Start: 05/27/21          Goal: LTG-By discharge, patient will transfer one surface to another at SPV level with a FWW or SPC.     Dates: Start: 05/27/21          Goal: LTG-By discharge, patient will ambulate up/down 4-6 stairs at SBA level with 1-2 railings.     Dates: Start: 05/27/21          Goal: LTG-By discharge, patient will transfer in/out of a car at SBA level with a FWW or SPC.     Dates: Start: 05/27/21

## 2021-06-02 NOTE — THERAPY
"Occupational Therapy  Daily Treatment     Patient Name: Mady Yang  Age:  65 y.o., Sex:  female  Medical Record #: [id number]  Today's Date: 6/2/2021     Precautions  Precautions: Fall Risk  Comments: diabetic, tachycardia    Safety   Comments: See functional levels of assist below for ADL performance details     Subjective    \"I need to shower so I can do therapy in the pool later\"     Objective       06/02/21 0831   Precautions   Precautions Fall Risk   Comments diabetic, tachycardia   Vitals   O2 Delivery Device None - Room Air   Non Verbal Descriptors   Non Verbal Scale  Calm   Cognition    Level of Consciousness Alert   Sleep/Wake Cycle   Sleep & Rest Awake   Functional Level of Assist   Eating Independent   Grooming Modified Independent;Seated   Grooming Description Seated in wheelchair at sink;Increased time   Bathing Supervision   Bathing Description Grab bar;Hand held shower;Tub bench;Increased time;Supervision for safety   Upper Body Dressing Modified Independent   Upper Body Dressing Description Increased time   Lower Body Dressing Supervision   Lower Body Dressing Description Grab bar;Increased time;Set-up of equipment;Supervision for safety   Toileting Modified Independent   Toileting Description Grab bar;Increased time   Toilet Transfers Supervised  (wc/commode)   Toilet Transfer Description Grab bar;Supervision for safety   Tub / Shower Transfers Stand by Assist   Tub Shower Transfer Description Grab bar;Shower bench;Supervision for safety   Interdisciplinary Plan of Care Collaboration   IDT Collaboration with  Certified Nursing Assistant;Family / Caregiver   Patient Position at End of Therapy Seated;Call Light within Reach;Tray Table within Reach;Phone within Reach;Family / Friend in Room   Collaboration Comments CLOF   OT Total Time Spent   OT Individual Total Time Spent (Mins) 60   OT Charge Group   OT Self Care / ADL 4       Assessment    Pt was alert and cooperative w/ tx.  tx emphasis on " ADL's and bathroom transfers at  level - see notes above for details.  Strengths: Effective communication skills, Independent prior level of function, Motivated for self care and independence, Pleasant and cooperative, Willingly participates in therapeutic activities, Supportive family  Barriers: Decreased endurance, Generalized weakness, Impaired activity tolerance, Impaired balance, Limited mobility    Plan    Cont'd OT for education (safety/environmental awareness, energy conservation, use of AE/DME), strengthening, endurance and balance for safe ADL's/IADL's and bathroom transfers.    Passport items to be completed:  Passport items to be completed:  Perform bathroom transfers, complete dressing, complete feeding, get ready for the day, prepare a simple meal, participate in household tasks, adapt home for safety needs, demonstrate home exercise program, complete caregiver training     Occupational Therapy Goals (Active)     Problem: OT Long Term Goals     Dates: Start: 05/27/21       Goal: LTG-By discharge, patient will complete basic self care tasks     Dates: Start: 05/27/21       Goal Note filed on 05/27/21 0812 by Salvador Ponce MS,OTR/L     1) Individualized Goal:  Mod I for BADL's via AE/DME PRN  2) Interventions:  OT Self Care/ADL, OT Neuro Re-Ed/Balance, OT Therapeutic Activity, OT Evaluation, and OT Therapeutic Exercise            Goal: LTG-By discharge, patient will perform bathroom transfers     Dates: Start: 05/27/21       Goal Note filed on 05/27/21 0812 by Salvador Ponce MS,OTR/L     1) Individualized Goal:  Mod I for bathroom transfers via DME PRN  2) Interventions: OT Self Care/ADL, OT Neuro Re-Ed/Balance, OT Therapeutic Activity, OT Evaluation, and OT Therapeutic Exercise

## 2021-06-02 NOTE — PROGRESS NOTES
"Rehab Progress Note     Encounter Date: 6/2/2021    CC: decreased mobility, right knee pain    Interval Events (Subjective)  Patient sitting up in room. She reports therapy is going well. Her back pain is still causing trouble but she is pushing through it. She reports she thinks she will be ready by Saturday.   She has improved HR today into low 90s. Discussed if into the 120 at rest will check EKG. SBP remains borderline low, will discontinue ARB.     IDT Team Meeting 6/1/2021  DC/Disposition:  6/5/21      Objective:  VITAL SIGNS: /53   Pulse 90   Temp 36.5 °C (97.7 °F) (Oral)   Resp 20   Ht 1.6 m (5' 3\")   Wt (!) 134 kg (296 lb 8 oz)   SpO2 95%   BMI 52.52 kg/m²   Gen: NAD  Psych: Mood and affect appropriate  CV: RRR, 1+ edema  Resp: CTAB, no upper airway sounds  Abd: NTND  Neuro: AOx4, pushing wheelchair BUE    Recent Results (from the past 72 hour(s))   Basic Metabolic Panel    Collection Time: 05/31/21  6:10 AM   Result Value Ref Range    Sodium 140 135 - 145 mmol/L    Potassium 4.0 3.6 - 5.5 mmol/L    Chloride 106 96 - 112 mmol/L    Co2 22 20 - 33 mmol/L    Glucose 118 (H) 65 - 99 mg/dL    Bun 31 (H) 8 - 22 mg/dL    Creatinine 1.64 (H) 0.50 - 1.40 mg/dL    Calcium 9.9 8.5 - 10.5 mg/dL    Anion Gap 12.0 7.0 - 16.0   ESTIMATED GFR    Collection Time: 05/31/21  6:10 AM   Result Value Ref Range    GFR If  38 (A) >60 mL/min/1.73 m 2    GFR If Non  32 (A) >60 mL/min/1.73 m 2       Current Facility-Administered Medications   Medication Frequency   • amLODIPine (NORVASC) tablet 5 mg DAILY   • ibuprofen (MOTRIN) tablet 400 mg BID   • losartan (COZAAR) tablet 25 mg Q DAY   • Respiratory Therapy Consult Continuous RT   • oxyCODONE immediate-release (ROXICODONE) tablet 5 mg Q3HRS PRN    Or   • oxyCODONE immediate release (ROXICODONE) tablet 10 mg Q3HRS PRN   • hydrALAZINE (APRESOLINE) tablet 25 mg Q8HRS PRN   • acetaminophen (Tylenol) tablet 650 mg Q4HRS PRN   • senna-docusate " (PERICOLACE or SENOKOT S) 8.6-50 MG per tablet 2 tablet BID    And   • polyethylene glycol/lytes (MIRALAX) PACKET 1 Packet QDAY PRN    And   • magnesium hydroxide (MILK OF MAGNESIA) suspension 30 mL QDAY PRN    And   • bisacodyl (DULCOLAX) suppository 10 mg QDAY PRN   • artificial tears ophthalmic solution 1 Drop PRN   • benzocaine-menthol (CEPACOL) lozenge 1 Lozenge Q2HRS PRN   • mag hydrox-al hydrox-simeth (MAALOX PLUS ES or MYLANTA DS) suspension 20 mL Q2HRS PRN   • ondansetron (ZOFRAN ODT) dispertab 4 mg 4X/DAY PRN    Or   • ondansetron (ZOFRAN) syringe/vial injection 4 mg 4X/DAY PRN   • traZODone (DESYREL) tablet 50 mg QHS PRN   • sodium chloride (OCEAN) 0.65 % nasal spray 2 Spray PRN   • lidocaine (LIDODERM) 5 % 1 Patch Q24HRS   • thyroid (ARMOUR THYROID) tablet 90 mg DAILY   • ibuprofen (MOTRIN) tablet 400 mg Q6HRS PRN   • magnesium oxide (MAG-OX) tablet 400 mg DAILY       Orders Placed This Encounter   Procedures   • Diet Order Diet: Consistent CHO (Diabetic)     Standing Status:   Standing     Number of Occurrences:   1     Order Specific Question:   Diet:     Answer:   Consistent CHO (Diabetic) [4]       Assessment:  Active Hospital Problems    Diagnosis    • *Critical illness myopathy    • Anemia    • Hyperglycemia    • Hypothyroidism    • Acute appendicitis with localized peritonitis    • Stage 3 chronic kidney disease (HCC)    • Essential hypertension    • Osteoarthritis    • BMI 50.0-59.9, adult (HCC)    • HENRI (obstructive sleep apnea)        Medical Decision Making and Plan:  Critical Illness myopathy - Patient with prolonged hospitalization after burst appendix complicated by respiratory failure, ileus, and poor intake requiring TPN. Patient transferred to [facility] and found to have significant weakness concerning for CIM.  -PT and OT for mobility and ADLs     DM - Patient takes home supplement. Will start SSI, previously well controlled. Will check A1c  -Blood sugars < 150. Discontinue SSI as <  130    HTN - Patient on Losartan 50 mg and Amlodipine 10 mg on transfer.   -Decrease ARB with elevated Cr. SBP remains low-normal on decreased Losartan. Decrease Amlodipine to 5 mg. Discontinue Losartan     Tachycardia - On admission. Ongoing occasionally, improved 6/2/21, if returns at rest check EKG      Hypothyroidism - Patient on [place] Thyroid 90 mg daily. Check TSH - 0.810     Anemia - Difficult stick due, follow-up PCP for repeat CBC     Back pain/Left leg OA - Patient primarily limited by left knee and ankle OA. On Ibuprofen scheduled.     JOSHUA on CKD - Patient with elevated Cr on admission. Has been trending up at OSH. Patient is drinking plenty of fluids. Will monitor  -Cr 1.64, improved from 2.2. Recheck 6/3/21    Morbid obesity due to excess calories - Patient with BMI of 52.52 on admission meeting medical criteria.      DVT ppx - Patient to start Lovenox until ambulating sufficiently. Ambulating > 250 feet, discontinue    Total time:  26 minutes.  I spent greater than 50% of the time for patient care, counseling, and coordination on this date, including unit/floor time, and face-to-face time with the patient as per interval events and assessment and plan above. Topics discussed included improving mobility, low SBP, and discontinue Losartan. Discussed about tachycardia and concern if remains at rest.     Ana Laura Cho M.D.

## 2021-06-02 NOTE — THERAPY
Physical Therapy   Daily Treatment     Patient Name: Mady Yang  Age:  65 y.o., Sex:  female  Medical Record #: [id number]  Today's Date: 6/2/2021     Precautions  Precautions: Fall Risk  Comments: diabetic, tachycardia    Subjective    Patient agreeable to PT.  Reports fatigue today; reports pt prefers the bariatric FWW to the 4WW trial that occurred today.     Objective       06/01/21 1031   Vitals   O2 (LPM) 0   O2 Delivery Device None - Room Air   Gait Functional Level of Assist    Gait Level Of Assist Supervised   Assistive Device 4 Wheel Walker  (pt reports will prefer the bariatric FWW for home use)   Distance (Feet)   (32-36 feet indoors)   # of Times Distance was Traveled 3   Deviation Trendelenberg;Increased Base Of Support;Bradykinetic   Stairs Functional Level of Assist   Level of Assist with Stairs Unable to Participate   # of Stairs Climbed 0   Stairs Description Safety concerns  (fatigue)   Sitting Lower Body Exercises   Ankle Pumps 2 sets of 10   Standing Lower Body Exercises   Standing Lower Body Exercises Yes  (// bars, SBA-SPV, setup)   Hamstring Curl 2 sets of 10   Hip Abduction 2 sets of 10   Marching 2 sets of 10   Mini Squat   (unable today)   IDT Conference   IDT Conference I have reviewed and discussed the POC and barriers to discharge for this patient.  I am knowledgeable of the patient's needs and have attended the IDT Conference.   Interdisciplinary Plan of Care Collaboration   IDT Collaboration with  Physical Therapist Assistant (PTA);Physical Therapist;Occupational Therapist;Other (See Comments)  (therapy scheduling)   Patient Position at End of Therapy Seated;Self Releasing Lap Belt Applied;Call Light within Reach;Tray Table within Reach;Phone within Reach   Collaboration Comments aquatic therapy setup for tomorrow instead of today due to limited tech A today   PT Total Time Spent   PT Individual Total Time Spent (Mins) 60   PT Charge Group   PT Gait Training 2   PT Therapeutic  Exercise 2       Assessment    Patient has limited endurance and increased fatigue today; good motivation; no LOB; improving BLE strength; slightly increased bradykinesia with ambulation.    Strengths: Able to follow instructions, Alert and oriented, Effective communication skills, Good carryover of learning, Good insight into deficits/needs, Independent prior level of function, Making steady progress towards goals, Manages pain appropriately, Motivated for self care and independence, Pleasant and cooperative, Willingly participates in therapeutic activities  Barriers: Decreased endurance, Fatigue, Generalized weakness, Home accessibility, Hypotension, Impaired activity tolerance, Impaired balance, Limited mobility    Plan    FWW ambulation, Ther ex for BLE strengthening and endurance, Lumbar stab/core strengthening, Education, bed mobility with less elevated HOB, aquatic therapy.  D/C on 6/5/21.    Passport items to be completed:  Passport items to be completed:  Get in/out of bed safely, in/out of a vehicle, safely use mobility device, walk or wheel around home/community, navigate up and down stairs, show how to get up/down from the ground, ensure home is accessible, demonstrate HEP, complete caregiver training    Physical Therapy Problems (Active)     Problem: Mobility     Dates: Start: 05/27/21       Goal: STG-Within one week, patient will ambulate household distance of 150 feet with FWW at SBA level.     Dates: Start: 05/27/21       Goal Note filed on 06/01/21 0819 by Derrell Rutledge, PT     Partially Met: Patient ambulates with FWW at SBA level from 40-85 feet.            Goal: STG-Within one week, patient will ascend and descend four to six stairs with B railings at Min A level.     Dates: Start: 05/27/21       Goal Note filed on 06/01/21 0819 by Derrell Rutledge, PT     Performance yet to be assessed; will monitor progress this week.               Problem: Mobility Transfers     Dates: Start:  05/27/21       Goal: STG-Within one week, patient will transfer bed to chair with FWW at CGA level.     Dates: Start: 05/27/21       Goal Note filed on 06/01/21 0819 by Derrell Rutledge, PT     Partially Met: CGA with FWW or Min A without FWW.            Goal: STG-Within one week, patient will transfer in/out of car with FWW at Min A level.     Dates: Start: 05/27/21       Goal Note filed on 06/01/21 0819 by Derrell Rutledge, PT     Performance yet to be assessed; will monitor progress this week.               Problem: PT-Long Term Goals     Dates: Start: 05/27/21       Goal: LTG-By discharge, patient will ambulate 300 feet at SBA level with a FWW or SPC.     Dates: Start: 05/27/21          Goal: LTG-By discharge, patient will transfer one surface to another at SPV level with a FWW or SPC.     Dates: Start: 05/27/21          Goal: LTG-By discharge, patient will ambulate up/down 4-6 stairs at SBA level with 1-2 railings.     Dates: Start: 05/27/21          Goal: LTG-By discharge, patient will transfer in/out of a car at SBA level with a FWW or SPC.     Dates: Start: 05/27/21

## 2021-06-03 LAB
ANION GAP SERPL CALC-SCNC: 12 MMOL/L (ref 7–16)
BASOPHILS # BLD AUTO: 0.5 % (ref 0–1.8)
BASOPHILS # BLD: 0.03 K/UL (ref 0–0.12)
BUN SERPL-MCNC: 27 MG/DL (ref 8–22)
CALCIUM SERPL-MCNC: 9.7 MG/DL (ref 8.5–10.5)
CHLORIDE SERPL-SCNC: 105 MMOL/L (ref 96–112)
CO2 SERPL-SCNC: 23 MMOL/L (ref 20–33)
CREAT SERPL-MCNC: 1.55 MG/DL (ref 0.5–1.4)
EOSINOPHIL # BLD AUTO: 0.44 K/UL (ref 0–0.51)
EOSINOPHIL NFR BLD: 6.8 % (ref 0–6.9)
ERYTHROCYTE [DISTWIDTH] IN BLOOD BY AUTOMATED COUNT: 50.3 FL (ref 35.9–50)
GLUCOSE SERPL-MCNC: 112 MG/DL (ref 65–99)
HCT VFR BLD AUTO: 31.9 % (ref 37–47)
HGB BLD-MCNC: 9.9 G/DL (ref 12–16)
IMM GRANULOCYTES # BLD AUTO: 0.03 K/UL (ref 0–0.11)
IMM GRANULOCYTES NFR BLD AUTO: 0.5 % (ref 0–0.9)
LYMPHOCYTES # BLD AUTO: 2.24 K/UL (ref 1–4.8)
LYMPHOCYTES NFR BLD: 34.8 % (ref 22–41)
MCH RBC QN AUTO: 28.9 PG (ref 27–33)
MCHC RBC AUTO-ENTMCNC: 31 G/DL (ref 33.6–35)
MCV RBC AUTO: 93 FL (ref 81.4–97.8)
MONOCYTES # BLD AUTO: 0.69 K/UL (ref 0–0.85)
MONOCYTES NFR BLD AUTO: 10.7 % (ref 0–13.4)
NEUTROPHILS # BLD AUTO: 3.01 K/UL (ref 2–7.15)
NEUTROPHILS NFR BLD: 46.7 % (ref 44–72)
NRBC # BLD AUTO: 0 K/UL
NRBC BLD-RTO: 0 /100 WBC
PLATELET # BLD AUTO: 206 K/UL (ref 164–446)
PMV BLD AUTO: 10.6 FL (ref 9–12.9)
POTASSIUM SERPL-SCNC: 3.9 MMOL/L (ref 3.6–5.5)
RBC # BLD AUTO: 3.43 M/UL (ref 4.2–5.4)
SODIUM SERPL-SCNC: 140 MMOL/L (ref 135–145)
WBC # BLD AUTO: 6.4 K/UL (ref 4.8–10.8)

## 2021-06-03 PROCEDURE — 700101 HCHG RX REV CODE 250: Performed by: PHYSICAL MEDICINE & REHABILITATION

## 2021-06-03 PROCEDURE — 97110 THERAPEUTIC EXERCISES: CPT

## 2021-06-03 PROCEDURE — 80048 BASIC METABOLIC PNL TOTAL CA: CPT

## 2021-06-03 PROCEDURE — 97535 SELF CARE MNGMENT TRAINING: CPT

## 2021-06-03 PROCEDURE — 99232 SBSQ HOSP IP/OBS MODERATE 35: CPT | Performed by: PHYSICAL MEDICINE & REHABILITATION

## 2021-06-03 PROCEDURE — 36415 COLL VENOUS BLD VENIPUNCTURE: CPT

## 2021-06-03 PROCEDURE — 770010 HCHG ROOM/CARE - REHAB SEMI PRIVAT*

## 2021-06-03 PROCEDURE — 700102 HCHG RX REV CODE 250 W/ 637 OVERRIDE(OP): Performed by: PHYSICAL MEDICINE & REHABILITATION

## 2021-06-03 PROCEDURE — A9270 NON-COVERED ITEM OR SERVICE: HCPCS | Performed by: PHYSICAL MEDICINE & REHABILITATION

## 2021-06-03 PROCEDURE — 97116 GAIT TRAINING THERAPY: CPT

## 2021-06-03 PROCEDURE — 97530 THERAPEUTIC ACTIVITIES: CPT

## 2021-06-03 PROCEDURE — 85025 COMPLETE CBC W/AUTO DIFF WBC: CPT

## 2021-06-03 RX ADMIN — IBUPROFEN 400 MG: 400 TABLET ORAL at 09:03

## 2021-06-03 RX ADMIN — IBUPROFEN 400 MG: 400 TABLET, FILM COATED ORAL at 20:11

## 2021-06-03 RX ADMIN — AMLODIPINE BESYLATE 5 MG: 5 TABLET ORAL at 09:04

## 2021-06-03 RX ADMIN — SENNOSIDES AND DOCUSATE SODIUM 2 TABLET: 8.6; 5 TABLET ORAL at 20:09

## 2021-06-03 RX ADMIN — LIDOCAINE 1 PATCH: 50 PATCH TOPICAL at 09:04

## 2021-06-03 RX ADMIN — IBUPROFEN 400 MG: 400 TABLET ORAL at 12:46

## 2021-06-03 RX ADMIN — THYROID, PORCINE 90 MG: 30 TABLET ORAL at 09:04

## 2021-06-03 RX ADMIN — SENNOSIDES AND DOCUSATE SODIUM 2 TABLET: 8.6; 5 TABLET ORAL at 09:04

## 2021-06-03 RX ADMIN — MAGNESIUM OXIDE TAB 400 MG (241.3 MG ELEMENTAL MG) 400 MG: 400 (241.3 MG) TAB at 09:03

## 2021-06-03 ASSESSMENT — ACTIVITIES OF DAILY LIVING (ADL)
TOILETING_LEVEL_OF_ASSIST_DESCRIPTION: GRAB BAR;INCREASED TIME
BED_CHAIR_WHEELCHAIR_TRANSFER_DESCRIPTION: ADAPTIVE EQUIPMENT;INCREASED TIME;SUPERVISION FOR SAFETY
TOILET_TRANSFER_DESCRIPTION: SUPERVISION FOR SAFETY;GRAB BAR

## 2021-06-03 ASSESSMENT — GAIT ASSESSMENTS
ASSISTIVE DEVICE: FRONT WHEEL WALKER
ASSISTIVE DEVICE: FRONT WHEEL WALKER
DISTANCE (FEET): 80
GAIT LEVEL OF ASSIST: STAND BY ASSIST
DEVIATION: BRADYKINETIC;SHUFFLED GAIT
GAIT LEVEL OF ASSIST: STAND BY ASSIST
DEVIATION: BRADYKINETIC;SHUFFLED GAIT

## 2021-06-03 ASSESSMENT — PAIN DESCRIPTION - PAIN TYPE: TYPE: CHRONIC PAIN

## 2021-06-03 NOTE — THERAPY
06/03/21 1129   Precautions   Precautions Fall Risk   Comments Diabetic, tachycardia   Pain 0 - 10 Group   Therapist Pain Assessment 0   Cognition    Cognition / Consciousness WDL   Level of Consciousness Alert   Gait Functional Level of Assist    Gait Level Of Assist Stand by Assist   Assistive Device Front Wheel Walker   Distance (Feet) 80   # of Times Distance was Traveled 3   Deviation Bradykinetic;Shuffled Gait   Sitting Lower Body Exercises   Ankle Pumps 1 set of 15;Bilateral   Hip Flexion 3 sets of 15;Bilateral   Hip Abduction 3 sets of 15;Bilateral   Hip Adduction 3 sets of 15;Bilateral   Long Arc Quad 3 sets of 15;Bilateral   Hamstring Curl 3 sets of 15;Bilateral   PT Total Time Spent   PT Individual Total Time Spent (Mins) 60   PT Charge Group   PT Gait Training 2   PT Therapeutic Exercise 2   Physical Therapy   Daily Treatment     Patient Name: Mady Yang  Age:  65 y.o., Sex:  female  Medical Record #: [id number]  Today's Date: 6/3/2021     Precautions  Precautions: Fall Risk  Comments: Diabetic, tachycardia    Subjective    The patient was up and ready for PT.  She had no complaints of pain.     Objective    The patient participated in gait training with a fww and tolerated 80 FT x3 SBA.  She also participated in seated bilateral lower extremity therapeutic exercise 3 sets of 15.  PT provided education regarding endurance training at home using a stationary recumbent style bike.  PT explained a strategy for increasing her endurance and strength using the recumbent bike.    Assessment    The patient is demonstrating consistent improvement in strength and endurance.  She is motivated and willingly participates in PT activity.  Strengths: Able to follow instructions, Alert and oriented, Effective communication skills, Good carryover of learning, Good insight into deficits/needs, Independent prior level of function, Making steady progress towards goals, Manages pain appropriately, Motivated for  self care and independence, Pleasant and cooperative, Willingly participates in therapeutic activities  Barriers: Decreased endurance, Fatigue, Generalized weakness, Home accessibility, Hypotension, Impaired activity tolerance, Impaired balance, Limited mobility    Plan    Safety education, therapeutic exercise for strength and endurance, gait training, bed mobility and transfers    Passport items to be completed:  Passport items to be completed:  Get in/out of bed safely, in/out of a vehicle, safely use mobility device, walk or wheel around home/community, navigate up and down stairs, show how to get up/down from the ground, ensure home is accessible, demonstrate HEP, complete caregiver training    Physical Therapy Problems (Active)     Problem: Mobility     Dates: Start: 05/27/21       Goal: STG-Within one week, patient will ambulate household distance of 150 feet with FWW at SBA level.     Dates: Start: 05/27/21       Goal Note filed on 06/01/21 0819 by Derrell Rutledge, PT     Partially Met: Patient ambulates with FWW at SBA level from 40-85 feet.            Goal: STG-Within one week, patient will ascend and descend four to six stairs with B railings at Min A level.     Dates: Start: 05/27/21       Goal Note filed on 06/01/21 0819 by Derrell Rutledge, PT     Performance yet to be assessed; will monitor progress this week.               Problem: Mobility Transfers     Dates: Start: 05/27/21       Goal: STG-Within one week, patient will transfer bed to chair with FWW at CGA level.     Dates: Start: 05/27/21       Goal Note filed on 06/01/21 0819 by Derrell Rutledge, PT     Partially Met: CGA with FWW or Min A without FWW.            Goal: STG-Within one week, patient will transfer in/out of car with FWW at Min A level.     Dates: Start: 05/27/21       Goal Note filed on 06/01/21 0819 by Derrell Rutledge, PT     Performance yet to be assessed; will monitor progress this week.                Problem: PT-Long Term Goals     Dates: Start: 05/27/21       Goal: LTG-By discharge, patient will ambulate 300 feet at SBA level with a FWW or SPC.     Dates: Start: 05/27/21          Goal: LTG-By discharge, patient will transfer one surface to another at SPV level with a FWW or SPC.     Dates: Start: 05/27/21          Goal: LTG-By discharge, patient will ambulate up/down 4-6 stairs at SBA level with 1-2 railings.     Dates: Start: 05/27/21          Goal: LTG-By discharge, patient will transfer in/out of a car at SBA level with a FWW or SPC.     Dates: Start: 05/27/21

## 2021-06-03 NOTE — THERAPY
"Physical Therapy   Daily Treatment     Patient Name: Mady Yang  Age:  65 y.o., Sex:  female  Medical Record #: [id number]  Today's Date: 6/3/2021     Precautions  Precautions: Fall Risk  Comments: Diabetic, tachycardia    Subjective    Pt is ready for pool therapy session     Objective       06/02/21 1501   PT Total Time Spent   PT Individual Total Time Spent (Mins) 45   PT Charge Group   PT Aquatic Therapy  3   Supervising Physical Therapist Robert Rutledge     The patient participated in 45' of aquatic therapy as an adjunct to her day    Standing TE 2 x 10 with B UE support on hand rail  Marches, heel/toe lifts, 3-way hip and mini squats    Buoyancy assisted amb with and without flotation device 4 laps each(8 total)    High knee stepping, side stepping, retro stepping    3 x 10 step ups to pool step    2 x 30\" figure-4 stretching seated on pool bench    Pt entered and exited pool via stairs with HR and CGA    Assessment    The patient will benefit from aquatic therapy as an adjunct to her rehab program.   Strengths: Able to follow instructions, Alert and oriented, Effective communication skills, Good carryover of learning, Good insight into deficits/needs, Independent prior level of function, Making steady progress towards goals, Manages pain appropriately, Motivated for self care and independence, Pleasant and cooperative, Willingly participates in therapeutic activities  Barriers: Decreased endurance, Fatigue, Generalized weakness, Home accessibility, Hypotension, Impaired activity tolerance, Impaired balance, Limited mobility    Plan    Cont per POC    Physical Therapy Problems (Active)     Problem: Mobility     Dates: Start: 05/27/21       Goal: STG-Within one week, patient will ambulate household distance of 150 feet with FWW at SBA level.     Dates: Start: 05/27/21       Goal Note filed on 06/01/21 0819 by Derrell Rutledge, PT     Partially Met: Patient ambulates with FWW at SBA level from 40-85 " feet.            Goal: STG-Within one week, patient will ascend and descend four to six stairs with B railings at Min A level.     Dates: Start: 05/27/21       Goal Note filed on 06/01/21 0819 by Derrell Rutledge, PT     Performance yet to be assessed; will monitor progress this week.               Problem: Mobility Transfers     Dates: Start: 05/27/21       Goal: STG-Within one week, patient will transfer bed to chair with FWW at CGA level.     Dates: Start: 05/27/21       Goal Note filed on 06/01/21 0819 by Derrell Rutledge, PT     Partially Met: CGA with FWW or Min A without FWW.            Goal: STG-Within one week, patient will transfer in/out of car with FWW at Min A level.     Dates: Start: 05/27/21       Goal Note filed on 06/01/21 0819 by Derrell Rutledge, PT     Performance yet to be assessed; will monitor progress this week.               Problem: PT-Long Term Goals     Dates: Start: 05/27/21       Goal: LTG-By discharge, patient will ambulate 300 feet at SBA level with a FWW or SPC.     Dates: Start: 05/27/21          Goal: LTG-By discharge, patient will transfer one surface to another at SPV level with a FWW or SPC.     Dates: Start: 05/27/21          Goal: LTG-By discharge, patient will ambulate up/down 4-6 stairs at SBA level with 1-2 railings.     Dates: Start: 05/27/21          Goal: LTG-By discharge, patient will transfer in/out of a car at SBA level with a FWW or SPC.     Dates: Start: 05/27/21

## 2021-06-03 NOTE — THERAPY
Physical Therapy   Daily Treatment     Patient Name: Mady Yang  Age:  65 y.o., Sex:  female  Medical Record #: [id number]  Today's Date: 6/3/2021     Precautions  Precautions: (P) Fall Risk  Comments: (P) Diabetic, tachycardia    Subjective    Patient seated in w/c and agreeable to therapy.     Objective       06/03/21 0731   Precautions   Precautions Fall Risk   Comments Diabetic, tachycardia   Gait Functional Level of Assist    Gait Level Of Assist Stand by Assist   Assistive Device Front Wheel Walker   Distance (Feet)   (100 ftx1, 75 ftx1)   Deviation Bradykinetic;Shuffled Gait   Transfer Functional Level of Assist   Bed, Chair, Wheelchair Transfer Stand by Assist   Bed Chair Wheelchair Transfer Description Adaptive equipment;Increased time;Supervision for safety  (stand step transfer with FWW)   Bed Mobility    Sit to Stand Supervised  (to SBA)   Interdisciplinary Plan of Care Collaboration   IDT Collaboration with  Family / Caregiver   Patient Position at End of Therapy Seated;Call Light within Reach;Tray Table within Reach;Phone within Reach   Collaboration Comments  participating car transfer   PT Total Time Spent   PT Individual Total Time Spent (Mins) 30   PT Charge Group   PT Gait Training 1   PT Therapeutic Activities 1     Car transfer in patient's truck, utilizing step stool to step onto running board and grab handle. Performed x2 with CGA-SBA.    Assessment    Patient tolerated session well, performing car transfer in own vehicle today with good technique and safety.  demonstrating good guarding techniques.    Strengths: Able to follow instructions, Alert and oriented, Effective communication skills, Good carryover of learning, Good insight into deficits/needs, Independent prior level of function, Making steady progress towards goals, Manages pain appropriately, Motivated for self care and independence, Pleasant and cooperative, Willingly participates in therapeutic  activities  Barriers: Decreased endurance, Fatigue, Generalized weakness, Home accessibility, Hypotension, Impaired activity tolerance, Impaired balance, Limited mobility    Plan    FWW ambulation, Ther ex for BLE strengthening and endurance, Lumbar stab/core strengthening, Education, bed mobility with less elevated HOB, aquatic therapy.  D/C on 6/5/21.     Passport items to be completed:  Passport items to be completed:  Get in/out of bed safely, in/out of a vehicle, safely use mobility device, walk or wheel around home/community, navigate up and down stairs, show how to get up/down from the ground, ensure home is accessible, demonstrate HEP, complete caregiver training    Physical Therapy Problems (Active)     Problem: Mobility     Dates: Start: 05/27/21       Goal: STG-Within one week, patient will ambulate household distance of 150 feet with FWW at SBA level.     Dates: Start: 05/27/21       Goal Note filed on 06/01/21 0819 by Derrell Rutledge, PT     Partially Met: Patient ambulates with FWW at SBA level from 40-85 feet.            Goal: STG-Within one week, patient will ascend and descend four to six stairs with B railings at Min A level.     Dates: Start: 05/27/21       Goal Note filed on 06/01/21 0819 by Derrell Rutledge, PT     Performance yet to be assessed; will monitor progress this week.               Problem: Mobility Transfers     Dates: Start: 05/27/21       Goal: STG-Within one week, patient will transfer bed to chair with FWW at CGA level.     Dates: Start: 05/27/21       Goal Note filed on 06/01/21 0819 by Derrell Rutledge, PT     Partially Met: CGA with FWW or Min A without FWW.            Goal: STG-Within one week, patient will transfer in/out of car with FWW at Min A level.     Dates: Start: 05/27/21       Goal Note filed on 06/01/21 0819 by Derrell Rutledge, PT     Performance yet to be assessed; will monitor progress this week.               Problem: PT-Long Term Goals      Dates: Start: 05/27/21       Goal: LTG-By discharge, patient will ambulate 300 feet at SBA level with a FWW or SPC.     Dates: Start: 05/27/21          Goal: LTG-By discharge, patient will transfer one surface to another at SPV level with a FWW or SPC.     Dates: Start: 05/27/21          Goal: LTG-By discharge, patient will ambulate up/down 4-6 stairs at SBA level with 1-2 railings.     Dates: Start: 05/27/21          Goal: LTG-By discharge, patient will transfer in/out of a car at SBA level with a FWW or SPC.     Dates: Start: 05/27/21

## 2021-06-03 NOTE — THERAPY
"Occupational Therapy  Daily Treatment     Patient Name: Mady Yang  Age:  65 y.o., Sex:  female  Medical Record #: [id number]  Today's Date: 6/3/2021     Precautions  Precautions: Fall Risk  Comments: Diabetic, tachycardia    Safety   Comments: See functional levels of assist below for ADL performance details     Subjective  \"I don't cook.  My  does all the cooking\"  Patient declined kitchen/cooking activity at FWW level.     Objective       06/03/21 1301   Precautions   Precautions Fall Risk   Comments Diabetic, tachycardia   Vitals   O2 Delivery Device None - Room Air   Pain   Intervention Declines   Pain 0 - 10 Group   Therapist Pain Assessment 0   Non Verbal Descriptors   Non Verbal Scale  Calm   Cognition    Level of Consciousness Alert   Sleep/Wake Cycle   Sleep & Rest Awake   Standing Upper Body Exercises   Comments Standing w/FWW, ladder for Ladder Ball (LB) 5' behind patient.  12' opposite end of room LB's placed.  Pt instructed to navigate FWW 12' to LB's, retrieve one at a time and return 12' to starting position to toss LB at ladder.  1 LB = 12'x2 = 24'.  CGA via gait belt, no LOB, seated rest break x 4.  Pt completed 24 = 24'x24 = 576'   Interdisciplinary Plan of Care Collaboration   IDT Collaboration with  Family / Caregiver   Patient Position at End of Therapy Seated;Chair Alarm On;Self Releasing Lap Belt Applied;Call Light within Reach;Tray Table within Reach;Phone within Reach;Family / Friend in Room   OT Total Time Spent   OT Individual Total Time Spent (Mins) 30   OT Charge Group   OT Therapeutic Exercise  2       Assessment    Pt was alert and cooperative w/ tx.  Tx emphasis on functional mobility/TherEx at FWW level - refer to notes above for details.  Strengths: Effective communication skills, Independent prior level of function, Motivated for self care and independence, Pleasant and cooperative, Willingly participates in therapeutic activities, Supportive family  Barriers: " Decreased endurance, Generalized weakness, Impaired activity tolerance, Impaired balance, Limited mobility    Plan    DC FIM's in tomorrow morning for Saturday discharge.  Cont'd OT for education (safety/environmental awareness, energy conservation, use of AE/DME), strengthening, endurance and balance for safe ADL's/IADL's and bathroom transfers.    Passport items to be completed:  Passport items to be completed:  Perform bathroom transfers, complete dressing, complete feeding, get ready for the day, prepare a simple meal, participate in household tasks, adapt home for safety needs, demonstrate home exercise program, complete caregiver training     Occupational Therapy Goals (Active)     Problem: OT Long Term Goals     Dates: Start: 05/27/21       Goal: LTG-By discharge, patient will complete basic self care tasks     Dates: Start: 05/27/21       Goal Note filed on 05/27/21 0812 by Salvador Ponce MS,OTR/L     1) Individualized Goal:  Mod I for BADL's via AE/DME PRN  2) Interventions:  OT Self Care/ADL, OT Neuro Re-Ed/Balance, OT Therapeutic Activity, OT Evaluation, and OT Therapeutic Exercise            Goal: LTG-By discharge, patient will perform bathroom transfers     Dates: Start: 05/27/21       Goal Note filed on 05/27/21 0812 by Salvador Ponce MS,OTR/L     1) Individualized Goal:  Mod I for bathroom transfers via DME PRN  2) Interventions: OT Self Care/ADL, OT Neuro Re-Ed/Balance, OT Therapeutic Activity, OT Evaluation, and OT Therapeutic Exercise

## 2021-06-03 NOTE — THERAPY
Occupational Therapy  Daily Treatment     Patient Name: Mady Yang  Age:  65 y.o., Sex:  female  Medical Record #: [id number]  Today's Date: 6/3/2021     Precautions  Precautions: Fall Risk  Comments: Diabetic, tachycardia    Safety   Comments: See functional levels of assist below for ADL performance details     Subjective       Objective       06/03/21 0931   Precautions   Precautions Fall Risk   Comments Diabetic, tachycardia   Non Verbal Descriptors   Non Verbal Scale  Calm   Cognition    Level of Consciousness Alert   Sleep/Wake Cycle   Sleep & Rest Awake   Functional Level of Assist   Toileting Modified Independent   Toileting Description Grab bar;Increased time   Toilet Transfers Supervised  (wc/commode)   Toilet Transfer Description Supervision for safety;Grab bar   Supine Upper Body Exercises   Comments Standing at raised plinth (used as a table top) for PVC pipe tree activity - standing at one end of plinth, 15' at opposite end of plinth container of piexes.  Pt instructed to navigate FWW 15' + around 2 corners to retrieve one piece at a time and return 15' + 2 corners to starting position to complete given pattern.  CGA via gait belt, no LOB, limited endurance (every two piwcws required 1 min seated rest break).  1 piece = 15'x2 = 30' + navigating corners 2x2 = 4x's.  Pt completed 13 pieces with 6 seated rest breaks, 13x30' = 390' + navigating corners w/ FWW 4x13 = 52x's.    Sitting Upper Body Exercises   Upper Extremity Bike Level 5 Resistance  ([employer], 15 mins, rest break x 3, .33 miles)   Interdisciplinary Plan of Care Collaboration   IDT Collaboration with  Family / Caregiver;Nursing   Patient Position at End of Therapy Seated;Chair Alarm On;Self Releasing Lap Belt Applied   OT Total Time Spent   OT Individual Total Time Spent (Mins) 60   OT Charge Group   OT Self Care / ADL 1   OT Therapeutic Exercise  3     Assessment    Pt was alert and cooperaive w/ tx.  Tx emphasis on ADL's and TherEx -  refer to notes above for details.  Noted poor endurance and limited standing/functional mobility w/ FWW required additional time + multiple seated rest breaks.  Strengths: Effective communication skills, Independent prior level of function, Motivated for self care and independence, Pleasant and cooperative, Willingly participates in therapeutic activities, Supportive family  Barriers: Decreased endurance, Generalized weakness, Impaired activity tolerance, Impaired balance, Limited mobility    Plan    Cont'd OT for education (safety/environmental awareness, energy conservation, use of AE/DME), strengthening, endurance and balance for safe ADL's/IADL's and bathroom transfers.    Passport items to be completed:  Passport items to be completed:  Perform bathroom transfers, complete dressing, complete feeding, get ready for the day, prepare a simple meal, participate in household tasks, adapt home for safety needs, demonstrate home exercise program, complete caregiver training     Occupational Therapy Goals (Active)     Problem: OT Long Term Goals     Dates: Start: 05/27/21       Goal: LTG-By discharge, patient will complete basic self care tasks     Dates: Start: 05/27/21       Goal Note filed on 05/27/21 0812 by Salvador Ponce MS,OTR/L     1) Individualized Goal:  Mod I for BADL's via AE/DME PRN  2) Interventions:  OT Self Care/ADL, OT Neuro Re-Ed/Balance, OT Therapeutic Activity, OT Evaluation, and OT Therapeutic Exercise            Goal: LTG-By discharge, patient will perform bathroom transfers     Dates: Start: 05/27/21       Goal Note filed on 05/27/21 0812 by Salvador Ponce MS,OTR/L     1) Individualized Goal:  Mod I for bathroom transfers via DME PRN  2) Interventions: OT Self Care/ADL, OT Neuro Re-Ed/Balance, OT Therapeutic Activity, OT Evaluation, and OT Therapeutic Exercise

## 2021-06-03 NOTE — CARE PLAN
The patient is Stable - Low risk of patient condition declining or worsening       Problem: Pain - Standard  Goal: Alleviation of pain or a reduction in pain to the patient’s comfort goal  Outcome: Progressing  Flowsheets  Taken 6/2/2021 2107 by Elijah Raines R.N.  OB Pain Intervention: Medication - See MAR  Taken 6/2/2021 2000 by Elijah Raines R.N.  Pain Rating Scale (NPRS): 5  Taken 6/2/2021 1401 by Salvador Ponce MS,OTR/L  Non Verbal Scale: Calm     Problem: Infection  Goal: Patient will remain free from infection  Outcome: Progressing  Note:   Patient remains free from s/s infection; afebrile.  Will continue to monitor.

## 2021-06-04 PROBLEM — D64.9 ANEMIA: Status: RESOLVED | Noted: 2021-05-03 | Resolved: 2021-06-04

## 2021-06-04 PROBLEM — K35.30 ACUTE APPENDICITIS WITH LOCALIZED PERITONITIS: Status: RESOLVED | Noted: 2021-04-21 | Resolved: 2021-06-04

## 2021-06-04 PROCEDURE — 97535 SELF CARE MNGMENT TRAINING: CPT

## 2021-06-04 PROCEDURE — 97113 AQUATIC THERAPY/EXERCISES: CPT | Mod: CQ

## 2021-06-04 PROCEDURE — 97110 THERAPEUTIC EXERCISES: CPT

## 2021-06-04 PROCEDURE — 770010 HCHG ROOM/CARE - REHAB SEMI PRIVAT*

## 2021-06-04 PROCEDURE — 97530 THERAPEUTIC ACTIVITIES: CPT

## 2021-06-04 PROCEDURE — 99233 SBSQ HOSP IP/OBS HIGH 50: CPT | Performed by: PHYSICAL MEDICINE & REHABILITATION

## 2021-06-04 PROCEDURE — A9270 NON-COVERED ITEM OR SERVICE: HCPCS | Performed by: PHYSICAL MEDICINE & REHABILITATION

## 2021-06-04 PROCEDURE — 97116 GAIT TRAINING THERAPY: CPT

## 2021-06-04 PROCEDURE — 700102 HCHG RX REV CODE 250 W/ 637 OVERRIDE(OP): Performed by: PHYSICAL MEDICINE & REHABILITATION

## 2021-06-04 RX ORDER — AMLODIPINE BESYLATE 10 MG/1
5 TABLET ORAL DAILY
Qty: 30 TABLET | Refills: 2 | Status: SHIPPED | OUTPATIENT
Start: 2021-06-04

## 2021-06-04 RX ORDER — THYROID 90 MG/1
90 TABLET ORAL DAILY
Qty: 30 TABLET | Refills: 2 | Status: SHIPPED | OUTPATIENT
Start: 2021-06-04

## 2021-06-04 RX ADMIN — AMLODIPINE BESYLATE 5 MG: 5 TABLET ORAL at 08:29

## 2021-06-04 RX ADMIN — SENNOSIDES AND DOCUSATE SODIUM 2 TABLET: 8.6; 5 TABLET ORAL at 08:29

## 2021-06-04 RX ADMIN — IBUPROFEN 400 MG: 400 TABLET ORAL at 08:29

## 2021-06-04 RX ADMIN — SENNOSIDES AND DOCUSATE SODIUM 2 TABLET: 8.6; 5 TABLET ORAL at 21:22

## 2021-06-04 RX ADMIN — THYROID, PORCINE 90 MG: 30 TABLET ORAL at 08:28

## 2021-06-04 RX ADMIN — IBUPROFEN 400 MG: 400 TABLET, FILM COATED ORAL at 16:14

## 2021-06-04 RX ADMIN — IBUPROFEN 400 MG: 400 TABLET ORAL at 13:43

## 2021-06-04 RX ADMIN — MAGNESIUM OXIDE TAB 400 MG (241.3 MG ELEMENTAL MG) 400 MG: 400 (241.3 MG) TAB at 08:29

## 2021-06-04 ASSESSMENT — ACTIVITIES OF DAILY LIVING (ADL)
TOILET_TRANSFER_DESCRIPTION: GRAB BAR;INCREASED TIME
TOILETING_LEVEL_OF_ASSIST_DESCRIPTION: GRAB BAR;INCREASED TIME
BED_CHAIR_WHEELCHAIR_TRANSFER_DESCRIPTION: INCREASED TIME
TUB_SHOWER_TRANSFER_DESCRIPTION: GRAB BAR;SHOWER BENCH;SUPERVISION FOR SAFETY

## 2021-06-04 ASSESSMENT — GAIT ASSESSMENTS
DISTANCE (FEET): 150
GAIT LEVEL OF ASSIST: SUPERVISED
ASSISTIVE DEVICE: FRONT WHEEL WALKER

## 2021-06-04 ASSESSMENT — FIBROSIS 4 INDEX: FIB4 SCORE: 1.7

## 2021-06-04 ASSESSMENT — PATIENT HEALTH QUESTIONNAIRE - PHQ9
SUM OF ALL RESPONSES TO PHQ9 QUESTIONS 1 AND 2: 0
SUM OF ALL RESPONSES TO PHQ9 QUESTIONS 1 AND 2: 0
1. LITTLE INTEREST OR PLEASURE IN DOING THINGS: NOT AT ALL
2. FEELING DOWN, DEPRESSED, IRRITABLE, OR HOPELESS: NOT AT ALL
2. FEELING DOWN, DEPRESSED, IRRITABLE, OR HOPELESS: NOT AT ALL
1. LITTLE INTEREST OR PLEASURE IN DOING THINGS: NOT AT ALL

## 2021-06-04 ASSESSMENT — PAIN DESCRIPTION - PAIN TYPE: TYPE: CHRONIC PAIN

## 2021-06-04 NOTE — DISCHARGE PLANNING
Per Ambreen at [facility], referral accepted pending insurance authorization.  CM notified.

## 2021-06-04 NOTE — THERAPY
Occupational Therapy  Daily Treatment     Patient Name: Mady Yang  Age:  65 y.o., Sex:  female  Medical Record #: [id number]  Today's Date: 6/4/2021     Precautions  Precautions: Fall Risk  Comments: Diabetic, tachycardia    Safety   Comments: See functional levels of assist below for ADL performance details     Subjective       Objective       06/04/21 0901   Precautions   Precautions Fall Risk   Comments Diabetic, tachycardia   Vitals   O2 Delivery Device None - Room Air   Pain   Intervention Declines   Pain 0 - 10 Group   Therapist Pain Assessment 0   Non Verbal Descriptors   Non Verbal Scale  Calm   Cognition    Level of Consciousness Alert   Sleep/Wake Cycle   Sleep & Rest Awake   Functional Level of Assist   Eating Independent   Grooming Modified Independent;Seated   Grooming Description Increased time;Seated in wheelchair at sink   Bathing Modified Independent   Bathing Description Grab bar;Hand held shower;Long handled bath tool;Tub bench;Increased time   Upper Body Dressing Independent   Lower Body Dressing Modified Independent   Lower Body Dressing Description Grab bar;Increased time   Toileting Modified Independent   Toileting Description Grab bar;Increased time   Toilet Transfers Modified Independent   Toilet Transfer Description Grab bar;Increased time   Tub / Shower Transfers Stand by Assist   Tub Shower Transfer Description Grab bar;Shower bench;Supervision for safety   Comprehension Modified Independent   Comprehension Description Glasses   Expression Independent   Problem Solving Modified Independent   Problem Solving Description Therapy schedule   Memory Modified Independent   Memory Description Therapy schedule   Sitting Upper Body Exercises   Upper Extremity Bike Level 6 Resistance  ([employer], 15 mins, rest break x 1, .227 miles)   Interdisciplinary Plan of Care Collaboration   Patient Position at End of Therapy Seated;Self Releasing Lap Belt Applied;Call Light within Reach;Tray Table  within Reach;Phone within Reach   OT Total Time Spent   OT Individual Total Time Spent (Mins) 60   OT Charge Group   OT Self Care / ADL 3   OT Therapeutic Exercise  1       Assessment    Pt was alert and cooperative w/ tx.  Tx emphasis on ADL's, bathroom transfers and TheEx - see notes above for details.  Strengths: Effective communication skills, Independent prior level of function, Motivated for self care and independence, Pleasant and cooperative, Willingly participates in therapeutic activities, Supportive family  Barriers: Decreased endurance, Generalized weakness, Impaired activity tolerance, Impaired balance, Limited mobility    Plan    DC home tomorrow afternoon.  Family training this afternoon with patient and .  Cont'd OT for education (safety/environmental awareness, energy conservation, use of AE/DME), strengthening, endurance and balance for safe ADL's/IADL's and bathroom transfers.    Passport items to be completed:  Passport items to be completed:  Perform bathroom transfers, complete dressing, complete feeding, get ready for the day, prepare a simple meal, participate in household tasks, adapt home for safety needs, demonstrate home exercise program, complete caregiver training     Occupational Therapy Goals (Active)     Problem: OT Long Term Goals     Dates: Start: 05/27/21       Goal: LTG-By discharge, patient will complete basic self care tasks     Dates: Start: 05/27/21       Goal Note filed on 05/27/21 0812 by Salvador Ponce MS,OTR/L     1) Individualized Goal:  Mod I for BADL's via AE/DME PRN  2) Interventions:  OT Self Care/ADL, OT Neuro Re-Ed/Balance, OT Therapeutic Activity, OT Evaluation, and OT Therapeutic Exercise            Goal: LTG-By discharge, patient will perform bathroom transfers     Dates: Start: 05/27/21       Goal Note filed on 05/27/21 0812 by Salvador Ponce MS,OTR/L     1) Individualized Goal:  Mod I for bathroom transfers via DME PRN  2) Interventions: OT Self Care/ADL,  OT Neuro Re-Ed/Balance, OT Therapeutic Activity, OT Evaluation, and OT Therapeutic Exercise

## 2021-06-04 NOTE — DISCHARGE PLANNING
Case Management Discharge Instructions        Discharge Location: Home with Outpatient Physical Therapy     Agency Name / Address / Phone:  [facility]          [address], [address], [place] [address]         [phone]         Medical Equipment Provider / Phone: [facility]  [phone]     Medical Equipment Ordered: Front Wheel Walker            Follow-up Information:     Derrell Lipscomb M.D.  General Surgeon   [address], [facility]   [facility] [id number] [phone]   Tuesday 6/8/2021 appointment at 9:30am.     Iesla Muir M.D.   Primary Care Provider   [address] [place] [id number] [phone]  Tuesday 6/22/21 appointment at 11:00am.

## 2021-06-04 NOTE — PROGRESS NOTES
"Rehab Progress Note     Encounter Date: 6/4/2021    CC: decreased mobility, right knee pain    Interval Events (Subjective)  Patient sitting up in room. She reports her HR is improved this morning. Discussed would work on discharge medications this afternoon. Discussed she needs to follow-up with PCP and possibly cardiology. Discussed needs to follow-up with PCP about kidney function and monitor ibuprofen use. No reported bowel or bladder changes, Reports back pain controlled.     IDT Team Meeting 6/1/2021  DC/Disposition:  6/5/21      Objective:  VITAL SIGNS: /66   Pulse 90   Temp 36.8 °C (98.3 °F) (Oral)   Resp 18   Ht 1.6 m (5' 3\")   Wt (!) 134 kg (296 lb 8 oz)   SpO2 95%   BMI 52.52 kg/m²   Gen: NAD  Psych: Mood and affect appropriate  CV: RRR, no edema  Resp: CTAB, no upper airway sounds  Abd: NTND  Neuro: AOx4, walking short step length    Recent Results (from the past 72 hour(s))   Basic Metabolic Panel    Collection Time: 06/03/21  5:37 AM   Result Value Ref Range    Sodium 140 135 - 145 mmol/L    Potassium 3.9 3.6 - 5.5 mmol/L    Chloride 105 96 - 112 mmol/L    Co2 23 20 - 33 mmol/L    Glucose 112 (H) 65 - 99 mg/dL    Bun 27 (H) 8 - 22 mg/dL    Creatinine 1.55 (H) 0.50 - 1.40 mg/dL    Calcium 9.7 8.5 - 10.5 mg/dL    Anion Gap 12.0 7.0 - 16.0   CBC WITH DIFFERENTIAL    Collection Time: 06/03/21  5:37 AM   Result Value Ref Range    WBC 6.4 4.8 - 10.8 K/uL    RBC 3.43 (L) 4.20 - 5.40 M/uL    Hemoglobin 9.9 (L) 12.0 - 16.0 g/dL    Hematocrit 31.9 (L) 37.0 - 47.0 %    MCV 93.0 81.4 - 97.8 fL    MCH 28.9 27.0 - 33.0 pg    MCHC 31.0 (L) 33.6 - 35.0 g/dL    RDW 50.3 (H) 35.9 - 50.0 fL    Platelet Count 206 164 - 446 K/uL    MPV 10.6 9.0 - 12.9 fL    Neutrophils-Polys 46.70 44.00 - 72.00 %    Lymphocytes 34.80 22.00 - 41.00 %    Monocytes 10.70 0.00 - 13.40 %    Eosinophils 6.80 0.00 - 6.90 %    Basophils 0.50 0.00 - 1.80 %    Immature Granulocytes 0.50 0.00 - 0.90 %    Nucleated RBC 0.00 /100 WBC    " Neutrophils (Absolute) 3.01 2.00 - 7.15 K/uL    Lymphs (Absolute) 2.24 1.00 - 4.80 K/uL    Monos (Absolute) 0.69 0.00 - 0.85 K/uL    Eos (Absolute) 0.44 0.00 - 0.51 K/uL    Baso (Absolute) 0.03 0.00 - 0.12 K/uL    Immature Granulocytes (abs) 0.03 0.00 - 0.11 K/uL    NRBC (Absolute) 0.00 K/uL   ESTIMATED GFR    Collection Time: 06/03/21  5:37 AM   Result Value Ref Range    GFR If  41 (A) >60 mL/min/1.73 m 2    GFR If Non  34 (A) >60 mL/min/1.73 m 2       Current Facility-Administered Medications   Medication Frequency   • amLODIPine (NORVASC) tablet 5 mg DAILY   • ibuprofen (MOTRIN) tablet 400 mg BID   • Respiratory Therapy Consult Continuous RT   • oxyCODONE immediate-release (ROXICODONE) tablet 5 mg Q3HRS PRN    Or   • oxyCODONE immediate release (ROXICODONE) tablet 10 mg Q3HRS PRN   • hydrALAZINE (APRESOLINE) tablet 25 mg Q8HRS PRN   • acetaminophen (Tylenol) tablet 650 mg Q4HRS PRN   • senna-docusate (PERICOLACE or SENOKOT S) 8.6-50 MG per tablet 2 tablet BID    And   • polyethylene glycol/lytes (MIRALAX) PACKET 1 Packet QDAY PRN    And   • magnesium hydroxide (MILK OF MAGNESIA) suspension 30 mL QDAY PRN    And   • bisacodyl (DULCOLAX) suppository 10 mg QDAY PRN   • artificial tears ophthalmic solution 1 Drop PRN   • benzocaine-menthol (CEPACOL) lozenge 1 Lozenge Q2HRS PRN   • mag hydrox-al hydrox-simeth (MAALOX PLUS ES or MYLANTA DS) suspension 20 mL Q2HRS PRN   • ondansetron (ZOFRAN ODT) dispertab 4 mg 4X/DAY PRN    Or   • ondansetron (ZOFRAN) syringe/vial injection 4 mg 4X/DAY PRN   • traZODone (DESYREL) tablet 50 mg QHS PRN   • sodium chloride (OCEAN) 0.65 % nasal spray 2 Spray PRN   • lidocaine (LIDODERM) 5 % 1 Patch Q24HRS   • thyroid (ARMOUR THYROID) tablet 90 mg DAILY   • ibuprofen (MOTRIN) tablet 400 mg Q6HRS PRN   • magnesium oxide (MAG-OX) tablet 400 mg DAILY       Orders Placed This Encounter   Procedures   • Diet Order Diet: Consistent CHO (Diabetic)     Standing  Status:   Standing     Number of Occurrences:   1     Order Specific Question:   Diet:     Answer:   Consistent CHO (Diabetic) [4]       Assessment:  Active Hospital Problems    Diagnosis    • *Critical illness myopathy    • Anemia    • Hyperglycemia    • Hypothyroidism    • Acute appendicitis with localized peritonitis    • Stage 3 chronic kidney disease (HCC)    • Essential hypertension    • Osteoarthritis    • BMI 50.0-59.9, adult (HCC)    • HENRI (obstructive sleep apnea)        Medical Decision Making and Plan:  Critical Illness myopathy - Patient with prolonged hospitalization after burst appendix complicated by respiratory failure, ileus, and poor intake requiring TPN. Patient transferred to [facility] and found to have significant weakness concerning for CIM.  -PT and OT for mobility and ADLs     DM - Patient takes home supplement. Will start SSI, previously well controlled. Will check A1c  -Blood sugars < 150. Discontinue SSI as < 130    HTN - Patient on Losartan 50 mg and Amlodipine 10 mg on transfer. Decrease ARB with elevated Cr. SBP remains low-normal on decreased Losartan. Decrease Amlodipine to 5 mg. Discontinue Losartan     Tachycardia - On admission. Ongoing occasionally, improved 6/2/21, if returns at rest check EKG      Hypothyroidism - Patient on [place] Thyroid 90 mg daily. Check TSH - 0.810     Anemia - 9.8, stable. Follow-up PCP     Back pain/Left leg OA - Patient primarily limited by left knee and ankle OA. On Ibuprofen scheduled.  -Lidocaine patches to back     JOSHUA on CKD - Patient with elevated Cr on admission. Has been trending up at OSH. Patient is drinking plenty of fluids. Will monitor  -Cr 1.64, improved from 2.2. Recheck 6/3/21 -1.55    Morbid obesity due to excess calories - Patient with BMI of 52.52 on admission meeting medical criteria.      DVT ppx - Patient to start Lovenox until ambulating sufficiently. Ambulating > 250 feet, discontinue    Total time:  36 minutes.  I spent greater than  50% of the time for patient care, counseling, and coordination on this date, including unit/floor time, and face-to-face time with the patient as per interval events and assessment and plan above. Topics discussed included discharge planning, discharge medications, tachycardia improved, and follow-up PCP/EKG.     Ana Laura Cho M.D.

## 2021-06-04 NOTE — THERAPY
"Occupational Therapy  Daily Treatment     Patient Name: Mady Yang  Age:  65 y.o., Sex:  female  Medical Record #: [id number]  Today's Date: 6/4/2021     Precautions  Precautions: Fall Risk  Comments: Diabetic, tachycardia    Safety   Comments: See functional levels of assist below for ADL performance details     Subjective    \"  Wasn't it at 2?\" referring to scheduled tx with this therapist   agreeable to early tx session      Objective       06/04/21 1101   Precautions   Precautions Fall Risk   Comments Diabetic, tachycardia   Sitting Upper Body Exercises   Chest Press 2 sets of 10;Bilateral   Front Arm Raise 2 sets of 10;Bilateral   Shoulder Press 2 sets of 10;Bilateral   Internal Shoulder Rotation 2 sets of 10;Bilateral   External Shoulder Rotation 2 sets of 10;Bilateral   Bicep Curls 2 sets of 10;Bilateral   Tricep Press 1 set of 10;Bilateral   Pronation / Supination 2 sets of 10;Bilateral   Other Exercise 2 sets of 10;Bilateral  peformed with 1 2lb weight    Comments   ther ex performed with 2lb weight in each UE.     Interdisciplinary Plan of Care Collaboration   Patient Position at End of Therapy Seated;Self Releasing Lap Belt Applied;Family / Friend in Room   OT Total Time Spent   OT Individual Total Time Spent (Mins) 30   OT Charge Group   OT Therapeutic Exercise  2       Assessment    Completed ther ex  No complaints.     Strengths: Effective communication skills, Independent prior level of function, Motivated for self care and independence, Pleasant and cooperative, Willingly participates in therapeutic activities, Supportive family  Barriers: Decreased endurance, Generalized weakness, Impaired activity tolerance, Impaired balance, Limited mobility    Plan    D/c home  6-5-21      Occupational Therapy Goals (Active)     Problem: OT Long Term Goals     Dates: Start: 05/27/21       Goal: LTG-By discharge, patient will complete basic self care tasks     Dates: Start: 05/27/21       Goal Note filed on " 05/27/21 0812 by Salvador Ponce MS,OTR/L     1) Individualized Goal:  Mod I for BADL's via AE/DME PRN  2) Interventions:  OT Self Care/ADL, OT Neuro Re-Ed/Balance, OT Therapeutic Activity, OT Evaluation, and OT Therapeutic Exercise            Goal: LTG-By discharge, patient will perform bathroom transfers     Dates: Start: 05/27/21       Goal Note filed on 05/27/21 0812 by Salvador Ponce MS,OTR/L     1) Individualized Goal:  Mod I for bathroom transfers via DME PRN  2) Interventions: OT Self Care/ADL, OT Neuro Re-Ed/Balance, OT Therapeutic Activity, OT Evaluation, and OT Therapeutic Exercise

## 2021-06-04 NOTE — PROGRESS NOTES
"Rehab Progress Note     Encounter Date: 6/3/2021    CC: decreased mobility, right knee pain    Interval Events (Subjective)  Patient sitting up in room. She reports she is doing well. She reports her strength is improving. Reviewed AM labs including stable anemia, and slight improvement in Cr. Discussed should continue to drink fluids. HR still borderline tachycardic. Denies chest pain. Denies SOB.     IDT Team Meeting 6/1/2021  DC/Disposition:  6/5/21      Objective:  VITAL SIGNS: /76   Pulse (!) 102   Temp 37 °C (98.6 °F) (Oral)   Resp 18   Ht 1.6 m (5' 3\")   Wt (!) 134 kg (296 lb 8 oz)   SpO2 96%   BMI 52.52 kg/m²   Gen: NAD  Psych: Mood and affect appropriate  CV: RRR, 1+ edema  Resp: CTAB, no upper airway sounds  Abd: NTND  Neuro: AOx4, pushing wheelchair BUE  Unchanged from 6/2/21 except sinus tachycardia into low 100s    Recent Results (from the past 72 hour(s))   Basic Metabolic Panel    Collection Time: 06/03/21  5:37 AM   Result Value Ref Range    Sodium 140 135 - 145 mmol/L    Potassium 3.9 3.6 - 5.5 mmol/L    Chloride 105 96 - 112 mmol/L    Co2 23 20 - 33 mmol/L    Glucose 112 (H) 65 - 99 mg/dL    Bun 27 (H) 8 - 22 mg/dL    Creatinine 1.55 (H) 0.50 - 1.40 mg/dL    Calcium 9.7 8.5 - 10.5 mg/dL    Anion Gap 12.0 7.0 - 16.0   CBC WITH DIFFERENTIAL    Collection Time: 06/03/21  5:37 AM   Result Value Ref Range    WBC 6.4 4.8 - 10.8 K/uL    RBC 3.43 (L) 4.20 - 5.40 M/uL    Hemoglobin 9.9 (L) 12.0 - 16.0 g/dL    Hematocrit 31.9 (L) 37.0 - 47.0 %    MCV 93.0 81.4 - 97.8 fL    MCH 28.9 27.0 - 33.0 pg    MCHC 31.0 (L) 33.6 - 35.0 g/dL    RDW 50.3 (H) 35.9 - 50.0 fL    Platelet Count 206 164 - 446 K/uL    MPV 10.6 9.0 - 12.9 fL    Neutrophils-Polys 46.70 44.00 - 72.00 %    Lymphocytes 34.80 22.00 - 41.00 %    Monocytes 10.70 0.00 - 13.40 %    Eosinophils 6.80 0.00 - 6.90 %    Basophils 0.50 0.00 - 1.80 %    Immature Granulocytes 0.50 0.00 - 0.90 %    Nucleated RBC 0.00 /100 WBC    Neutrophils " (Absolute) 3.01 2.00 - 7.15 K/uL    Lymphs (Absolute) 2.24 1.00 - 4.80 K/uL    Monos (Absolute) 0.69 0.00 - 0.85 K/uL    Eos (Absolute) 0.44 0.00 - 0.51 K/uL    Baso (Absolute) 0.03 0.00 - 0.12 K/uL    Immature Granulocytes (abs) 0.03 0.00 - 0.11 K/uL    NRBC (Absolute) 0.00 K/uL   ESTIMATED GFR    Collection Time: 06/03/21  5:37 AM   Result Value Ref Range    GFR If  41 (A) >60 mL/min/1.73 m 2    GFR If Non  34 (A) >60 mL/min/1.73 m 2       Current Facility-Administered Medications   Medication Frequency   • amLODIPine (NORVASC) tablet 5 mg DAILY   • ibuprofen (MOTRIN) tablet 400 mg BID   • Respiratory Therapy Consult Continuous RT   • oxyCODONE immediate-release (ROXICODONE) tablet 5 mg Q3HRS PRN    Or   • oxyCODONE immediate release (ROXICODONE) tablet 10 mg Q3HRS PRN   • hydrALAZINE (APRESOLINE) tablet 25 mg Q8HRS PRN   • acetaminophen (Tylenol) tablet 650 mg Q4HRS PRN   • senna-docusate (PERICOLACE or SENOKOT S) 8.6-50 MG per tablet 2 tablet BID    And   • polyethylene glycol/lytes (MIRALAX) PACKET 1 Packet QDAY PRN    And   • magnesium hydroxide (MILK OF MAGNESIA) suspension 30 mL QDAY PRN    And   • bisacodyl (DULCOLAX) suppository 10 mg QDAY PRN   • artificial tears ophthalmic solution 1 Drop PRN   • benzocaine-menthol (CEPACOL) lozenge 1 Lozenge Q2HRS PRN   • mag hydrox-al hydrox-simeth (MAALOX PLUS ES or MYLANTA DS) suspension 20 mL Q2HRS PRN   • ondansetron (ZOFRAN ODT) dispertab 4 mg 4X/DAY PRN    Or   • ondansetron (ZOFRAN) syringe/vial injection 4 mg 4X/DAY PRN   • traZODone (DESYREL) tablet 50 mg QHS PRN   • sodium chloride (OCEAN) 0.65 % nasal spray 2 Spray PRN   • lidocaine (LIDODERM) 5 % 1 Patch Q24HRS   • thyroid (ARMOUR THYROID) tablet 90 mg DAILY   • ibuprofen (MOTRIN) tablet 400 mg Q6HRS PRN   • magnesium oxide (MAG-OX) tablet 400 mg DAILY       Orders Placed This Encounter   Procedures   • Diet Order Diet: Consistent CHO (Diabetic)     Standing Status:    Standing     Number of Occurrences:   1     Order Specific Question:   Diet:     Answer:   Consistent CHO (Diabetic) [4]       Assessment:  Active Hospital Problems    Diagnosis    • *Critical illness myopathy    • Anemia    • Hyperglycemia    • Hypothyroidism    • Acute appendicitis with localized peritonitis    • Stage 3 chronic kidney disease (HCC)    • Essential hypertension    • Osteoarthritis    • BMI 50.0-59.9, adult (HCC)    • HENRI (obstructive sleep apnea)        Medical Decision Making and Plan:  Critical Illness myopathy - Patient with prolonged hospitalization after burst appendix complicated by respiratory failure, ileus, and poor intake requiring TPN. Patient transferred to [facility] and found to have significant weakness concerning for CIM.  -PT and OT for mobility and ADLs     DM - Patient takes home supplement. Will start SSI, previously well controlled. Will check A1c  -Blood sugars < 150. Discontinue SSI as < 130    HTN - Patient on Losartan 50 mg and Amlodipine 10 mg on transfer.   -Decrease ARB with elevated Cr. SBP remains low-normal on decreased Losartan. Decrease Amlodipine to 5 mg. Discontinue Losartan     Tachycardia - On admission. Ongoing occasionally, improved 6/2/21, if returns at rest check EKG      Hypothyroidism - Patient on [place] Thyroid 90 mg daily. Check TSH - 0.810     Anemia - 9.8, stable. Follow-up PCP     Back pain/Left leg OA - Patient primarily limited by left knee and ankle OA. On Ibuprofen scheduled.  -Lidocaine patches to back     JOSHUA on CKD - Patient with elevated Cr on admission. Has been trending up at OSH. Patient is drinking plenty of fluids. Will monitor  -Cr 1.64, improved from 2.2. Recheck 6/3/21 -1.55    Morbid obesity due to excess calories - Patient with BMI of 52.52 on admission meeting medical criteria.      DVT ppx - Patient to start Lovenox until ambulating sufficiently. Ambulating > 250 feet, discontinue    Total time:  26 minutes.  I spent greater than 50% of  the time for patient care, counseling, and coordination on this date, including unit/floor time, and face-to-face time with the patient as per interval events and assessment and plan above. Topics discussed included ongoing tachycardia, hold on TTE per patient, and ongoing discussion about heart health.     Ana Laura Cho M.D.

## 2021-06-04 NOTE — DISCHARGE PLANNING
Call out to Blaire at [facility] as we have not receive notification regarding request for additional days (clinicals faxed to Blaire on 6/2/21).  Reached voicemail and left message for her to call me back.

## 2021-06-04 NOTE — CARE PLAN
The patient is Stable - Low risk of patient condition declining or worsening      Problem: Pain - Standard  Goal: Alleviation of pain or a reduction in pain to the patient’s comfort goal  Outcome: Progressing  Note: Motrin PRN given at bedtime for c/o back pain with adequate relief. Pt sleeps good. Will continue to monitor.     Problem: Bladder / Voiding  Goal: Patient will establish and maintain bladder regimen  Outcome: Progressing  Note: Patient continent of bladder. Voiding adequately in the BR. She denies dysuria.

## 2021-06-04 NOTE — THERAPY
"Physical Therapy   Daily Treatment     Patient Name: Mady Yang  Age:  65 y.o., Sex:  female  Medical Record #: [id number]  Today's Date: 6/4/2021     Precautions  Precautions: Fall Risk  Comments: Diabetic, tachycardia    Subjective    Patient agreeable to PT.     Objective       06/04/21 0831   Vitals   O2 (LPM) 0   O2 Delivery Device None - Room Air   Gait Functional Level of Assist    Gait Level Of Assist Supervised   Assistive Device Front Wheel Walker   Distance (Feet) 150   # of Times Distance was Traveled 1   Deviation Trendelenberg;Decreased Toe Off   Wheelchair Functional Level of Assist   Wheelchair Assist Modified Independent   Distance Wheelchair (Feet or Distance) 150   Wheelchair Description Extra time;Leg rest management   Transfer Functional Level of Assist   Bed, Chair, Wheelchair Transfer Modified Independent   Bed Chair Wheelchair Transfer Description Increased time  (FWW)   Bed Mobility    Supine to Sit Modified Independent   Sit to Supine Modified Independent   Sit to Stand Modified Independent   Scooting Modified Independent   Rolling Modified Independent   Interdisciplinary Plan of Care Collaboration   IDT Collaboration with  Occupational Therapist   Patient Position at End of Therapy Seated;Self Releasing Lap Belt Applied;Other (Comments)   Collaboration Comments Handoff to OT   PT Total Time Spent   PT Individual Total Time Spent (Mins) 30   PT Charge Group   PT Gait Training 1   PT Therapeutic Activities 1     Updated applicable mobility IRF-Dedrick, SBA and cueing with 6\" curb using FWW and increased time.    Assessment    Patient has improving endurance throughout session; improving activity tolerance; SBA with curb training today; good cognition and motivation.    Strengths: Able to follow instructions, Alert and oriented, Effective communication skills, Good carryover of learning, Good insight into deficits/needs, Independent prior level of function, Making steady progress towards " goals, Manages pain appropriately, Motivated for self care and independence, Pleasant and cooperative, Willingly participates in therapeutic activities  Barriers: Decreased endurance, Fatigue, Generalized weakness, Home accessibility, Hypotension, Impaired activity tolerance, Impaired balance, Limited mobility    Plan    Fall Facts handout, complete pt passport, review standing HEP, complete IRF-Dedrick.  D/c home tomorrow with SO.    Passport items to be completed: navigate up and down stairs, show how to get up/down from the ground, ensure home is accessible, demonstrate HEP, complete caregiver training    Physical Therapy Problems (Active)     Problem: Mobility     Dates: Start: 05/27/21       Goal: STG-Within one week, patient will ambulate household distance of 150 feet with FWW at SBA level.     Dates: Start: 05/27/21       Goal Note filed on 06/01/21 0819 by Derrell Rutledge, PT     Partially Met: Patient ambulates with FWW at SBA level from 40-85 feet.            Goal: STG-Within one week, patient will ascend and descend four to six stairs with B railings at Min A level.     Dates: Start: 05/27/21       Goal Note filed on 06/01/21 0819 by Derrell Rutledge, PT     Performance yet to be assessed; will monitor progress this week.               Problem: Mobility Transfers     Dates: Start: 05/27/21       Goal: STG-Within one week, patient will transfer bed to chair with FWW at CGA level.     Dates: Start: 05/27/21       Goal Note filed on 06/01/21 0819 by Derrell Rutledge, PT     Partially Met: CGA with FWW or Min A without FWW.            Goal: STG-Within one week, patient will transfer in/out of car with FWW at Min A level.     Dates: Start: 05/27/21       Goal Note filed on 06/01/21 0819 by Derrell Rutledge, PT     Performance yet to be assessed; will monitor progress this week.               Problem: PT-Long Term Goals     Dates: Start: 05/27/21       Goal: LTG-By discharge, patient will  ambulate 300 feet at SBA level with a FWW or SPC.     Dates: Start: 05/27/21          Goal: LTG-By discharge, patient will transfer one surface to another at SPV level with a FWW or SPC.     Dates: Start: 05/27/21          Goal: LTG-By discharge, patient will ambulate up/down 4-6 stairs at SBA level with 1-2 railings.     Dates: Start: 05/27/21          Goal: LTG-By discharge, patient will transfer in/out of a car at SBA level with a FWW or SPC.     Dates: Start: 05/27/21

## 2021-06-04 NOTE — DISCHARGE PLANNING
Case management Summary:   Met with patient and her  to review CM DC Instructions prior to discharge tomorrow.   Reviewed follow up appointments with surgeon and PCP.  Referral made to [facility] and they have accepted referral and are ready to follow.    [facility] has delivered a FWW to the patient.      During hospitalization, I have provided support and education and have been available for questions and information during hours of operation, communicated with therapy team and MD along with providing links/resources  to outside services.    Patient verbalizes agreement with all plans and has an understanding of the next steps within the post acute services.     Individualized Goals:   1. Be discharged home   2. Set up available resources     Outcome:   1. Goal met - patient to be discharged home tomorrow with her   2. Goal met - patient set up with outpatient therapy in [place].  No other needs.

## 2021-06-04 NOTE — THERAPY
"Physical Therapy   Daily Treatment     Patient Name: Mady Yang  Age:  65 y.o., Sex:  female  Medical Record #: [id number]  Today's Date: 6/4/2021     Precautions  Precautions: Fall Risk  Comments: Diabetic, tachycardia    Subjective    Pt ready for therapy     Objective       06/04/21 1445   Interdisciplinary Plan of Care Collaboration   IDT Collaboration with  Therapy Tech   Patient Position at End of Therapy Seated;Other (Comments)   Collaboration Comments tech transported pt to/from pool area   PT Total Time Spent   PT Individual Total Time Spent (Mins) 45   PT Charge Group   PT Aquatic Therapy  4   Supervising Physical Therapist Robert Rutledge     The patient participated in 45' of aquatic therapy as an adjunct to her day     Standing TE 2 x 15 with B UE support on hand rail using flotation cuffs   Marches, heel/toe lifts, 3-way hip and mini squats     Buoyancy assisted amb with and without flotation device 6 laps in the pool     3 x 10 step ups to pool step     2 x 30\" figure-4 stretching seated on pool bench     Pt entered and exited pool via stairs with HR and CGA     Assessment    Pt will benefit from aquatic therapy as an adjunct to rehabilitation  Strengths: Able to follow instructions, Alert and oriented, Effective communication skills, Good carryover of learning, Good insight into deficits/needs, Independent prior level of function, Making steady progress towards goals, Manages pain appropriately, Motivated for self care and independence, Pleasant and cooperative, Willingly participates in therapeutic activities  Barriers: Decreased endurance, Fatigue, Generalized weakness, Home accessibility, Hypotension, Impaired activity tolerance, Impaired balance, Limited mobility    Plan    DC home 6/5    Physical Therapy Problems (Active)     Problem: Mobility     Dates: Start: 05/27/21       Goal: STG-Within one week, patient will ambulate household distance of 150 feet with FWW at SBA level.     Dates: " Start: 05/27/21       Goal Note filed on 06/01/21 0819 by Derrell Rutledge, PT     Partially Met: Patient ambulates with FWW at SBA level from 40-85 feet.            Goal: STG-Within one week, patient will ascend and descend four to six stairs with B railings at Min A level.     Dates: Start: 05/27/21       Goal Note filed on 06/01/21 0819 by Derrell Rutledge, PT     Performance yet to be assessed; will monitor progress this week.               Problem: Mobility Transfers     Dates: Start: 05/27/21       Goal: STG-Within one week, patient will transfer bed to chair with FWW at CGA level.     Dates: Start: 05/27/21       Goal Note filed on 06/01/21 0819 by Derrell Rutledge, PT     Partially Met: CGA with FWW or Min A without FWW.            Goal: STG-Within one week, patient will transfer in/out of car with FWW at Min A level.     Dates: Start: 05/27/21       Goal Note filed on 06/01/21 0819 by Derrell Rutledge, PT     Performance yet to be assessed; will monitor progress this week.               Problem: PT-Long Term Goals     Dates: Start: 05/27/21       Goal: LTG-By discharge, patient will ambulate 300 feet at SBA level with a FWW or SPC.     Dates: Start: 05/27/21          Goal: LTG-By discharge, patient will transfer one surface to another at SPV level with a FWW or SPC.     Dates: Start: 05/27/21          Goal: LTG-By discharge, patient will ambulate up/down 4-6 stairs at SBA level with 1-2 railings.     Dates: Start: 05/27/21          Goal: LTG-By discharge, patient will transfer in/out of a car at SBA level with a FWW or SPC.     Dates: Start: 05/27/21

## 2021-06-04 NOTE — THERAPY
"Physical Therapy   Daily Treatment     Patient Name: Mady Yang  Age:  65 y.o., Sex:  female  Medical Record #: [id number]  Today's Date: 6/4/2021     Precautions  Precautions: Fall Risk  Comments: Diabetic, tachycardia    Subjective    Patient agreeable to PT; reports new FWW has arrived but that \"it appears narrow.\" SO present.     Objective       06/04/21 1401   Vitals   O2 (LPM) 0   O2 Delivery Device None - Room Air   Stairs Functional Level of Assist   Level of Assist with Stairs Stand by Assist   # of Stairs Climbed 10  (mixed heights of standard & adaptive)   Stairs Description Extra time;Hand rails;Supervision for safety   Sitting Lower Body Exercises   Ankle Pumps 1 set of 15   Standing Lower Body Exercises   Standing Lower Body Exercises   (// bars, standing HEP with exception of ankle pumps, SBA)   Hamstring Curl 1 set of 10   Hip Extension 1 set of 10   Hip Abduction 1 set of 10   Marching 1 set of 10   Mini Squat   (1 set of 5 reps)   Bed Mobility    Sit to Stand Modified Independent   Interdisciplinary Plan of Care Collaboration   IDT Collaboration with  Family / Caregiver;;Physical Therapist Assistant (PTA)   Patient Position at End of Therapy Seated;Self Releasing Lap Belt Applied;Tray Table within Reach;Phone within Reach;Family / Friend in Room   Collaboration Comments Reviewed pt's new FWW and requested bariatric/wider FWW to be issued instead; pt wt currently at 300.8 lbs; SO present throughout session; pt preferred discussion of Fall Facts handout but not demonstration today; pt reports performed truck/car transfer yesterday instead; updated applicable IRF-Dedrick for d/c; reviewed pt's progress and POC; discussed pt's treatment plan with PTA for aquatic therapy   PT Total Time Spent   PT Individual Total Time Spent (Mins) 60   PT Charge Group   PT Gait Training 1   PT Therapeutic Exercise 1   PT Therapeutic Activities 2     Completed pt passport as " well.    Assessment    Patient is ready for the next level of care; good social support, understanding of POC, receptiveness to ther ex, and good cognition throughout.  Pt demonstrates safe decisions to take seated breaks every 1-2 sets for activity pacing.    Strengths: Able to follow instructions, Alert and oriented, Effective communication skills, Good carryover of learning, Good insight into deficits/needs, Independent prior level of function, Making steady progress towards goals, Manages pain appropriately, Motivated for self care and independence, Pleasant and cooperative, Willingly participates in therapeutic activities  Barriers: Decreased endurance, Fatigue, Generalized weakness, Home accessibility, Hypotension, Impaired activity tolerance, Impaired balance, Limited mobility    Plan    D/c home tomorrow with SO    Passport items to be completed: none.    Physical Therapy Problems (Active)     Problem: Mobility     Dates: Start: 05/27/21       Goal: STG-Within one week, patient will ambulate household distance of 150 feet with FWW at SBA level.     Dates: Start: 05/27/21       Goal Note filed on 06/01/21 0819 by Derrell Rutledge, PT     Partially Met: Patient ambulates with FWW at SBA level from 40-85 feet.            Goal: STG-Within one week, patient will ascend and descend four to six stairs with B railings at Min A level.     Dates: Start: 05/27/21       Goal Note filed on 06/01/21 0819 by Derrell Rutledge, PT     Performance yet to be assessed; will monitor progress this week.               Problem: Mobility Transfers     Dates: Start: 05/27/21       Goal: STG-Within one week, patient will transfer bed to chair with FWW at CGA level.     Dates: Start: 05/27/21       Goal Note filed on 06/01/21 0819 by Derrell Rutledge, PT     Partially Met: CGA with FWW or Min A without FWW.            Goal: STG-Within one week, patient will transfer in/out of car with FWW at Min A level.     Dates:  Start: 05/27/21       Goal Note filed on 06/01/21 0819 by Derrell Rutledge, PT     Performance yet to be assessed; will monitor progress this week.               Problem: PT-Long Term Goals     Dates: Start: 05/27/21       Goal: LTG-By discharge, patient will ambulate 300 feet at SBA level with a FWW or SPC.     Dates: Start: 05/27/21          Goal: LTG-By discharge, patient will transfer one surface to another at SPV level with a FWW or SPC.     Dates: Start: 05/27/21          Goal: LTG-By discharge, patient will ambulate up/down 4-6 stairs at SBA level with 1-2 railings.     Dates: Start: 05/27/21          Goal: LTG-By discharge, patient will transfer in/out of a car at SBA level with a FWW or SPC.     Dates: Start: 05/27/21

## 2021-06-04 NOTE — DISCHARGE SUMMARY
Rehab Discharge Summary    Admission Date: 5/26/2021    Discharge Date: 6/5/2021    Attending Provider: Jim Parker MD covering for Ana Laura Cho MD    Admission Diagnosis:   Active Hospital Problems    Diagnosis    • *Critical illness myopathy    • Anemia    • Hyperglycemia    • Hypothyroidism    • Acute appendicitis with localized peritonitis    • Stage 3 chronic kidney disease (HCC)    • Essential hypertension    • Osteoarthritis    • BMI 50.0-59.9, adult (HCC)    • HENRI (obstructive sleep apnea)        Discharge Diagnosis:  Active Hospital Problems    Diagnosis    • *Critical illness myopathy    • Anemia    • Hyperglycemia    • Hypothyroidism    • Acute appendicitis with localized peritonitis    • Stage 3 chronic kidney disease (HCC)    • Essential hypertension    • Osteoarthritis    • BMI 50.0-59.9, adult (HCC)    • HENRI (obstructive sleep apnea)        HPI per H&P:  Patient is a 65 y.o. female with a PMH of HTN, obesity, DM, and hypothyroidism who presented on 4/21/21 to [facility] with severe localized RLQ pain. She had reportedly gone to urgent care who ordered CT scan and confirmed diagnosis of appendicitis.  Patient also had signs concerning for intra-abdominal abscess. She underwent laparoscopic appendectomy on 4/21/21 with Dr. Ruiz but was converted to open appendectomy due to rupture of appendix. Patient had prolonged hospitalization with multiple rounds of antibiotics. Her stay was also complicated by ileus. She was placed on TPN.  She also had urinary retention and yusuf was placed.  Patient was transferred to LTAC ([facility]) on 5/14/21 for rehabilitation and medical management.  Patient has since been weaned off of TPN. She has had yusuf removed and is voiding.  Patient was very difficult to ambulate concerning team at [facility] for critical illness myopathy as well as limited by her > 50 BMI.      Patient was last evaluated by PT on 5/24/21 and was min-modA for mobility. Patient was last evaluated by  OT on 5/23/21 and was modA for ADLs. Patient was previously living in a 1 story home with 2 steps to enter; living with .    Patient was admitted to [facility] on 5/26/2021.     Hospital Course by Problem List:  Critical Illness myopathy - Patient with prolonged hospitalization after burst appendix complicated by respiratory failure, ileus, and poor intake requiring TPN. Patient transferred to [facility] and found to have significant weakness concerning for CIM. Patient underwent acute inpatient rehabilitation from 5/26/21 to 6/5/21 with good improvement in mobility and ADLs.      DM - Patient takes home supplement. Will start SSI, previously well controlled. Will check A1c - 6.5. Blood sugars < 150. Discontinue SSI as < 130  -Follow-up PCP     HTN - Patient on Losartan 50 mg and Amlodipine 10 mg on transfer. Decrease ARB with elevated Cr. SBP remains low-normal on decreased Losartan. Decrease Amlodipine to 5 mg. Discontinue Losartan   -Continue Amlodipine 5 mg. Follow-up PCP    Tachycardia - On admission. Ongoing occasionally, improved 6/2/21, if returns at rest check EKG      Hypothyroidism - Patient on [place] Thyroid 90 mg daily. Check TSH - 0.810     Anemia - 9.8, stable. Follow-up PCP     Back pain/Left leg OA - Patient primarily limited by left knee and ankle OA. On Ibuprofen PRN, monitor kidney function     JOSHUA on CKD - Patient with elevated Cr on admission. Has been trending up at OSH. Patient is drinking plenty of fluids. Will monitor. Cr 1.64, improved from 2.2. Recheck 6/3/21 -1.55     Morbid obesity due to excess calories - Patient with BMI of 52.52 on admission meeting medical criteria.      DVT ppx - Patient to start Lovenox until ambulating sufficiently. Ambulating > 250 feet, discontinue    Functional Status at Discharge  Eating:  Independent  Eating Description:  Increased time  Grooming:  Modified Independent, Seated  Grooming Description:  Increased time, Seated in wheelchair  at sink  Bathing:  Modified Independent  Bathing Description:  Grab bar, Hand held shower, Long handled bath tool, Tub bench, Increased time  Upper Body Dressing:  Independent  Upper Body Dressing Description:  Increased time  Lower Body Dressing:  Modified Independent  Lower Body Dressing Description:  Grab bar, Increased time     Walk:  Stand by Assist  Distance Walked:  80  Number of Times Distance Was Traveled:  3  Assistive Device:  Front Wheel Walker  Gait Deviation:  Bradykinetic, Shuffled Gait  Wheelchair:  Stand by Assist  Distance Propelled:  65   Wheelchair Description:  Extra time, Leg rest management, Limited by fatigue, Supervision for safety, Verbal cueing  Stairs Contact Guard Assist  Stairs Description Extra time, Hand rails, Supervision for safety, Verbal cueing (in parallel bars)       Jim MORALES MD, personally performed a complete drug regimen review and no potential clinically significant medication issues were identified.   Discharge Medication:     Medication List      CHANGE how you take these medications      Instructions   amLODIPine 10 MG Tabs  What changed: how much to take  Commonly known as: NORVASC   Take 0.5 Tablets by mouth every day.  Dose: 5 mg        CONTINUE taking these medications      Instructions   BERBERINE COMPLEX PO   Take  by mouth.     BIOTIN PO   Take  by mouth.     ibuprofen 600 MG Tabs  Commonly known as: MOTRIN   Take 800 mg by mouth every 6 hours as needed.  Dose: 800 mg     Krill Oil 500 MG Caps   Take 1 Cap by mouth every day.  Dose: 1 capsule     Magnesium 400 MG Caps   Take 1 Cap by mouth every day.  Dose: 1 capsule     Ocuvite Lutein 25 25-5 MG Caps  Generic drug: Lutein-Zeaxanthin   Take 1 Cap by mouth every day.  Dose: 1 capsule     thyroid 90 MG Tabs  Commonly known as: [place] Thyroid   Take 1 tablet by mouth every day.  Dose: 90 mg     VITAMIN B 12 PO   Take  by mouth.     vitamin D3 125 MCG (5000 UT) Caps   Take 1 Cap by mouth every day.  Dose: 1  capsule        STOP taking these medications    cyclobenzaprine 5 mg tablet  Commonly known as: Flexeril     insulin regular 100 Unit/mL Soln  Commonly known as: HumuLIN R     lidocaine 5 % Ptch  Commonly known as: LIDODERM     Loratadine 10 MG Caps     losartan 50 MG Tabs  Commonly known as: COZAAR            Discharge Diet:  Regular    Discharge Activity:  As tolerated     Disposition:  Patient to discharge home with family support and community resources.     Equipment:  4WW    Follow-up & Discharge Instructions:  Follow up with your primary care provider (PCP) within 7-10 days of discharge to review your medications and take over your care.     If you develop chest pain, fever, chills, change in neurologic function (weakness, sensation changes, vision changes), or other concerning sxs, seek immediate medical attention or call 911.      Condition on Discharge:  Good    More than 31 minutes was spent on discharging this patient, including face-to-face time, prescription management, and the dictation of this note.    Jim Parker MD    Date of Service: 6/5/2021

## 2021-06-04 NOTE — CARE PLAN
Problem: Mobility  Goal: STG-Within one week, patient will ambulate household distance of 150 feet with FWW at SBA level.  Outcome: Met  Goal: STG-Within one week, patient will ascend and descend four to six stairs with B railings at Min A level.  Outcome: Met     Problem: Mobility Transfers  Goal: STG-Within one week, patient will transfer bed to chair with FWW at CGA level.  Outcome: Met  Goal: STG-Within one week, patient will transfer in/out of car with FWW at Min A level.  Outcome: Met     Problem: PT-Long Term Goals  Goal: LTG-By discharge, patient will ambulate 300 feet at SBA level with a FWW or SPC.  Outcome: Discharged - Not Met  Goal: LTG-By discharge, patient will transfer one surface to another at SPV level with a FWW or SPC.  Outcome: Met  Goal: LTG-By discharge, patient will ambulate up/down 4-6 stairs at SBA level with 1-2 railings.  Outcome: Met  Goal: LTG-By discharge, patient will transfer in/out of a car at SBA level with a FWW or SPC.  Outcome: Discharged - Not Met

## 2021-06-05 VITALS
RESPIRATION RATE: 17 BRPM | HEART RATE: 88 BPM | SYSTOLIC BLOOD PRESSURE: 111 MMHG | OXYGEN SATURATION: 93 % | DIASTOLIC BLOOD PRESSURE: 65 MMHG | TEMPERATURE: 96.9 F | WEIGHT: 293 LBS | HEIGHT: 63 IN | BODY MASS INDEX: 51.91 KG/M2

## 2021-06-05 PROCEDURE — 99239 HOSP IP/OBS DSCHRG MGMT >30: CPT | Performed by: PHYSICAL MEDICINE & REHABILITATION

## 2021-06-05 PROCEDURE — 700102 HCHG RX REV CODE 250 W/ 637 OVERRIDE(OP): Performed by: PHYSICAL MEDICINE & REHABILITATION

## 2021-06-05 PROCEDURE — A9270 NON-COVERED ITEM OR SERVICE: HCPCS | Performed by: PHYSICAL MEDICINE & REHABILITATION

## 2021-06-05 RX ADMIN — THYROID, PORCINE 90 MG: 30 TABLET ORAL at 09:09

## 2021-06-05 RX ADMIN — MAGNESIUM OXIDE TAB 400 MG (241.3 MG ELEMENTAL MG) 400 MG: 400 (241.3 MG) TAB at 09:10

## 2021-06-05 RX ADMIN — IBUPROFEN 400 MG: 400 TABLET ORAL at 09:10

## 2021-06-05 RX ADMIN — AMLODIPINE BESYLATE 5 MG: 5 TABLET ORAL at 09:09

## 2021-06-05 RX ADMIN — SENNOSIDES AND DOCUSATE SODIUM 2 TABLET: 8.6; 5 TABLET ORAL at 09:09

## 2021-06-05 ASSESSMENT — ACTIVITIES OF DAILY LIVING (ADL)
SHOWER_TRANSFER_LEVEL_OF_ASSIST: REQUIRES SUPERVISION WITH SHOWER TRANSFER
TOILETING_LEVEL_OF_ASSIST: ABLE TO COMPLETE TOILETING WITHOUT ASSIST
TOILET_TRANSFER_LEVEL_OF_ASSIST: ABLE TO COMPLETE TOILET TRANSFER WITHOUT ASSIST

## 2021-06-05 ASSESSMENT — PAIN DESCRIPTION - PAIN TYPE: TYPE: CHRONIC PAIN

## 2021-06-05 ASSESSMENT — PATIENT HEALTH QUESTIONNAIRE - PHQ9
2. FEELING DOWN, DEPRESSED, IRRITABLE, OR HOPELESS: NOT AT ALL
SUM OF ALL RESPONSES TO PHQ9 QUESTIONS 1 AND 2: 0
1. LITTLE INTEREST OR PLEASURE IN DOING THINGS: NOT AT ALL

## 2021-06-05 NOTE — DISCHARGE INSTRUCTIONS
[facility] NURSING DISCHARGE INSTRUCTIONS    Blood Pressure: 116/64  Weight: (!) 136 kg (300 lb 12.8 oz)  Nursing recommendations for Mady Yang at time of discharge are as follows:  Client verbalized understanding of all discharge instructions and prescriptions.     Review all your home medications and newly ordered medications with your doctor and/or pharmacist. Follow medication instructions as directed by your doctor and/or pharmacist.    Pain Management:   Discharge Pain Medication Instructions:  Comfort Goal: Sleep Comfortably, Comfort with Movement  Notify your primary care provider if pain is unrelieved with these measures, if the pain is new, or increased in intensity.    Discharge Skin Characteristics: Dry, Warm  Discharge Skin Exam: Clear             Skin / Wound Care Instructions: Please contact your primary care physician for any change in skin integrity.     If You Have Surgical Incisions / Wounds:  Monitor surgical site(s) for signs of increased swelling, redness or symptoms of drainage from the site or fever as this could indicate signs and symptoms of infection. If these symptoms are noted, notifiy your primary care provider.      Discharge Safety Instructions: Should Not Be Left Alone In The House     Discharge Safety Concerns: Weakness  The interdisciplinary team has made recommendation that you should not be left alone  in the house due to weakness  Anti-embolic stockings are not required to increase circulation to the lower extremities.    Discharge Diet: Diabetic diet     Discharge Liquids: Thin liquids  Discharge Bowel Function: Continent  Please contact your primary care physician for any changes in bowel habits.  Discharge Bowel Program:    Discharge Bladder Function: Continent  Discharge Urinary Devices: None      Nursing Discharge Plan:       Diabetes Basics    Diabetes (diabetes mellitus) is a long-term (chronic) disease. It occurs when the body does not  properly use sugar (glucose) that is released from food after you eat.  Diabetes may be caused by one or both of these problems:  · Your pancreas does not make enough of a hormone called insulin.  · Your body does not react in a normal way to insulin that it makes.  Insulin lets sugars (glucose) go into cells in your body. This gives you energy. If you have diabetes, sugars cannot get into cells. This causes high blood sugar (hyperglycemia).  Follow these instructions at home:  How is diabetes treated?  You may need to take insulin or other diabetes medicines daily to keep your blood sugar in balance. Take your diabetes medicines every day as told by your doctor. List your diabetes medicines here:  Diabetes medicines  · Name of medicine: ______________________________  ? Amount (dose): _______________ Time (a.m./p.m.): _______________ Notes: ___________________________________  · Name of medicine: ______________________________  ? Amount (dose): _______________ Time (a.m./p.m.): _______________ Notes: ___________________________________  · Name of medicine: ______________________________  ? Amount (dose): _______________ Time (a.m./p.m.): _______________ Notes: ___________________________________  If you use insulin, you will learn how to give yourself insulin by injection. You may need to adjust the amount based on the food that you eat. List the types of insulin you use here:  Insulin  · Insulin type: ______________________________  ? Amount (dose): _______________ Time (a.m./p.m.): _______________ Notes: ___________________________________  · Insulin type: ______________________________  ? Amount (dose): _______________ Time (a.m./p.m.): _______________ Notes: ___________________________________  · Insulin type: ______________________________  ? Amount (dose): _______________ Time (a.m./p.m.): _______________ Notes: ___________________________________  · Insulin type: ______________________________  ? Amount  (dose): _______________ Time (a.m./p.m.): _______________ Notes: ___________________________________  · Insulin type: ______________________________  ? Amount (dose): _______________ Time (a.m./p.m.): _______________ Notes: ___________________________________  How do I manage my blood sugar?    Check your blood sugar levels using a blood glucose monitor as directed by your doctor.  Your doctor will set treatment goals for you. Generally, you should have these blood sugar levels:  · Before meals (preprandial):  mg/dL (4.4-7.2 mmol/L).  · After meals (postprandial): below 180 mg/dL (10 mmol/L).  · A1c level: less than 7%.  Write down the times that you will check your blood sugar levels:  Blood sugar checks  · Time: _______________ Notes: ___________________________________  · Time: _______________ Notes: ___________________________________  · Time: _______________ Notes: ___________________________________  · Time: _______________ Notes: ___________________________________  · Time: _______________ Notes: ___________________________________  · Time: _______________ Notes: ___________________________________    What do I need to know about low blood sugar?  Low blood sugar is called hypoglycemia. This is when blood sugar is at or below 70 mg/dL (3.9 mmol/L). Symptoms may include:  · Feeling:  ? Hungry.  ? Worried or nervous (anxious).  ? Sweaty and clammy.  ? Confused.  ? Dizzy.  ? Sleepy.  ? Sick to your stomach (nauseous).  · Having:  ? A fast heartbeat.  ? A headache.  ? A change in your vision.  ? Tingling or no feeling (numbness) around the mouth, lips, or tongue.  ? Jerky movements that you cannot control (seizure).  · Having trouble with:  ? Moving (coordination).  ? Sleeping.  ? Passing out (fainting).  ? Getting upset easily (irritability).  Treating low blood sugar  To treat low blood sugar, eat or drink something sugary right away. If you can think clearly and swallow safely, follow the 15:15  rule:  · Take 15 grams of a fast-acting carb (carbohydrate). Talk with your doctor about how much you should take.  · Some fast-acting carbs are:  ? Sugar tablets (glucose pills). Take 3-4 glucose pills.  ? 6-8 pieces of hard candy.  ? 4-6 oz (120-150 mL) of fruit juice.  ? 4-6 oz (120-150 mL) of regular (not diet) soda.  ? 1 Tbsp (15 mL) honey or sugar.  · Check your blood sugar 15 minutes after you take the carb.  · If your blood sugar is still at or below 70 mg/dL (3.9 mmol/L), take 15 grams of a carb again.  · If your blood sugar does not go above 70 mg/dL (3.9 mmol/L) after 3 tries, get help right away.  · After your blood sugar goes back to normal, eat a meal or a snack within 1 hour.  Treating very low blood sugar  If your blood sugar is at or below 54 mg/dL (3 mmol/L), you have very low blood sugar (severe hypoglycemia). This is an emergency. Do not wait to see if the symptoms will go away. Get medical help right away. Call your local emergency services (911 in the [place] Do not drive yourself to the hospital.  Questions to ask your health care provider  · Do I need to meet with a diabetes educator?  · What equipment will I need to care for myself at home?  · What diabetes medicines do I need? When should I take them?  · How often do I need to check my blood sugar?  · What number can I call if I have questions?  · When is my next doctor's visit?  · Where can I find a support group for people with diabetes?  Where to find more information  · American Diabetes Association: www.diabetes.org  · American Association of Diabetes Educators: www.diabeteseducator.org/patient-resources  Contact a doctor if:  · Your blood sugar is at or above 240 mg/dL (13.3 mmol/L) for 2 days in a row.  · You have been sick or have had a fever for 2 days or more, and you are not getting better.  · You have any of these problems for more than 6 hours:  ? You cannot eat or drink.  ? You feel sick to your stomach (nauseous).  ? You throw  up (vomit).  ? You have watery poop (diarrhea).  Get help right away if:  · Your blood sugar is lower than 54 mg/dL (3 mmol/L).  · You get confused.  · You have trouble:  ? Thinking clearly.  ? Breathing.  Summary  · Diabetes (diabetes mellitus) is a long-term (chronic) disease. It occurs when the body does not properly use sugar (glucose) that is released from food after digestion.  · Take insulin and diabetes medicines as told.  · Check your blood sugar every day, as often as told.  · Keep all follow-up visits as told by your doctor. This is important.  This information is not intended to replace advice given to you by your health care provider. Make sure you discuss any questions you have with your health care provider.  Document Released: 03/22/2019 Document Revised: 02/07/2020 Document Reviewed: 03/22/2019  Elsevier Patient Education © 2020 [employer]        Fall Prevention in the Home, Adult  Falls can cause injuries. They can happen to people of all ages. There are many things you can do to make your home safe and to help prevent falls. Ask for help when making these changes, if needed.  What actions can I take to prevent falls?  General Instructions  · Use good lighting in all rooms. Replace any light bulbs that burn out.  · Turn on the lights when you go into a dark area. Use night-lights.  · Keep items that you use often in easy-to-reach places. Lower the shelves around your home if necessary.  · Set up your furniture so you have a clear path. Avoid moving your furniture around.  · Do not have throw rugs and other things on the floor that can make you trip.  · Avoid walking on wet floors.  · If any of your floors are uneven, fix them.  · Add color or contrast paint or tape to clearly louise and help you see:  ? Any grab bars or handrails.  ? First and last steps of stairways.  ? Where the edge of each step is.  · If you use a stepladder:  ? Make sure that it is fully opened. Do not climb a closed  stepladder.  ? Make sure that both sides of the stepladder are locked into place.  ? Ask someone to hold the stepladder for you while you use it.  · If there are any pets around you, be aware of where they are.  What can I do in the bathroom?         · Keep the floor dry. Clean up any water that spills onto the floor as soon as it happens.  · Remove soap buildup in the tub or shower regularly.  · Use non-skid mats or decals on the floor of the tub or shower.  · Attach bath mats securely with double-sided, non-slip rug tape.  · If you need to sit down in the shower, use a plastic, non-slip stool.  · Install grab bars by the toilet and in the tub and shower. Do not use towel bars as grab bars.  What can I do in the bedroom?  · Make sure that you have a light by your bed that is easy to reach.  · Do not use any sheets or blankets that are too big for your bed. They should not hang down onto the floor.  · Have a firm chair that has side arms. You can use this for support while you get dressed.  What can I do in the kitchen?  · Clean up any spills right away.  · If you need to reach something above you, use a strong step stool that has a grab bar.  · Keep electrical cords out of the way.  · Do not use floor polish or wax that makes floors slippery. If you must use wax, use non-skid floor wax.  What can I do with my stairs?  · Do not leave any items on the stairs.  · Make sure that you have a light switch at the top of the stairs and the bottom of the stairs. If you do not have them, ask someone to add them for you.  · Make sure that there are handrails on both sides of the stairs, and use them. Fix handrails that are broken or loose. Make sure that handrails are as long as the stairways.  · Install non-slip stair treads on all stairs in your home.  · Avoid having throw rugs at the top or bottom of the stairs. If you do have throw rugs, attach them to the floor with carpet tape.  · Choose a carpet that does not hide the  edge of the steps on the stairway.  · Check any carpeting to make sure that it is firmly attached to the stairs. Fix any carpet that is loose or worn.  What can I do on the outside of my home?  · Use bright outdoor lighting.  · Regularly fix the edges of walkways and driveways and fix any cracks.  · Remove anything that might make you trip as you walk through a door, such as a raised step or threshold.  · Trim any bushes or trees on the path to your home.  · Regularly check to see if handrails are loose or broken. Make sure that both sides of any steps have handrails.  · Install guardrails along the edges of any raised decks and porches.  · Clear walking paths of anything that might make someone trip, such as tools or rocks.  · Have any leaves, snow, or ice cleared regularly.  · Use sand or salt on walking paths during winter.  · Clean up any spills in your garage right away. This includes grease or oil spills.  What other actions can I take?  · Wear shoes that:  ? Have a low heel. Do not wear high heels.  ? Have rubber bottoms.  ? Are comfortable and fit you well.  ? Are closed at the toe. Do not wear open-toe sandals.  · Use tools that help you move around (mobility aids) if they are needed. These include:  ? Canes.  ? Walkers.  ? Scooters.  ? Crutches.  · Review your medicines with your doctor. Some medicines can make you feel dizzy. This can increase your chance of falling.  Ask your doctor what other things you can do to help prevent falls.  Where to find more information  · Centers for Disease Control and Prevention, STEADI: https://cdc.gov  · National [place] on Aging: https://zd5mwlo.lynn.nih.gov  Contact a doctor if:  · You are afraid of falling at home.  · You feel weak, drowsy, or dizzy at home.  · You fall at home.  Summary  · There are many simple things that you can do to make your home safe and to help prevent falls.  · Ways to make your home safe include removing tripping hazards and installing grab  bars in the bathroom.  · Ask for help when making these changes in your home.  This information is not intended to replace advice given to you by your health care provider. Make sure you discuss any questions you have with your health care provider.  Document Released: 10/14/2010 Document Revised: 04/09/2020 Document Reviewed: 08/02/2018  Elsevier Patient Education © 2020 [email or web address]         Case Management Discharge Instructions:   Discharge Location: Home with Outpatient Services  Agency Name/Address/Phone: [facility]   [address], [place], [place] [id number] [phone]  Home Health: Physical Therapist  Outpatient Services:    DME Provider/Phone: [facility] [employer]  [phone]  Medical Equipment Ordered: Front Wheel Walker  Prescription Faxed to:        Discharge Medication Instructions:  Below are the medications your physician expects you to take upon discharge:    Fall Prevention in the Home, Adult  Falls can cause injuries and can affect people from all age groups. There are many simple things that you can do to make your home safe and to help prevent falls. Ask for help when making these changes, if needed.  What actions can I take to prevent falls?  General instructions  · Use good lighting in all rooms. Replace any light bulbs that burn out.  · Turn on lights if it is dark. Use night-lights.  · Place frequently used items in easy-to-reach places. Lower the shelves around your home if necessary.  · Set up furniture so that there are clear paths around it. Avoid moving your furniture around.  · Remove throw rugs and other tripping hazards from the floor.  · Avoid walking on wet floors.  · Fix any uneven floor surfaces.  · Add color or contrast paint or tape to grab bars and handrails in your home. Place contrasting color strips on the first and last steps of stairways.  · When you use a stepladder, make sure that it is completely opened and that the sides are firmly locked. Have someone hold  the ladder while you are using it. Do not climb a closed stepladder.  · Be aware of any and all pets.  What can I do in the bathroom?         · Keep the floor dry. Immediately clean up any water that spills onto the floor.  · Remove soap buildup in the tub or shower on a regular basis.  · Use non-skid mats or decals on the floor of the tub or shower.  · Attach bath mats securely with double-sided, non-slip rug tape.  · If you need to sit down while you are in the shower, use a plastic, non-slip stool.  · Install grab bars by the toilet and in the tub and shower. Do not use towel bars as grab bars.  What can I do in the bedroom?  · Make sure that a bedside light is easy to reach.  · Do not use oversized bedding that drapes onto the floor.  · Have a firm chair that has side arms to use for getting dressed.  What can I do in the kitchen?  · Clean up any spills right away.  · If you need to reach for something above you, use a sturdy step stool that has a grab bar.  · Keep electrical cables out of the way.  · Do not use floor polish or wax that makes floors slippery. If you must use wax, make sure that it is non-skid floor wax.  What can I do in the stairways?  · Do not leave any items on the stairs.  · Make sure that you have a light switch at the top of the stairs and the bottom of the stairs. Have them installed if you do not have them.  · Make sure that there are handrails on both sides of the stairs. Fix handrails that are broken or loose. Make sure that handrails are as long as the stairways.  · Install non-slip stair treads on all stairs in your home.  · Avoid having throw rugs at the top or bottom of stairways, or secure the rugs with carpet tape to prevent them from moving.  · Choose a carpet design that does not hide the edge of steps on the stairway.  · Check any carpeting to make sure that it is firmly attached to the stairs. Fix any carpet that is loose or worn.  What can I do on the outside of my  home?  · Use bright outdoor lighting.  · Regularly repair the edges of walkways and driveways and fix any cracks.  · Remove high doorway thresholds.  · Trim any shrubbery on the main path into your home.  · Regularly check that handrails are securely fastened and in good repair. Both sides of any steps should have handrails.  · Install guardrails along the edges of any raised decks or porches.  · Clear walkways of debris and clutter, including tools and rocks.  · Have leaves, snow, and ice cleared regularly.  · Use sand or salt on walkways during winter months.  · In the garage, clean up any spills right away, including grease or oil spills.  What other actions can I take?  · Wear closed-toe shoes that fit well and support your feet. Wear shoes that have rubber soles or low heels.  · Use mobility aids as needed, such as canes, walkers, scooters, and crutches.  · Review your medicines with your health care provider. Some medicines can cause dizziness or changes in blood pressure, which increase your risk of falling.  Talk with your health care provider about other ways that you can decrease your risk of falls. This may include working with a physical therapist or  to improve your strength, balance, and endurance.  Where to find more information  · Centers for Disease Control and Prevention, STEADI: https://www.cdc.gov  · National [place] on Aging: https://ds1icmm.lynn.nih.gov  Contact a health care provider if:  · You are afraid of falling at home.  · You feel weak, drowsy, or dizzy at home.  · You fall at home.  Summary  · There are many simple things that you can do to make your home safe and to help prevent falls.  · Ways to make your home safe include removing tripping hazards and installing grab bars in the bathroom.  · Ask for help when making these changes in your home.  This information is not intended to replace advice given to you by your health care provider. Make sure you discuss any questions you  have with your health care provider.  Document Released: 12/08/2003 Document Revised: 11/30/2018 Document Reviewed: 08/02/2018  [employer] Patient Education © 2020 [employer]      Depression / Suicide Risk    As you are discharged from this [facility] facility, it is important to learn how to keep safe from harming yourself.    Recognize the warning signs:  · Abrupt changes in personality, positive or negative- including increase in energy   · Giving away possessions  · Change in eating patterns- significant weight changes-  positive or negative  · Change in sleeping patterns- unable to sleep or sleeping all the time   · Unwillingness or inability to communicate  · Depression  · Unusual sadness, discouragement and loneliness  · Talk of wanting to die  · Neglect of personal appearance   · Rebelliousness- reckless behavior  · Withdrawal from people/activities they love  · Confusion- inability to concentrate     If you or a loved one observes any of these behaviors or has concerns about self-harm, here's what you can do:  · Talk about it- your feelings and reasons for harming yourself  · Remove any means that you might use to hurt yourself (examples: pills, rope, extension cords, firearm)  · Get professional help from the community (Mental Health, Substance Abuse, psychological counseling)  · Do not be alone:Call your Safe Contact- someone whom you trust who will be there for you.  · Call your local CRISIS HOTLINE 677-7643 or 700-783-2964  · Call your local Children's Mobile Crisis Response Team [place] (397) 193-1719 or www.Nanjing Zhangmen  · Call the toll free National Suicide Prevention Hotlines   · National Suicide Prevention Lifeline 207-651-TGJW (4670)  · National Hope Line Network 800-SUICIDE (848-0029)    Physical Therapy Discharge Instructions for Mady Yagn    6/4/2021    Level of Assist Required for Ambulation: Supervision on Flat Surfaces, Supervision on Curbs, Supervision on  Stairs  Distance Patient May Ambulate: up to 150 feet as endurance allows  Device Recommended for Ambulation: Front-Wheeled Walker  Level of Assist Required for Transfers: Supervision (Assist for Balance with car transfers)  Device Recommended for Transfers: Front-Wheeled Walker  Home Exercise Program: Refer to Home Exercise Program Handout for Details  Prosthesis / Orthosis Recommendation / Location: No Prosthesis  or Orthosis Recommended    Occupational Therapy Discharge Instructions for Mady Yang    6/5/2021    Level of Assist Required for Eating: Able to Complete Eating without Assist  Level of Assist Required for Grooming: Able to Complete Grooming without Assist (Seated)  Level of Assist Required for Dressing: Able to Complete Dressing without Assist  Level of Assist Required for Toileting: Able to Complete Toileting without Assist  Level of Assist Required for Toilet Transfer: Able to Complete Toilet Transfer without Assist  Equipment for Toilet Transfer: Grab Bars by Toilet  Level of Assist Required for Bathing: Able to Complete Bathing without Assist  Equipment for Bathing: Shower Chair, Grab Bars in Tub / Shower, Hand Held Shower Head, Long Handled Sponge  Level of Assist Required for Shower Transfer: Requires Supervision with Shower Transfer  Equipment for Shower Transfer: Grab Bars in Tub / Shower, Shower Chair  Level of Assist Required for Home Mgmt: Requires Supervision with Home Management  Level of Assist Required for Meal Prep: Requires Supervision with Meal Preparation  Home Exercise Program: Refer to Home Exercise Program Handout for Details

## 2021-06-05 NOTE — PROGRESS NOTES
Patient discharged to home per order.  Reviewed all discharge instructions, appointments, discharge medications, and wound care instructions with patient and ; they verbalize understanding.  Education provided in discharge instructions about Diabetes and Home Safety and Fall Prevention. Discharge paperwork completed; signed copies in chart.  Patient has education binder and all belongings; signed copy in chart.  Pt alert, calm, stable; no change in status from morning assessment.  Patient left facility at 10:21 a.m. via wheelchair accompanied by ; escorted to car by staff.  Have enjoyed working with this pleasant patient.

## 2021-06-05 NOTE — CARE PLAN
The patient is Stable - Low risk of patient condition declining or worsening      Problem: Pain - Standard  Goal: Alleviation of pain or a reduction in pain to the patient’s comfort goal  Outcome: Progressing  Flowsheets  Taken 6/4/2021 2122 by Elijah Raines R.N.  Pain Rating Scale (NPRS): 0  Taken 6/4/2021 1327 by Tobias Real R.N.  Non Verbal Scale: Calm  Taken 6/2/2021 2107 by Elijah Raines R.N.  OB Pain Intervention: Medication - See MAR  Note: Patient does not complain of pain at this time. Will continue to monitor.     Problem: Skin Integrity  Goal: Skin integrity is maintained or improved  Outcome: Progressing  Note:   Patient's skin remains intact and free from new or accidental injury this shift; no s/s of infection. Will continue to monitor.

## 2021-08-18 ENCOUNTER — HOSPITAL ENCOUNTER (OUTPATIENT)
Dept: LAB | Facility: MEDICAL CENTER | Age: 66
End: 2021-08-18
Attending: INTERNAL MEDICINE
Payer: OTHER GOVERNMENT

## 2021-08-18 DIAGNOSIS — N18.30 STAGE 3 CHRONIC KIDNEY DISEASE: ICD-10-CM

## 2021-08-18 DIAGNOSIS — I10 ESSENTIAL HYPERTENSION: ICD-10-CM

## 2021-08-18 LAB
ANION GAP SERPL CALC-SCNC: 13 MMOL/L (ref 7–16)
BUN SERPL-MCNC: 18 MG/DL (ref 8–22)
CALCIUM SERPL-MCNC: 9.6 MG/DL (ref 8.5–10.5)
CHLORIDE SERPL-SCNC: 105 MMOL/L (ref 96–112)
CO2 SERPL-SCNC: 26 MMOL/L (ref 20–33)
CREAT SERPL-MCNC: 0.96 MG/DL (ref 0.5–1.4)
GLUCOSE SERPL-MCNC: 87 MG/DL (ref 65–99)
POTASSIUM SERPL-SCNC: 4.6 MMOL/L (ref 3.6–5.5)
SODIUM SERPL-SCNC: 144 MMOL/L (ref 135–145)

## 2021-08-18 PROCEDURE — 80048 BASIC METABOLIC PNL TOTAL CA: CPT

## 2021-08-18 PROCEDURE — 36415 COLL VENOUS BLD VENIPUNCTURE: CPT

## 2021-08-19 ENCOUNTER — HOSPITAL ENCOUNTER (OUTPATIENT)
Facility: MEDICAL CENTER | Age: 66
End: 2021-08-19
Attending: INTERNAL MEDICINE
Payer: OTHER GOVERNMENT

## 2021-08-19 LAB
CREAT UR-MCNC: 68.28 MG/DL
MICROALBUMIN UR-MCNC: <1.2 MG/DL
MICROALBUMIN/CREAT UR: NORMAL MG/G (ref 0–30)

## 2021-08-19 PROCEDURE — 82043 UR ALBUMIN QUANTITATIVE: CPT

## 2021-08-19 PROCEDURE — 82570 ASSAY OF URINE CREATININE: CPT

## 2021-09-03 ENCOUNTER — TELEPHONE (OUTPATIENT)
Dept: NEPHROLOGY | Facility: MEDICAL CENTER | Age: 66
End: 2021-09-03

## 2021-09-03 NOTE — TELEPHONE ENCOUNTER
Patient called Had labs done 8/19.    Has an appt 10/19 as she requested.  She says that your told her to call for results. So she wouldn't need to drive 5 hrs for this appt.    Please advise.

## 2021-11-16 NOTE — PROGRESS NOTES
"CC: Follow-up HENRI on CPAP    HPI:  Mady Yang is a 66 y.o.female  kindly referred by BEN Colvin and last seen by Akilah Coleman on 6/28/2019 when her AHI was normalized and her compliance was acceptable. Her PSG showed an AHI of 55/mamie saturation 84%. She is on auto titrating CPAP 12-20 cm water.    Today, 11/17/2021, her 30-day compliance is 100% for 9 hours and 29 minutes.  She is on auto titrating CPAP 12-20 cm water and has an average residual estimated apnea-hypopnea index of 0.3.  Her median pressure is 14.2 cm water and her median leak is 0.0 L/min.      The patient reports improved symptoms using positive airway pressure.  Has experienced no significant problems with mask fit, mask leak, pressure tolerance, excessive airway dryness, nasal congestion, aerophagia, or chest pain.      Significant comorbidities modifying factors include essential hypertension, stage III chronic kidney disease, BMI 50-59.9, former smoker, s/p ruptured appy 2021, hypothyroidism, essential hypertension, elevated blood pressure, up-to-date with SARS-CoV-2 vaccination aside from the booster, and need for 2021 influenza vaccination.      Patient Active Problem List    Diagnosis Date Noted   • Critical illness myopathy 05/26/2021   • Hyperglycemia 04/28/2021   • Hypothyroidism 04/27/2021   • Osteoarthritis 04/24/2018   • Essential hypertension 04/24/2018   • Stage 3 chronic kidney disease (HCC) 04/24/2018   • BMI 50.0-59.9, adult (HCC) 03/08/2018   • HENRI (obstructive sleep apnea) 01/20/2017       Past Medical History:   Diagnosis Date   • Back pain    • Chronic low back pain 09/25/2018   • Elevated blood sugar     States she was told not yet \"pre diabetic\" gets HbA1C   • Hypertension    • Kidney stone 1980's   • Obesity    • HENRI (obstructive sleep apnea) 1/20/2017    AHI 55.7, minimum saturation 84%, on CPAP 13 cm.   • Osteoporosis    • Post-nasal drip    • Seasonal allergies    • Sleep apnea    • Thyroid disease     "     Past Surgical History:   Procedure Laterality Date   • PB LAP,APPENDECTOMY N/A 2021    Procedure: APPENDECTOMY, LAPAROSCOPIC; converted to open appendectomy;  Surgeon: Jett Ruiz M.D.;  Location: SURGERY [facility];  Service: General   • COLONOSCOPY  2018    Procedure: COLONOSCOPY;  Surgeon: Milan Lipscomb M.D.;  Location: SURGERY [facility];  Service: EUS   • COLONOSCOPY FLEXIBLE W/BIOPSY  2018    Procedure: COLONOSCOPY FLEXIBLE W/BIOPSY - POSSIBLE, DILATION, POLYPECTOMY, CONTROL OF HEMORRHAGE;  Surgeon: Milan Lipscomb M.D.;  Location: SURGERY [facility];  Service: EUS   • COLONOSCOPY     • COLONOSCOPY     • CLOSED MANIPULATION ELBOW  1967    For fracture-no hardware   • CHOLECYSTECTOMY     • DENTAL EXTRACTION(S)      wisdom teeth       Family History   Problem Relation Age of Onset   • Heart Failure Father    • Other Brother         leukemia   • Sleep Apnea Neg Hx        Social History     Socioeconomic History   • Marital status:      Spouse name: Not on file   • Number of children: Not on file   • Years of education: Not on file   • Highest education level: Not on file   Occupational History   • Not on file   Tobacco Use   • Smoking status: Former Smoker     Packs/day: 0.50     Years: 30.00     Pack years: 15.00     Types: Cigarettes     Quit date: 2017     Years since quittin.0   • Smokeless tobacco: Never Used   Vaping Use   • Vaping Use: Never used   Substance and Sexual Activity   • Alcohol use: Not Currently     Alcohol/week: 0.0 oz     Comment: 1-2 per month   • Drug use: No   • Sexual activity: Not on file   Other Topics Concern   • Not on file   Social History Narrative   • Not on file     Social Determinants of Health     Financial Resource Strain:    • Difficulty of Paying Living Expenses: Not on file   Food Insecurity:    • Worried About Running Out of Food in the Last Year: Not on file   • Ran Out of Food in the Last Year:  "Not on file   Transportation Needs:    • Lack of Transportation (Medical): Not on file   • Lack of Transportation (Non-Medical): Not on file   Physical Activity:    • Days of Exercise per Week: Not on file   • Minutes of Exercise per Session: Not on file   Stress:    • Feeling of Stress : Not on file   Social Connections:    • Frequency of Communication with Friends and Family: Not on file   • Frequency of Social Gatherings with Friends and Family: Not on file   • Attends [employer] Services: Not on file   • Active Member of Clubs or Organizations: Not on file   • Attends Club or Organization Meetings: Not on file   • Marital Status: Not on file   Intimate Partner Violence:    • Fear of Current or Ex-Partner: Not on file   • Emotionally Abused: Not on file   • Physically Abused: Not on file   • Sexually Abused: Not on file   Housing Stability:    • Unable to Pay for Housing in the Last Year: Not on file   • Number of Places Lived in the Last Year: Not on file   • Unstable Housing in the Last Year: Not on file       Current Outpatient Medications   Medication Sig Dispense Refill   • amLODIPine (NORVASC) 10 MG Tab Take 0.5 Tablets by mouth every day. 30 tablet 2   • thyroid (ARMOUR THYROID) 90 MG Tab Take 1 tablet by mouth every day. 30 tablet 2   • Cyanocobalamin (VITAMIN B 12 PO) Take  by mouth.     • BIOTIN PO Take  by mouth.     • Barberry-Oreg Grape-Goldenseal (BERBERINE COMPLEX PO) Take  by mouth.     • ibuprofen (MOTRIN) 600 MG Tab Take 800 mg by mouth every 6 hours as needed.     • Cholecalciferol (VITAMIN D3) 5000 units Cap Take 1 Cap by mouth every day.     • Krill Oil 500 MG Cap Take 1 Cap by mouth every day.     • Magnesium 400 MG Cap Take 1 Cap by mouth every day.     • Lutein-Zeaxanthin (OCUVITE LUTEIN 25) 25-5 MG Cap Take 1 Cap by mouth every day.       No current facility-administered medications for this visit.    \"CURRENT RX\"    ALLERGIES: Patient has no known allergies.    ROS    Constitutional: " "Denies fever, chills, sweats,  weight loss, fatigue  Cardiovascular: Denies chest pain, tightness, palpitations, swelling in legs/feet, fainting, difficulty breathing when lying down but gets better when sitting up.   Respiratory: Denies shortness of breath, cough, sputum, wheezing, painful breathing, coughing up blood.   Sleep: per HPI  Gastrointestinal: Denies  difficulty swallowing, nausea, abdominal pain, diarrhea, constipation, heartburn.  Musculoskeletal: Denies painful joints, sore muscles, back pain.        PHYSICAL EXAM  Obese    /64 (BP Location: Left arm, Patient Position: Sitting, BP Cuff Size: Large adult)   Pulse (!) 112   Resp 16   Ht 1.6 m (5' 3\")   Wt (!) 144 kg (318 lb)   SpO2 93%   BMI 56.33 kg/m²   Appearance: Well-nourished, well-developed, no acute distress  Eyes:  PERRLA, EOMI  ENMT: masked  Neck: Supple, trachea midline, no masses  Respiratory effort:  No intercostal retractions or use of accessory muscles  Lung auscultation:  No wheezes rhonchi rubs or rales  Cardiac: No murmurs, rubs, or gallops; regular rhythm, normal rate; no edema  Abdomen:  No tenderness, no organomegaly.  Obese  Musculoskeletal:  Grossly normal; gait and station normal; digits and nails normal  Skin:  No rashes, petechiae, cyanosis  Neurologic: without focal signs; oriented to person, time, place, and purpose; judgement intact  Psychiatric:  No depression, anxiety, agitation      Medical Decision Making       The medical record was reviewed in its entirety including the referral notes, records from primary care, consultants notes, hospital records, lab, imaging, microbiology, immunology, and immunizations.  Care gaps identified and reviewed with the patient.    Diagnostic and titration nocturnal polysomnogram's, home sleep apnea tests, continuous nocturnal oximetry results, multiple sleep latency tests, and compliance reports reviewed.  1. HENRI (obstructive sleep apnea)  - DME Mask and Supplies      PLAN: "   Has been advised to continue the current  APAP 12-20 cm water, clean equipment frequently, and get new mask and supplies as allowed by insurance and DME. Advised about potential problems including nasal obstruction/stuffiness, mask leak or discomfort , frequent awakenings, chill or dryness of the upper airway, noise, inconvenience, and lack of benefit. Recommend an earlier appointment, if significant treatment barriers develop.    The risks of untreated sleep apnea were discussed with the patient at length. Patients with HENRI are at increased risk of cardiovascular disease including coronary artery disease, systemic arterial hypertension, pulmonary arterial hypertension, cardiac arrythmias, and stroke. HENRI patients have an increased risk of motor vehicle accidents, type 2 diabetes, chronic kidney disease, and non-alcoholic liver disease. The patient was advised to avoid driving a motor vehicle when drowsy.    Positive airway pressure will favorably impact many of the adverse conditions and effects provoked by HENRI.    Have advised the patient to follow up with the appropriate healthcare practitioners for all other medical problems and issues.    Mask wearing, handwashing, and social distancing protocols reviewed and encouraged.    Return in about 1 year (around 11/17/2022).      Total time 30 minutes      - OBESITY COUNSELING (No Charge): Patient identified as having weight management issue.  Appropriate orders and counseling given.    If your BMI is 25-29.9 you are overweight. If your BMI is 30 or greater you are obese. To lose weight eat less, move more, or both. Any diet that reduces caloric intake can help with weight loss. Extra weight may reduce your lifespan. Avoid dramatic unsustainable dietary changes that result in the yo-yo effect (down then back up.)  Usually small modifications in diet and exercise are easier to stick with.

## 2021-11-17 ENCOUNTER — OFFICE VISIT (OUTPATIENT)
Dept: SLEEP MEDICINE | Facility: MEDICAL CENTER | Age: 66
End: 2021-11-17
Payer: OTHER GOVERNMENT

## 2021-11-17 VITALS
HEART RATE: 112 BPM | DIASTOLIC BLOOD PRESSURE: 64 MMHG | OXYGEN SATURATION: 93 % | BODY MASS INDEX: 51.91 KG/M2 | WEIGHT: 293 LBS | HEIGHT: 63 IN | SYSTOLIC BLOOD PRESSURE: 128 MMHG | RESPIRATION RATE: 16 BRPM

## 2021-11-17 DIAGNOSIS — G47.33 OSA (OBSTRUCTIVE SLEEP APNEA): ICD-10-CM

## 2021-11-17 PROCEDURE — 99214 OFFICE O/P EST MOD 30 MIN: CPT | Performed by: INTERNAL MEDICINE

## 2021-11-17 ASSESSMENT — FIBROSIS 4 INDEX: FIB4 SCORE: 1.73

## 2022-06-20 ENCOUNTER — TELEPHONE (OUTPATIENT)
Dept: NEPHROLOGY | Facility: MEDICAL CENTER | Age: 67
End: 2022-06-20

## 2022-06-27 DIAGNOSIS — N18.30 STAGE 3 CHRONIC KIDNEY DISEASE, UNSPECIFIED WHETHER STAGE 3A OR 3B CKD: ICD-10-CM

## 2022-08-03 ENCOUNTER — OFFICE VISIT (OUTPATIENT)
Dept: NEPHROLOGY | Facility: MEDICAL CENTER | Age: 67
End: 2022-08-03
Payer: OTHER GOVERNMENT

## 2022-08-03 VITALS
HEART RATE: 91 BPM | BODY MASS INDEX: 51.91 KG/M2 | WEIGHT: 293 LBS | OXYGEN SATURATION: 95 % | SYSTOLIC BLOOD PRESSURE: 122 MMHG | TEMPERATURE: 97.4 F | HEIGHT: 63 IN | DIASTOLIC BLOOD PRESSURE: 68 MMHG

## 2022-08-03 DIAGNOSIS — I10 ESSENTIAL HYPERTENSION: ICD-10-CM

## 2022-08-03 DIAGNOSIS — N18.9 CHRONIC KIDNEY DISEASE, UNSPECIFIED CKD STAGE: ICD-10-CM

## 2022-08-03 DIAGNOSIS — M19.90 OSTEOARTHRITIS, UNSPECIFIED OSTEOARTHRITIS TYPE, UNSPECIFIED SITE: ICD-10-CM

## 2022-08-03 PROCEDURE — 99214 OFFICE O/P EST MOD 30 MIN: CPT | Performed by: INTERNAL MEDICINE

## 2022-08-03 RX ORDER — AMLODIPINE BESYLATE 5 MG/1
TABLET ORAL
COMMUNITY
Start: 2022-06-09

## 2022-08-03 RX ORDER — CETIRIZINE HYDROCHLORIDE 10 MG/1
TABLET ORAL
COMMUNITY
Start: 2022-07-29

## 2022-08-03 RX ORDER — IBUPROFEN 400 MG/1
TABLET ORAL
COMMUNITY
Start: 2022-06-09

## 2022-08-03 ASSESSMENT — ENCOUNTER SYMPTOMS
HYPERTENSION: 1
CHILLS: 0
NAUSEA: 0
FEVER: 0
VOMITING: 0
COUGH: 0
SHORTNESS OF BREATH: 0

## 2022-08-03 ASSESSMENT — FIBROSIS 4 INDEX: FIB4 SCORE: 1.73

## 2022-08-03 NOTE — PROGRESS NOTES
"Subjective     Mady Yang is a 66 y.o. female who presents with Hypertension and Chronic Kidney Disease            Hypertension  This is a chronic problem. The current episode started more than 1 year ago. The problem is unchanged. The problem is controlled. Pertinent negatives include no chest pain, malaise/fatigue, peripheral edema or shortness of breath. Risk factors for coronary artery disease include obesity and post-menopausal state. Past treatments include calcium channel blockers. The current treatment provides significant improvement. There are no compliance problems.  Hypertensive end-organ damage includes kidney disease. Identifiable causes of hypertension include chronic renal disease.   Chronic Kidney Disease  This is a chronic problem. The current episode started more than 1 year ago. The problem occurs constantly. The problem has been unchanged. Pertinent negatives include no chest pain, chills, coughing, fever, nausea, urinary symptoms or vomiting.       Review of Systems   Constitutional: Negative for chills, fever and malaise/fatigue.   Respiratory: Negative for cough and shortness of breath.    Cardiovascular: Negative for chest pain and leg swelling.   Gastrointestinal: Negative for nausea and vomiting.   Genitourinary: Negative for dysuria, frequency and urgency.   Musculoskeletal: Positive for joint pain.              Objective     /68 (BP Location: Right arm, Patient Position: Sitting, BP Cuff Size: Large adult)   Pulse 91   Temp 36.3 °C (97.4 °F) (Temporal)   Ht 1.6 m (5' 3\")   Wt (!) 146 kg (322 lb)   SpO2 95%   BMI 57.04 kg/m²      Physical Exam  Vitals and nursing note reviewed.   Constitutional:       General: She is not in acute distress.     Appearance: Normal appearance. She is well-developed. She is not diaphoretic.   HENT:      Head: Normocephalic and atraumatic.      Right Ear: External ear normal.      Left Ear: External ear normal.      Nose: Nose normal. " "  Eyes:      General: No scleral icterus.        Right eye: No discharge.         Left eye: No discharge.      Conjunctiva/sclera: Conjunctivae normal.   Cardiovascular:      Rate and Rhythm: Normal rate and regular rhythm.      Heart sounds: No murmur heard.  Pulmonary:      Effort: Pulmonary effort is normal. No respiratory distress.      Breath sounds: Normal breath sounds.   Musculoskeletal:         General: No tenderness.      Right lower leg: No edema.      Left lower leg: No edema.   Skin:     General: Skin is warm and dry.      Findings: No erythema.   Neurological:      General: No focal deficit present.      Mental Status: She is alert and oriented to person, place, and time.      Cranial Nerves: No cranial nerve deficit.   Psychiatric:         Mood and Affect: Mood normal.         Behavior: Behavior normal.       Past Medical History:   Diagnosis Date   • Back pain    • Chronic low back pain 2018   • Elevated blood sugar     States she was told not yet \"pre diabetic\" gets HbA1C   • Hypertension    • Kidney stone    • Obesity    • HENRI (obstructive sleep apnea) 2017    AHI 55.7, minimum saturation 84%, on CPAP 13 cm.   • Osteoporosis    • Post-nasal drip    • Seasonal allergies    • Sleep apnea    • Thyroid disease      Social History     Socioeconomic History   • Marital status:      Spouse name: Not on file   • Number of children: Not on file   • Years of education: Not on file   • Highest education level: Not on file   Occupational History   • Not on file   Tobacco Use   • Smoking status: Former Smoker     Packs/day: 0.50     Years: 30.00     Pack years: 15.00     Types: Cigarettes     Quit date: 2017     Years since quittin.7   • Smokeless tobacco: Never Used   Vaping Use   • Vaping Use: Never used   Substance and Sexual Activity   • Alcohol use: Not Currently     Alcohol/week: 0.0 oz     Comment: 1-2 per month   • Drug use: No   • Sexual activity: Not on file   Other " Topics Concern   • Not on file   Social History Narrative   • Not on file     Social Determinants of Health     Financial Resource Strain: Not on file   Food Insecurity: Not on file   Transportation Needs: Not on file   Physical Activity: Not on file   Stress: Not on file   Social Connections: Not on file   Intimate Partner Violence: Not on file   Housing Stability: Not on file     Family History   Problem Relation Age of Onset   • Heart Failure Father    • Other Brother         leukemia   • Sleep Apnea Neg Hx      Recent Labs     08/18/21  1146   SODIUM 144   POTASSIUM 4.6   CHLORIDE 105   CO2 26   BUN 18   CREATININE 0.96     Recent labs from August 5, 2022 were reviewed showed creatinine 0.99 mg/dL                        Assessment & Plan        1. Essential hypertension  Controlled  Continue same medication regimen  Continue low-sodium diet      2. Stage 3 chronic kidney disease, unspecified stage  Stable  No uremic symptoms  Renal dose of medication  Avoid nephrotoxins  Continue same medication regimen  Check labs annually  Follow-up in the clinic on as-needed basis    3. Osteoarthritis, unspecified osteoarthritis type, unspecified site  Patient was advised to avoid NSAIDs

## 2023-01-09 NOTE — PROGRESS NOTES
"CC: Follow-up HENRI on CPAP       HPI:    Mady Yang is a 67 y.o.female from [facility]  and last seen by me on 11 February 2021 when her 30-day compliance 100% for 9 hours and 29 minutes.  On auto titrating CPAP 12-20 cm water she had an average residual estimated apnea-hypopnea index of 0.3, a median pressure of 13.2 cm water, and the median leak is 0.0 L/min.       Today, 1/10/2023, her 30-day compliance is 100% for 9 hours and 4 minutes.  On auto titrating CPAP 12-20 cm water average residual estimated apnea-hypopnea index is a normal 0.5.  Her median pressure is 15 cm water and her maximum pressure is 90.9 cm water.  Her median leak is 0.2 L/min       The patient reports improved symptoms using positive airway pressure.  Has experienced no significant problems with mask fit, mask leak, pressure tolerance, excessive airway dryness, nasal congestion, aerophagia, or chest pain.      Significant comorbidities modifying factors include essential hypertension, stage III chronic kidney disease, BMI 50-59.9, former smoker, s/p ruptured appy 2021, hypothyroidism, essential hypertension, elevated blood pressure, and multiple immunization care gaps.              Patient Active Problem List    Diagnosis Date Noted    Critical illness myopathy 05/26/2021    Hyperglycemia 04/28/2021    Hypothyroidism 04/27/2021    Osteoarthritis 04/24/2018    Essential hypertension 04/24/2018    Stage 3 chronic kidney disease (HCC) 04/24/2018    BMI 50.0-59.9, adult ([facility]) 03/08/2018    HENRI (obstructive sleep apnea) 01/20/2017       Past Medical History:   Diagnosis Date    Back pain     Chronic low back pain 09/25/2018    Elevated blood sugar     States she was told not yet \"pre diabetic\" gets HbA1C    Hypertension     Kidney stone 1980's    Obesity     HENRI (obstructive sleep apnea) 1/20/2017    AHI 55.7, minimum saturation 84%, on CPAP 13 cm.    Osteoporosis     Post-nasal drip     Seasonal allergies     Sleep apnea     " Thyroid disease         Past Surgical History:   Procedure Laterality Date    [place] LAP,APPENDECTOMY N/A 2021    Procedure: APPENDECTOMY, LAPAROSCOPIC; converted to open appendectomy;  Surgeon: Jett Ruiz M.D.;  Location: SURGERY [facility];  Service: General    COLONOSCOPY  2018    Procedure: COLONOSCOPY;  Surgeon: Milan Lipscomb M.D.;  Location: SURGERY [facility];  Service: EUS    COLONOSCOPY FLEXIBLE W/BIOPSY  2018    Procedure: COLONOSCOPY FLEXIBLE W/BIOPSY - POSSIBLE, DILATION, POLYPECTOMY, CONTROL OF HEMORRHAGE;  Surgeon: Milan Lipscomb M.D.;  Location: SURGERY [facility];  Service: EUS    COLONOSCOPY      COLONOSCOPY      CLOSED MANIPULATION ELBOW  1967    For fracture-no hardware    CHOLECYSTECTOMY      DENTAL EXTRACTION(S)      wisdom teeth       Family History   Problem Relation Age of Onset    Heart Failure Father     Other Brother         leukemia    Sleep Apnea Neg Hx        Social History     Socioeconomic History    Marital status:      Spouse name: Not on file    Number of children: Not on file    Years of education: Not on file    Highest education level: Not on file   Occupational History    Not on file   Tobacco Use    Smoking status: Former     Packs/day: 0.50     Years: 30.00     Pack years: 15.00     Types: Cigarettes     Quit date: 2017     Years since quittin.1    Smokeless tobacco: Never   Vaping Use    Vaping Use: Never used   Substance and Sexual Activity    Alcohol use: Not Currently     Comment: occ    Drug use: No    Sexual activity: Not on file   Other Topics Concern    Not on file   Social History Narrative    Not on file     Social Determinants of Health     Financial Resource Strain: Not on file   Food Insecurity: Not on file   Transportation Needs: Not on file   Physical Activity: Not on file   Stress: Not on file   Social Connections: Not on file   Intimate Partner Violence: Not on file   Housing Stability: Not  "on file       Current Outpatient Medications   Medication Sig Dispense Refill    amLODIPine (NORVASC) 5 MG Tab       cetirizine (ZYRTEC) 10 MG Tab       ibuprofen (MOTRIN) 400 MG Tab       thyroid (ARMOUR THYROID) 90 MG Tab Take 1 tablet by mouth every day. 30 tablet 2    Cyanocobalamin (VITAMIN B 12 PO) Take  by mouth.      Barberry-Oreg Grape-Goldenseal (BERBERINE COMPLEX PO) Take  by mouth.      Cholecalciferol (VITAMIN D3) 5000 units Cap Take 1 Cap by mouth every day.      Krill Oil 500 MG Cap Take 1 Cap by mouth every day.      Magnesium 400 MG Cap Take 1 Cap by mouth every day.      Lutein-Zeaxanthin 25-5 MG Cap Take 1 Cap by mouth every day.      amLODIPine (NORVASC) 10 MG Tab Take 0.5 Tablets by mouth every day. 30 tablet 2    BIOTIN PO Take  by mouth.      ibuprofen (MOTRIN) 600 MG Tab Take 800 mg by mouth every 6 hours as needed.       No current facility-administered medications for this visit.    \"CURRENT RX\"    ALLERGIES: Patient has no known allergies.    ROS    Constitutional: Denies fever, chills, sweats,  weight loss, fatigue  Cardiovascular: Denies chest pain, tightness, palpitations, swelling in legs/feet, fainting, difficulty breathing when lying down but gets better when sitting up.   Respiratory: Denies shortness of breath, cough, sputum, wheezing, painful breathing, coughing up blood.   Sleep: per HPI  Gastrointestinal: Denies  difficulty swallowing, nausea, abdominal pain, diarrhea, constipation, heartburn.  Musculoskeletal: Denies painful joints, sore muscles, back pain.        PHYSICAL EXAM      /84 (BP Location: Left arm, Patient Position: Sitting, BP Cuff Size: Adult)   Pulse 69   Resp 16   Ht 1.607 m (5' 3.25\")   Wt (!) 148 kg (326 lb 14.4 oz)   SpO2 93%   BMI 57.45 kg/m²   Appearance: Well-nourished, well-developed, no acute distress  Eyes:  PERRLA, EOMI  ENMT: masked  Neck: Supple, trachea midline, no masses  Respiratory effort:  No intercostal retractions or use of " accessory muscles  Lung auscultation:  No wheezes rhonchi rubs or rales  Cardiac: No murmurs, rubs, or gallops; regular rhythm, normal rate; no edema  Abdomen:  No tenderness, no organomegaly.  Musculoskeletal:  Grossly normal; gait and station normal; digits and nails normal  Skin:  No rashes, petechiae, cyanosis  Neurologic: without focal signs; oriented to person, time, place, and purpose; judgement intact  Psychiatric:  No depression, anxiety, agitation      Medical Decision Making       The medical record was reviewed in its entirety including the referral notes, records from primary care, consultants notes, hospital records, lab, imaging, microbiology, immunology, and immunizations.  Care gaps identified and reviewed with the patient.    Diagnostic and titration nocturnal polysomnogram's, home sleep apnea tests, continuous nocturnal oximetry results, multiple sleep latency tests, and compliance reports reviewed.  1. HENRI (obstructive sleep apnea)  - DME Mask and Supplies      PLAN:   Has been advised to continue the current pap, clean equipment frequently, and get new mask and supplies as allowed by insurance and DME. Advised about potential problems including nasal obstruction/stuffiness, mask leak or discomfort , frequent awakenings, chill or dryness of the upper airway, noise, inconvenience, and lack of benefit. Recommend an earlier appointment, if significant treatment barriers develop.    The risks of untreated sleep apnea were discussed with the patient at length. Patients with HENRI are at increased risk of cardiovascular disease including systemic arterial hypertension, pulmonary arterial hypertension (transient or fixed), TIA, and an elevated risk of stroke, heart attack, sudden death, or arrhythmia between the hours of 11 PM and 6 AM. HENRI patients have an increased risk of motor vehicle accidents, type 2 diabetes, GERD, repetitive mechanical trauma to pharyngeal muscles with inflammation and  denervation, frequent EEG arousals leading to nonrestorative sleep, excessive daytime sleepiness, fatigue, depression, poor pain control, irritability, and lower quality of life.  The patient was advised to avoid driving a motor vehicle when drowsy.    Positive airway pressure will favorably impact many of the adverse conditions and effects provoked by HENRI.    Have advised the patient to follow up with the appropriate healthcare practitioners for all other medical problems and issues.    Return in about 1 year (around 1/10/2024).    Total time 30 minutes

## 2023-01-10 ENCOUNTER — OFFICE VISIT (OUTPATIENT)
Dept: SLEEP MEDICINE | Facility: MEDICAL CENTER | Age: 68
End: 2023-01-10
Payer: MEDICARE

## 2023-01-10 VITALS
HEIGHT: 63 IN | DIASTOLIC BLOOD PRESSURE: 84 MMHG | SYSTOLIC BLOOD PRESSURE: 124 MMHG | OXYGEN SATURATION: 93 % | HEART RATE: 69 BPM | WEIGHT: 293 LBS | BODY MASS INDEX: 51.91 KG/M2 | RESPIRATION RATE: 16 BRPM

## 2023-01-10 DIAGNOSIS — G47.33 OSA (OBSTRUCTIVE SLEEP APNEA): ICD-10-CM

## 2023-01-10 PROCEDURE — 99214 OFFICE O/P EST MOD 30 MIN: CPT | Performed by: INTERNAL MEDICINE

## 2023-01-10 ASSESSMENT — FIBROSIS 4 INDEX: FIB4 SCORE: 1.76

## 2023-04-06 ENCOUNTER — APPOINTMENT (RX ONLY)
Dept: URBAN - METROPOLITAN AREA CLINIC 6 | Facility: CLINIC | Age: 68
Setting detail: DERMATOLOGY
End: 2023-04-06

## 2023-04-06 DIAGNOSIS — L40.0 PSORIASIS VULGARIS: ICD-10-CM | Status: INADEQUATELY CONTROLLED

## 2023-04-06 DIAGNOSIS — L53.1 ERYTHEMA ANNULARE CENTRIFUGUM: ICD-10-CM

## 2023-04-06 PROCEDURE — ? PRESCRIPTION

## 2023-04-06 PROCEDURE — ? PRESCRIPTION MEDICATION MANAGEMENT

## 2023-04-06 PROCEDURE — 99204 OFFICE O/P NEW MOD 45 MIN: CPT

## 2023-04-06 PROCEDURE — ? COUNSELING

## 2023-04-06 RX ORDER — TRIAMCINOLONE ACETONIDE 1 MG/G
CREAM TOPICAL BID
Qty: 80 | Refills: 3 | Status: ERX | COMMUNITY
Start: 2023-04-06

## 2023-04-06 RX ORDER — CLOBETASOL PROPIONATE 0.5 MG/ML
SOLUTION TOPICAL QHS
Qty: 1 | Refills: 3 | Status: ERX | COMMUNITY
Start: 2023-04-06

## 2023-04-06 RX ADMIN — CLOBETASOL PROPIONATE: 0.5 SOLUTION TOPICAL at 00:00

## 2023-04-06 RX ADMIN — TRIAMCINOLONE ACETONIDE: 1 CREAM TOPICAL at 00:00

## 2023-04-06 ASSESSMENT — LOCATION SIMPLE DESCRIPTION DERM
LOCATION SIMPLE: POSTERIOR SCALP
LOCATION SIMPLE: ABDOMEN
LOCATION SIMPLE: LEFT PRETIBIAL REGION
LOCATION SIMPLE: RIGHT ELBOW

## 2023-04-06 ASSESSMENT — LOCATION ZONE DERM
LOCATION ZONE: LEG
LOCATION ZONE: TRUNK
LOCATION ZONE: SCALP
LOCATION ZONE: ARM

## 2023-04-06 ASSESSMENT — LOCATION DETAILED DESCRIPTION DERM
LOCATION DETAILED: LEFT LATERAL ABDOMEN
LOCATION DETAILED: RIGHT ELBOW
LOCATION DETAILED: LEFT DISTAL PRETIBIAL REGION
LOCATION DETAILED: PERIUMBILICAL SKIN
LOCATION DETAILED: POSTERIOR MID-PARIETAL SCALP

## 2023-04-06 ASSESSMENT — BSA PSORIASIS: % BODY COVERED IN PSORIASIS: 5

## 2023-04-06 NOTE — PROCEDURE: PRESCRIPTION MEDICATION MANAGEMENT
Initiate Treatment: clobetasol 0.05 % scalp solution QHS
Render In Strict Bullet Format?: No
Plan: Continue applying T/gel\\nAdvised pt to apply clobetasol to spot on leg
Detail Level: Zone

## 2023-05-16 ENCOUNTER — APPOINTMENT (RX ONLY)
Dept: URBAN - METROPOLITAN AREA CLINIC 6 | Facility: CLINIC | Age: 68
Setting detail: DERMATOLOGY
End: 2023-05-16

## 2023-05-16 DIAGNOSIS — D485 NEOPLASM OF UNCERTAIN BEHAVIOR OF SKIN: ICD-10-CM

## 2023-05-16 DIAGNOSIS — L40.0 PSORIASIS VULGARIS: ICD-10-CM

## 2023-05-16 PROBLEM — D48.5 NEOPLASM OF UNCERTAIN BEHAVIOR OF SKIN: Status: ACTIVE | Noted: 2023-05-16

## 2023-05-16 PROBLEM — L30.9 DERMATITIS, UNSPECIFIED: Status: ACTIVE | Noted: 2023-05-16

## 2023-05-16 PROCEDURE — ? DIAGNOSIS COMMENT

## 2023-05-16 PROCEDURE — ? PRESCRIPTION

## 2023-05-16 PROCEDURE — ? PRESCRIPTION MEDICATION MANAGEMENT

## 2023-05-16 PROCEDURE — ? BIOPSY BY SHAVE METHOD

## 2023-05-16 PROCEDURE — 99214 OFFICE O/P EST MOD 30 MIN: CPT | Mod: 25

## 2023-05-16 PROCEDURE — ? COUNSELING

## 2023-05-16 PROCEDURE — 11102 TANGNTL BX SKIN SINGLE LES: CPT

## 2023-05-16 RX ORDER — TERBINAFINE HYDROCHLORIDE 250 MG/1
TABLET ORAL QD
Qty: 14 | Refills: 0 | Status: ERX | COMMUNITY
Start: 2023-05-16

## 2023-05-16 RX ADMIN — TERBINAFINE HYDROCHLORIDE: 250 TABLET ORAL at 00:00

## 2023-05-16 ASSESSMENT — LOCATION DETAILED DESCRIPTION DERM
LOCATION DETAILED: RIGHT ELBOW
LOCATION DETAILED: LEFT DISTAL PRETIBIAL REGION
LOCATION DETAILED: PERIUMBILICAL SKIN
LOCATION DETAILED: POSTERIOR MID-PARIETAL SCALP

## 2023-05-16 ASSESSMENT — LOCATION SIMPLE DESCRIPTION DERM
LOCATION SIMPLE: LEFT PRETIBIAL REGION
LOCATION SIMPLE: ABDOMEN
LOCATION SIMPLE: POSTERIOR SCALP
LOCATION SIMPLE: RIGHT ELBOW

## 2023-05-16 ASSESSMENT — LOCATION ZONE DERM
LOCATION ZONE: LEG
LOCATION ZONE: ARM
LOCATION ZONE: TRUNK
LOCATION ZONE: SCALP

## 2023-05-16 NOTE — PROCEDURE: DIAGNOSIS COMMENT
Comment: Additionally has what appears clinically c/w EAC, but few focal areas appear to alternatively have leading scale
Render Risk Assessment In Note?: no
Detail Level: Simple

## 2023-05-16 NOTE — PROCEDURE: PRESCRIPTION MEDICATION MANAGEMENT
Samples Given: Enstilar
Continue Regimen: clobetasol 0.05 % scalp solution 1x/week after Enstilar is done
Initiate Treatment: terbinafine HCl 250 mg tablet Qd
Render In Strict Bullet Format?: No
Plan: Advised pt to apply Enstilar qhs x 1 week, then decrease to 2x/week\\nContinue applying T/gel\\nAdvised pt to apply clobetasol to spot on leg
Detail Level: Zone

## 2023-05-26 ENCOUNTER — RX ONLY (OUTPATIENT)
Age: 68
Setting detail: RX ONLY
End: 2023-05-26

## 2023-05-26 RX ORDER — CLOBETASOL PROPIONATE 0.5 MG/G
1 CREAM TOPICAL BID
Qty: 60 | Refills: 2 | Status: ERX | COMMUNITY
Start: 2023-05-26

## 2023-07-20 ENCOUNTER — HOSPITAL ENCOUNTER (OUTPATIENT)
Dept: LAB | Facility: MEDICAL CENTER | Age: 68
End: 2023-07-20
Attending: INTERNAL MEDICINE
Payer: MEDICARE

## 2023-07-20 ENCOUNTER — HOSPITAL ENCOUNTER (OUTPATIENT)
Dept: CARDIOLOGY | Facility: MEDICAL CENTER | Age: 68
End: 2023-07-20
Attending: INTERNAL MEDICINE
Payer: MEDICARE

## 2023-07-20 DIAGNOSIS — Z01.810 PRE-OPERATIVE CARDIOVASCULAR EXAMINATION: ICD-10-CM

## 2023-07-20 LAB
ALBUMIN SERPL BCP-MCNC: 3.9 G/DL (ref 3.2–4.9)
ALBUMIN/GLOB SERPL: 1 G/DL
ALP SERPL-CCNC: 153 U/L (ref 30–99)
ALT SERPL-CCNC: 36 U/L (ref 2–50)
ANION GAP SERPL CALC-SCNC: 12 MMOL/L (ref 7–16)
AST SERPL-CCNC: 35 U/L (ref 12–45)
BASOPHILS # BLD AUTO: 0.6 % (ref 0–1.8)
BASOPHILS # BLD: 0.05 K/UL (ref 0–0.12)
BILIRUB SERPL-MCNC: 0.4 MG/DL (ref 0.1–1.5)
BUN SERPL-MCNC: 21 MG/DL (ref 8–22)
CALCIUM ALBUM COR SERPL-MCNC: 9 MG/DL (ref 8.5–10.5)
CALCIUM SERPL-MCNC: 8.9 MG/DL (ref 8.5–10.5)
CHLORIDE SERPL-SCNC: 104 MMOL/L (ref 96–112)
CO2 SERPL-SCNC: 23 MMOL/L (ref 20–33)
CREAT SERPL-MCNC: 1.02 MG/DL (ref 0.5–1.4)
EKG IMPRESSION: NORMAL
EOSINOPHIL # BLD AUTO: 0.22 K/UL (ref 0–0.51)
EOSINOPHIL NFR BLD: 2.7 % (ref 0–6.9)
ERYTHROCYTE [DISTWIDTH] IN BLOOD BY AUTOMATED COUNT: 48.5 FL (ref 35.9–50)
GFR SERPLBLD CREATININE-BSD FMLA CKD-EPI: 61 ML/MIN/1.73 M 2
GLOBULIN SER CALC-MCNC: 3.8 G/DL (ref 1.9–3.5)
GLUCOSE SERPL-MCNC: 118 MG/DL (ref 65–99)
HCT VFR BLD AUTO: 42.2 % (ref 37–47)
HGB BLD-MCNC: 13.6 G/DL (ref 12–16)
IMM GRANULOCYTES # BLD AUTO: 0.04 K/UL (ref 0–0.11)
IMM GRANULOCYTES NFR BLD AUTO: 0.5 % (ref 0–0.9)
LYMPHOCYTES # BLD AUTO: 3.02 K/UL (ref 1–4.8)
LYMPHOCYTES NFR BLD: 37.5 % (ref 22–41)
MCH RBC QN AUTO: 29.8 PG (ref 27–33)
MCHC RBC AUTO-ENTMCNC: 32.2 G/DL (ref 32.2–35.5)
MCV RBC AUTO: 92.5 FL (ref 81.4–97.8)
MONOCYTES # BLD AUTO: 0.57 K/UL (ref 0–0.85)
MONOCYTES NFR BLD AUTO: 7.1 % (ref 0–13.4)
NEUTROPHILS # BLD AUTO: 4.15 K/UL (ref 1.82–7.42)
NEUTROPHILS NFR BLD: 51.6 % (ref 44–72)
NRBC # BLD AUTO: 0 K/UL
NRBC BLD-RTO: 0 /100 WBC (ref 0–0.2)
NT-PROBNP SERPL IA-MCNC: <36 PG/ML (ref 0–125)
PLATELET # BLD AUTO: 216 K/UL (ref 164–446)
PMV BLD AUTO: 10.8 FL (ref 9–12.9)
POTASSIUM SERPL-SCNC: 4.1 MMOL/L (ref 3.6–5.5)
PROT SERPL-MCNC: 7.7 G/DL (ref 6–8.2)
RBC # BLD AUTO: 4.56 M/UL (ref 4.2–5.4)
SODIUM SERPL-SCNC: 139 MMOL/L (ref 135–145)
T4 SERPL-MCNC: 7.4 UG/DL (ref 4–12)
TROPONIN T SERPL-MCNC: 7 NG/L (ref 6–19)
TSH SERPL DL<=0.005 MIU/L-ACNC: 1.02 UIU/ML (ref 0.38–5.33)
WBC # BLD AUTO: 8.1 K/UL (ref 4.8–10.8)

## 2023-07-20 PROCEDURE — 93005 ELECTROCARDIOGRAM TRACING: CPT

## 2023-07-20 PROCEDURE — 80053 COMPREHEN METABOLIC PANEL: CPT

## 2023-07-20 PROCEDURE — 83880 ASSAY OF NATRIURETIC PEPTIDE: CPT | Mod: GA

## 2023-07-20 PROCEDURE — 93010 ELECTROCARDIOGRAM REPORT: CPT | Performed by: INTERNAL MEDICINE

## 2023-07-20 PROCEDURE — 84443 ASSAY THYROID STIM HORMONE: CPT | Mod: GA

## 2023-07-20 PROCEDURE — 85025 COMPLETE CBC W/AUTO DIFF WBC: CPT

## 2023-07-20 PROCEDURE — 84484 ASSAY OF TROPONIN QUANT: CPT

## 2023-07-20 PROCEDURE — 36415 COLL VENOUS BLD VENIPUNCTURE: CPT

## 2023-07-20 PROCEDURE — 84436 ASSAY OF TOTAL THYROXINE: CPT | Mod: GA

## 2023-08-17 ENCOUNTER — HOSPITAL ENCOUNTER (OUTPATIENT)
Dept: LAB | Facility: MEDICAL CENTER | Age: 68
End: 2023-08-17
Attending: INTERNAL MEDICINE
Payer: MEDICARE

## 2023-08-17 LAB
FERRITIN SERPL-MCNC: 206 NG/ML (ref 10–291)
GGT SERPL-CCNC: 56 U/L (ref 7–34)
HBV CORE AB SERPL QL IA: NONREACTIVE
HBV SURFACE AB SERPL IA-ACNC: 106 MIU/ML (ref 0–10)
HBV SURFACE AG SER QL: NORMAL
HCV AB SER QL: NORMAL
IRON SATN MFR SERPL: 25 % (ref 15–55)
IRON SERPL-MCNC: 80 UG/DL (ref 40–170)
TIBC SERPL-MCNC: 314 UG/DL (ref 250–450)
UIBC SERPL-MCNC: 234 UG/DL (ref 110–370)

## 2023-08-17 PROCEDURE — 86803 HEPATITIS C AB TEST: CPT

## 2023-08-17 PROCEDURE — 87340 HEPATITIS B SURFACE AG IA: CPT | Mod: GA

## 2023-08-17 PROCEDURE — 82103 ALPHA-1-ANTITRYPSIN TOTAL: CPT

## 2023-08-17 PROCEDURE — 83550 IRON BINDING TEST: CPT

## 2023-08-17 PROCEDURE — 36415 COLL VENOUS BLD VENIPUNCTURE: CPT | Mod: GA

## 2023-08-17 PROCEDURE — 86708 HEPATITIS A ANTIBODY: CPT

## 2023-08-17 PROCEDURE — 85610 PROTHROMBIN TIME: CPT

## 2023-08-17 PROCEDURE — 86706 HEP B SURFACE ANTIBODY: CPT | Mod: GA

## 2023-08-17 PROCEDURE — 82390 ASSAY OF CERULOPLASMIN: CPT

## 2023-08-17 PROCEDURE — 86704 HEP B CORE ANTIBODY TOTAL: CPT | Mod: GA

## 2023-08-17 PROCEDURE — 82728 ASSAY OF FERRITIN: CPT

## 2023-08-17 PROCEDURE — 86381 MITOCHONDRIAL ANTIBODY EACH: CPT

## 2023-08-17 PROCEDURE — 86015 ACTIN ANTIBODY EACH: CPT

## 2023-08-17 PROCEDURE — 83540 ASSAY OF IRON: CPT

## 2023-08-17 PROCEDURE — 82787 IGG 1 2 3 OR 4 EACH: CPT

## 2023-08-17 PROCEDURE — 82977 ASSAY OF GGT: CPT

## 2023-08-17 PROCEDURE — 86038 ANTINUCLEAR ANTIBODIES: CPT

## 2023-08-18 LAB
INR PPP: 1.1 (ref 0.87–1.13)
PROTHROMBIN TIME: 14.1 SEC (ref 12–14.6)

## 2023-08-19 LAB
A1AT SERPL-MCNC: 165 MG/DL (ref 90–200)
CERULOPLASMIN SERPL-MCNC: 29 MG/DL (ref 16–45)
HAV AB SER QL IA: POSITIVE
NUCLEAR IGG SER QL IA: NORMAL

## 2023-08-20 LAB
IGG1 SER-MCNC: 642 MG/DL (ref 240–1118)
IGG2 SER-MCNC: 509 MG/DL (ref 124–549)
IGG3 SER-MCNC: 80 MG/DL (ref 21–134)
IGG4 SER-MCNC: 37 MG/DL (ref 1–123)
MITOCHONDRIA M2 IGG SER-ACNC: 3.1 UNITS (ref 0–24.9)
SMA IGG SER-ACNC: 6 UNITS (ref 0–19)

## 2023-09-07 ENCOUNTER — HOSPITAL ENCOUNTER (OUTPATIENT)
Dept: RADIOLOGY | Facility: MEDICAL CENTER | Age: 68
End: 2023-09-07
Attending: INTERNAL MEDICINE
Payer: MEDICARE

## 2023-09-07 DIAGNOSIS — R07.9 CHEST PAIN, UNSPECIFIED TYPE: ICD-10-CM

## 2023-09-07 PROCEDURE — 93017 CV STRESS TEST TRACING ONLY: CPT

## 2023-09-07 PROCEDURE — 93018 CV STRESS TEST I&R ONLY: CPT | Performed by: INTERNAL MEDICINE

## 2023-10-09 ENCOUNTER — TELEPHONE (OUTPATIENT)
Dept: HEALTH INFORMATION MANAGEMENT | Facility: OTHER | Age: 68
End: 2023-10-09

## 2023-12-11 RX ORDER — CLOBETASOL PROPIONATE 0.5 MG/ML
1 SOLUTION TOPICAL QHS
Qty: 50 | Refills: 3 | Status: ERX

## 2024-03-21 ENCOUNTER — HOSPITAL ENCOUNTER (OUTPATIENT)
Dept: RADIOLOGY | Facility: MEDICAL CENTER | Age: 69
End: 2024-03-21
Attending: INTERNAL MEDICINE
Payer: MEDICARE

## 2024-03-21 DIAGNOSIS — Z12.31 ENCOUNTER FOR MAMMOGRAM TO ESTABLISH BASELINE MAMMOGRAM: ICD-10-CM

## 2024-03-21 PROCEDURE — 77067 SCR MAMMO BI INCL CAD: CPT

## 2024-05-31 ENCOUNTER — OFFICE VISIT (OUTPATIENT)
Dept: SLEEP MEDICINE | Facility: MEDICAL CENTER | Age: 69
End: 2024-05-31
Attending: PHYSICIAN ASSISTANT
Payer: MEDICARE

## 2024-05-31 VITALS
HEART RATE: 86 BPM | DIASTOLIC BLOOD PRESSURE: 70 MMHG | HEIGHT: 63 IN | RESPIRATION RATE: 16 BRPM | SYSTOLIC BLOOD PRESSURE: 120 MMHG | BODY MASS INDEX: 51.91 KG/M2 | OXYGEN SATURATION: 93 % | WEIGHT: 293 LBS

## 2024-05-31 DIAGNOSIS — G47.33 OSA (OBSTRUCTIVE SLEEP APNEA): ICD-10-CM

## 2024-05-31 PROCEDURE — 3074F SYST BP LT 130 MM HG: CPT | Performed by: PHYSICIAN ASSISTANT

## 2024-05-31 PROCEDURE — 3078F DIAST BP <80 MM HG: CPT | Performed by: PHYSICIAN ASSISTANT

## 2024-05-31 PROCEDURE — 99213 OFFICE O/P EST LOW 20 MIN: CPT | Performed by: PHYSICIAN ASSISTANT

## 2024-05-31 RX ORDER — SEMAGLUTIDE 1.34 MG/ML
INJECTION, SOLUTION SUBCUTANEOUS
COMMUNITY
Start: 2024-04-10

## 2024-05-31 RX ORDER — POLYETHYLENE GLYCOL-3350 AND ELECTROLYTES 236; 6.74; 5.86; 2.97; 22.74 G/274.31G; G/274.31G; G/274.31G; G/274.31G; G/274.31G
POWDER, FOR SOLUTION ORAL
COMMUNITY
Start: 2024-03-29

## 2024-05-31 ASSESSMENT — ENCOUNTER SYMPTOMS
SPUTUM PRODUCTION: 1
COUGH: 0
DIZZINESS: 0
HEARTBURN: 0
SINUS PAIN: 0
WEIGHT LOSS: 0
ORTHOPNEA: 0
SHORTNESS OF BREATH: 0
FEVER: 0
INSOMNIA: 0
HEADACHES: 1
WHEEZING: 0
PALPITATIONS: 0
ROS GI COMMENTS: NO DENTURES, MISSING TEETH OR SWALLOWING ISSUES
SORE THROAT: 0
TREMORS: 0
CHILLS: 0

## 2024-05-31 ASSESSMENT — FIBROSIS 4 INDEX: FIB4 SCORE: 1.782407407407407407

## 2024-05-31 NOTE — PROGRESS NOTES
"Chief Complaint   Patient presents with    Apnea     Last Office Visit 1/10/23 with     Settings: CPAP 12-20 cm       HPI:  Mady Yang is a 68 y.o. year old female here today for follow-up on obstructive sleep apnea.  Previously evaluated by Dr. Eligio Rehman on 1/10/2023.    Past Medical History: Essential hypertension, stage III chronic kidney disease, morbid obesity, former smoker, ruptured appendectomy 2021, hypothyroidism.    Vitals:  /70 (BP Location: Left arm, Patient Position: Sitting, BP Cuff Size: Large adult)   Pulse 86   Resp 16   Ht 1.6 m (5' 3\")   Wt (!) 139 kg (307 lb 4.8 oz)   SpO2 93% BMI of 54.44 kg/m².    Recent Imaging:  none    Currently using  Resmed auto CPAP @12-20 cm H20 pressure; compliance reviewed for 4/30/2024 through 5/29/2024, days used 30/30, average daily usage 8 hours 56 minutes, 100% of days greater than or equal to 4 hours, mask leak at 16.6 LPM at 95th percentile, AHI 0.4 per hour.  See media for full report.    Device obtained 2012  DME provider [facility]   Mask interface nasal mask    Initial polysomnogram obtained in 2012 indicated findings consistent with severe obstructive sleep apnea with overall AHI of 55 and low O2 sat of 84%.  CPAP therapy was recommended.    Sleep schedule goes to bed 10 PM and wakens 6-7 AM   Symptoms denies day time somnolence and denies morning headache      Review of Systems   Constitutional:  Negative for chills, fever, malaise/fatigue and weight loss.   HENT:  Positive for nosebleeds (occasional) and tinnitus (intermittent). Negative for congestion, hearing loss, sinus pain and sore throat.    Eyes:         Presc glasses    Respiratory:  Positive for sputum production (clear). Negative for cough, shortness of breath and wheezing.    Cardiovascular:  Positive for leg swelling (trace). Negative for chest pain, palpitations and orthopnea.   Gastrointestinal:  Negative for heartburn.        No dentures, missing teeth or " "swallowing issues    Neurological:  Positive for headaches (sinus). Negative for dizziness and tremors.   Psychiatric/Behavioral:  The patient does not have insomnia.        Past Medical History:   Diagnosis Date    Back pain     Chronic low back pain 09/25/2018    Elevated blood sugar     States she was told not yet \"pre diabetic\" gets HbA1C    Hypertension     Kidney stone 1980's    Obesity     HENRI (obstructive sleep apnea) 1/20/2017    AHI 55.7, minimum saturation 84%, on CPAP 13 cm.    Osteoporosis     Post-nasal drip     Seasonal allergies     Sleep apnea     Thyroid disease        Past Surgical History:   Procedure Laterality Date    [place] LAP,APPENDECTOMY N/A 4/21/2021    Procedure: APPENDECTOMY, LAPAROSCOPIC; converted to open appendectomy;  Surgeon: Jett Ruiz M.D.;  Location: SURGERY [facility];  Service: General    COLONOSCOPY  9/27/2018    Procedure: COLONOSCOPY;  Surgeon: Milan Lipscomb M.D.;  Location: [facility];  Service: EUS    COLONOSCOPY FLEXIBLE W/BIOPSY  9/27/2018    Procedure: COLONOSCOPY FLEXIBLE W/BIOPSY - POSSIBLE, DILATION, POLYPECTOMY, CONTROL OF HEMORRHAGE;  Surgeon: Milan Lipscomb M.D.;  Location: SURGERY [facility];  Service: EUS    COLONOSCOPY  2010    COLONOSCOPY  2007    CLOSED MANIPULATION ELBOW  1967    For fracture-no hardware    CHOLECYSTECTOMY  1980's    DENTAL EXTRACTION(S)  1980's    wisdom teeth       Family History   Problem Relation Age of Onset    Heart Failure Father     Other Brother         leukemia    Sleep Apnea Neg Hx        Social History     Socioeconomic History    Marital status:      Spouse name: Not on file    Number of children: Not on file    Years of education: Not on file    Highest education level: Not on file   Occupational History    Not on file   Tobacco Use    Smoking status: Former     Current packs/day: 0.00     Average packs/day: 0.5 packs/day for 30.0 years (15.0 ttl pk-yrs)     Types: Cigarettes     Start date: " 1987     Quit date: 2017     Years since quittin.5    Smokeless tobacco: Never   Vaping Use    Vaping status: Never Used   Substance and Sexual Activity    Alcohol use: Not Currently     Comment: occ    Drug use: No    Sexual activity: Not on file   Other Topics Concern    Not on file   Social History Narrative    Not on file     Social Determinants of Health     Financial Resource Strain: Not on file   Food Insecurity: Not on file   Transportation Needs: Not on file   Physical Activity: Not on file   Stress: Not on file   Social Connections: Not on file   Intimate Partner Violence: Not on file   Housing Stability: Not on file       Allergies as of 2024    (No Known Allergies)          Current medications as of today   Current Outpatient Medications   Medication Sig Dispense Refill    GAVILYTE-G 236 g Recon Soln       OZEMPIC, 1 MG/DOSE, 4 MG/3ML Solution Pen-injector       amLODIPine (NORVASC) 5 MG Tab       cetirizine (ZYRTEC) 10 MG Tab       thyroid (ARMOUR THYROID) 90 MG Tab Take 1 tablet by mouth every day. 30 tablet 2    Cholecalciferol (VITAMIN D3) 5000 units Cap Take 1 Cap by mouth every day.      Krill Oil 500 MG Cap Take 1 Cap by mouth every day.      Magnesium 400 MG Cap Take 1 Cap by mouth every day.      Lutein-Zeaxanthin 25-5 MG Cap Take 1 Cap by mouth every day.      ibuprofen (MOTRIN) 400 MG Tab       amLODIPine (NORVASC) 10 MG Tab Take 0.5 Tablets by mouth every day. 30 tablet 2    Cyanocobalamin (VITAMIN B 12 PO) Take  by mouth.      BIOTIN PO Take  by mouth.      Barberry-Oreg Grape-Goldenseal (BERBERINE COMPLEX PO) Take  by mouth.      ibuprofen (MOTRIN) 600 MG Tab Take 800 mg by mouth every 6 hours as needed.       No current facility-administered medications for this visit.         Physical Exam:   Gen:           Alert and oriented, No apparent distress. Mood and affect appropriate, normal interaction with examiner.   Hearing:     Grossly intact.  Nose:          Normal, no  lesions or deformities.  Dentition:    fair dentition.   Oropharynx:   Tongue normal, posterior pharynx without erythema or exudate.  Mallampati Classification: II  Neck:        Supple, trachea midline, no masses.  Respiratory Effort: No intercostal retractions or use of accessory muscles.   Gait and Station: Normal.  Digits and Nails: No clubbing, cyanosis, petechiae, or nodes.   Skin:        No rashes, lesions or ulcers noted.               Ext:           No cyanosis or edema.      Immunizations:  Flu: 8/18/2023  PCV 20: 8/18/2023  Moderna SARS CoV2 Vaccine: 2/22/2022, 3/29/2021, 3/1/2021    Assessment / Plan:  1. HENRI (obstructive sleep apnea)  - DME Mask and Supplies    Reviewed compliance which is excellent, patient is demonstrating use and benefit.  Will send new order for mask and supplies.  Reminded to use distilled water only in humidifier chamber although patient reports rarely using water, use was encouraged.  Reviewed equipment cleaning, equipment replacement schedule, patient to follow-up in 1 year or sooner if needed.    2. BMI 50.0-59.9, adult (HCC)    Elevated BMI: Discussion regarding impact central adiposity on pulmonary function.  Encouraged to increase activity as tolerated and monitor nutritional intake.       Follow-up:   Return in about 1 year (around 5/31/2025) for Return with Elisabet Mcknight PA-C.    Please note that this dictation was created using voice recognition software. I have made every reasonable attempt to correct obvious errors, but it is possible there are errors of grammar and possibly content that I did not discover before finalizing the note.

## 2024-05-31 NOTE — PATIENT INSTRUCTIONS
1-reviewed compliance which is excellent  2-demonstrating use and benefit  3-will send new orders for mask and supplies  4-As a reminder use distilled water only in humidifier chamber, rarely uses water, use encouraged  5-Today we reviewed equipment cleaning  once weekly minimum  mask, tubing and water chamber  use dedicated container  use mild soap and water  SoClean or other ozone  are not recommended  white vinegar and water solution is no longer recommended  hang tubing to dry  mask sanitizing wipes are an option for use   6-Equipment replacement schedule : Mask cushion every month, Mask every 6 months, Head gear every 6 months, Tubing every 3 months, Ultra-fine filters 2 times per month, Humidifier chamber every 6 months  7-follow up in one year, sooner if needed

## 2024-08-22 ENCOUNTER — PATIENT MESSAGE (OUTPATIENT)
Dept: SLEEP MEDICINE | Facility: MEDICAL CENTER | Age: 69
End: 2024-08-22
Payer: MEDICARE

## 2024-08-22 DIAGNOSIS — G47.33 OSA (OBSTRUCTIVE SLEEP APNEA): ICD-10-CM

## 2024-08-26 NOTE — PROGRESS NOTES
Placed order for polysomnogram (sleep study) as per Medicare requirements. Will place new orders for device after that is completed.  Will need the follow up appointment 31-90 days after starting therapy with new device. Do not use cpap for 2 nights prior to sleep study as it can affect the results.

## 2024-08-29 NOTE — TELEPHONE ENCOUNTER
Sleep Study Scheduled on 10/6/24. Patient will need tSS fv appt. To address a new prescription for CPAP.

## 2024-10-06 ENCOUNTER — SLEEP STUDY (OUTPATIENT)
Dept: SLEEP MEDICINE | Facility: MEDICAL CENTER | Age: 69
End: 2024-10-06
Payer: MEDICARE

## 2024-10-06 PROCEDURE — 95811 POLYSOM 6/>YRS CPAP 4/> PARM: CPT | Performed by: STUDENT IN AN ORGANIZED HEALTH CARE EDUCATION/TRAINING PROGRAM

## 2024-11-04 ENCOUNTER — PATIENT MESSAGE (OUTPATIENT)
Dept: SLEEP MEDICINE | Facility: MEDICAL CENTER | Age: 69
End: 2024-11-04
Payer: MEDICARE

## 2024-11-11 ENCOUNTER — OFFICE VISIT (OUTPATIENT)
Dept: SLEEP MEDICINE | Facility: MEDICAL CENTER | Age: 69
End: 2024-11-11
Attending: STUDENT IN AN ORGANIZED HEALTH CARE EDUCATION/TRAINING PROGRAM
Payer: MEDICARE

## 2024-11-11 VITALS
BODY MASS INDEX: 51.91 KG/M2 | DIASTOLIC BLOOD PRESSURE: 76 MMHG | WEIGHT: 293 LBS | OXYGEN SATURATION: 96 % | HEART RATE: 87 BPM | HEIGHT: 63 IN | SYSTOLIC BLOOD PRESSURE: 112 MMHG

## 2024-11-11 DIAGNOSIS — G47.33 OSA (OBSTRUCTIVE SLEEP APNEA): Primary | ICD-10-CM

## 2024-11-11 PROCEDURE — 99213 OFFICE O/P EST LOW 20 MIN: CPT | Performed by: STUDENT IN AN ORGANIZED HEALTH CARE EDUCATION/TRAINING PROGRAM

## 2024-11-11 PROCEDURE — 3078F DIAST BP <80 MM HG: CPT | Performed by: STUDENT IN AN ORGANIZED HEALTH CARE EDUCATION/TRAINING PROGRAM

## 2024-11-11 PROCEDURE — 3074F SYST BP LT 130 MM HG: CPT | Performed by: STUDENT IN AN ORGANIZED HEALTH CARE EDUCATION/TRAINING PROGRAM

## 2024-11-11 RX ORDER — SEMAGLUTIDE 2.68 MG/ML
INJECTION, SOLUTION SUBCUTANEOUS
COMMUNITY
Start: 2024-10-18

## 2024-11-11 ASSESSMENT — PATIENT HEALTH QUESTIONNAIRE - PHQ9: CLINICAL INTERPRETATION OF PHQ2 SCORE: 0

## 2024-11-11 ASSESSMENT — FIBROSIS 4 INDEX: FIB4 SCORE: 1.782407407407407407

## 2024-11-11 NOTE — PROGRESS NOTES
"[facility] Follow-up Visit    CC: Follow-up regarding management of obstructive sleep apnea and to discuss recent sleep study results      HPI:  Mady Yang is a 68 y.o.female  with hypertension, obesity, and severe obstructive sleep apnea on CPAP.  Presents today to follow-up to discuss management of obstructive sleep apnea.    She is using a CPAP machine nightly.  She does find CPAP helpful to her sleep.  She has found that her current CPAP machine seems to not be working as well as it once was.  She believes her current machine is 68 years old.  She does live in a remote location with no cell service so the machine does not always download remotely.    She recently completed a in lab sleep study to requalify for a new PAP machine given the age of her machine and recent transition to Medicare.    DME provider: CPAP and more   Device: Airsense 10   Mask: nasal N20   Aerophagia: No   Snoring: No   Dry mouth: Yes  Leak: No   Skin irritation: No   Chin strap: No       Sleep History  10/6/2024 PSG split-night study showed an overall 4% AHI of 53.5 events an hour, time out of below 88% saturation of 8 minutes.  Patient met criteria for split-night protocol was placed on CPAP.  Overall respiratory events improved with CPAP and oxygen saturation normalized.  Recommended auto CPAP 12-14 cmH2O    Patient Active Problem List    Diagnosis Date Noted    Critical illness myopathy 05/26/2021    Hyperglycemia 04/28/2021    Hypothyroidism 04/27/2021    Osteoarthritis 04/24/2018    Essential hypertension 04/24/2018    Stage 3 chronic kidney disease (HCC) 04/24/2018    BMI 50.0-59.9, adult ([facility]) 03/08/2018    HENRI (obstructive sleep apnea) 01/20/2017       Past Medical History:   Diagnosis Date    Back pain     Chronic low back pain 09/25/2018    Elevated blood sugar     States she was told not yet \"pre diabetic\" gets HbA1C    Hypertension     Kidney stone 1980's    Obesity     HENRI (obstructive sleep apnea) " 2017    AHI 55.7, minimum saturation 84%, on CPAP 13 cm.    Osteoporosis     Post-nasal drip     Seasonal allergies     Sleep apnea     Thyroid disease         Past Surgical History:   Procedure Laterality Date    [place] LAP,APPENDECTOMY N/A 2021    Procedure: APPENDECTOMY, LAPAROSCOPIC; converted to open appendectomy;  Surgeon: Jett Ruiz M.D.;  Location: [facility];  Service: General    COLONOSCOPY  2018    Procedure: COLONOSCOPY;  Surgeon: Milan Lipscomb M.D.;  Location: SURGERY [facility];  Service: EUS    COLONOSCOPY FLEXIBLE W/BIOPSY  2018    Procedure: COLONOSCOPY FLEXIBLE W/BIOPSY - POSSIBLE, DILATION, POLYPECTOMY, CONTROL OF HEMORRHAGE;  Surgeon: Milan Lipscomb M.D.;  Location: [facility];  Service: EUS    COLONOSCOPY      COLONOSCOPY      CLOSED MANIPULATION ELBOW  1967    For fracture-no hardware    CHOLECYSTECTOMY      DENTAL EXTRACTION(S)      wisdom teeth       Family History   Problem Relation Age of Onset    Heart Failure Father     Other Brother         leukemia    Sleep Apnea Neg Hx        Social History     Socioeconomic History    Marital status:      Spouse name: Not on file    Number of children: Not on file    Years of education: Not on file    Highest education level: Not on file   Occupational History    Not on file   Tobacco Use    Smoking status: Former     Current packs/day: 0.00     Average packs/day: 0.5 packs/day for 30.0 years (15.0 ttl pk-yrs)     Types: Cigarettes     Start date: 1987     Quit date: 2017     Years since quittin.0    Smokeless tobacco: Never   Vaping Use    Vaping status: Never Used   Substance and Sexual Activity    Alcohol use: Not Currently     Comment: occ    Drug use: No    Sexual activity: Not on file   Other Topics Concern    Not on file   Social History Narrative    Not on file     Social Drivers of Health     Financial Resource Strain: Not on file   Food Insecurity:  "Not on file   Transportation Needs: Not on file   Physical Activity: Not on file   Stress: Not on file   Social Connections: Not on file   Intimate Partner Violence: Not on file   Housing Stability: Not on file       Current Outpatient Medications   Medication Sig Dispense Refill    OZEMPIC, 2 MG/DOSE, 8 MG/3ML Solution Pen-injector every 7 days.      GAVILYTE-G 236 g Recon Soln       amLODIPine (NORVASC) 5 MG Tab       cetirizine (ZYRTEC) 10 MG Tab       thyroid (ARMOUR THYROID) 90 MG Tab Take 1 tablet by mouth every day. 30 tablet 2    Cholecalciferol (VITAMIN D3) 5000 units Cap Take 1 Cap by mouth every day.      Krill Oil 500 MG Cap Take 1 Cap by mouth every day.      Magnesium 400 MG Cap Take 1 Cap by mouth every day.      Lutein-Zeaxanthin 25-5 MG Cap Take 1 Cap by mouth every day.       No current facility-administered medications for this visit.        ALLERGIES: Patient has no known allergies.    ROS  Constitutional: Denies fevers, Denies weight changes  Ears/Nose/Throat/Mouth: Denies nasal congestion or sore throat   Cardiovascular: Denies chest pain  Respiratory: Denies shortness of breath, Denies cough  Gastrointestinal/Hepatic: Denies nausea, vomiting  Sleep: see HPI      PHYSICAL EXAM  /76 (BP Location: Left arm, Patient Position: Sitting, BP Cuff Size: Large adult) Comment (BP Location): Lower  Pulse 87   Ht 1.6 m (5' 3\")   Wt (!) 141 kg (311 lb)   SpO2 96%   BMI 55.09 kg/m²   Appearance: Well-nourished, well-developed, no acute distress  Eyes:  No scleral icterus , EOMI  Musculoskeletal:  Grossly normal; gait and station normal; digits and nails normal  Skin:  No rashes, petechiae, cyanosis  Neurologic: without focal signs; oriented to person, time, place, and purpose; judgement intact      Medical Decision Making   Assessment and Plan  Mady Yang is a 68 y.o.female  with hypertension, obesity, and severe obstructive sleep apnea on CPAP.  Presents today to follow-up to discuss " management of obstructive sleep apnea.    The medical record was reviewed.    Obstructive sleep apnea  Compliance data reviewed showing 90% usage > 4hours in last 30 days from 10/28/2024. Average AHI 0.5 events/hour. Pt continues to use and benefit from machine.      Current Settings auto CPAP 12-20    Her current machine reports 20,400 hours of usage.    Given the age of her machine she would benefit getting a new machine.    Reviewed recent PSG split-night study with patient showing severe obstructive sleep apnea is present that responded to PAP therapy.  Based on sleep study would recommend new auto CPAP machine 12-14 cmH2O    PLAN:   -Order placed for new auto CPAP machine 12-14 cmH2O  -Advised to reach out via MyChart with questions     Has been advised to continue the current CPAP, clean equipment frequently, and get new mask and supplies as allowed by insurance and DME. Recommend an earlier appointment, if significant treatment barriers develop.    Patients with HENRI are at increased risk of cardiovascular disease including coronary artery disease, systemic arterial hypertension, pulmonary arterial hypertension, cardiac arrythmias, and stroke. The patient was advised to avoid driving a motor vehicle when drowsy.      Return in about 3 months (around 2/11/2025).      Please note portions of this record was created using voice recognition software. I have made every reasonable attempt to correct obvious errors, but I expect that there are errors of grammar and possibly content I did not discover before finalizing the note.

## 2025-01-28 ENCOUNTER — OFFICE VISIT (OUTPATIENT)
Dept: SLEEP MEDICINE | Facility: MEDICAL CENTER | Age: 70
End: 2025-01-28
Attending: NURSE PRACTITIONER
Payer: MEDICARE

## 2025-01-28 VITALS
WEIGHT: 293 LBS | OXYGEN SATURATION: 94 % | RESPIRATION RATE: 16 BRPM | SYSTOLIC BLOOD PRESSURE: 118 MMHG | DIASTOLIC BLOOD PRESSURE: 84 MMHG | HEART RATE: 78 BPM | HEIGHT: 63 IN | BODY MASS INDEX: 51.91 KG/M2

## 2025-01-28 DIAGNOSIS — G47.33 OSA (OBSTRUCTIVE SLEEP APNEA): ICD-10-CM

## 2025-01-28 PROCEDURE — 99213 OFFICE O/P EST LOW 20 MIN: CPT | Performed by: NURSE PRACTITIONER

## 2025-01-28 PROCEDURE — 3074F SYST BP LT 130 MM HG: CPT | Performed by: NURSE PRACTITIONER

## 2025-01-28 PROCEDURE — 99214 OFFICE O/P EST MOD 30 MIN: CPT | Performed by: NURSE PRACTITIONER

## 2025-01-28 PROCEDURE — 3079F DIAST BP 80-89 MM HG: CPT | Performed by: NURSE PRACTITIONER

## 2025-01-28 ASSESSMENT — FIBROSIS 4 INDEX: FIB4 SCORE: 1.81

## 2025-01-28 ASSESSMENT — PATIENT HEALTH QUESTIONNAIRE - PHQ9: CLINICAL INTERPRETATION OF PHQ2 SCORE: 0

## 2025-01-28 NOTE — PROGRESS NOTES
"Chief Complaint   Patient presents with    Follow-Up     SLEEP - ESTABLISHED PT CHART PREP COMPLETED ON 01/24/2025    Last Office Visit 11.11.24 with Imelda lobato Compliance: No    DME: cpap and more     Settings: 12-14    Wireless Data? YES, If not wireless contact pt to bring in device.         HPI:  Mady Yang is a 69 y.o. year old female here today for follow-up on severe obstructive sleep apnea on CPAP.  Presents today first compliance on CPAP that was ordered at last visit     She is using a CPAP machine nightly.  She does find CPAP helpful to her sleep.  She has found that her current CPAP machine seems to not be working as well as it once was.  She believes her current machine is 68 years old.  She does live in a remote location with no cell service so the machine does not always download remotely..  Currently using a nasal mask.  Denies any difficulty with mask fit or pressures.  Averages 8 hours of sleep at night and takes a 1 hour nap during the day.  Denies any excessive daytime sleepiness, morning headaches, palpitations, concentration or memory problems.    30-day compliance shows 100% use with an average time of 10 hours and 37 minutes and a residual AHI of 0.3 on auto CPAP 12 to 14 cm/H2O.          Sleep History  10/6/2024 PSG split-night study showed an overall 4% AHI of 53.5 events an hour, time out of below 88% saturation of 8 minutes.  Patient met criteria for split-night protocol was placed on CPAP.  Overall respiratory events improved with CPAP and oxygen saturation normalized.  Recommended auto CPAP 12-14 cmH2O    ROS: As per HPI and otherwise negative if not stated.    Past Medical History:   Diagnosis Date    Back pain     Chronic low back pain 09/25/2018    Elevated blood sugar     States she was told not yet \"pre diabetic\" gets HbA1C    Hypertension     Kidney stone 1980's    Obesity     HENRI (obstructive sleep apnea) 1/20/2017    AHI 55.7, minimum saturation 84%, on CPAP 13 cm. " "   Osteoporosis     Post-nasal drip     Seasonal allergies     Sleep apnea     Thyroid disease        Past Surgical History:   Procedure Laterality Date    GA LAP,APPENDECTOMY N/A 4/21/2021    Procedure: APPENDECTOMY, LAPAROSCOPIC; converted to open appendectomy;  Surgeon: Jett Ruiz M.D.;  Location: SURGERY [facility];  Service: General    COLONOSCOPY  9/27/2018    Procedure: COLONOSCOPY;  Surgeon: Milan Lipscomb M.D.;  Location: SURGERY [facility];  Service: EUS    COLONOSCOPY FLEXIBLE W/BIOPSY  9/27/2018    Procedure: COLONOSCOPY FLEXIBLE W/BIOPSY - POSSIBLE, DILATION, POLYPECTOMY, CONTROL OF HEMORRHAGE;  Surgeon: Milan Lipscomb M.D.;  Location: SURGERY [facility];  Service: EUS    COLONOSCOPY  2010    COLONOSCOPY  2007    CLOSED MANIPULATION ELBOW  1967    For fracture-no hardware    CHOLECYSTECTOMY  1980's    DENTAL EXTRACTION(S)  1980's    wisdom teeth       Family History   Problem Relation Age of Onset    Heart Failure Father     Other Brother         leukemia    Sleep Apnea Neg Hx        Allergies as of 01/28/2025    (No Known Allergies)        Vitals:  /84 (BP Location: Left arm, Patient Position: Sitting, BP Cuff Size: Adult)   Pulse 78   Resp 16   Ht 1.6 m (5' 3\")   Wt (!) 141 kg (311 lb)   SpO2 94%     Current medications as of today   Current Outpatient Medications   Medication Sig Dispense Refill    OZEMPIC, 2 MG/DOSE, 8 MG/3ML Solution Pen-injector every 7 days.      amLODIPine (NORVASC) 5 MG Tab       cetirizine (ZYRTEC) 10 MG Tab       thyroid (ARMOUR THYROID) 90 MG Tab Take 1 tablet by mouth every day. 30 tablet 2    Cholecalciferol (VITAMIN D3) 5000 units Cap Take 1 Cap by mouth every day.      Krill Oil 500 MG Cap Take 1 Cap by mouth every day.      Magnesium 400 MG Cap Take 1 Cap by mouth every day.      Lutein-Zeaxanthin 25-5 MG Cap Take 1 Cap by mouth every day.      GAVILYTE-G 236 g Recon Soln        No current facility-administered medications for this " visit.         Physical Exam:   Gen:           Alert and oriented, No apparent distress. Mood and affect appropriate, normal interaction with examiner.  Eyes:          PERRL, EOM intact, sclere white, conjunctive moist.  Ears:          Not examined.   Hearing:     Grossly intact.  Nose:          Normal, no lesions or deformities.  Dentition:    Good dentition.  Oropharynx:   Tongue normal, posterior pharynx without erythema or exudate.  Neck:        Supple, trachea midline, no masses.  Respiratory Effort: No intercostal retractions or use of accessory muscles.   Lung Auscultation:      Clear to auscultation bilaterally; no rales, rhonchi or wheezing.  CV:            Regular rate and rhythm. No murmurs, rubs or gallops.  Abd:           Not examined.   Lymphadenopathy: Not examined.  Gait and Station: Normal.  Digits and Nails: No clubbing, cyanosis, petechiae, or nodes.   Cranial Nerves: II-XII grossly intact.  Skin:        No rashes, lesions or ulcers noted.               Ext:           No cyanosis or edema.      Assessment:  1. HENRI (obstructive sleep apnea)              Plan:  Using and benefiting from CPAP therapy.  Compliance shows adequate use and control of HENRI.  Order placed for mask and supplies.  Patient is compliant on first checkup since receiving device.  Will follow-up in November for annual HENRI, but can be seen sooner if needed.    Please note that this dictation was created using voice recognition software. I have made every reasonable attempt to correct obvious errors, but it is possible there are errors of grammar and possibly content that I did not discover before finalizing the note.

## 2025-01-30 NOTE — H&P
Hospital Medicine History & Physical Note    Date of Service  4/21/2021    Primary Care Physician  Pcp Pt States None    Consultants  Dr Ruiz - general surgery    Code Status  Full Code    Chief Complaint  Chief Complaint   Patient presents with   • RLQ Pain     RLQ pain and diagnosed with appy at urgent care  pain since thursday  Also has had some vomiting   • Complicated Appendicitis       History of Presenting Illness  65 y.o. female who presented 4/21/2021 with abdominal pain that started generalized on Friday and has localized to the RLQ.  She presented to urgent care earlier today and CT scan confirms the diagnosis of acute appendicitis.  She denies any fevers or chills.  She has a history of diabetes and uses diet to control this, she states her last A1C was around 6 and she doesn't check her sugars regularly.  She does take a supplement called Berberine complex that she states is similar to metformin.  I will resume her home medications for hypertension and thyroid and will also order a sliding scale insulin while hospitalized to make sure the stress of surgery does not drive her sugars too high.      Review of Systems  Review of Systems   Constitutional: Negative for chills and fever.   HENT: Negative for congestion and sore throat.    Eyes: Negative for blurred vision and photophobia.   Respiratory: Negative for cough and shortness of breath.    Cardiovascular: Negative for chest pain, claudication and leg swelling.   Gastrointestinal: Positive for abdominal pain and vomiting (single episode Saturday). Negative for constipation, diarrhea and heartburn.   Genitourinary: Negative for dysuria and hematuria.   Musculoskeletal: Negative for joint pain and myalgias.   Skin: Negative for itching and rash.   Neurological: Negative for dizziness, sensory change, speech change, weakness and headaches.   Psychiatric/Behavioral: Negative for depression. The patient is not nervous/anxious and does not have insomnia.         Past Medical History   has a past medical history of Back pain, Chronic low back pain (09/25/2018), Elevated blood sugar, Hypertension, Kidney stone (1980's), Obesity, HENRI (obstructive sleep apnea) (1/20/2017), Osteoporosis, Post-nasal drip, Seasonal allergies, Sleep apnea, and Thyroid disease.    Surgical History   has a past surgical history that includes cholecystectomy (1980's); closed manipulation elbow (1967); colonoscopy (2007); colonoscopy (2010); dental extraction(s) (1980's); colonoscopy (9/27/2018); and colonoscopy flexible w/biopsy (9/27/2018).     Family History  family history includes Heart Failure in her father; Other in her brother.     Social History   reports that she quit smoking about 3 years ago. Her smoking use included cigarettes. She has a 15.00 pack-year smoking history. She has never used smokeless tobacco. She reports previous alcohol use. She reports that she does not use drugs.    Allergies  No Known Allergies    Medications  Prior to Admission Medications   Prescriptions Last Dose Informant Patient Reported? Taking?   BIOTIN PO   Yes No   Sig: Take  by mouth.   Barberry-Oreg Grape-Goldenseal (BERBERINE COMPLEX PO)   Yes No   Sig: Take  by mouth.   Cholecalciferol (VITAMIN D3) 5000 units Cap   Yes No   Sig: Take 1 Cap by mouth every day.   Cyanocobalamin (VITAMIN B 12 PO)   Yes No   Sig: Take  by mouth.   Krill Oil 500 MG Cap   Yes No   Sig: Take 1 Cap by mouth every day.   Loratadine 10 MG Cap   Yes No   Sig: Take 1 Cap by mouth every day.   Lutein-Zeaxanthin (OCUVITE LUTEIN 25) 25-5 MG Cap   Yes No   Sig: Take 1 Cap by mouth every day.   Magnesium 400 MG Cap   Yes No   Sig: Take 1 Cap by mouth every day.   ibuprofen (MOTRIN) 600 MG Tab   Yes No   Sig: Take 800 mg by mouth every 6 hours as needed.   losartan (COZAAR) 25 MG TABS   Yes No   Sig: Take 25 mg by mouth every day.   losartan (COZAAR) 50 MG Tab   Yes No   thyroid (ARMOUR THYROID) 90 MG Tab   Yes No   Sig: Take 90 mg by  Detail Level: Simple mouth every day.      Facility-Administered Medications: None       Physical Exam  Temp:  [37.2 °C (98.9 °F)] 37.2 °C (98.9 °F)  Pulse:  [94] 94  Resp:  [18] 18  BP: (132)/(85) 132/85  SpO2:  [94 %] 94 %    Physical Exam  Vitals and nursing note reviewed.   Constitutional:       General: She is not in acute distress.     Appearance: Normal appearance. She is obese. She is not ill-appearing.   HENT:      Head: Normocephalic and atraumatic.      Nose: Nose normal.   Eyes:      General: No scleral icterus.  Cardiovascular:      Rate and Rhythm: Normal rate and regular rhythm.      Heart sounds: Normal heart sounds. No murmur.   Pulmonary:      Effort: Pulmonary effort is normal.      Breath sounds: Normal breath sounds.   Abdominal:      General: Bowel sounds are normal. There is no distension.      Palpations: Abdomen is soft.      Tenderness: There is abdominal tenderness (RLQ).   Musculoskeletal:         General: No swelling or tenderness.      Cervical back: Neck supple.   Skin:     General: Skin is warm and dry.   Neurological:      General: No focal deficit present.      Mental Status: She is alert and oriented to person, place, and time.   Psychiatric:         Mood and Affect: Mood normal.         Laboratory:  Recent Labs     04/21/21  1032   WBC 13.4*   RBC 4.41   HEMOGLOBIN 12.7   HEMATOCRIT 39.7   MCV 90.0   MCH 28.8   MCHC 32.0*   RDW 48.0   PLATELETCT 333   MPV 9.5     Recent Labs     04/21/21  1032   SODIUM 138   POTASSIUM 3.9   CHLORIDE 103   CO2 21   GLUCOSE 116*   BUN 35*   CREATININE 1.61*   CALCIUM 9.3     Recent Labs     04/21/21  1032   ALTSGPT 22   ASTSGOT 19   ALKPHOSPHAT 111*   TBILIRUBIN 0.5   LIPASE 44   GLUCOSE 116*         No results for input(s): NTPROBNP in the last 72 hours.      No results for input(s): TROPONINT in the last 72 hours.    Imaging:  No orders to display   CT Scan abdomen and pelvis from urgent care: Inflammatory changes in the right lower quadrant with a probable dilated  appendix likely related to appendicitis. Examination is limited by patient body habitus.  Hepatomegaly and hepatic steatosis.  Mild atherosclerotic plaque.  Status post cholecystectomy.      Assessment/Plan:  I anticipate this patient will require at least two midnights for appropriate medical management, necessitating inpatient admission.    * Acute appendicitis with localized peritonitis  Assessment & Plan  Symptoms started Friday with generalized abdominal pain  CT abdomen confirmed acute appy  Dr Ruiz to consult, NPO now in anticipation of upcoming surgery  Rapid covid ordered and pending      Stage 3 chronic kidney disease- (present on admission)  Assessment & Plan  Cr today 1.61, baseline is around 1.1  IV hydration      Essential hypertension- (present on admission)  Assessment & Plan  Resume cozaar  PRN metoprolol if needed      BMI 50.0-59.9, adult (HCC)- (present on admission)  Assessment & Plan  Post hospitalization weight management      HENRI (obstructive sleep apnea)- (present on admission)  Assessment & Plan   will be bringing in her CPAP machine for use while in the hospital         Detail Level: Detailed Detail Level: Zone

## 2025-05-21 NOTE — THERAPY
Physical Therapy   Daily Treatment     Patient Name: Mady Yang  Age:  65 y.o., Sex:  female  Medical Record #: [id number]  Today's Date: 5/30/2021     Precautions  Precautions: Fall Risk, Other (See Comments)  Comments: diabetic, tachycardia    Subjective    Patient agreeable to PT.     Objective       05/30/21 1231   Vitals   Pulse (!) 103   Patient BP Position Sitting   Blood Pressure (!) 99/56   O2 (LPM) 0   O2 Delivery Device None - Room Air   Gait Functional Level of Assist    Gait Level Of Assist Contact Guard Assist  (SBA-CGA due to mild hypotension during session as per above)   Assistive Device Front Wheel Walker  (and gait belt)   Distance (Feet)   (1x 125 feet, and 2x 100 feet)   # of Times Distance was Traveled 1   Deviation Trendelenberg;Increased Base Of Support;Bradykinetic;Decreased Toe Off  (limited by fatigue)   Bed Mobility    Sit to Stand   (SPV initially; CGA at end of session)   Interdisciplinary Plan of Care Collaboration   IDT Collaboration with  Family / Caregiver;Physical Therapist   Patient Position at End of Therapy Seated;Family / Friend in Room   Collaboration Comments SO present throughout; PT discussing treatment plan   PT Total Time Spent   PT Individual Total Time Spent (Mins) 30   PT Charge Group   PT Gait Training 2       Assessment    Patient has improving endurance and good motivation; mild increase in fatigue and mild hypotension today; good social support.    Strengths: Able to follow instructions, Alert and oriented, Effective communication skills, Good carryover of learning, Good insight into deficits/needs, Independent prior level of function, Making steady progress towards goals, Manages pain appropriately, Motivated for self care and independence, Pleasant and cooperative, Willingly participates in therapeutic activities  Barriers: Decreased endurance, Fatigue, Generalized weakness, Home accessibility, Hypotension, Impaired activity tolerance, Impaired balance,  Limited mobility    Plan    Gait with FWW, assess transfers for mod I status; endurance work, LE strengthening    Passport items to be completed:  Passport items to be completed:  Get in/out of bed safely, in/out of a vehicle, safely use mobility device, walk or wheel around home/community, navigate up and down stairs, show how to get up/down from the ground, ensure home is accessible, demonstrate HEP, complete caregiver training    Physical Therapy Problems (Active)     Problem: Mobility     Dates: Start: 05/27/21       Goal: STG-Within one week, patient will ambulate household distance of 150 feet with FWW at SBA level.     Dates: Start: 05/27/21          Goal: STG-Within one week, patient will ascend and descend four to six stairs with B railings at Min A level.     Dates: Start: 05/27/21             Problem: Mobility Transfers     Dates: Start: 05/27/21       Goal: STG-Within one week, patient will perform bed mobility with bed rail at CGA level.     Dates: Start: 05/27/21          Goal: STG-Within one week, patient will transfer bed to chair with FWW at CGA level.     Dates: Start: 05/27/21          Goal: STG-Within one week, patient will transfer in/out of car with FWW at Min A level.     Dates: Start: 05/27/21             Problem: PT-Long Term Goals     Dates: Start: 05/27/21       Goal: LTG-By discharge, patient will ambulate 300 feet at SBA level with a FWW or SPC.     Dates: Start: 05/27/21          Goal: LTG-By discharge, patient will transfer one surface to another at SPV level with a FWW or SPC.     Dates: Start: 05/27/21          Goal: LTG-By discharge, patient will ambulate up/down 4-6 stairs at SBA level with 1-2 railings.     Dates: Start: 05/27/21          Goal: LTG-By discharge, patient will transfer in/out of a car at SBA level with a FWW or SPC.     Dates: Start: 05/27/21                 DISPLAY PLAN FREE TEXT

## (undated) DEVICE — RESERVOIR SUCTION 100 CC - SILICONE (20EA/CA)

## (undated) DEVICE — TUBING CLEARLINK DUO-VENT - C-FLO (48EA/CA)

## (undated) DEVICE — LACTATED RINGERS INJ 1000 ML - (14EA/CA 60CA/PF)

## (undated) DEVICE — PLUMEPEN ULTRA 3/8 IN X 10 FT HOSE (20EA/CA)

## (undated) DEVICE — SLEEVE, VASO, THIGH, MED

## (undated) DEVICE — SET SUCTION/IRRIGATION WITH DISPOSABLE TIP (6/CA )PART #0250-070-520 IS A SUB

## (undated) DEVICE — STAPLER 45MM LINEAR ENDO FLEX - ECHELON (3EA/BX)

## (undated) DEVICE — MASK ANESTHESIA ADULT  - (100/CA)

## (undated) DEVICE — GLOVE BIOGEL SZ 8 SURGICAL PF LTX - (50PR/BX 4BX/CA)

## (undated) DEVICE — NEPTUNE 4 PORT MANIFOLD - (20/PK)

## (undated) DEVICE — ELECTRODE DUAL RETURN W/ CORD - (50/PK)

## (undated) DEVICE — ELECTRODE 850 FOAM ADHESIVE - HYDROGEL RADIOTRNSPRNT (50/PK)

## (undated) DEVICE — CATHETER IV SAFETY 20 GA X 1-1/4 (50/BX)

## (undated) DEVICE — SUCTION INSTRUMENT YANKAUER BULBOUS TIP W/O VENT (50EA/CA)

## (undated) DEVICE — STAPLER 45MM ARTICULATING - ENDO (3EA/BX)

## (undated) DEVICE — TUBE CONNECTING SUCTION - CLEAR PLASTIC STERILE 72 IN (50EA/CA)

## (undated) DEVICE — TRAY CATHETER FOLEY URINE METER W/STATLOCK 350ML (10EA/CA)

## (undated) DEVICE — SYRINGE DISP. 50CC LS - (40/BX)

## (undated) DEVICE — DETERGENT RENUZYME PLUS 10 OZ PACKET (50/BX)

## (undated) DEVICE — Device

## (undated) DEVICE — CANNULA W/SEAL 5X100 Z-THRE - ADED KII (12/BX)

## (undated) DEVICE — SET EXTENSION WITH 2 PORTS (48EA/CA) ***PART #2C8610 IS A SUBSTITUTE*****

## (undated) DEVICE — ENDO PEANUT 5MM DEVICE (12EA/BX)

## (undated) DEVICE — BAG RETRIEVAL 10ML (10EA/BX)

## (undated) DEVICE — SUTURE 4-0 MONOCRYL PLUS PS-2 - 27 INCH (36/BX)

## (undated) DEVICE — STAPLE 45MM VASCULAR WHITE 2.5MM (12EA/BX)

## (undated) DEVICE — TROCAR 5X100 BLADED Z-THREAD - KII (6/BX)

## (undated) DEVICE — GOWN SURGEONS X-LARGE - DISP. (30/CA)

## (undated) DEVICE — TROCAR 12 X 150 KII FIOS Z THREAD (6EA/BX)

## (undated) DEVICE — GLOVE, LITE (PAIR)

## (undated) DEVICE — SYRINGE SAFETY 3 ML 18 GA X 1 1/2 BLUNT LL (100/BX 8BX/CA)

## (undated) DEVICE — SENSOR SPO2 NEO LNCS ADHESIVE (20/BX) SEE USER NOTES

## (undated) DEVICE — SUTURE 1 PDS II PLUS TP-1 - (12PK/BX)

## (undated) DEVICE — TUBE E-T HI-LO CUFF 7.0MM (10EA/PK)

## (undated) DEVICE — TOWEL STOP TIMEOUT SAFETY FLAG (40EA/CA)

## (undated) DEVICE — CANNULA W/ SUPPLY TUBING O2 - (50/CA)

## (undated) DEVICE — TROCAR LAPSCP 100MM 12MM NTHRD - (6/BX)

## (undated) DEVICE — BASIN EMESIS DISP. - (250/CA)

## (undated) DEVICE — GOWN WARMING STANDARD FLEX - (30/CA)

## (undated) DEVICE — SUTURE 2-0 SILK SH C/R ETHICON (12PK/BX)

## (undated) DEVICE — KIT ANESTHESIA W/CIRCUIT & 3/LT BAG W/FILTER (20EA/CA)

## (undated) DEVICE — DRESSING TRANSPARENT FILM TEGADERM 4 X 4.75" (50EA/BX)"

## (undated) DEVICE — SUTURE 0 VICRYL PLUS UR-6 - 27 INCH (36/BX)

## (undated) DEVICE — CANISTER SUCTION 3000ML MECHANICAL FILTER AUTO SHUTOFF MEDI-VAC NONSTERILE LF DISP  (40EA/CA)

## (undated) DEVICE — STAPLE 45MM BLUE 4.5MM (12EA/BX)

## (undated) DEVICE — SYRINGE SAFETY 5 ML 18 GA X 1-1/2 BLUNT LL (100/BX 4BX/CA)

## (undated) DEVICE — SPONGE GAUZE NON-STERILE 4X4 - (2000/CA 10PK/CA)

## (undated) DEVICE — SODIUM CHL IRRIGATION 0.9% 1000ML (12EA/CA)

## (undated) DEVICE — KIT  I.V. START (100EA/CA)

## (undated) DEVICE — SENSOR SPO2 ADULT LNCS ADTX (20/BX) ORDER ITEM #19593

## (undated) DEVICE — CHLORAPREP 26 ML APPLICATOR - ORANGE TINT(25/CA)

## (undated) DEVICE — PROTECTOR ULNA NERVE - (36PR/CA)

## (undated) DEVICE — SUTURE 3-0 SILK SH C/R 18 IN - (12/BX)

## (undated) DEVICE — CANISTER SUCTION RIGID RED 1500CC (40EA/CA)

## (undated) DEVICE — HEAD HOLDER JUNIOR/ADULT

## (undated) DEVICE — GLOVE BIOGEL INDICATOR SZ 8.5 SURGICAL PF LTX - (50/BX 4BX/CA)

## (undated) DEVICE — SUTURE GENERAL

## (undated) DEVICE — STAPLE 45MM WHTE 2.5MM - (12/BX)

## (undated) DEVICE — PAD PREP 24 X 48 CUFFED - (100/CA)

## (undated) DEVICE — SEALER VESSEL HARMONIC ACE PLUS WITH ADVANCED HEMOSTASIS 36CM (1/EA)